# Patient Record
Sex: MALE | Race: BLACK OR AFRICAN AMERICAN | Employment: UNEMPLOYED | URBAN - METROPOLITAN AREA
[De-identification: names, ages, dates, MRNs, and addresses within clinical notes are randomized per-mention and may not be internally consistent; named-entity substitution may affect disease eponyms.]

---

## 2019-01-01 ENCOUNTER — APPOINTMENT (OUTPATIENT)
Dept: GENERAL RADIOLOGY | Age: 84
DRG: 004 | End: 2019-01-01
Payer: MEDICARE

## 2019-01-01 ENCOUNTER — APPOINTMENT (OUTPATIENT)
Dept: INTERVENTIONAL RADIOLOGY/VASCULAR | Age: 84
DRG: 004 | End: 2019-01-01
Payer: MEDICARE

## 2019-01-01 ENCOUNTER — HOSPITAL ENCOUNTER (EMERGENCY)
Age: 84
Discharge: HOME | End: 2019-07-21
Attending: EMERGENCY MEDICINE
Payer: MEDICARE

## 2019-01-01 ENCOUNTER — ANESTHESIA EVENT (OUTPATIENT)
Dept: ENDOSCOPY | Age: 84
DRG: 004 | End: 2019-01-01
Payer: MEDICARE

## 2019-01-01 ENCOUNTER — ANESTHESIA EVENT (OUTPATIENT)
Dept: OPERATING ROOM | Age: 84
DRG: 004 | End: 2019-01-01
Payer: MEDICARE

## 2019-01-01 ENCOUNTER — APPOINTMENT (OUTPATIENT)
Dept: CT IMAGING | Age: 84
DRG: 004 | End: 2019-01-01
Payer: MEDICARE

## 2019-01-01 ENCOUNTER — ANESTHESIA EVENT (OUTPATIENT)
Dept: MEDSURG UNIT | Age: 84
DRG: 004 | End: 2019-01-01
Payer: MEDICARE

## 2019-01-01 ENCOUNTER — APPOINTMENT (OUTPATIENT)
Dept: GENERAL RADIOLOGY | Age: 84
End: 2019-01-01
Payer: MEDICARE

## 2019-01-01 ENCOUNTER — ANESTHESIA (OUTPATIENT)
Dept: ENDOSCOPY | Age: 84
DRG: 004 | End: 2019-01-01
Payer: MEDICARE

## 2019-01-01 ENCOUNTER — HOSPITAL ENCOUNTER (INPATIENT)
Age: 84
LOS: 11 days | Discharge: ANOTHER HOSPITAL | DRG: 194 | End: 2019-03-23
Attending: PHYSICAL MEDICINE & REHABILITATION | Admitting: PHYSICAL MEDICINE & REHABILITATION
Payer: MEDICARE

## 2019-01-01 ENCOUNTER — ANESTHESIA (OUTPATIENT)
Dept: ICU | Age: 84
DRG: 004 | End: 2019-01-01
Payer: MEDICARE

## 2019-01-01 ENCOUNTER — ANESTHESIA (OUTPATIENT)
Dept: MEDSURG UNIT | Age: 84
DRG: 004 | End: 2019-01-01
Payer: MEDICARE

## 2019-01-01 ENCOUNTER — HOSPITAL ENCOUNTER (INPATIENT)
Age: 84
LOS: 19 days | Discharge: NURSING, REHAB OR LONG-TERM CARE | DRG: 004 | End: 2019-04-16
Attending: EMERGENCY MEDICINE | Admitting: INTERNAL MEDICINE
Payer: MEDICARE

## 2019-01-01 ENCOUNTER — APPOINTMENT (OUTPATIENT)
Dept: MRI IMAGING | Age: 84
DRG: 004 | End: 2019-01-01
Payer: MEDICARE

## 2019-01-01 ENCOUNTER — APPOINTMENT (OUTPATIENT)
Dept: GENERAL RADIOLOGY | Age: 84
DRG: 194 | End: 2019-01-01
Payer: MEDICARE

## 2019-01-01 ENCOUNTER — ANESTHESIA (OUTPATIENT)
Dept: OPERATING ROOM | Age: 84
DRG: 004 | End: 2019-01-01
Payer: MEDICARE

## 2019-01-01 ENCOUNTER — APPOINTMENT (OUTPATIENT)
Dept: CT IMAGING | Age: 84
DRG: 194 | End: 2019-01-01
Payer: MEDICARE

## 2019-01-01 ENCOUNTER — ANESTHESIA EVENT (OUTPATIENT)
Dept: ICU | Age: 84
DRG: 004 | End: 2019-01-01
Payer: MEDICARE

## 2019-01-01 ENCOUNTER — HOSPITAL ENCOUNTER (INPATIENT)
Age: 84
LOS: 3 days | Discharge: NURSING, REHAB OR LONG-TERM CARE | DRG: 194 | End: 2019-03-12
Attending: EMERGENCY MEDICINE | Admitting: HOSPITALIST
Payer: MEDICARE

## 2019-01-01 VITALS
TEMPERATURE: 97.7 F | HEART RATE: 99 BPM | SYSTOLIC BLOOD PRESSURE: 126 MMHG | RESPIRATION RATE: 16 BRPM | BODY MASS INDEX: 22.13 KG/M2 | DIASTOLIC BLOOD PRESSURE: 59 MMHG | OXYGEN SATURATION: 90 % | WEIGHT: 154.6 LBS | HEIGHT: 70 IN

## 2019-01-01 VITALS
WEIGHT: 150 LBS | DIASTOLIC BLOOD PRESSURE: 74 MMHG | OXYGEN SATURATION: 98 % | RESPIRATION RATE: 16 BRPM | TEMPERATURE: 98.1 F | BODY MASS INDEX: 22.22 KG/M2 | HEART RATE: 81 BPM | SYSTOLIC BLOOD PRESSURE: 123 MMHG | HEIGHT: 69 IN

## 2019-01-01 VITALS
WEIGHT: 154.8 LBS | TEMPERATURE: 97.8 F | HEIGHT: 70 IN | OXYGEN SATURATION: 95 % | DIASTOLIC BLOOD PRESSURE: 78 MMHG | SYSTOLIC BLOOD PRESSURE: 132 MMHG | HEART RATE: 97 BPM | BODY MASS INDEX: 22.16 KG/M2 | RESPIRATION RATE: 27 BRPM

## 2019-01-01 VITALS
WEIGHT: 160.05 LBS | SYSTOLIC BLOOD PRESSURE: 154 MMHG | RESPIRATION RATE: 22 BRPM | OXYGEN SATURATION: 100 % | HEIGHT: 70 IN | HEART RATE: 98 BPM | BODY MASS INDEX: 22.91 KG/M2 | DIASTOLIC BLOOD PRESSURE: 82 MMHG | TEMPERATURE: 99.1 F

## 2019-01-01 VITALS
SYSTOLIC BLOOD PRESSURE: 108 MMHG | RESPIRATION RATE: 12 BRPM | DIASTOLIC BLOOD PRESSURE: 69 MMHG | TEMPERATURE: 93.5 F | OXYGEN SATURATION: 100 %

## 2019-01-01 DIAGNOSIS — R77.8 ELEVATED TROPONIN: ICD-10-CM

## 2019-01-01 DIAGNOSIS — K94.23 MALFUNCTION OF GASTROSTOMY TUBE (HCC): Primary | ICD-10-CM

## 2019-01-01 DIAGNOSIS — R53.1 GENERAL WEAKNESS: ICD-10-CM

## 2019-01-01 DIAGNOSIS — R79.89 ELEVATED SERUM CREATININE: ICD-10-CM

## 2019-01-01 DIAGNOSIS — R94.31 ACUTE ELECTROCARDIOGRAM CHANGES: ICD-10-CM

## 2019-01-01 DIAGNOSIS — J11.1 INFLUENZA: ICD-10-CM

## 2019-01-01 DIAGNOSIS — R05.9 COUGH: ICD-10-CM

## 2019-01-01 DIAGNOSIS — I50.9 CONGESTIVE HEART FAILURE, UNSPECIFIED HF CHRONICITY, UNSPECIFIED HEART FAILURE TYPE (HCC): ICD-10-CM

## 2019-01-01 DIAGNOSIS — A41.9 SEPTICEMIA (HCC): Primary | ICD-10-CM

## 2019-01-01 DIAGNOSIS — R79.89 ELEVATED BRAIN NATRIURETIC PEPTIDE (BNP) LEVEL: ICD-10-CM

## 2019-01-01 DIAGNOSIS — J18.9 PNEUMONIA DUE TO ORGANISM: ICD-10-CM

## 2019-01-01 DIAGNOSIS — R77.8 TROPONIN LEVEL ELEVATED: ICD-10-CM

## 2019-01-01 DIAGNOSIS — R53.83 FATIGUE, UNSPECIFIED TYPE: Primary | ICD-10-CM

## 2019-01-01 DIAGNOSIS — R79.89 SERUM CREATININE RAISED: ICD-10-CM

## 2019-01-01 LAB
ABO/RH: NORMAL
ABO/RH: NORMAL
ACETYLCHOLINE BINDING ANTIBODY: 0 NMOL/L (ref 0–0.4)
ACETYLCHOLINE BINDING ANTIBODY: ABNORMAL NMOL/L (ref 0–0.4)
ACETYLCHOLINE BLOCKING AB: 0 % (ref 0–26)
ACETYLCHOLINE BLOCKING AB: ABNORMAL % (ref 0–26)
ADENOVIRUS DETECTION BY PCR: NOT DETECTED
ALBUMIN SERPL-MCNC: 2.5 GM/DL (ref 3.4–5)
ALBUMIN SERPL-MCNC: 2.9 GM/DL (ref 3.4–5)
ALBUMIN SERPL-MCNC: 3 GM/DL (ref 3.4–5)
ALBUMIN SERPL-MCNC: 3 GM/DL (ref 3.4–5)
ALBUMIN SERPL-MCNC: 3.6 GM/DL (ref 3.4–5)
ALP BLD-CCNC: 102 IU/L (ref 40–128)
ALP BLD-CCNC: 76 IU/L (ref 40–128)
ALP BLD-CCNC: 81 IU/L (ref 40–128)
ALP BLD-CCNC: 81 IU/L (ref 40–129)
ALP BLD-CCNC: 86 IU/L (ref 40–129)
ALT SERPL-CCNC: 14 U/L (ref 10–40)
ALT SERPL-CCNC: 5 U/L (ref 10–40)
ALT SERPL-CCNC: 7 U/L (ref 10–40)
ALT SERPL-CCNC: 7 U/L (ref 10–40)
ALT SERPL-CCNC: <5 U/L (ref 10–40)
AMMONIA: 35 UMOL/L (ref 16–60)
ANION GAP SERPL CALCULATED.3IONS-SCNC: 11 MMOL/L (ref 4–16)
ANION GAP SERPL CALCULATED.3IONS-SCNC: 11 MMOL/L (ref 4–16)
ANION GAP SERPL CALCULATED.3IONS-SCNC: 14 MMOL/L (ref 4–16)
ANION GAP SERPL CALCULATED.3IONS-SCNC: 3 MMOL/L (ref 4–16)
ANION GAP SERPL CALCULATED.3IONS-SCNC: 4 MMOL/L (ref 4–16)
ANION GAP SERPL CALCULATED.3IONS-SCNC: 5 MMOL/L (ref 4–16)
ANION GAP SERPL CALCULATED.3IONS-SCNC: 6 MMOL/L (ref 4–16)
ANION GAP SERPL CALCULATED.3IONS-SCNC: 7 MMOL/L (ref 4–16)
ANION GAP SERPL CALCULATED.3IONS-SCNC: 8 MMOL/L (ref 4–16)
ANION GAP SERPL CALCULATED.3IONS-SCNC: 9 MMOL/L (ref 4–16)
ANION GAP SERPL CALCULATED.3IONS-SCNC: 9 MMOL/L (ref 4–16)
ANTIBODY SCREEN: NEGATIVE
ANTIBODY SCREEN: NEGATIVE
APTT: 27.4 SECONDS (ref 21.2–33)
APTT: 30.8 SECONDS (ref 21.2–33)
APTT: 32.8 SECONDS (ref 21.2–33)
APTT: 33 SECONDS (ref 21.2–33)
APTT: 34 SECONDS (ref 21.2–33)
APTT: 34.2 SECONDS (ref 21.2–33)
APTT: 35.5 SECONDS (ref 21.2–33)
APTT: 35.8 SECONDS (ref 21.2–33)
APTT: 39.8 SECONDS (ref 21.2–33)
APTT: 41.5 SECONDS (ref 21.2–33)
APTT: 41.6 SECONDS (ref 21.2–33)
APTT: 61.1 SECONDS (ref 21.2–33)
APTT: 63.8 SECONDS (ref 21.2–33)
APTT: 73.2 SECONDS (ref 21.2–33)
APTT: >240 SECONDS (ref 21.2–33)
AST SERPL-CCNC: 13 IU/L (ref 15–37)
AST SERPL-CCNC: 18 IU/L (ref 15–37)
AST SERPL-CCNC: 18 IU/L (ref 15–37)
AST SERPL-CCNC: 19 IU/L (ref 15–37)
AST SERPL-CCNC: 35 IU/L (ref 15–37)
BACTERIA: ABNORMAL /HPF
BACTERIA: NEGATIVE /HPF
BACTERIA: NEGATIVE /HPF
BASE EXCESS MIXED: 6.1 (ref 0–1.2)
BASE EXCESS MIXED: ABNORMAL (ref 0–1.2)
BASOPHILS ABSOLUTE: 0 K/CU MM
BASOPHILS RELATIVE PERCENT: 0.1 % (ref 0–1)
BASOPHILS RELATIVE PERCENT: 0.3 % (ref 0–1)
BASOPHILS RELATIVE PERCENT: 0.5 % (ref 0–1)
BILIRUB SERPL-MCNC: 0.4 MG/DL (ref 0–1)
BILIRUB SERPL-MCNC: 0.4 MG/DL (ref 0–1)
BILIRUB SERPL-MCNC: 0.5 MG/DL (ref 0–1)
BILIRUB SERPL-MCNC: 0.5 MG/DL (ref 0–1)
BILIRUB SERPL-MCNC: 0.9 MG/DL (ref 0–1)
BILIRUBIN DIRECT: 0.2 MG/DL (ref 0–0.3)
BILIRUBIN URINE: NEGATIVE MG/DL
BILIRUBIN, INDIRECT: 0.2 MG/DL (ref 0–0.7)
BLOOD, URINE: ABNORMAL
BLOOD, URINE: ABNORMAL
BLOOD, URINE: NEGATIVE
BORDETELLA PERTUSSIS PCR: NOT DETECTED
BUN BLDV-MCNC: 13 MG/DL (ref 6–23)
BUN BLDV-MCNC: 15 MG/DL (ref 6–23)
BUN BLDV-MCNC: 16 MG/DL (ref 6–23)
BUN BLDV-MCNC: 16 MG/DL (ref 6–23)
BUN BLDV-MCNC: 17 MG/DL (ref 6–23)
BUN BLDV-MCNC: 18 MG/DL (ref 6–23)
BUN BLDV-MCNC: 19 MG/DL (ref 6–23)
BUN BLDV-MCNC: 19 MG/DL (ref 6–23)
BUN BLDV-MCNC: 20 MG/DL (ref 6–23)
BUN BLDV-MCNC: 20 MG/DL (ref 6–23)
BUN BLDV-MCNC: 21 MG/DL (ref 6–23)
BUN BLDV-MCNC: 21 MG/DL (ref 6–23)
BUN BLDV-MCNC: 22 MG/DL (ref 6–23)
BUN BLDV-MCNC: 23 MG/DL (ref 6–23)
BUN BLDV-MCNC: 24 MG/DL (ref 6–23)
BUN BLDV-MCNC: 29 MG/DL (ref 6–23)
BUN BLDV-MCNC: 31 MG/DL (ref 6–23)
BUN BLDV-MCNC: 34 MG/DL (ref 6–23)
BUN BLDV-MCNC: 35 MG/DL (ref 6–23)
BUN BLDV-MCNC: 9 MG/DL (ref 6–23)
CALCIUM IONIZED: 4.52 MG/DL (ref 4.48–5.28)
CALCIUM SERPL-MCNC: 7.3 MG/DL (ref 8.3–10.6)
CALCIUM SERPL-MCNC: 7.5 MG/DL (ref 8.3–10.6)
CALCIUM SERPL-MCNC: 7.6 MG/DL (ref 8.3–10.6)
CALCIUM SERPL-MCNC: 7.6 MG/DL (ref 8.3–10.6)
CALCIUM SERPL-MCNC: 7.7 MG/DL (ref 8.3–10.6)
CALCIUM SERPL-MCNC: 7.8 MG/DL (ref 8.3–10.6)
CALCIUM SERPL-MCNC: 7.9 MG/DL (ref 8.3–10.6)
CALCIUM SERPL-MCNC: 8 MG/DL (ref 8.3–10.6)
CALCIUM SERPL-MCNC: 8.1 MG/DL (ref 8.3–10.6)
CALCIUM SERPL-MCNC: 8.2 MG/DL (ref 8.3–10.6)
CALCIUM SERPL-MCNC: 8.2 MG/DL (ref 8.3–10.6)
CALCIUM SERPL-MCNC: 8.3 MG/DL (ref 8.3–10.6)
CALCIUM SERPL-MCNC: 8.4 MG/DL (ref 8.3–10.6)
CALCIUM SERPL-MCNC: 8.4 MG/DL (ref 8.3–10.6)
CALCIUM SERPL-MCNC: 8.5 MG/DL (ref 8.3–10.6)
CALCIUM SERPL-MCNC: 8.5 MG/DL (ref 8.3–10.6)
CALCIUM SERPL-MCNC: 8.6 MG/DL (ref 8.3–10.6)
CALCIUM SERPL-MCNC: 8.9 MG/DL (ref 8.3–10.6)
CALCIUM SERPL-MCNC: 8.9 MG/DL (ref 8.3–10.6)
CARBON MONOXIDE, BLOOD: 1.7 % (ref 0–5)
CARBON MONOXIDE, BLOOD: 1.9 % (ref 0–5)
CARBON MONOXIDE, BLOOD: 2 % (ref 0–5)
CARBON MONOXIDE, BLOOD: 2.1 % (ref 0–5)
CARBON MONOXIDE, BLOOD: 2.2 % (ref 0–5)
CARBON MONOXIDE, BLOOD: 2.2 % (ref 0–5)
CARBON MONOXIDE, BLOOD: 2.3 % (ref 0–5)
CARBON MONOXIDE, BLOOD: 2.4 % (ref 0–5)
CARBON MONOXIDE, BLOOD: 2.5 % (ref 0–5)
CARBON MONOXIDE, BLOOD: 2.6 % (ref 0–5)
CARBON MONOXIDE, BLOOD: 2.7 % (ref 0–5)
CARBON MONOXIDE, BLOOD: 2.8 % (ref 0–5)
CARBON MONOXIDE, BLOOD: 3.5 % (ref 0–5)
CARBON MONOXIDE, BLOOD: 4 % (ref 0–5)
CARBON MONOXIDE, BLOOD: 4.1 % (ref 0–5)
CHLAMYDOPHILA PNEUMONIA PCR: NOT DETECTED
CHLORIDE BLD-SCNC: 100 MMOL/L (ref 99–110)
CHLORIDE BLD-SCNC: 101 MMOL/L (ref 99–110)
CHLORIDE BLD-SCNC: 103 MMOL/L (ref 99–110)
CHLORIDE BLD-SCNC: 104 MMOL/L (ref 99–110)
CHLORIDE BLD-SCNC: 105 MMOL/L (ref 99–110)
CHLORIDE BLD-SCNC: 107 MMOL/L (ref 99–110)
CHLORIDE BLD-SCNC: 108 MMOL/L (ref 99–110)
CHLORIDE BLD-SCNC: 109 MMOL/L (ref 99–110)
CHLORIDE BLD-SCNC: 94 MMOL/L (ref 99–110)
CHLORIDE BLD-SCNC: 95 MMOL/L (ref 99–110)
CHLORIDE BLD-SCNC: 98 MMOL/L (ref 99–110)
CHLORIDE BLD-SCNC: 98 MMOL/L (ref 99–110)
CHLORIDE BLD-SCNC: 99 MMOL/L (ref 99–110)
CHOLESTEROL: 113 MG/DL
CLARITY: ABNORMAL
CLARITY: CLEAR
CLARITY: CLEAR
CO2 CONTENT: 26.7 MMOL/L (ref 19–24)
CO2 CONTENT: 31.3 MMOL/L (ref 19–24)
CO2 CONTENT: 33.9 MMOL/L (ref 19–24)
CO2 CONTENT: 34 MMOL/L (ref 19–24)
CO2 CONTENT: 34.2 MMOL/L (ref 19–24)
CO2 CONTENT: 35.6 MMOL/L (ref 19–24)
CO2 CONTENT: 35.7 MMOL/L (ref 19–24)
CO2 CONTENT: 36.8 MMOL/L (ref 19–24)
CO2 CONTENT: 37.3 MMOL/L (ref 19–24)
CO2 CONTENT: 37.4 MMOL/L (ref 19–24)
CO2 CONTENT: 37.6 MMOL/L (ref 19–24)
CO2 CONTENT: 38.1 MMOL/L (ref 19–24)
CO2 CONTENT: 39.4 MMOL/L (ref 19–24)
CO2 CONTENT: 40.2 MMOL/L (ref 19–24)
CO2 CONTENT: 40.3 MMOL/L (ref 19–24)
CO2 CONTENT: 41 MMOL/L (ref 19–24)
CO2 CONTENT: 41.7 MMOL/L (ref 19–24)
CO2 CONTENT: 43.1 MMOL/L (ref 19–24)
CO2 CONTENT: 43.6 MMOL/L (ref 19–24)
CO2 CONTENT: 43.6 MMOL/L (ref 19–24)
CO2 CONTENT: 44.5 MMOL/L (ref 19–24)
CO2: 29 MMOL/L (ref 21–32)
CO2: 29 MMOL/L (ref 21–32)
CO2: 30 MMOL/L (ref 21–32)
CO2: 31 MMOL/L (ref 21–32)
CO2: 31 MMOL/L (ref 21–32)
CO2: 32 MMOL/L (ref 21–32)
CO2: 33 MMOL/L (ref 21–32)
CO2: 33 MMOL/L (ref 21–32)
CO2: 34 MMOL/L (ref 21–32)
CO2: 35 MMOL/L (ref 21–32)
CO2: 36 MMOL/L (ref 21–32)
CO2: 37 MMOL/L (ref 21–32)
CO2: 38 MMOL/L (ref 21–32)
CO2: 39 MMOL/L (ref 21–32)
CO2: 41 MMOL/L (ref 21–32)
COLOR: ABNORMAL
COLOR: YELLOW
COLOR: YELLOW
COMMENT: ABNORMAL
COMMENT: NORMAL
COMPONENT: NORMAL
CORONAVIRUS 229E PCR: NOT DETECTED
CORONAVIRUS HKU1 PCR: NOT DETECTED
CORONAVIRUS NL63 PCR: NOT DETECTED
CORONAVIRUS OC43 PCR: NOT DETECTED
CREAT SERPL-MCNC: 0.8 MG/DL (ref 0.9–1.3)
CREAT SERPL-MCNC: 0.8 MG/DL (ref 0.9–1.3)
CREAT SERPL-MCNC: 0.9 MG/DL (ref 0.9–1.3)
CREAT SERPL-MCNC: 1 MG/DL (ref 0.9–1.3)
CREAT SERPL-MCNC: 1.1 MG/DL (ref 0.9–1.3)
CREAT SERPL-MCNC: 1.2 MG/DL (ref 0.9–1.3)
CREAT SERPL-MCNC: 1.3 MG/DL (ref 0.9–1.3)
CREAT SERPL-MCNC: 1.4 MG/DL (ref 0.9–1.3)
CROSSMATCH RESULT: NORMAL
CULTURE: ABNORMAL
CULTURE: NORMAL
DIFFERENTIAL TYPE: ABNORMAL
DOSE AMOUNT: NORMAL
DOSE TIME: NORMAL
EKG ATRIAL RATE: 116 BPM
EKG ATRIAL RATE: 91 BPM
EKG ATRIAL RATE: 94 BPM
EKG DIAGNOSIS: NORMAL
EKG P AXIS: 78 DEGREES
EKG P AXIS: 82 DEGREES
EKG P AXIS: 82 DEGREES
EKG P-R INTERVAL: 154 MS
EKG P-R INTERVAL: 160 MS
EKG P-R INTERVAL: 200 MS
EKG Q-T INTERVAL: 304 MS
EKG Q-T INTERVAL: 354 MS
EKG Q-T INTERVAL: 372 MS
EKG QRS DURATION: 82 MS
EKG QRS DURATION: 84 MS
EKG QRS DURATION: 86 MS
EKG QTC CALCULATION (BAZETT): 422 MS
EKG QTC CALCULATION (BAZETT): 435 MS
EKG QTC CALCULATION (BAZETT): 465 MS
EKG R AXIS: 18 DEGREES
EKG R AXIS: 21 DEGREES
EKG R AXIS: 38 DEGREES
EKG T AXIS: -11 DEGREES
EKG T AXIS: -3 DEGREES
EKG T AXIS: 82 DEGREES
EKG VENTRICULAR RATE: 116 BPM
EKG VENTRICULAR RATE: 91 BPM
EKG VENTRICULAR RATE: 94 BPM
EOSINOPHILS ABSOLUTE: 0 K/CU MM
EOSINOPHILS RELATIVE PERCENT: 0 % (ref 0–3)
EOSINOPHILS RELATIVE PERCENT: 0 % (ref 0–3)
EOSINOPHILS RELATIVE PERCENT: 0.1 % (ref 0–3)
GFR AFRICAN AMERICAN: 58 ML/MIN/1.73M2
GFR AFRICAN AMERICAN: >60 ML/MIN/1.73M2
GFR NON-AFRICAN AMERICAN: 48 ML/MIN/1.73M2
GFR NON-AFRICAN AMERICAN: 52 ML/MIN/1.73M2
GFR NON-AFRICAN AMERICAN: 58 ML/MIN/1.73M2
GFR NON-AFRICAN AMERICAN: >60 ML/MIN/1.73M2
GLUCOSE BLD-MCNC: 102 MG/DL (ref 70–99)
GLUCOSE BLD-MCNC: 103 MG/DL (ref 70–99)
GLUCOSE BLD-MCNC: 104 MG/DL (ref 70–99)
GLUCOSE BLD-MCNC: 108 MG/DL (ref 70–99)
GLUCOSE BLD-MCNC: 110 MG/DL (ref 70–99)
GLUCOSE BLD-MCNC: 111 MG/DL (ref 70–99)
GLUCOSE BLD-MCNC: 112 MG/DL (ref 70–99)
GLUCOSE BLD-MCNC: 112 MG/DL (ref 70–99)
GLUCOSE BLD-MCNC: 115 MG/DL (ref 70–99)
GLUCOSE BLD-MCNC: 116 MG/DL (ref 70–99)
GLUCOSE BLD-MCNC: 116 MG/DL (ref 70–99)
GLUCOSE BLD-MCNC: 117 MG/DL (ref 70–99)
GLUCOSE BLD-MCNC: 118 MG/DL (ref 70–99)
GLUCOSE BLD-MCNC: 121 MG/DL (ref 70–99)
GLUCOSE BLD-MCNC: 122 MG/DL (ref 70–99)
GLUCOSE BLD-MCNC: 122 MG/DL (ref 70–99)
GLUCOSE BLD-MCNC: 123 MG/DL (ref 70–99)
GLUCOSE BLD-MCNC: 124 MG/DL (ref 70–99)
GLUCOSE BLD-MCNC: 124 MG/DL (ref 70–99)
GLUCOSE BLD-MCNC: 125 MG/DL (ref 70–99)
GLUCOSE BLD-MCNC: 126 MG/DL (ref 70–99)
GLUCOSE BLD-MCNC: 127 MG/DL (ref 70–99)
GLUCOSE BLD-MCNC: 128 MG/DL (ref 70–99)
GLUCOSE BLD-MCNC: 130 MG/DL (ref 70–99)
GLUCOSE BLD-MCNC: 132 MG/DL (ref 70–99)
GLUCOSE BLD-MCNC: 133 MG/DL (ref 70–99)
GLUCOSE BLD-MCNC: 134 MG/DL (ref 70–99)
GLUCOSE BLD-MCNC: 136 MG/DL (ref 70–99)
GLUCOSE BLD-MCNC: 137 MG/DL (ref 70–99)
GLUCOSE BLD-MCNC: 138 MG/DL (ref 70–99)
GLUCOSE BLD-MCNC: 138 MG/DL (ref 70–99)
GLUCOSE BLD-MCNC: 139 MG/DL (ref 70–99)
GLUCOSE BLD-MCNC: 139 MG/DL (ref 70–99)
GLUCOSE BLD-MCNC: 140 MG/DL (ref 70–99)
GLUCOSE BLD-MCNC: 141 MG/DL (ref 70–99)
GLUCOSE BLD-MCNC: 142 MG/DL (ref 70–99)
GLUCOSE BLD-MCNC: 142 MG/DL (ref 70–99)
GLUCOSE BLD-MCNC: 144 MG/DL (ref 70–99)
GLUCOSE BLD-MCNC: 146 MG/DL (ref 70–99)
GLUCOSE BLD-MCNC: 146 MG/DL (ref 70–99)
GLUCOSE BLD-MCNC: 147 MG/DL (ref 70–99)
GLUCOSE BLD-MCNC: 148 MG/DL (ref 70–99)
GLUCOSE BLD-MCNC: 149 MG/DL (ref 70–99)
GLUCOSE BLD-MCNC: 150 MG/DL (ref 70–99)
GLUCOSE BLD-MCNC: 151 MG/DL (ref 70–99)
GLUCOSE BLD-MCNC: 151 MG/DL (ref 70–99)
GLUCOSE BLD-MCNC: 152 MG/DL (ref 70–99)
GLUCOSE BLD-MCNC: 153 MG/DL (ref 70–99)
GLUCOSE BLD-MCNC: 153 MG/DL (ref 70–99)
GLUCOSE BLD-MCNC: 154 MG/DL (ref 70–99)
GLUCOSE BLD-MCNC: 154 MG/DL (ref 70–99)
GLUCOSE BLD-MCNC: 155 MG/DL (ref 70–99)
GLUCOSE BLD-MCNC: 156 MG/DL (ref 70–99)
GLUCOSE BLD-MCNC: 157 MG/DL (ref 70–99)
GLUCOSE BLD-MCNC: 157 MG/DL (ref 70–99)
GLUCOSE BLD-MCNC: 158 MG/DL (ref 70–99)
GLUCOSE BLD-MCNC: 158 MG/DL (ref 70–99)
GLUCOSE BLD-MCNC: 159 MG/DL (ref 70–99)
GLUCOSE BLD-MCNC: 161 MG/DL (ref 70–99)
GLUCOSE BLD-MCNC: 162 MG/DL (ref 70–99)
GLUCOSE BLD-MCNC: 163 MG/DL
GLUCOSE BLD-MCNC: 163 MG/DL (ref 70–99)
GLUCOSE BLD-MCNC: 165 MG/DL (ref 70–99)
GLUCOSE BLD-MCNC: 165 MG/DL (ref 70–99)
GLUCOSE BLD-MCNC: 168 MG/DL (ref 70–99)
GLUCOSE BLD-MCNC: 168 MG/DL (ref 70–99)
GLUCOSE BLD-MCNC: 169 MG/DL (ref 70–99)
GLUCOSE BLD-MCNC: 170 MG/DL (ref 70–99)
GLUCOSE BLD-MCNC: 171 MG/DL (ref 70–99)
GLUCOSE BLD-MCNC: 171 MG/DL (ref 70–99)
GLUCOSE BLD-MCNC: 172 MG/DL (ref 70–99)
GLUCOSE BLD-MCNC: 172 MG/DL (ref 70–99)
GLUCOSE BLD-MCNC: 173 MG/DL (ref 70–99)
GLUCOSE BLD-MCNC: 174 MG/DL (ref 70–99)
GLUCOSE BLD-MCNC: 174 MG/DL (ref 70–99)
GLUCOSE BLD-MCNC: 175 MG/DL (ref 70–99)
GLUCOSE BLD-MCNC: 175 MG/DL (ref 70–99)
GLUCOSE BLD-MCNC: 177 MG/DL (ref 70–99)
GLUCOSE BLD-MCNC: 178 MG/DL (ref 70–99)
GLUCOSE BLD-MCNC: 179 MG/DL (ref 70–99)
GLUCOSE BLD-MCNC: 179 MG/DL (ref 70–99)
GLUCOSE BLD-MCNC: 183 MG/DL (ref 70–99)
GLUCOSE BLD-MCNC: 183 MG/DL (ref 70–99)
GLUCOSE BLD-MCNC: 184 MG/DL (ref 70–99)
GLUCOSE BLD-MCNC: 185 MG/DL (ref 70–99)
GLUCOSE BLD-MCNC: 188 MG/DL (ref 70–99)
GLUCOSE BLD-MCNC: 188 MG/DL (ref 70–99)
GLUCOSE BLD-MCNC: 190 MG/DL (ref 70–99)
GLUCOSE BLD-MCNC: 191 MG/DL (ref 70–99)
GLUCOSE BLD-MCNC: 191 MG/DL (ref 70–99)
GLUCOSE BLD-MCNC: 192 MG/DL (ref 70–99)
GLUCOSE BLD-MCNC: 193 MG/DL (ref 70–99)
GLUCOSE BLD-MCNC: 194 MG/DL (ref 70–99)
GLUCOSE BLD-MCNC: 196 MG/DL (ref 70–99)
GLUCOSE BLD-MCNC: 200 MG/DL (ref 70–99)
GLUCOSE BLD-MCNC: 201 MG/DL (ref 70–99)
GLUCOSE BLD-MCNC: 202 MG/DL (ref 70–99)
GLUCOSE BLD-MCNC: 203 MG/DL (ref 70–99)
GLUCOSE BLD-MCNC: 204 MG/DL (ref 70–99)
GLUCOSE BLD-MCNC: 204 MG/DL (ref 70–99)
GLUCOSE BLD-MCNC: 205 MG/DL (ref 70–99)
GLUCOSE BLD-MCNC: 205 MG/DL (ref 70–99)
GLUCOSE BLD-MCNC: 207 MG/DL (ref 70–99)
GLUCOSE BLD-MCNC: 207 MG/DL (ref 70–99)
GLUCOSE BLD-MCNC: 208 MG/DL (ref 70–99)
GLUCOSE BLD-MCNC: 211 MG/DL (ref 70–99)
GLUCOSE BLD-MCNC: 212 MG/DL (ref 70–99)
GLUCOSE BLD-MCNC: 214 MG/DL (ref 70–99)
GLUCOSE BLD-MCNC: 216 MG/DL (ref 70–99)
GLUCOSE BLD-MCNC: 219 MG/DL (ref 70–99)
GLUCOSE BLD-MCNC: 222 MG/DL (ref 70–99)
GLUCOSE BLD-MCNC: 223 MG/DL (ref 70–99)
GLUCOSE BLD-MCNC: 224 MG/DL
GLUCOSE BLD-MCNC: 224 MG/DL (ref 70–99)
GLUCOSE BLD-MCNC: 224 MG/DL (ref 70–99)
GLUCOSE BLD-MCNC: 226 MG/DL (ref 70–99)
GLUCOSE BLD-MCNC: 227 MG/DL (ref 70–99)
GLUCOSE BLD-MCNC: 229 MG/DL (ref 70–99)
GLUCOSE BLD-MCNC: 232 MG/DL (ref 70–99)
GLUCOSE BLD-MCNC: 239 MG/DL (ref 70–99)
GLUCOSE BLD-MCNC: 244 MG/DL (ref 70–99)
GLUCOSE BLD-MCNC: 250 MG/DL (ref 70–99)
GLUCOSE BLD-MCNC: 267 MG/DL (ref 70–99)
GLUCOSE BLD-MCNC: 301 MG/DL (ref 70–99)
GLUCOSE BLD-MCNC: 365 MG/DL (ref 70–99)
GLUCOSE BLD-MCNC: 69 MG/DL (ref 70–99)
GLUCOSE BLD-MCNC: 79 MG/DL (ref 70–99)
GLUCOSE BLD-MCNC: 81 MG/DL (ref 70–99)
GLUCOSE BLD-MCNC: 81 MG/DL (ref 70–99)
GLUCOSE BLD-MCNC: 82 MG/DL (ref 70–99)
GLUCOSE BLD-MCNC: 84 MG/DL (ref 70–99)
GLUCOSE BLD-MCNC: 85 MG/DL (ref 70–99)
GLUCOSE BLD-MCNC: 89 MG/DL (ref 70–99)
GLUCOSE BLD-MCNC: 90 MG/DL (ref 70–99)
GLUCOSE BLD-MCNC: 91 MG/DL (ref 70–99)
GLUCOSE BLD-MCNC: 96 MG/DL (ref 70–99)
GLUCOSE BLD-MCNC: 97 MG/DL (ref 70–99)
GLUCOSE BLD-MCNC: 99 MG/DL (ref 70–99)
GLUCOSE, URINE: 50 MG/DL
GLUCOSE, URINE: NEGATIVE MG/DL
GLUCOSE, URINE: NEGATIVE MG/DL
GRAM SMEAR: ABNORMAL
GRANULAR CASTS: 1 /LPF
GRANULAR CASTS: 4 /LPF
HCO3 ARTERIAL: 26 MMOL/L (ref 18–23)
HCO3 ARTERIAL: 29.9 MMOL/L (ref 18–23)
HCO3 ARTERIAL: 32.4 MMOL/L (ref 18–23)
HCO3 ARTERIAL: 32.5 MMOL/L (ref 18–23)
HCO3 ARTERIAL: 32.8 MMOL/L (ref 18–23)
HCO3 ARTERIAL: 34.2 MMOL/L (ref 18–23)
HCO3 ARTERIAL: 34.2 MMOL/L (ref 18–23)
HCO3 ARTERIAL: 34.3 MMOL/L (ref 18–23)
HCO3 ARTERIAL: 34.7 MMOL/L (ref 18–23)
HCO3 ARTERIAL: 35.9 MMOL/L (ref 18–23)
HCO3 ARTERIAL: 36.4 MMOL/L (ref 18–23)
HCO3 ARTERIAL: 36.6 MMOL/L (ref 18–23)
HCO3 ARTERIAL: 37.8 MMOL/L (ref 18–23)
HCO3 ARTERIAL: 38.6 MMOL/L (ref 18–23)
HCO3 ARTERIAL: 38.7 MMOL/L (ref 18–23)
HCO3 ARTERIAL: 39.2 MMOL/L (ref 18–23)
HCO3 ARTERIAL: 40 MMOL/L (ref 18–23)
HCO3 ARTERIAL: 41.3 MMOL/L (ref 18–23)
HCO3 ARTERIAL: 41.5 MMOL/L (ref 18–23)
HCO3 ARTERIAL: 41.9 MMOL/L (ref 18–23)
HCO3 ARTERIAL: 42.1 MMOL/L (ref 18–23)
HCO3 VENOUS: 31.2 MMOL/L (ref 19–25)
HCO3 VENOUS: 43.2 MMOL/L (ref 19–25)
HCT VFR BLD CALC: 21.9 % (ref 42–52)
HCT VFR BLD CALC: 23.2 % (ref 42–52)
HCT VFR BLD CALC: 23.9 % (ref 42–52)
HCT VFR BLD CALC: 25 % (ref 42–52)
HCT VFR BLD CALC: 25.2 % (ref 42–52)
HCT VFR BLD CALC: 25.4 % (ref 42–52)
HCT VFR BLD CALC: 25.8 % (ref 42–52)
HCT VFR BLD CALC: 25.8 % (ref 42–52)
HCT VFR BLD CALC: 25.9 % (ref 42–52)
HCT VFR BLD CALC: 26.1 % (ref 42–52)
HCT VFR BLD CALC: 26.8 % (ref 42–52)
HCT VFR BLD CALC: 26.9 % (ref 42–52)
HCT VFR BLD CALC: 27.1 % (ref 42–52)
HCT VFR BLD CALC: 28.1 % (ref 42–52)
HCT VFR BLD CALC: 28.5 % (ref 42–52)
HCT VFR BLD CALC: 28.6 % (ref 42–52)
HCT VFR BLD CALC: 28.8 % (ref 42–52)
HCT VFR BLD CALC: 29.1 % (ref 42–52)
HCT VFR BLD CALC: 29.9 % (ref 42–52)
HCT VFR BLD CALC: 30 % (ref 42–52)
HCT VFR BLD CALC: 35.3 % (ref 42–52)
HCT VFR BLD CALC: 36 % (ref 42–52)
HCT VFR BLD CALC: 36.1 % (ref 42–52)
HCT VFR BLD CALC: 36.4 % (ref 42–52)
HCT VFR BLD CALC: 37.9 % (ref 42–52)
HCT VFR BLD CALC: 38.1 % (ref 42–52)
HCT VFR BLD CALC: 39.3 % (ref 42–52)
HCT VFR BLD CALC: 39.3 % (ref 42–52)
HCT VFR BLD CALC: 41.7 % (ref 42–52)
HDLC SERPL-MCNC: 44 MG/DL
HEMOGLOBIN: 10.3 GM/DL (ref 13.5–18)
HEMOGLOBIN: 10.4 GM/DL (ref 13.5–18)
HEMOGLOBIN: 10.6 GM/DL (ref 13.5–18)
HEMOGLOBIN: 10.8 GM/DL (ref 13.5–18)
HEMOGLOBIN: 11 GM/DL (ref 13.5–18)
HEMOGLOBIN: 11.1 GM/DL (ref 13.5–18)
HEMOGLOBIN: 11.4 GM/DL (ref 13.5–18)
HEMOGLOBIN: 11.5 GM/DL (ref 13.5–18)
HEMOGLOBIN: 12.8 GM/DL (ref 13.5–18)
HEMOGLOBIN: 7 GM/DL (ref 13.5–18)
HEMOGLOBIN: 7.4 GM/DL (ref 13.5–18)
HEMOGLOBIN: 7.5 GM/DL (ref 13.5–18)
HEMOGLOBIN: 7.5 GM/DL (ref 13.5–18)
HEMOGLOBIN: 7.6 GM/DL (ref 13.5–18)
HEMOGLOBIN: 7.6 GM/DL (ref 13.5–18)
HEMOGLOBIN: 7.7 GM/DL (ref 13.5–18)
HEMOGLOBIN: 7.8 GM/DL (ref 13.5–18)
HEMOGLOBIN: 7.8 GM/DL (ref 13.5–18)
HEMOGLOBIN: 8.1 GM/DL (ref 13.5–18)
HEMOGLOBIN: 8.1 GM/DL (ref 13.5–18)
HEMOGLOBIN: 8.3 GM/DL (ref 13.5–18)
HEMOGLOBIN: 8.4 GM/DL (ref 13.5–18)
HEMOGLOBIN: 8.7 GM/DL (ref 13.5–18)
HEMOGLOBIN: 8.7 GM/DL (ref 13.5–18)
HEMOGLOBIN: 8.8 GM/DL (ref 13.5–18)
HEMOGLOBIN: ABNORMAL GM/DL (ref 13.5–18)
HEMOGLOBIN: ABNORMAL GM/DL (ref 13.5–18)
HUMAN METAPNEUMOVIRUS PCR: NOT DETECTED
HYALINE CASTS: 10 /LPF
IMMATURE NEUTROPHIL %: 0.4 % (ref 0–0.43)
IMMATURE NEUTROPHIL %: 0.7 % (ref 0–0.43)
IMMATURE NEUTROPHIL %: 2.3 % (ref 0–0.43)
INFLUENZA A BY PCR: ABNORMAL
INFLUENZA A H1 (2009) PCR: NOT DETECTED
INFLUENZA A H1 PANDEMIC PCR: NOT DETECTED
INFLUENZA A H3 PCR: ABNORMAL
INFLUENZA A H3 PCR: ABNORMAL
INFLUENZA B BY PCR: NOT DETECTED
INR BLD: 1.04 INDEX
INR BLD: 1.09 INDEX
INR BLD: 1.13 INDEX
IONIZED CA: 1.13 MMOL/L (ref 1.12–1.32)
KETONES, URINE: ABNORMAL MG/DL
KETONES, URINE: NEGATIVE MG/DL
KETONES, URINE: NEGATIVE MG/DL
LACTATE: 1.1 MMOL/L (ref 0.4–2)
LACTATE: 1.1 MMOL/L (ref 0.4–2)
LACTATE: 2 MMOL/L (ref 0.4–2)
LDL CHOLESTEROL DIRECT: 48 MG/DL
LEUKOCYTE ESTERASE, URINE: ABNORMAL
LEUKOCYTE ESTERASE, URINE: NEGATIVE
LEUKOCYTE ESTERASE, URINE: NEGATIVE
LIPASE: 27 IU/L (ref 13–60)
LV EF: 63 %
LVEF MODALITY: NORMAL
LYMPHOCYTES ABSOLUTE: 0.3 K/CU MM
LYMPHOCYTES ABSOLUTE: 0.6 K/CU MM
LYMPHOCYTES ABSOLUTE: 1 K/CU MM
LYMPHOCYTES RELATIVE PERCENT: 12 % (ref 24–44)
LYMPHOCYTES RELATIVE PERCENT: 4.9 % (ref 24–44)
LYMPHOCYTES RELATIVE PERCENT: 5.4 % (ref 24–44)
Lab: ABNORMAL
Lab: ABNORMAL
Lab: NORMAL
MAGNESIUM: 2.1 MG/DL (ref 1.8–2.4)
MAGNESIUM: 2.2 MG/DL (ref 1.8–2.4)
MAGNESIUM: 2.3 MG/DL (ref 1.8–2.4)
MCH RBC QN AUTO: 28.4 PG (ref 27–31)
MCH RBC QN AUTO: 28.4 PG (ref 27–31)
MCH RBC QN AUTO: 28.5 PG (ref 27–31)
MCH RBC QN AUTO: 28.6 PG (ref 27–31)
MCH RBC QN AUTO: 28.7 PG (ref 27–31)
MCH RBC QN AUTO: 28.9 PG (ref 27–31)
MCH RBC QN AUTO: 29.1 PG (ref 27–31)
MCH RBC QN AUTO: 29.1 PG (ref 27–31)
MCH RBC QN AUTO: 29.2 PG (ref 27–31)
MCH RBC QN AUTO: 29.2 PG (ref 27–31)
MCH RBC QN AUTO: 29.3 PG (ref 27–31)
MCHC RBC AUTO-ENTMCNC: 28 % (ref 32–36)
MCHC RBC AUTO-ENTMCNC: 28.3 % (ref 32–36)
MCHC RBC AUTO-ENTMCNC: 28.9 % (ref 32–36)
MCHC RBC AUTO-ENTMCNC: 29 % (ref 32–36)
MCHC RBC AUTO-ENTMCNC: 29.1 % (ref 32–36)
MCHC RBC AUTO-ENTMCNC: 29.2 % (ref 32–36)
MCHC RBC AUTO-ENTMCNC: 29.2 % (ref 32–36)
MCHC RBC AUTO-ENTMCNC: 29.3 % (ref 32–36)
MCHC RBC AUTO-ENTMCNC: 29.4 % (ref 32–36)
MCHC RBC AUTO-ENTMCNC: 29.4 % (ref 32–36)
MCHC RBC AUTO-ENTMCNC: 29.6 % (ref 32–36)
MCHC RBC AUTO-ENTMCNC: 29.8 % (ref 32–36)
MCHC RBC AUTO-ENTMCNC: 29.9 % (ref 32–36)
MCHC RBC AUTO-ENTMCNC: 30.2 % (ref 32–36)
MCHC RBC AUTO-ENTMCNC: 30.6 % (ref 32–36)
MCHC RBC AUTO-ENTMCNC: 30.6 % (ref 32–36)
MCHC RBC AUTO-ENTMCNC: 30.7 % (ref 32–36)
MCV RBC AUTO: 100.7 FL (ref 78–100)
MCV RBC AUTO: 100.8 FL (ref 78–100)
MCV RBC AUTO: 101.4 FL (ref 78–100)
MCV RBC AUTO: 104.8 FL (ref 78–100)
MCV RBC AUTO: 94.4 FL (ref 78–100)
MCV RBC AUTO: 94.7 FL (ref 78–100)
MCV RBC AUTO: 94.8 FL (ref 78–100)
MCV RBC AUTO: 94.8 FL (ref 78–100)
MCV RBC AUTO: 95.1 FL (ref 78–100)
MCV RBC AUTO: 95.1 FL (ref 78–100)
MCV RBC AUTO: 95.7 FL (ref 78–100)
MCV RBC AUTO: 96.9 FL (ref 78–100)
MCV RBC AUTO: 97 FL (ref 78–100)
MCV RBC AUTO: 97.2 FL (ref 78–100)
MCV RBC AUTO: 97.3 FL (ref 78–100)
MCV RBC AUTO: 97.4 FL (ref 78–100)
MCV RBC AUTO: 97.6 FL (ref 78–100)
MCV RBC AUTO: 97.7 FL (ref 78–100)
MCV RBC AUTO: 97.9 FL (ref 78–100)
MCV RBC AUTO: 98.2 FL (ref 78–100)
MCV RBC AUTO: 98.5 FL (ref 78–100)
MCV RBC AUTO: 98.7 FL (ref 78–100)
MCV RBC AUTO: 98.9 FL (ref 78–100)
MCV RBC AUTO: 98.9 FL (ref 78–100)
METHEMOGLOBIN ARTERIAL: 0.4 %
METHEMOGLOBIN ARTERIAL: 0.8 %
METHEMOGLOBIN ARTERIAL: 0.8 %
METHEMOGLOBIN ARTERIAL: 0.9 %
METHEMOGLOBIN ARTERIAL: 1.1 %
METHEMOGLOBIN ARTERIAL: 1.1 %
METHEMOGLOBIN ARTERIAL: 1.2 %
METHEMOGLOBIN ARTERIAL: 1.3 %
METHEMOGLOBIN ARTERIAL: 1.4 %
METHEMOGLOBIN ARTERIAL: 1.5 %
METHEMOGLOBIN ARTERIAL: 1.5 %
METHEMOGLOBIN ARTERIAL: 1.6 %
METHEMOGLOBIN ARTERIAL: 1.7 %
MONOCYTES ABSOLUTE: 0.4 K/CU MM
MONOCYTES ABSOLUTE: 0.7 K/CU MM
MONOCYTES ABSOLUTE: 0.9 K/CU MM
MONOCYTES RELATIVE PERCENT: 6.2 % (ref 0–4)
MONOCYTES RELATIVE PERCENT: 8.1 % (ref 0–4)
MONOCYTES RELATIVE PERCENT: 8.7 % (ref 0–4)
MUCUS: ABNORMAL HPF
MYCOPLASMA PNEUMONIAE PCR: NOT DETECTED
NITRITE URINE, QUANTITATIVE: NEGATIVE
NUCLEATED RBC %: 0 %
NUCLEATED RBC %: 0 %
NUCLEATED RBC %: 0.5 %
O2 SAT, VEN: 86.9 % (ref 50–70)
O2 SAT, VEN: 93.6 % (ref 50–70)
O2 SATURATION: 74.4 % (ref 96–97)
O2 SATURATION: 83.2 % (ref 96–97)
O2 SATURATION: 84.3 % (ref 96–97)
O2 SATURATION: 84.6 % (ref 96–97)
O2 SATURATION: 87.9 % (ref 96–97)
O2 SATURATION: 90.5 % (ref 96–97)
O2 SATURATION: 94.7 % (ref 96–97)
O2 SATURATION: 94.8 % (ref 96–97)
O2 SATURATION: 95 % (ref 96–97)
O2 SATURATION: 95.4 % (ref 96–97)
O2 SATURATION: 95.8 % (ref 96–97)
O2 SATURATION: 96 % (ref 96–97)
O2 SATURATION: 96 % (ref 96–97)
O2 SATURATION: 96.1 % (ref 96–97)
O2 SATURATION: 96.2 % (ref 96–97)
O2 SATURATION: 96.2 % (ref 96–97)
O2 SATURATION: 96.3 % (ref 96–97)
O2 SATURATION: 96.4 % (ref 96–97)
O2 SATURATION: 96.5 % (ref 96–97)
PARAINFLUENZA 1 PCR: NOT DETECTED
PARAINFLUENZA 2 PCR: NOT DETECTED
PARAINFLUENZA 3 PCR: NOT DETECTED
PARAINFLUENZA 4 PCR: NOT DETECTED
PCO2 ARTERIAL: 110 MMHG (ref 32–45)
PCO2 ARTERIAL: 22 MMHG (ref 32–45)
PCO2 ARTERIAL: 33 MMHG (ref 32–45)
PCO2 ARTERIAL: 44 MMHG (ref 32–45)
PCO2 ARTERIAL: 45 MMHG (ref 32–45)
PCO2 ARTERIAL: 46 MMHG (ref 32–45)
PCO2 ARTERIAL: 46 MMHG (ref 32–45)
PCO2 ARTERIAL: 47 MMHG (ref 32–45)
PCO2 ARTERIAL: 48 MMHG (ref 32–45)
PCO2 ARTERIAL: 49 MMHG (ref 32–45)
PCO2 ARTERIAL: 50 MMHG (ref 32–45)
PCO2 ARTERIAL: 52 MMHG (ref 32–45)
PCO2 ARTERIAL: 52 MMHG (ref 32–45)
PCO2 ARTERIAL: 53 MMHG (ref 32–45)
PCO2 ARTERIAL: 55 MMHG (ref 32–45)
PCO2 ARTERIAL: 55 MMHG (ref 32–45)
PCO2 ARTERIAL: 58 MMHG (ref 32–45)
PCO2 ARTERIAL: 59 MMHG (ref 32–45)
PCO2 ARTERIAL: 67 MMHG (ref 32–45)
PCO2 ARTERIAL: 69 MMHG (ref 32–45)
PCO2 ARTERIAL: 78 MMHG (ref 32–45)
PCO2, VEN: 46 MMHG (ref 38–52)
PCO2, VEN: 73 MMHG (ref 38–52)
PDW BLD-RTO: 12.8 % (ref 11.7–14.9)
PDW BLD-RTO: 12.8 % (ref 11.7–14.9)
PDW BLD-RTO: 13 % (ref 11.7–14.9)
PDW BLD-RTO: 13.1 % (ref 11.7–14.9)
PDW BLD-RTO: 13.5 % (ref 11.7–14.9)
PDW BLD-RTO: 14.6 % (ref 11.7–14.9)
PDW BLD-RTO: 15 % (ref 11.7–14.9)
PDW BLD-RTO: 15 % (ref 11.7–14.9)
PDW BLD-RTO: 15.2 % (ref 11.7–14.9)
PDW BLD-RTO: 15.4 % (ref 11.7–14.9)
PDW BLD-RTO: 15.6 % (ref 11.7–14.9)
PDW BLD-RTO: 15.9 % (ref 11.7–14.9)
PDW BLD-RTO: 16 % (ref 11.7–14.9)
PDW BLD-RTO: 16 % (ref 11.7–14.9)
PDW BLD-RTO: 16.1 % (ref 11.7–14.9)
PDW BLD-RTO: 16.4 % (ref 11.7–14.9)
PDW BLD-RTO: 16.6 % (ref 11.7–14.9)
PDW BLD-RTO: 16.7 % (ref 11.7–14.9)
PDW BLD-RTO: 16.8 % (ref 11.7–14.9)
PDW BLD-RTO: 17.1 % (ref 11.7–14.9)
PDW BLD-RTO: 17.5 % (ref 11.7–14.9)
PH BLOOD: 7.1 (ref 7.34–7.45)
PH BLOOD: 7.31 (ref 7.34–7.45)
PH BLOOD: 7.34 (ref 7.34–7.45)
PH BLOOD: 7.4 (ref 7.34–7.45)
PH BLOOD: 7.42 (ref 7.34–7.45)
PH BLOOD: 7.43 (ref 7.34–7.45)
PH BLOOD: 7.43 (ref 7.34–7.45)
PH BLOOD: 7.44 (ref 7.34–7.45)
PH BLOOD: 7.46 (ref 7.34–7.45)
PH BLOOD: 7.47 (ref 7.34–7.45)
PH BLOOD: 7.48 (ref 7.34–7.45)
PH BLOOD: 7.48 (ref 7.34–7.45)
PH BLOOD: 7.49 (ref 7.34–7.45)
PH BLOOD: 7.49 (ref 7.34–7.45)
PH BLOOD: 7.5 (ref 7.34–7.45)
PH BLOOD: 7.65 (ref 7.34–7.45)
PH BLOOD: 7.68 (ref 7.34–7.45)
PH VENOUS: 7.38 (ref 7.32–7.42)
PH VENOUS: 7.44 (ref 7.32–7.42)
PH, URINE: 5 (ref 5–8)
PLATELET # BLD: 141 K/CU MM (ref 140–440)
PLATELET # BLD: 148 K/CU MM (ref 140–440)
PLATELET # BLD: 154 K/CU MM (ref 140–440)
PLATELET # BLD: 168 K/CU MM (ref 140–440)
PLATELET # BLD: 168 K/CU MM (ref 140–440)
PLATELET # BLD: 170 K/CU MM (ref 140–440)
PLATELET # BLD: 170 K/CU MM (ref 140–440)
PLATELET # BLD: 171 K/CU MM (ref 140–440)
PLATELET # BLD: 176 K/CU MM (ref 140–440)
PLATELET # BLD: 178 K/CU MM (ref 140–440)
PLATELET # BLD: 183 K/CU MM (ref 140–440)
PLATELET # BLD: 192 K/CU MM (ref 140–440)
PLATELET # BLD: 199 K/CU MM (ref 140–440)
PLATELET # BLD: 200 K/CU MM (ref 140–440)
PLATELET # BLD: 206 K/CU MM (ref 140–440)
PLATELET # BLD: 219 K/CU MM (ref 140–440)
PLATELET # BLD: 221 K/CU MM (ref 140–440)
PLATELET # BLD: 236 K/CU MM (ref 140–440)
PLATELET # BLD: 253 K/CU MM (ref 140–440)
PLATELET # BLD: 270 K/CU MM (ref 140–440)
PLATELET # BLD: 276 K/CU MM (ref 140–440)
PLATELET # BLD: 286 K/CU MM (ref 140–440)
PLATELET # BLD: 311 K/CU MM (ref 140–440)
PLATELET # BLD: 313 K/CU MM (ref 140–440)
PLATELET # BLD: 356 K/CU MM (ref 140–440)
PLATELET # BLD: ABNORMAL K/CU MM (ref 140–440)
PMV BLD AUTO: 10 FL (ref 7.5–11.1)
PMV BLD AUTO: 10.1 FL (ref 7.5–11.1)
PMV BLD AUTO: 10.1 FL (ref 7.5–11.1)
PMV BLD AUTO: 10.2 FL (ref 7.5–11.1)
PMV BLD AUTO: 10.5 FL (ref 7.5–11.1)
PMV BLD AUTO: 10.6 FL (ref 7.5–11.1)
PMV BLD AUTO: 10.7 FL (ref 7.5–11.1)
PMV BLD AUTO: 10.8 FL (ref 7.5–11.1)
PMV BLD AUTO: 10.9 FL (ref 7.5–11.1)
PMV BLD AUTO: 11.1 FL (ref 7.5–11.1)
PMV BLD AUTO: 11.5 FL (ref 7.5–11.1)
PMV BLD AUTO: 12 FL (ref 7.5–11.1)
PMV BLD AUTO: 12 FL (ref 7.5–11.1)
PO2 ARTERIAL: 101 MMHG (ref 75–100)
PO2 ARTERIAL: 102 MMHG (ref 75–100)
PO2 ARTERIAL: 108 MMHG (ref 75–100)
PO2 ARTERIAL: 111 MMHG (ref 75–100)
PO2 ARTERIAL: 119 MMHG (ref 75–100)
PO2 ARTERIAL: 142 MMHG (ref 75–100)
PO2 ARTERIAL: 150 MMHG (ref 75–100)
PO2 ARTERIAL: 186 MMHG (ref 75–100)
PO2 ARTERIAL: 36 MMHG (ref 75–100)
PO2 ARTERIAL: 48 MMHG (ref 75–100)
PO2 ARTERIAL: 48 MMHG (ref 75–100)
PO2 ARTERIAL: 52 MMHG (ref 75–100)
PO2 ARTERIAL: 56 MMHG (ref 75–100)
PO2 ARTERIAL: 57 MMHG (ref 75–100)
PO2 ARTERIAL: 68 MMHG (ref 75–100)
PO2 ARTERIAL: 79 MMHG (ref 75–100)
PO2 ARTERIAL: 88 MMHG (ref 75–100)
PO2 ARTERIAL: 93 MMHG (ref 75–100)
PO2 ARTERIAL: 98 MMHG (ref 75–100)
PO2 ARTERIAL: 99 MMHG (ref 75–100)
PO2 ARTERIAL: 99 MMHG (ref 75–100)
PO2, VEN: 250 MMHG (ref 28–48)
PO2, VEN: 53 MMHG (ref 28–48)
POTASSIUM SERPL-SCNC: 3.1 MMOL/L (ref 3.5–5.1)
POTASSIUM SERPL-SCNC: 3.1 MMOL/L (ref 3.5–5.1)
POTASSIUM SERPL-SCNC: 3.2 MMOL/L (ref 3.5–5.1)
POTASSIUM SERPL-SCNC: 3.3 MMOL/L (ref 3.5–5.1)
POTASSIUM SERPL-SCNC: 3.4 MMOL/L (ref 3.5–5.1)
POTASSIUM SERPL-SCNC: 3.5 MMOL/L (ref 3.5–5.1)
POTASSIUM SERPL-SCNC: 3.5 MMOL/L (ref 3.5–5.1)
POTASSIUM SERPL-SCNC: 3.6 MMOL/L (ref 3.5–5.1)
POTASSIUM SERPL-SCNC: 3.7 MMOL/L (ref 3.5–5.1)
POTASSIUM SERPL-SCNC: 3.8 MMOL/L (ref 3.5–5.1)
POTASSIUM SERPL-SCNC: 3.9 MMOL/L (ref 3.5–5.1)
POTASSIUM SERPL-SCNC: 4 MMOL/L (ref 3.5–5.1)
POTASSIUM SERPL-SCNC: 4 MMOL/L (ref 3.5–5.1)
POTASSIUM SERPL-SCNC: 4.2 MMOL/L (ref 3.5–5.1)
POTASSIUM SERPL-SCNC: 4.6 MMOL/L (ref 3.5–5.1)
POTASSIUM SERPL-SCNC: 4.9 MMOL/L (ref 3.5–5.1)
POTASSIUM SERPL-SCNC: 5 MMOL/L (ref 3.5–5.1)
POTASSIUM SERPL-SCNC: ABNORMAL MMOL/L (ref 3.5–5.1)
PRO-BNP: 1095 PG/ML
PRO-BNP: ABNORMAL PG/ML
PROTEIN UA: 100 MG/DL
PROTEIN UA: 100 MG/DL
PROTEIN UA: 30 MG/DL
PROTHROMBIN TIME: 12.1 SECONDS (ref 9.12–12.5)
PROTHROMBIN TIME: 12.6 SECONDS (ref 9.12–12.5)
PROTHROMBIN TIME: 12.9 SECONDS (ref 9.12–12.5)
RBC # BLD: 2.23 M/CU MM (ref 4.6–6.2)
RBC # BLD: 2.37 M/CU MM (ref 4.6–6.2)
RBC # BLD: 2.46 M/CU MM (ref 4.6–6.2)
RBC # BLD: 2.56 M/CU MM (ref 4.6–6.2)
RBC # BLD: 2.57 M/CU MM (ref 4.6–6.2)
RBC # BLD: 2.62 M/CU MM (ref 4.6–6.2)
RBC # BLD: 2.65 M/CU MM (ref 4.6–6.2)
RBC # BLD: 2.66 M/CU MM (ref 4.6–6.2)
RBC # BLD: 2.72 M/CU MM (ref 4.6–6.2)
RBC # BLD: 2.83 M/CU MM (ref 4.6–6.2)
RBC # BLD: 2.85 M/CU MM (ref 4.6–6.2)
RBC # BLD: 2.96 M/CU MM (ref 4.6–6.2)
RBC # BLD: 2.98 M/CU MM (ref 4.6–6.2)
RBC # BLD: 3.04 M/CU MM (ref 4.6–6.2)
RBC # BLD: 3.07 M/CU MM (ref 4.6–6.2)
RBC # BLD: 3.59 M/CU MM (ref 4.6–6.2)
RBC # BLD: 3.64 M/CU MM (ref 4.6–6.2)
RBC # BLD: 3.7 M/CU MM (ref 4.6–6.2)
RBC # BLD: 3.74 M/CU MM (ref 4.6–6.2)
RBC # BLD: 3.75 M/CU MM (ref 4.6–6.2)
RBC # BLD: 3.91 M/CU MM (ref 4.6–6.2)
RBC # BLD: 3.98 M/CU MM (ref 4.6–6.2)
RBC # BLD: 4.02 M/CU MM (ref 4.6–6.2)
RBC # BLD: 4.4 M/CU MM (ref 4.6–6.2)
RBC URINE: 2 /HPF (ref 0–3)
RBC URINE: 6176 /HPF (ref 0–3)
RBC URINE: ABNORMAL /HPF (ref 0–3)
REASON FOR REJECTION: NORMAL
REASON FOR REJECTION: NORMAL
REJECTED TEST: NORMAL
REPORT STATUS: NORMAL
RHINOVIRUS ENTEROVIRUS PCR: NOT DETECTED
RSV PCR: NOT DETECTED
SEGMENTED NEUTROPHILS ABSOLUTE COUNT: 6 K/CU MM
SEGMENTED NEUTROPHILS ABSOLUTE COUNT: 6.3 K/CU MM
SEGMENTED NEUTROPHILS ABSOLUTE COUNT: 9.1 K/CU MM
SEGMENTED NEUTROPHILS RELATIVE PERCENT: 77 % (ref 36–66)
SEGMENTED NEUTROPHILS RELATIVE PERCENT: 84.9 % (ref 36–66)
SEGMENTED NEUTROPHILS RELATIVE PERCENT: 88.4 % (ref 36–66)
SODIUM BLD-SCNC: 137 MMOL/L (ref 135–145)
SODIUM BLD-SCNC: 137 MMOL/L (ref 135–145)
SODIUM BLD-SCNC: 138 MMOL/L (ref 135–145)
SODIUM BLD-SCNC: 139 MMOL/L (ref 135–145)
SODIUM BLD-SCNC: 139 MMOL/L (ref 135–145)
SODIUM BLD-SCNC: 141 MMOL/L (ref 135–145)
SODIUM BLD-SCNC: 142 MMOL/L (ref 135–145)
SODIUM BLD-SCNC: 143 MMOL/L (ref 135–145)
SODIUM BLD-SCNC: 144 MMOL/L (ref 135–145)
SODIUM BLD-SCNC: 146 MMOL/L (ref 135–145)
SODIUM BLD-SCNC: 147 MMOL/L (ref 135–145)
SODIUM BLD-SCNC: 148 MMOL/L (ref 135–145)
SPECIFIC GRAVITY UA: 1.02 (ref 1–1.03)
SPECIFIC GRAVITY UA: 1.03 (ref 1–1.03)
SPECIFIC GRAVITY UA: 1.03 (ref 1–1.03)
SPECIMEN: ABNORMAL
SPECIMEN: ABNORMAL
SPECIMEN: NORMAL
SQUAMOUS EPITHELIAL: <1 /HPF
STATUS: NORMAL
STRIATED MUSCLE AB TITER: NORMAL
STRIATED MUSCLE AB TITER: NORMAL
STRIATED MUSCLE AB, IGG SCREEN: ABNORMAL
STRIATED MUSCLE AB, IGG SCREEN: DETECTED
TITIN ANTIBODY: 0.24 IV (ref 0–0.45)
TITIN ANTIBODY: ABNORMAL IV (ref 0–0.45)
TOTAL CK: 49 IU/L (ref 38–174)
TOTAL IMMATURE NEUTOROPHIL: 0.03 K/CU MM
TOTAL IMMATURE NEUTOROPHIL: 0.07 K/CU MM
TOTAL IMMATURE NEUTOROPHIL: 0.19 K/CU MM
TOTAL NUCLEATED RBC: 0 K/CU MM
TOTAL PROTEIN: 5.1 GM/DL (ref 6.4–8.2)
TOTAL PROTEIN: 6.1 GM/DL (ref 6.4–8.2)
TOTAL PROTEIN: 6.1 GM/DL (ref 6.4–8.2)
TOTAL PROTEIN: 6.7 GM/DL (ref 6.4–8.2)
TOTAL PROTEIN: 7.7 GM/DL (ref 6.4–8.2)
TRANSFUSION STATUS: NORMAL
TRICHOMONAS: ABNORMAL /HPF
TRIGL SERPL-MCNC: 96 MG/DL
TROPONIN T: 0.06 NG/ML
TROPONIN T: 0.07 NG/ML
TROPONIN T: 0.13 NG/ML
TROPONIN T: 0.14 NG/ML
TROPONIN T: 0.14 NG/ML
TROPONIN T: 0.2 NG/ML
TSH HIGH SENSITIVITY: 0.79 UIU/ML (ref 0.27–4.2)
UNIT DIVISION: 0
UNIT NUMBER: NORMAL
UROBILINOGEN, URINE: NORMAL MG/DL (ref 0.2–1)
VANCOMYCIN TROUGH: 15 UG/ML (ref 10–20)
WBC # BLD: 10.1 K/CU MM (ref 4–10.5)
WBC # BLD: 10.5 K/CU MM (ref 4–10.5)
WBC # BLD: 10.7 K/CU MM (ref 4–10.5)
WBC # BLD: 11.4 K/CU MM (ref 4–10.5)
WBC # BLD: 5.5 K/CU MM (ref 4–10.5)
WBC # BLD: 6.4 K/CU MM (ref 4–10.5)
WBC # BLD: 6.8 K/CU MM (ref 4–10.5)
WBC # BLD: 6.9 K/CU MM (ref 4–10.5)
WBC # BLD: 7 K/CU MM (ref 4–10.5)
WBC # BLD: 7.2 K/CU MM (ref 4–10.5)
WBC # BLD: 7.3 K/CU MM (ref 4–10.5)
WBC # BLD: 7.3 K/CU MM (ref 4–10.5)
WBC # BLD: 7.4 K/CU MM (ref 4–10.5)
WBC # BLD: 7.5 K/CU MM (ref 4–10.5)
WBC # BLD: 7.7 K/CU MM (ref 4–10.5)
WBC # BLD: 8.2 K/CU MM (ref 4–10.5)
WBC # BLD: 8.5 K/CU MM (ref 4–10.5)
WBC # BLD: 8.7 K/CU MM (ref 4–10.5)
WBC # BLD: 8.7 K/CU MM (ref 4–10.5)
WBC # BLD: 8.9 K/CU MM (ref 4–10.5)
WBC # BLD: 9.4 K/CU MM (ref 4–10.5)
WBC # BLD: 9.4 K/CU MM (ref 4–10.5)
WBC # BLD: 9.9 K/CU MM (ref 4–10.5)
WBC # BLD: 9.9 K/CU MM (ref 4–10.5)
WBC UA: <1 /HPF (ref 0–2)
WBC UA: <1 /HPF (ref 0–2)
WBC UA: ABNORMAL /HPF (ref 0–2)

## 2019-01-01 PROCEDURE — 2580000003 HC RX 258: Performed by: INTERNAL MEDICINE

## 2019-01-01 PROCEDURE — 99283 EMERGENCY DEPT VISIT LOW MDM: CPT | Performed by: SURGERY

## 2019-01-01 PROCEDURE — 83735 ASSAY OF MAGNESIUM: CPT

## 2019-01-01 PROCEDURE — 80048 BASIC METABOLIC PNL TOTAL CA: CPT

## 2019-01-01 PROCEDURE — 6360000002 HC RX W HCPCS: Performed by: INTERNAL MEDICINE

## 2019-01-01 PROCEDURE — 94640 AIRWAY INHALATION TREATMENT: CPT

## 2019-01-01 PROCEDURE — 2500000003 HC RX 250 WO HCPCS: Performed by: INTERNAL MEDICINE

## 2019-01-01 PROCEDURE — 6370000000 HC RX 637 (ALT 250 FOR IP): Performed by: INTERNAL MEDICINE

## 2019-01-01 PROCEDURE — 92526 ORAL FUNCTION THERAPY: CPT

## 2019-01-01 PROCEDURE — 82140 ASSAY OF AMMONIA: CPT

## 2019-01-01 PROCEDURE — 94761 N-INVAS EAR/PLS OXIMETRY MLT: CPT

## 2019-01-01 PROCEDURE — 32555 ASPIRATE PLEURA W/ IMAGING: CPT

## 2019-01-01 PROCEDURE — 6370000000 HC RX 637 (ALT 250 FOR IP): Performed by: SURGERY

## 2019-01-01 PROCEDURE — 87070 CULTURE OTHR SPECIMN AEROBIC: CPT

## 2019-01-01 PROCEDURE — 6360000002 HC RX W HCPCS: Performed by: HOSPITALIST

## 2019-01-01 PROCEDURE — 2580000003 HC RX 258: Performed by: NURSE PRACTITIONER

## 2019-01-01 PROCEDURE — 84443 ASSAY THYROID STIM HORMONE: CPT

## 2019-01-01 PROCEDURE — 36592 COLLECT BLOOD FROM PICC: CPT

## 2019-01-01 PROCEDURE — 94002 VENT MGMT INPAT INIT DAY: CPT

## 2019-01-01 PROCEDURE — 82803 BLOOD GASES ANY COMBINATION: CPT

## 2019-01-01 PROCEDURE — 2000000000 HC ICU R&B

## 2019-01-01 PROCEDURE — 89220 SPUTUM SPECIMEN COLLECTION: CPT

## 2019-01-01 PROCEDURE — 6370000000 HC RX 637 (ALT 250 FOR IP): Performed by: NURSE PRACTITIONER

## 2019-01-01 PROCEDURE — 2500000003 HC RX 250 WO HCPCS: Performed by: HOSPITALIST

## 2019-01-01 PROCEDURE — 97530 THERAPEUTIC ACTIVITIES: CPT

## 2019-01-01 PROCEDURE — 94750 HC PULMONARY COMPLIANCE STUDY: CPT

## 2019-01-01 PROCEDURE — 6370000000 HC RX 637 (ALT 250 FOR IP): Performed by: PHYSICAL MEDICINE & REHABILITATION

## 2019-01-01 PROCEDURE — 3700000001 HC ADD 15 MINUTES (ANESTHESIA): Performed by: SURGERY

## 2019-01-01 PROCEDURE — 36569 INSJ PICC 5 YR+ W/O IMAGING: CPT

## 2019-01-01 PROCEDURE — 86900 BLOOD TYPING SEROLOGIC ABO: CPT

## 2019-01-01 PROCEDURE — 36600 WITHDRAWAL OF ARTERIAL BLOOD: CPT

## 2019-01-01 PROCEDURE — 87581 M.PNEUMON DNA AMP PROBE: CPT

## 2019-01-01 PROCEDURE — 2700000000 HC OXYGEN THERAPY PER DAY

## 2019-01-01 PROCEDURE — 85730 THROMBOPLASTIN TIME PARTIAL: CPT

## 2019-01-01 PROCEDURE — 96366 THER/PROPH/DIAG IV INF ADDON: CPT

## 2019-01-01 PROCEDURE — 97150 GROUP THERAPEUTIC PROCEDURES: CPT

## 2019-01-01 PROCEDURE — 81001 URINALYSIS AUTO W/SCOPE: CPT

## 2019-01-01 PROCEDURE — 1280000000 HC REHAB R&B

## 2019-01-01 PROCEDURE — C9113 INJ PANTOPRAZOLE SODIUM, VIA: HCPCS | Performed by: INTERNAL MEDICINE

## 2019-01-01 PROCEDURE — 84484 ASSAY OF TROPONIN QUANT: CPT

## 2019-01-01 PROCEDURE — 31720 CLEARANCE OF AIRWAYS: CPT

## 2019-01-01 PROCEDURE — 2580000003 HC RX 258: Performed by: SURGERY

## 2019-01-01 PROCEDURE — 85027 COMPLETE CBC AUTOMATED: CPT

## 2019-01-01 PROCEDURE — 99233 SBSQ HOSP IP/OBS HIGH 50: CPT | Performed by: INTERNAL MEDICINE

## 2019-01-01 PROCEDURE — 2500000003 HC RX 250 WO HCPCS: Performed by: NURSE ANESTHETIST, CERTIFIED REGISTERED

## 2019-01-01 PROCEDURE — 85025 COMPLETE CBC W/AUTO DIFF WBC: CPT

## 2019-01-01 PROCEDURE — 0B113F4 BYPASS TRACHEA TO CUTANEOUS WITH TRACHEOSTOMY DEVICE, PERCUTANEOUS APPROACH: ICD-10-PCS | Performed by: SURGERY

## 2019-01-01 PROCEDURE — 3609012800 HC EGD DIAGNOSTIC ONLY: Performed by: INTERNAL MEDICINE

## 2019-01-01 PROCEDURE — 3619154100 HC RT BRONCHOSCOPY

## 2019-01-01 PROCEDURE — 82962 GLUCOSE BLOOD TEST: CPT

## 2019-01-01 PROCEDURE — 6370000000 HC RX 637 (ALT 250 FOR IP): Performed by: HOSPITALIST

## 2019-01-01 PROCEDURE — 97166 OT EVAL MOD COMPLEX 45 MIN: CPT

## 2019-01-01 PROCEDURE — 86850 RBC ANTIBODY SCREEN: CPT

## 2019-01-01 PROCEDURE — 87081 CULTURE SCREEN ONLY: CPT

## 2019-01-01 PROCEDURE — 97116 GAIT TRAINING THERAPY: CPT

## 2019-01-01 PROCEDURE — 36415 COLL VENOUS BLD VENIPUNCTURE: CPT

## 2019-01-01 PROCEDURE — 6360000002 HC RX W HCPCS: Performed by: NURSE ANESTHETIST, CERTIFIED REGISTERED

## 2019-01-01 PROCEDURE — 94003 VENT MGMT INPAT SUBQ DAY: CPT

## 2019-01-01 PROCEDURE — 71045 X-RAY EXAM CHEST 1 VIEW: CPT

## 2019-01-01 PROCEDURE — 85610 PROTHROMBIN TIME: CPT

## 2019-01-01 PROCEDURE — 97535 SELF CARE MNGMENT TRAINING: CPT

## 2019-01-01 PROCEDURE — A9579 GAD-BASE MR CONTRAST NOS,1ML: HCPCS | Performed by: PSYCHIATRY & NEUROLOGY

## 2019-01-01 PROCEDURE — 99239 HOSP IP/OBS DSCHRG MGMT >30: CPT | Performed by: PHYSICAL MEDICINE & REHABILITATION

## 2019-01-01 PROCEDURE — 70498 CT ANGIOGRAPHY NECK: CPT

## 2019-01-01 PROCEDURE — 02HV33Z INSERTION OF INFUSION DEVICE INTO SUPERIOR VENA CAVA, PERCUTANEOUS APPROACH: ICD-10-PCS | Performed by: ANESTHESIOLOGY

## 2019-01-01 PROCEDURE — 2580000003 HC RX 258: Performed by: HOSPITALIST

## 2019-01-01 PROCEDURE — 96368 THER/DIAG CONCURRENT INF: CPT

## 2019-01-01 PROCEDURE — 86256 FLUORESCENT ANTIBODY TITER: CPT

## 2019-01-01 PROCEDURE — 83880 ASSAY OF NATRIURETIC PEPTIDE: CPT

## 2019-01-01 PROCEDURE — 2580000003 HC RX 258: Performed by: PHYSICIAN ASSISTANT

## 2019-01-01 PROCEDURE — 85049 AUTOMATED PLATELET COUNT: CPT

## 2019-01-01 PROCEDURE — 99223 1ST HOSP IP/OBS HIGH 75: CPT | Performed by: PHYSICAL MEDICINE & REHABILITATION

## 2019-01-01 PROCEDURE — 71275 CT ANGIOGRAPHY CHEST: CPT

## 2019-01-01 PROCEDURE — 0DH63UZ INSERTION OF FEEDING DEVICE INTO STOMACH, PERCUTANEOUS APPROACH: ICD-10-PCS | Performed by: SURGERY

## 2019-01-01 PROCEDURE — 85018 HEMOGLOBIN: CPT

## 2019-01-01 PROCEDURE — 99291 CRITICAL CARE FIRST HOUR: CPT | Performed by: INTERNAL MEDICINE

## 2019-01-01 PROCEDURE — 99285 EMERGENCY DEPT VISIT HI MDM: CPT

## 2019-01-01 PROCEDURE — 36430 TRANSFUSION BLD/BLD COMPNT: CPT

## 2019-01-01 PROCEDURE — 2140000000 HC CCU INTERMEDIATE R&B

## 2019-01-01 PROCEDURE — 83519 RIA NONANTIBODY: CPT

## 2019-01-01 PROCEDURE — 70553 MRI BRAIN STEM W/O & W/DYE: CPT

## 2019-01-01 PROCEDURE — 36620 INSERTION CATHETER ARTERY: CPT | Performed by: NURSE ANESTHETIST, CERTIFIED REGISTERED

## 2019-01-01 PROCEDURE — 94760 N-INVAS EAR/PLS OXIMETRY 1: CPT

## 2019-01-01 PROCEDURE — 80202 ASSAY OF VANCOMYCIN: CPT

## 2019-01-01 PROCEDURE — 97127 HC SP THER IVNTJ W/FOCUS COG FUNCJ: CPT

## 2019-01-01 PROCEDURE — 0BH18EZ INSERTION OF ENDOTRACHEAL AIRWAY INTO TRACHEA, VIA NATURAL OR ARTIFICIAL OPENING ENDOSCOPIC: ICD-10-PCS | Performed by: ANESTHESIOLOGY

## 2019-01-01 PROCEDURE — 93010 ELECTROCARDIOGRAM REPORT: CPT | Performed by: INTERNAL MEDICINE

## 2019-01-01 PROCEDURE — 99232 SBSQ HOSP IP/OBS MODERATE 35: CPT | Performed by: INTERNAL MEDICINE

## 2019-01-01 PROCEDURE — 86901 BLOOD TYPING SEROLOGIC RH(D): CPT

## 2019-01-01 PROCEDURE — 99233 SBSQ HOSP IP/OBS HIGH 50: CPT | Performed by: NURSE PRACTITIONER

## 2019-01-01 PROCEDURE — 86255 FLUORESCENT ANTIBODY SCREEN: CPT

## 2019-01-01 PROCEDURE — 0DJ08ZZ INSPECTION OF UPPER INTESTINAL TRACT, VIA NATURAL OR ARTIFICIAL OPENING ENDOSCOPIC: ICD-10-PCS | Performed by: INTERNAL MEDICINE

## 2019-01-01 PROCEDURE — 6360000004 HC RX CONTRAST MEDICATION: Performed by: EMERGENCY MEDICINE

## 2019-01-01 PROCEDURE — 99232 SBSQ HOSP IP/OBS MODERATE 35: CPT | Performed by: PHYSICAL MEDICINE & REHABILITATION

## 2019-01-01 PROCEDURE — 97110 THERAPEUTIC EXERCISES: CPT

## 2019-01-01 PROCEDURE — 51702 INSERT TEMP BLADDER CATH: CPT

## 2019-01-01 PROCEDURE — 36556 INSERT NON-TUNNEL CV CATH: CPT

## 2019-01-01 PROCEDURE — 85014 HEMATOCRIT: CPT

## 2019-01-01 PROCEDURE — 83605 ASSAY OF LACTIC ACID: CPT

## 2019-01-01 PROCEDURE — 31622 DX BRONCHOSCOPE/WASH: CPT | Performed by: SURGERY

## 2019-01-01 PROCEDURE — C1769 GUIDE WIRE: HCPCS | Performed by: SURGERY

## 2019-01-01 PROCEDURE — 49465 FLUORO EXAM OF G/COLON TUBE: CPT

## 2019-01-01 PROCEDURE — 2500000003 HC RX 250 WO HCPCS

## 2019-01-01 PROCEDURE — 94660 CPAP INITIATION&MGMT: CPT

## 2019-01-01 PROCEDURE — 3600000003 HC SURGERY LEVEL 3 BASE: Performed by: SURGERY

## 2019-01-01 PROCEDURE — 82330 ASSAY OF CALCIUM: CPT

## 2019-01-01 PROCEDURE — 6370000000 HC RX 637 (ALT 250 FOR IP): Performed by: EMERGENCY MEDICINE

## 2019-01-01 PROCEDURE — 93306 TTE W/DOPPLER COMPLETE: CPT

## 2019-01-01 PROCEDURE — 36556 INSERT NON-TUNNEL CV CATH: CPT | Performed by: NURSE ANESTHETIST, CERTIFIED REGISTERED

## 2019-01-01 PROCEDURE — 87486 CHLMYD PNEUM DNA AMP PROBE: CPT

## 2019-01-01 PROCEDURE — 2580000003 HC RX 258: Performed by: EMERGENCY MEDICINE

## 2019-01-01 PROCEDURE — 5A1945Z RESPIRATORY VENTILATION, 24-96 CONSECUTIVE HOURS: ICD-10-PCS | Performed by: INTERNAL MEDICINE

## 2019-01-01 PROCEDURE — 82248 BILIRUBIN DIRECT: CPT

## 2019-01-01 PROCEDURE — 87205 SMEAR GRAM STAIN: CPT

## 2019-01-01 PROCEDURE — 3700000001 HC ADD 15 MINUTES (ANESTHESIA): Performed by: INTERNAL MEDICINE

## 2019-01-01 PROCEDURE — 6360000002 HC RX W HCPCS

## 2019-01-01 PROCEDURE — 70450 CT HEAD/BRAIN W/O DYE: CPT

## 2019-01-01 PROCEDURE — 70496 CT ANGIOGRAPHY HEAD: CPT

## 2019-01-01 PROCEDURE — 82805 BLOOD GASES W/O2 SATURATION: CPT

## 2019-01-01 PROCEDURE — 80053 COMPREHEN METABOLIC PANEL: CPT

## 2019-01-01 PROCEDURE — 4500000028 HC INTERMEDIATE PROCEDURE

## 2019-01-01 PROCEDURE — 87798 DETECT AGENT NOS DNA AMP: CPT

## 2019-01-01 PROCEDURE — 6360000002 HC RX W HCPCS: Performed by: SURGERY

## 2019-01-01 PROCEDURE — 71046 X-RAY EXAM CHEST 2 VIEWS: CPT

## 2019-01-01 PROCEDURE — 02HV33Z INSERTION OF INFUSION DEVICE INTO SUPERIOR VENA CAVA, PERCUTANEOUS APPROACH: ICD-10-PCS | Performed by: INTERNAL MEDICINE

## 2019-01-01 PROCEDURE — 84132 ASSAY OF SERUM POTASSIUM: CPT

## 2019-01-01 PROCEDURE — 96374 THER/PROPH/DIAG INJ IV PUSH: CPT

## 2019-01-01 PROCEDURE — 3700000000 HC ANESTHESIA ATTENDED CARE: Performed by: INTERNAL MEDICINE

## 2019-01-01 PROCEDURE — 5A1955Z RESPIRATORY VENTILATION, GREATER THAN 96 CONSECUTIVE HOURS: ICD-10-PCS | Performed by: INTERNAL MEDICINE

## 2019-01-01 PROCEDURE — 2709999900 HC NON-CHARGEABLE SUPPLY: Performed by: SURGERY

## 2019-01-01 PROCEDURE — P9016 RBC LEUKOCYTES REDUCED: HCPCS

## 2019-01-01 PROCEDURE — 31600 PLANNED TRACHEOSTOMY: CPT | Performed by: SURGERY

## 2019-01-01 PROCEDURE — 74018 RADEX ABDOMEN 1 VIEW: CPT

## 2019-01-01 PROCEDURE — 80061 LIPID PANEL: CPT

## 2019-01-01 PROCEDURE — 82565 ASSAY OF CREATININE: CPT

## 2019-01-01 PROCEDURE — C1894 INTRO/SHEATH, NON-LASER: HCPCS

## 2019-01-01 PROCEDURE — 6360000002 HC RX W HCPCS: Performed by: NURSE PRACTITIONER

## 2019-01-01 PROCEDURE — 99221 1ST HOSP IP/OBS SF/LOW 40: CPT | Performed by: INTERNAL MEDICINE

## 2019-01-01 PROCEDURE — 86922 COMPATIBILITY TEST ANTIGLOB: CPT

## 2019-01-01 PROCEDURE — 93005 ELECTROCARDIOGRAM TRACING: CPT | Performed by: EMERGENCY MEDICINE

## 2019-01-01 PROCEDURE — 7100000000 HC PACU RECOVERY - FIRST 15 MIN

## 2019-01-01 PROCEDURE — 2580000003 HC RX 258

## 2019-01-01 PROCEDURE — 2709999900 HC NON-CHARGEABLE SUPPLY

## 2019-01-01 PROCEDURE — 6360000004 HC RX CONTRAST MEDICATION

## 2019-01-01 PROCEDURE — 99024 POSTOP FOLLOW-UP VISIT: CPT | Performed by: SURGERY

## 2019-01-01 PROCEDURE — 6360000002 HC RX W HCPCS: Performed by: PHYSICIAN ASSISTANT

## 2019-01-01 PROCEDURE — 97162 PT EVAL MOD COMPLEX 30 MIN: CPT

## 2019-01-01 PROCEDURE — 92610 EVALUATE SWALLOWING FUNCTION: CPT

## 2019-01-01 PROCEDURE — 94664 DEMO&/EVAL PT USE INHALER: CPT

## 2019-01-01 PROCEDURE — 3700000000 HC ANESTHESIA ATTENDED CARE: Performed by: SURGERY

## 2019-01-01 PROCEDURE — 31500 INSERT EMERGENCY AIRWAY: CPT | Performed by: ANESTHESIOLOGY

## 2019-01-01 PROCEDURE — 0W9B3ZX DRAINAGE OF LEFT PLEURAL CAVITY, PERCUTANEOUS APPROACH, DIAGNOSTIC: ICD-10-PCS | Performed by: RADIOLOGY

## 2019-01-01 PROCEDURE — 31500 INSERT EMERGENCY AIRWAY: CPT | Performed by: NURSE ANESTHETIST, CERTIFIED REGISTERED

## 2019-01-01 PROCEDURE — 97112 NEUROMUSCULAR REEDUCATION: CPT

## 2019-01-01 PROCEDURE — C1729 CATH, DRAINAGE: HCPCS

## 2019-01-01 PROCEDURE — 76937 US GUIDE VASCULAR ACCESS: CPT

## 2019-01-01 PROCEDURE — 99223 1ST HOSP IP/OBS HIGH 75: CPT | Performed by: PSYCHIATRY & NEUROLOGY

## 2019-01-01 PROCEDURE — 87086 URINE CULTURE/COLONY COUNT: CPT

## 2019-01-01 PROCEDURE — 99223 1ST HOSP IP/OBS HIGH 75: CPT | Performed by: SURGERY

## 2019-01-01 PROCEDURE — 82550 ASSAY OF CK (CPK): CPT

## 2019-01-01 PROCEDURE — 0BH17EZ INSERTION OF ENDOTRACHEAL AIRWAY INTO TRACHEA, VIA NATURAL OR ARTIFICIAL OPENING: ICD-10-PCS | Performed by: HOSPITALIST

## 2019-01-01 PROCEDURE — 43762 RPLC GTUBE NO REVJ TRC: CPT | Performed by: SURGERY

## 2019-01-01 PROCEDURE — 43246 EGD PLACE GASTROSTOMY TUBE: CPT | Performed by: SURGERY

## 2019-01-01 PROCEDURE — C9113 INJ PANTOPRAZOLE SODIUM, VIA: HCPCS | Performed by: SURGERY

## 2019-01-01 PROCEDURE — 99284 EMERGENCY DEPT VISIT MOD MDM: CPT

## 2019-01-01 PROCEDURE — 7100000001 HC PACU RECOVERY - ADDTL 15 MIN

## 2019-01-01 PROCEDURE — 87040 BLOOD CULTURE FOR BACTERIA: CPT

## 2019-01-01 PROCEDURE — 6360000004 HC RX CONTRAST MEDICATION: Performed by: INTERNAL MEDICINE

## 2019-01-01 PROCEDURE — 99232 SBSQ HOSP IP/OBS MODERATE 35: CPT | Performed by: SURGERY

## 2019-01-01 PROCEDURE — 83721 ASSAY OF BLOOD LIPOPROTEIN: CPT

## 2019-01-01 PROCEDURE — 6360000004 HC RX CONTRAST MEDICATION: Performed by: PSYCHIATRY & NEUROLOGY

## 2019-01-01 PROCEDURE — 6360000002 HC RX W HCPCS: Performed by: EMERGENCY MEDICINE

## 2019-01-01 PROCEDURE — 97163 PT EVAL HIGH COMPLEX 45 MIN: CPT

## 2019-01-01 PROCEDURE — 31500 INSERT EMERGENCY AIRWAY: CPT

## 2019-01-01 PROCEDURE — 2709999900 HC NON-CHARGEABLE SUPPLY: Performed by: INTERNAL MEDICINE

## 2019-01-01 PROCEDURE — 71250 CT THORAX DX C-: CPT

## 2019-01-01 PROCEDURE — 92611 MOTION FLUOROSCOPY/SWALLOW: CPT

## 2019-01-01 PROCEDURE — 93005 ELECTROCARDIOGRAM TRACING: CPT | Performed by: INTERNAL MEDICINE

## 2019-01-01 PROCEDURE — 6370000000 HC RX 637 (ALT 250 FOR IP): Performed by: PHYSICIAN ASSISTANT

## 2019-01-01 PROCEDURE — 3600000013 HC SURGERY LEVEL 3 ADDTL 15MIN: Performed by: SURGERY

## 2019-01-01 PROCEDURE — 83690 ASSAY OF LIPASE: CPT

## 2019-01-01 PROCEDURE — C1751 CATH, INF, PER/CENT/MIDLINE: HCPCS

## 2019-01-01 PROCEDURE — 93005 ELECTROCARDIOGRAM TRACING: CPT | Performed by: PHYSICIAN ASSISTANT

## 2019-01-01 PROCEDURE — 0W993ZX DRAINAGE OF RIGHT PLEURAL CAVITY, PERCUTANEOUS APPROACH, DIAGNOSTIC: ICD-10-PCS | Performed by: RADIOLOGY

## 2019-01-01 PROCEDURE — 99233 SBSQ HOSP IP/OBS HIGH 50: CPT | Performed by: PHYSICAL MEDICINE & REHABILITATION

## 2019-01-01 PROCEDURE — 74230 X-RAY XM SWLNG FUNCJ C+: CPT

## 2019-01-01 PROCEDURE — 96365 THER/PROPH/DIAG IV INF INIT: CPT

## 2019-01-01 RX ORDER — SODIUM CHLORIDE 9 MG/ML
INJECTION, SOLUTION INTRAVENOUS
Status: COMPLETED
Start: 2019-01-01 | End: 2019-01-01

## 2019-01-01 RX ORDER — OSELTAMIVIR PHOSPHATE 30 MG/1
30 CAPSULE ORAL 2 TIMES DAILY
Status: COMPLETED | OUTPATIENT
Start: 2019-01-01 | End: 2019-01-01

## 2019-01-01 RX ORDER — PROPOFOL 10 MG/ML
10 INJECTION, EMULSION INTRAVENOUS
Status: DISCONTINUED | OUTPATIENT
Start: 2019-01-01 | End: 2019-01-01

## 2019-01-01 RX ORDER — SIMVASTATIN 80 MG
40 TABLET ORAL NIGHTLY
COMMUNITY
End: 2020-01-01 | Stop reason: CLARIF

## 2019-01-01 RX ORDER — CEFAZOLIN SODIUM 1 G/50ML
1 INJECTION, SOLUTION INTRAVENOUS EVERY 8 HOURS
Status: DISCONTINUED | OUTPATIENT
Start: 2019-01-01 | End: 2019-01-01 | Stop reason: HOSPADM

## 2019-01-01 RX ORDER — DEXTROSE MONOHYDRATE 25 G/50ML
12.5 INJECTION, SOLUTION INTRAVENOUS PRN
Status: DISCONTINUED | OUTPATIENT
Start: 2019-01-01 | End: 2019-01-01 | Stop reason: HOSPADM

## 2019-01-01 RX ORDER — CHLORHEXIDINE GLUCONATE 0.12 MG/ML
15 RINSE ORAL 2 TIMES DAILY
Status: DISCONTINUED | OUTPATIENT
Start: 2019-01-01 | End: 2019-01-01 | Stop reason: HOSPADM

## 2019-01-01 RX ORDER — PROPOFOL 10 MG/ML
INJECTION, EMULSION INTRAVENOUS PRN
Status: DISCONTINUED | OUTPATIENT
Start: 2019-01-01 | End: 2019-01-01 | Stop reason: SDUPTHER

## 2019-01-01 RX ORDER — DOXYCYCLINE HYCLATE 50 MG/1
324 CAPSULE, GELATIN COATED ORAL
Status: ON HOLD | COMMUNITY
End: 2019-01-01 | Stop reason: HOSPADM

## 2019-01-01 RX ORDER — GABAPENTIN 400 MG/1
400 CAPSULE ORAL 2 TIMES DAILY
Status: DISCONTINUED | OUTPATIENT
Start: 2019-01-01 | End: 2019-01-01 | Stop reason: HOSPADM

## 2019-01-01 RX ORDER — OSELTAMIVIR PHOSPHATE 30 MG/1
30 CAPSULE ORAL 2 TIMES DAILY
Status: DISCONTINUED | OUTPATIENT
Start: 2019-01-01 | End: 2019-01-01 | Stop reason: HOSPADM

## 2019-01-01 RX ORDER — GUAIFENESIN/DEXTROMETHORPHAN 100-10MG/5
5 SYRUP ORAL EVERY 4 HOURS PRN
Status: DISCONTINUED | OUTPATIENT
Start: 2019-01-01 | End: 2019-01-01 | Stop reason: HOSPADM

## 2019-01-01 RX ORDER — FUROSEMIDE 10 MG/ML
20 INJECTION INTRAMUSCULAR; INTRAVENOUS ONCE
Status: COMPLETED | OUTPATIENT
Start: 2019-01-01 | End: 2019-01-01

## 2019-01-01 RX ORDER — SODIUM CHLORIDE 9 MG/ML
INJECTION, SOLUTION INTRAVENOUS CONTINUOUS
Status: DISCONTINUED | OUTPATIENT
Start: 2019-01-01 | End: 2019-01-01

## 2019-01-01 RX ORDER — VANCOMYCIN HYDROCHLORIDE 1 G/200ML
15 INJECTION, SOLUTION INTRAVENOUS EVERY 12 HOURS
Status: DISCONTINUED | OUTPATIENT
Start: 2019-01-01 | End: 2019-01-01

## 2019-01-01 RX ORDER — NICOTINE POLACRILEX 4 MG
15 LOZENGE BUCCAL PRN
Status: DISCONTINUED | OUTPATIENT
Start: 2019-01-01 | End: 2019-01-01 | Stop reason: HOSPADM

## 2019-01-01 RX ORDER — HEPARIN SODIUM 1000 [USP'U]/ML
60 INJECTION, SOLUTION INTRAVENOUS; SUBCUTANEOUS PRN
Status: DISCONTINUED | OUTPATIENT
Start: 2019-01-01 | End: 2019-01-01

## 2019-01-01 RX ORDER — ALBUTEROL SULFATE 2.5 MG/3ML
2.5 SOLUTION RESPIRATORY (INHALATION) EVERY 4 HOURS PRN
Status: DISCONTINUED | OUTPATIENT
Start: 2019-01-01 | End: 2019-01-01 | Stop reason: HOSPADM

## 2019-01-01 RX ORDER — SODIUM CHLORIDE 450 MG/100ML
INJECTION, SOLUTION INTRAVENOUS CONTINUOUS
Status: CANCELLED | OUTPATIENT
Start: 2019-01-01 | End: 2019-01-01

## 2019-01-01 RX ORDER — ONDANSETRON 2 MG/ML
4 INJECTION INTRAMUSCULAR; INTRAVENOUS EVERY 6 HOURS PRN
Status: DISCONTINUED | OUTPATIENT
Start: 2019-01-01 | End: 2019-01-01 | Stop reason: HOSPADM

## 2019-01-01 RX ORDER — DIPHENHYDRAMINE HCL 25 MG
25 TABLET ORAL
Status: ACTIVE | OUTPATIENT
Start: 2019-01-01 | End: 2019-01-01

## 2019-01-01 RX ORDER — POLYETHYLENE GLYCOL 3350 17 G/17G
17 POWDER, FOR SOLUTION ORAL DAILY PRN
Status: DISCONTINUED | OUTPATIENT
Start: 2019-01-01 | End: 2019-01-01 | Stop reason: HOSPADM

## 2019-01-01 RX ORDER — FUROSEMIDE 10 MG/ML
INJECTION INTRAMUSCULAR; INTRAVENOUS
Status: DISPENSED
Start: 2019-01-01 | End: 2019-01-01

## 2019-01-01 RX ORDER — SIMVASTATIN 40 MG
40 TABLET ORAL NIGHTLY
Status: DISCONTINUED | OUTPATIENT
Start: 2019-01-01 | End: 2019-01-01 | Stop reason: HOSPADM

## 2019-01-01 RX ORDER — SODIUM POLYSTYRENE SULFONATE 15 G/60ML
15 SUSPENSION ORAL; RECTAL ONCE
Status: COMPLETED | OUTPATIENT
Start: 2019-01-01 | End: 2019-01-01

## 2019-01-01 RX ORDER — DEXTROSE MONOHYDRATE 50 MG/ML
100 INJECTION, SOLUTION INTRAVENOUS PRN
Status: DISCONTINUED | OUTPATIENT
Start: 2019-01-01 | End: 2019-01-01 | Stop reason: HOSPADM

## 2019-01-01 RX ORDER — ONDANSETRON 2 MG/ML
4 INJECTION INTRAMUSCULAR; INTRAVENOUS EVERY 6 HOURS PRN
Status: DISCONTINUED | OUTPATIENT
Start: 2019-01-01 | End: 2019-01-01

## 2019-01-01 RX ORDER — MIDAZOLAM HYDROCHLORIDE 1 MG/ML
INJECTION INTRAMUSCULAR; INTRAVENOUS PRN
Status: DISCONTINUED | OUTPATIENT
Start: 2019-01-01 | End: 2019-01-01 | Stop reason: HOSPADM

## 2019-01-01 RX ORDER — INSULIN GLARGINE 100 [IU]/ML
5 INJECTION, SOLUTION SUBCUTANEOUS NIGHTLY
Status: DISCONTINUED | OUTPATIENT
Start: 2019-01-01 | End: 2019-01-01 | Stop reason: HOSPADM

## 2019-01-01 RX ORDER — METRONIDAZOLE 250 MG/1
500 TABLET ORAL EVERY 8 HOURS SCHEDULED
Status: COMPLETED | OUTPATIENT
Start: 2019-01-01 | End: 2019-01-01

## 2019-01-01 RX ORDER — GABAPENTIN 400 MG/1
400 CAPSULE ORAL 2 TIMES DAILY
COMMUNITY

## 2019-01-01 RX ORDER — ACETAMINOPHEN 325 MG/1
650 TABLET ORAL EVERY 4 HOURS PRN
Status: DISCONTINUED | OUTPATIENT
Start: 2019-01-01 | End: 2019-01-01

## 2019-01-01 RX ORDER — ALBUTEROL SULFATE 2.5 MG/3ML
2.5 SOLUTION RESPIRATORY (INHALATION) EVERY 4 HOURS PRN
Status: CANCELLED | OUTPATIENT
Start: 2019-01-01

## 2019-01-01 RX ORDER — IPRATROPIUM BROMIDE AND ALBUTEROL SULFATE 2.5; .5 MG/3ML; MG/3ML
3 SOLUTION RESPIRATORY (INHALATION)
Qty: 360 ML | Refills: 0 | Status: SHIPPED | OUTPATIENT
Start: 2019-01-01

## 2019-01-01 RX ORDER — LATANOPROST 50 UG/ML
1 SOLUTION/ DROPS OPHTHALMIC NIGHTLY
Status: CANCELLED | OUTPATIENT
Start: 2019-01-01

## 2019-01-01 RX ORDER — POTASSIUM CHLORIDE 29.8 MG/ML
20 INJECTION INTRAVENOUS
Status: COMPLETED | OUTPATIENT
Start: 2019-01-01 | End: 2019-01-01

## 2019-01-01 RX ORDER — PROPOFOL 10 MG/ML
10 INJECTION, EMULSION INTRAVENOUS CONTINUOUS
Status: DISCONTINUED | OUTPATIENT
Start: 2019-01-01 | End: 2019-01-01 | Stop reason: HOSPADM

## 2019-01-01 RX ORDER — SODIUM CHLORIDE 0.9 % (FLUSH) 0.9 %
10 SYRINGE (ML) INJECTION PRN
Status: DISCONTINUED | OUTPATIENT
Start: 2019-01-01 | End: 2019-01-01

## 2019-01-01 RX ORDER — CHLORHEXIDINE GLUCONATE 0.12 MG/ML
15 RINSE ORAL 2 TIMES DAILY
Qty: 420 ML | Refills: 0 | Status: SHIPPED | OUTPATIENT
Start: 2019-01-01 | End: 2019-01-01

## 2019-01-01 RX ORDER — MIDODRINE HYDROCHLORIDE 10 MG/1
10 TABLET ORAL
Qty: 90 TABLET | Refills: 3 | Status: SHIPPED | OUTPATIENT
Start: 2019-01-01 | End: 2020-01-01 | Stop reason: ALTCHOICE

## 2019-01-01 RX ORDER — OSELTAMIVIR PHOSPHATE 30 MG/1
30 CAPSULE ORAL ONCE
Status: COMPLETED | OUTPATIENT
Start: 2019-01-01 | End: 2019-01-01

## 2019-01-01 RX ORDER — POTASSIUM CHLORIDE 7.45 MG/ML
10 INJECTION INTRAVENOUS PRN
Status: CANCELLED | OUTPATIENT
Start: 2019-01-01

## 2019-01-01 RX ORDER — SODIUM CHLORIDE 0.9 % (FLUSH) 0.9 %
10 SYRINGE (ML) INJECTION 2 TIMES DAILY
Status: DISCONTINUED | OUTPATIENT
Start: 2019-01-01 | End: 2019-01-01

## 2019-01-01 RX ORDER — MIDAZOLAM HYDROCHLORIDE 1 MG/ML
5 INJECTION INTRAMUSCULAR; INTRAVENOUS ONCE
Status: COMPLETED | OUTPATIENT
Start: 2019-01-01 | End: 2019-01-01

## 2019-01-01 RX ORDER — GABAPENTIN 400 MG/1
400 CAPSULE ORAL 2 TIMES DAILY
Status: CANCELLED | OUTPATIENT
Start: 2019-01-01

## 2019-01-01 RX ORDER — ROCURONIUM BROMIDE 10 MG/ML
INJECTION, SOLUTION INTRAVENOUS PRN
Status: DISCONTINUED | OUTPATIENT
Start: 2019-01-01 | End: 2019-01-01 | Stop reason: SDUPTHER

## 2019-01-01 RX ORDER — ACETAMINOPHEN 325 MG/1
650 TABLET ORAL EVERY 4 HOURS PRN
Status: DISCONTINUED | OUTPATIENT
Start: 2019-01-01 | End: 2019-01-01 | Stop reason: HOSPADM

## 2019-01-01 RX ORDER — POTASSIUM CHLORIDE 1.5 G/1.77G
40 POWDER, FOR SOLUTION ORAL PRN
Status: DISCONTINUED | OUTPATIENT
Start: 2019-01-01 | End: 2019-01-01 | Stop reason: HOSPADM

## 2019-01-01 RX ORDER — LISINOPRIL 40 MG/1
40 TABLET ORAL DAILY
Status: ON HOLD | COMMUNITY
End: 2019-01-01 | Stop reason: HOSPADM

## 2019-01-01 RX ORDER — OMEPRAZOLE 20 MG/1
20 CAPSULE, DELAYED RELEASE ORAL
COMMUNITY
End: 2020-01-01 | Stop reason: ALTCHOICE

## 2019-01-01 RX ORDER — FENTANYL CITRATE 50 UG/ML
INJECTION, SOLUTION INTRAMUSCULAR; INTRAVENOUS PRN
Status: DISCONTINUED | OUTPATIENT
Start: 2019-01-01 | End: 2019-01-01 | Stop reason: SDUPTHER

## 2019-01-01 RX ORDER — LIDOCAINE HYDROCHLORIDE 10 MG/ML
5 INJECTION, SOLUTION EPIDURAL; INFILTRATION; INTRACAUDAL; PERINEURAL ONCE
Status: COMPLETED | OUTPATIENT
Start: 2019-01-01 | End: 2019-01-01

## 2019-01-01 RX ORDER — IPRATROPIUM BROMIDE AND ALBUTEROL SULFATE 2.5; .5 MG/3ML; MG/3ML
1 SOLUTION RESPIRATORY (INHALATION)
Status: DISCONTINUED | OUTPATIENT
Start: 2019-01-01 | End: 2019-01-01 | Stop reason: HOSPADM

## 2019-01-01 RX ORDER — 0.9 % SODIUM CHLORIDE 0.9 %
250 INTRAVENOUS SOLUTION INTRAVENOUS ONCE
Status: COMPLETED | OUTPATIENT
Start: 2019-01-01 | End: 2019-01-01

## 2019-01-01 RX ORDER — PROPOFOL 10 MG/ML
INJECTION, EMULSION INTRAVENOUS
Status: DISPENSED
Start: 2019-01-01 | End: 2019-01-01

## 2019-01-01 RX ORDER — SODIUM CHLORIDE 0.9 % (FLUSH) 0.9 %
10 SYRINGE (ML) INJECTION PRN
Status: DISCONTINUED | OUTPATIENT
Start: 2019-01-01 | End: 2019-01-01 | Stop reason: HOSPADM

## 2019-01-01 RX ORDER — DIAZEPAM 5 MG/1
5 TABLET ORAL
Status: ACTIVE | OUTPATIENT
Start: 2019-01-01 | End: 2019-01-01

## 2019-01-01 RX ORDER — 0.9 % SODIUM CHLORIDE 0.9 %
1000 INTRAVENOUS SOLUTION INTRAVENOUS ONCE
Status: COMPLETED | OUTPATIENT
Start: 2019-01-01 | End: 2019-01-01

## 2019-01-01 RX ORDER — ONDANSETRON 2 MG/ML
INJECTION INTRAMUSCULAR; INTRAVENOUS PRN
Status: DISCONTINUED | OUTPATIENT
Start: 2019-01-01 | End: 2019-01-01 | Stop reason: SDUPTHER

## 2019-01-01 RX ORDER — CHLORHEXIDINE GLUCONATE 0.12 MG/ML
15 RINSE ORAL 2 TIMES DAILY
Status: DISCONTINUED | OUTPATIENT
Start: 2019-01-01 | End: 2019-01-01

## 2019-01-01 RX ORDER — GUAIFENESIN/DEXTROMETHORPHAN 100-10MG/5
5 SYRUP ORAL EVERY 4 HOURS PRN
Status: CANCELLED | OUTPATIENT
Start: 2019-01-01

## 2019-01-01 RX ORDER — POTASSIUM CHLORIDE 29.8 MG/ML
40 INJECTION INTRAVENOUS ONCE
Status: DISCONTINUED | OUTPATIENT
Start: 2019-01-01 | End: 2019-01-01 | Stop reason: ALTCHOICE

## 2019-01-01 RX ORDER — MIDODRINE HYDROCHLORIDE 5 MG/1
5 TABLET ORAL 3 TIMES DAILY
COMMUNITY
End: 2019-01-01 | Stop reason: CLARIF

## 2019-01-01 RX ORDER — DEXTROSE MONOHYDRATE 50 MG/ML
INJECTION, SOLUTION INTRAVENOUS CONTINUOUS
Status: DISCONTINUED | OUTPATIENT
Start: 2019-01-01 | End: 2019-01-01

## 2019-01-01 RX ORDER — PANTOPRAZOLE SODIUM 40 MG/10ML
40 INJECTION, POWDER, LYOPHILIZED, FOR SOLUTION INTRAVENOUS DAILY
Qty: 30 EACH | Refills: 0 | Status: SHIPPED | OUTPATIENT
Start: 2019-01-01 | End: 2020-01-01 | Stop reason: ALTCHOICE

## 2019-01-01 RX ORDER — SODIUM CHLORIDE FOR INHALATION 0.9 %
3 VIAL, NEBULIZER (ML) INHALATION EVERY 4 HOURS PRN
Status: DISCONTINUED | OUTPATIENT
Start: 2019-01-01 | End: 2019-01-01 | Stop reason: HOSPADM

## 2019-01-01 RX ORDER — POTASSIUM CHLORIDE 750 MG/1
10 TABLET, FILM COATED, EXTENDED RELEASE ORAL
Status: DISCONTINUED | OUTPATIENT
Start: 2019-01-01 | End: 2019-01-01

## 2019-01-01 RX ORDER — MIDODRINE HYDROCHLORIDE 5 MG/1
10 TABLET ORAL
Status: DISCONTINUED | OUTPATIENT
Start: 2019-01-01 | End: 2019-01-01 | Stop reason: HOSPADM

## 2019-01-01 RX ORDER — DEXAMETHASONE SODIUM PHOSPHATE 4 MG/ML
INJECTION, SOLUTION INTRA-ARTICULAR; INTRALESIONAL; INTRAMUSCULAR; INTRAVENOUS; SOFT TISSUE PRN
Status: DISCONTINUED | OUTPATIENT
Start: 2019-01-01 | End: 2019-01-01 | Stop reason: SDUPTHER

## 2019-01-01 RX ORDER — OSELTAMIVIR PHOSPHATE 30 MG/1
30 CAPSULE ORAL 2 TIMES DAILY
Status: CANCELLED | OUTPATIENT
Start: 2019-01-01 | End: 2019-01-01

## 2019-01-01 RX ORDER — ACETAMINOPHEN 325 MG/1
650 TABLET ORAL EVERY 4 HOURS PRN
Status: CANCELLED | OUTPATIENT
Start: 2019-01-01

## 2019-01-01 RX ORDER — CLOPIDOGREL BISULFATE 75 MG/1
75 TABLET ORAL DAILY
COMMUNITY
End: 2019-01-01 | Stop reason: CLARIF

## 2019-01-01 RX ORDER — CLOPIDOGREL BISULFATE 75 MG/1
75 TABLET ORAL DAILY
Status: DISCONTINUED | OUTPATIENT
Start: 2019-01-01 | End: 2019-01-01 | Stop reason: HOSPADM

## 2019-01-01 RX ORDER — ASPIRIN 81 MG/1
81 TABLET, CHEWABLE ORAL DAILY
Qty: 30 TABLET | Refills: 3 | Status: SHIPPED | OUTPATIENT
Start: 2019-01-01

## 2019-01-01 RX ORDER — 0.9 % SODIUM CHLORIDE 0.9 %
10 VIAL (ML) INJECTION DAILY
Status: DISCONTINUED | OUTPATIENT
Start: 2019-01-01 | End: 2019-01-01 | Stop reason: HOSPADM

## 2019-01-01 RX ORDER — HEPARIN SODIUM 1000 [USP'U]/ML
30 INJECTION, SOLUTION INTRAVENOUS; SUBCUTANEOUS PRN
Status: DISCONTINUED | OUTPATIENT
Start: 2019-01-01 | End: 2019-01-01

## 2019-01-01 RX ORDER — POTASSIUM CHLORIDE 20 MEQ/1
40 TABLET, EXTENDED RELEASE ORAL PRN
Status: CANCELLED | OUTPATIENT
Start: 2019-01-01

## 2019-01-01 RX ORDER — CALCIUM CARBONATE 200(500)MG
500 TABLET,CHEWABLE ORAL 3 TIMES DAILY PRN
Status: DISCONTINUED | OUTPATIENT
Start: 2019-01-01 | End: 2019-01-01 | Stop reason: HOSPADM

## 2019-01-01 RX ORDER — METHYLPREDNISOLONE SODIUM SUCCINATE 125 MG/2ML
40 INJECTION, POWDER, LYOPHILIZED, FOR SOLUTION INTRAMUSCULAR; INTRAVENOUS ONCE
Status: COMPLETED | OUTPATIENT
Start: 2019-01-01 | End: 2019-01-01

## 2019-01-01 RX ORDER — HEPARIN SODIUM 1000 [USP'U]/ML
60 INJECTION, SOLUTION INTRAVENOUS; SUBCUTANEOUS ONCE
Status: COMPLETED | OUTPATIENT
Start: 2019-01-01 | End: 2019-01-01

## 2019-01-01 RX ORDER — HYDROCHLOROTHIAZIDE 12.5 MG/1
12.5 CAPSULE, GELATIN COATED ORAL DAILY
Status: ON HOLD | COMMUNITY
End: 2019-01-01 | Stop reason: HOSPADM

## 2019-01-01 RX ORDER — PANTOPRAZOLE SODIUM 40 MG/10ML
40 INJECTION, POWDER, LYOPHILIZED, FOR SOLUTION INTRAVENOUS DAILY
Status: DISCONTINUED | OUTPATIENT
Start: 2019-01-01 | End: 2019-01-01 | Stop reason: HOSPADM

## 2019-01-01 RX ORDER — PANTOPRAZOLE SODIUM 40 MG/1
40 TABLET, DELAYED RELEASE ORAL NIGHTLY
Status: DISCONTINUED | OUTPATIENT
Start: 2019-01-01 | End: 2019-01-01 | Stop reason: HOSPADM

## 2019-01-01 RX ORDER — SODIUM CHLORIDE 0.9 % (FLUSH) 0.9 %
10 SYRINGE (ML) INJECTION PRN
Status: CANCELLED | OUTPATIENT
Start: 2019-01-01

## 2019-01-01 RX ORDER — ASPIRIN 81 MG/1
81 TABLET, CHEWABLE ORAL DAILY
Status: DISCONTINUED | OUTPATIENT
Start: 2019-01-01 | End: 2019-01-01 | Stop reason: HOSPADM

## 2019-01-01 RX ORDER — SIMVASTATIN 40 MG
40 TABLET ORAL NIGHTLY
Status: CANCELLED | OUTPATIENT
Start: 2019-01-01

## 2019-01-01 RX ORDER — LATANOPROST 50 UG/ML
1 SOLUTION/ DROPS OPHTHALMIC NIGHTLY
Status: DISCONTINUED | OUTPATIENT
Start: 2019-01-01 | End: 2019-01-01 | Stop reason: HOSPADM

## 2019-01-01 RX ORDER — POTASSIUM CHLORIDE 7.45 MG/ML
10 INJECTION INTRAVENOUS PRN
Status: DISCONTINUED | OUTPATIENT
Start: 2019-01-01 | End: 2019-01-01 | Stop reason: HOSPADM

## 2019-01-01 RX ORDER — DEXTROSE AND SODIUM CHLORIDE 5; .45 G/100ML; G/100ML
INJECTION, SOLUTION INTRAVENOUS CONTINUOUS
Status: DISCONTINUED | OUTPATIENT
Start: 2019-01-01 | End: 2019-01-01

## 2019-01-01 RX ORDER — DEXTROSE MONOHYDRATE 25 G/50ML
12.5 INJECTION, SOLUTION INTRAVENOUS PRN
Status: CANCELLED | OUTPATIENT
Start: 2019-01-01

## 2019-01-01 RX ORDER — LEVOFLOXACIN 500 MG/1
500 TABLET, FILM COATED ORAL DAILY
Status: COMPLETED | OUTPATIENT
Start: 2019-01-01 | End: 2019-01-01

## 2019-01-01 RX ORDER — SODIUM CHLORIDE 0.9 % (FLUSH) 0.9 %
10 SYRINGE (ML) INJECTION EVERY 12 HOURS SCHEDULED
Status: CANCELLED | OUTPATIENT
Start: 2019-01-01

## 2019-01-01 RX ORDER — SODIUM CHLORIDE 0.9 % (FLUSH) 0.9 %
10 SYRINGE (ML) INJECTION EVERY 12 HOURS SCHEDULED
Status: DISCONTINUED | OUTPATIENT
Start: 2019-01-01 | End: 2019-01-01 | Stop reason: HOSPADM

## 2019-01-01 RX ORDER — DEXTROSE MONOHYDRATE 25 G/50ML
25 INJECTION, SOLUTION INTRAVENOUS ONCE
Status: COMPLETED | OUTPATIENT
Start: 2019-01-01 | End: 2019-01-01

## 2019-01-01 RX ORDER — SODIUM CHLORIDE 0.9 % (FLUSH) 0.9 %
10 SYRINGE (ML) INJECTION EVERY 12 HOURS SCHEDULED
Status: DISCONTINUED | OUTPATIENT
Start: 2019-01-01 | End: 2019-01-01

## 2019-01-01 RX ORDER — CLOPIDOGREL BISULFATE 75 MG/1
75 TABLET ORAL DAILY
Qty: 30 TABLET | Refills: 3 | Status: SHIPPED | OUTPATIENT
Start: 2019-01-01 | End: 2020-01-01 | Stop reason: ALTCHOICE

## 2019-01-01 RX ORDER — NICOTINE POLACRILEX 4 MG
15 LOZENGE BUCCAL PRN
Status: CANCELLED | OUTPATIENT
Start: 2019-01-01

## 2019-01-01 RX ORDER — POTASSIUM CHLORIDE 1.5 G/1.77G
40 POWDER, FOR SOLUTION ORAL PRN
Status: CANCELLED | OUTPATIENT
Start: 2019-01-01

## 2019-01-01 RX ORDER — PANTOPRAZOLE SODIUM 40 MG/1
40 TABLET, DELAYED RELEASE ORAL
Status: DISCONTINUED | OUTPATIENT
Start: 2019-01-01 | End: 2019-01-01

## 2019-01-01 RX ORDER — IPRATROPIUM BROMIDE AND ALBUTEROL SULFATE 2.5; .5 MG/3ML; MG/3ML
1 SOLUTION RESPIRATORY (INHALATION) ONCE
Status: COMPLETED | OUTPATIENT
Start: 2019-01-01 | End: 2019-01-01

## 2019-01-01 RX ORDER — LATANOPROST 50 UG/ML
1 SOLUTION/ DROPS OPHTHALMIC NIGHTLY
COMMUNITY

## 2019-01-01 RX ORDER — SODIUM CHLORIDE 9 MG/ML
INJECTION, SOLUTION INTRAVENOUS CONTINUOUS
Status: DISCONTINUED | OUTPATIENT
Start: 2019-01-01 | End: 2019-01-01 | Stop reason: HOSPADM

## 2019-01-01 RX ORDER — SUCCINYLCHOLINE/SOD CL,ISO/PF 100 MG/5ML
SYRINGE (ML) INTRAVENOUS PRN
Status: DISCONTINUED | OUTPATIENT
Start: 2019-01-01 | End: 2019-01-01 | Stop reason: HOSPADM

## 2019-01-01 RX ORDER — PANTOPRAZOLE SODIUM 40 MG/1
40 TABLET, DELAYED RELEASE ORAL NIGHTLY
Status: CANCELLED | OUTPATIENT
Start: 2019-01-01

## 2019-01-01 RX ORDER — FUROSEMIDE 10 MG/ML
40 INJECTION INTRAMUSCULAR; INTRAVENOUS ONCE
Status: COMPLETED | OUTPATIENT
Start: 2019-01-01 | End: 2019-01-01

## 2019-01-01 RX ORDER — ASPIRIN 81 MG/1
324 TABLET, CHEWABLE ORAL ONCE
Status: COMPLETED | OUTPATIENT
Start: 2019-01-01 | End: 2019-01-01

## 2019-01-01 RX ORDER — DEXTROSE MONOHYDRATE 50 MG/ML
100 INJECTION, SOLUTION INTRAVENOUS PRN
Status: CANCELLED | OUTPATIENT
Start: 2019-01-01

## 2019-01-01 RX ORDER — CALCIUM CHLORIDE 100 MG/ML
1 INJECTION INTRAVENOUS; INTRAVENTRICULAR ONCE
Status: DISCONTINUED | OUTPATIENT
Start: 2019-01-01 | End: 2019-01-01

## 2019-01-01 RX ORDER — LORAZEPAM 2 MG/ML
1 INJECTION INTRAMUSCULAR ONCE
Status: COMPLETED | OUTPATIENT
Start: 2019-01-01 | End: 2019-01-01

## 2019-01-01 RX ORDER — METRONIDAZOLE 250 MG/1
500 TABLET ORAL EVERY 8 HOURS SCHEDULED
Status: CANCELLED | OUTPATIENT
Start: 2019-01-01 | End: 2019-01-01

## 2019-01-01 RX ORDER — ONDANSETRON 2 MG/ML
4 INJECTION INTRAMUSCULAR; INTRAVENOUS EVERY 6 HOURS PRN
Status: CANCELLED | OUTPATIENT
Start: 2019-01-01

## 2019-01-01 RX ORDER — PANTOPRAZOLE SODIUM 40 MG/10ML
40 INJECTION, POWDER, LYOPHILIZED, FOR SOLUTION INTRAVENOUS 2 TIMES DAILY
Status: DISCONTINUED | OUTPATIENT
Start: 2019-01-01 | End: 2019-01-01 | Stop reason: SDUPTHER

## 2019-01-01 RX ORDER — ONDANSETRON 4 MG/1
4 TABLET, ORALLY DISINTEGRATING ORAL EVERY 8 HOURS PRN
Status: DISCONTINUED | OUTPATIENT
Start: 2019-01-01 | End: 2019-01-01 | Stop reason: HOSPADM

## 2019-01-01 RX ORDER — 0.9 % SODIUM CHLORIDE 0.9 %
500 INTRAVENOUS SOLUTION INTRAVENOUS ONCE
Status: COMPLETED | OUTPATIENT
Start: 2019-01-01 | End: 2019-01-01

## 2019-01-01 RX ORDER — HEPARIN SODIUM 10000 [USP'U]/100ML
12 INJECTION, SOLUTION INTRAVENOUS CONTINUOUS
Status: DISCONTINUED | OUTPATIENT
Start: 2019-01-01 | End: 2019-01-01

## 2019-01-01 RX ORDER — INSULIN GLARGINE 100 [IU]/ML
5 INJECTION, SOLUTION SUBCUTANEOUS NIGHTLY
Qty: 1 VIAL | Refills: 3 | Status: SHIPPED | OUTPATIENT
Start: 2019-01-01 | End: 2020-01-01 | Stop reason: ALTCHOICE

## 2019-01-01 RX ORDER — POTASSIUM CHLORIDE 20 MEQ/1
40 TABLET, EXTENDED RELEASE ORAL PRN
Status: DISCONTINUED | OUTPATIENT
Start: 2019-01-01 | End: 2019-01-01 | Stop reason: HOSPADM

## 2019-01-01 RX ORDER — LEVOFLOXACIN 500 MG/1
500 TABLET, FILM COATED ORAL DAILY
Status: CANCELLED | OUTPATIENT
Start: 2019-01-01 | End: 2019-01-01

## 2019-01-01 RX ADMIN — LATANOPROST 1 DROP: 50 SOLUTION OPHTHALMIC at 23:07

## 2019-01-01 RX ADMIN — GABAPENTIN 400 MG: 400 CAPSULE ORAL at 16:51

## 2019-01-01 RX ADMIN — CEFEPIME HYDROCHLORIDE 2 G: 2 INJECTION, POWDER, FOR SOLUTION INTRAVENOUS at 11:36

## 2019-01-01 RX ADMIN — GABAPENTIN 400 MG: 400 CAPSULE ORAL at 17:32

## 2019-01-01 RX ADMIN — LEVOFLOXACIN 500 MG: 500 TABLET, FILM COATED ORAL at 09:53

## 2019-01-01 RX ADMIN — SODIUM CHLORIDE, PRESERVATIVE FREE 10 ML: 5 INJECTION INTRAVENOUS at 09:56

## 2019-01-01 RX ADMIN — IPRATROPIUM BROMIDE AND ALBUTEROL SULFATE 1 AMPULE: .5; 3 SOLUTION RESPIRATORY (INHALATION) at 07:18

## 2019-01-01 RX ADMIN — IPRATROPIUM BROMIDE AND ALBUTEROL SULFATE 1 AMPULE: .5; 3 SOLUTION RESPIRATORY (INHALATION) at 11:08

## 2019-01-01 RX ADMIN — IPRATROPIUM BROMIDE AND ALBUTEROL SULFATE 1 AMPULE: .5; 3 SOLUTION RESPIRATORY (INHALATION) at 08:27

## 2019-01-01 RX ADMIN — IPRATROPIUM BROMIDE AND ALBUTEROL SULFATE 1 AMPULE: .5; 3 SOLUTION RESPIRATORY (INHALATION) at 11:39

## 2019-01-01 RX ADMIN — INSULIN LISPRO 1 UNITS: 100 INJECTION, SOLUTION INTRAVENOUS; SUBCUTANEOUS at 17:03

## 2019-01-01 RX ADMIN — SODIUM CHLORIDE, PRESERVATIVE FREE 10 ML: 5 INJECTION INTRAVENOUS at 21:45

## 2019-01-01 RX ADMIN — POTASSIUM CHLORIDE 10 MEQ: 7.46 INJECTION, SOLUTION INTRAVENOUS at 13:21

## 2019-01-01 RX ADMIN — SODIUM POLYSTYRENE SULFONATE 15 G: 15 SUSPENSION ORAL; RECTAL at 06:07

## 2019-01-01 RX ADMIN — ONDANSETRON 4 MG: 4 TABLET, ORALLY DISINTEGRATING ORAL at 09:45

## 2019-01-01 RX ADMIN — INSULIN LISPRO 4 UNITS: 100 INJECTION, SOLUTION INTRAVENOUS; SUBCUTANEOUS at 16:52

## 2019-01-01 RX ADMIN — SODIUM CHLORIDE, PRESERVATIVE FREE 10 ML: 5 INJECTION INTRAVENOUS at 08:42

## 2019-01-01 RX ADMIN — PANTOPRAZOLE SODIUM 40 MG: 40 INJECTION, POWDER, FOR SOLUTION INTRAVENOUS at 09:13

## 2019-01-01 RX ADMIN — CHLORHEXIDINE GLUCONATE 0.12% ORAL RINSE 15 ML: 1.2 LIQUID ORAL at 21:45

## 2019-01-01 RX ADMIN — INSULIN LISPRO 2 UNITS: 100 INJECTION, SOLUTION INTRAVENOUS; SUBCUTANEOUS at 17:19

## 2019-01-01 RX ADMIN — CHLORHEXIDINE GLUCONATE 0.12% ORAL RINSE 15 ML: 1.2 LIQUID ORAL at 21:41

## 2019-01-01 RX ADMIN — LATANOPROST 1 DROP: 50 SOLUTION OPHTHALMIC at 21:38

## 2019-01-01 RX ADMIN — PANTOPRAZOLE SODIUM 40 MG: 40 TABLET, DELAYED RELEASE ORAL at 21:26

## 2019-01-01 RX ADMIN — DEXTROSE MONOHYDRATE: 50 INJECTION, SOLUTION INTRAVENOUS at 01:15

## 2019-01-01 RX ADMIN — PROPOFOL 25 MCG/KG/MIN: 10 INJECTION, EMULSION INTRAVENOUS at 10:53

## 2019-01-01 RX ADMIN — IPRATROPIUM BROMIDE AND ALBUTEROL SULFATE 1 AMPULE: .5; 3 SOLUTION RESPIRATORY (INHALATION) at 07:28

## 2019-01-01 RX ADMIN — SODIUM CHLORIDE: 9 INJECTION, SOLUTION INTRAVENOUS at 04:11

## 2019-01-01 RX ADMIN — IPRATROPIUM BROMIDE AND ALBUTEROL SULFATE 1 AMPULE: .5; 3 SOLUTION RESPIRATORY (INHALATION) at 19:40

## 2019-01-01 RX ADMIN — GABAPENTIN 400 MG: 400 CAPSULE ORAL at 09:06

## 2019-01-01 RX ADMIN — LATANOPROST 1 DROP: 50 SOLUTION OPHTHALMIC at 21:34

## 2019-01-01 RX ADMIN — ENOXAPARIN SODIUM 40 MG: 100 INJECTION SUBCUTANEOUS at 09:41

## 2019-01-01 RX ADMIN — SODIUM CHLORIDE: 9 INJECTION, SOLUTION INTRAVENOUS at 21:45

## 2019-01-01 RX ADMIN — PANTOPRAZOLE SODIUM 40 MG: 40 TABLET, DELAYED RELEASE ORAL at 06:06

## 2019-01-01 RX ADMIN — SIMVASTATIN 40 MG: 40 TABLET, FILM COATED ORAL at 20:58

## 2019-01-01 RX ADMIN — METRONIDAZOLE 500 MG: 250 TABLET ORAL at 05:17

## 2019-01-01 RX ADMIN — IPRATROPIUM BROMIDE AND ALBUTEROL SULFATE 1 AMPULE: .5; 3 SOLUTION RESPIRATORY (INHALATION) at 07:25

## 2019-01-01 RX ADMIN — GUAIFENESIN AND DEXTROMETHORPHAN 5 ML: 100; 10 SYRUP ORAL at 11:30

## 2019-01-01 RX ADMIN — OSELTAMIVIR PHOSPHATE 30 MG: 30 CAPSULE ORAL at 08:40

## 2019-01-01 RX ADMIN — SODIUM CHLORIDE, PRESERVATIVE FREE 10 ML: 5 INJECTION INTRAVENOUS at 22:34

## 2019-01-01 RX ADMIN — MIDODRINE HYDROCHLORIDE 10 MG: 5 TABLET ORAL at 12:55

## 2019-01-01 RX ADMIN — ENOXAPARIN SODIUM 40 MG: 100 INJECTION SUBCUTANEOUS at 09:22

## 2019-01-01 RX ADMIN — METRONIDAZOLE 500 MG: 250 TABLET ORAL at 21:27

## 2019-01-01 RX ADMIN — CHLORHEXIDINE GLUCONATE 0.12% ORAL RINSE 15 ML: 1.2 LIQUID ORAL at 08:45

## 2019-01-01 RX ADMIN — GABAPENTIN 400 MG: 400 CAPSULE ORAL at 19:11

## 2019-01-01 RX ADMIN — Medication: at 09:00

## 2019-01-01 RX ADMIN — IPRATROPIUM BROMIDE AND ALBUTEROL SULFATE 1 AMPULE: .5; 3 SOLUTION RESPIRATORY (INHALATION) at 15:09

## 2019-01-01 RX ADMIN — POTASSIUM CHLORIDE: 750 TABLET, FILM COATED, EXTENDED RELEASE ORAL at 14:13

## 2019-01-01 RX ADMIN — CEFEPIME HYDROCHLORIDE 2 G: 2 INJECTION, POWDER, FOR SOLUTION INTRAVENOUS at 09:00

## 2019-01-01 RX ADMIN — INSULIN GLARGINE 5 UNITS: 100 INJECTION, SOLUTION SUBCUTANEOUS at 20:38

## 2019-01-01 RX ADMIN — PHENYLEPHRINE HYDROCHLORIDE 50 MCG/MIN: 10 INJECTION INTRAVENOUS at 11:40

## 2019-01-01 RX ADMIN — GABAPENTIN 400 MG: 400 CAPSULE ORAL at 09:26

## 2019-01-01 RX ADMIN — GABAPENTIN 400 MG: 400 CAPSULE ORAL at 17:24

## 2019-01-01 RX ADMIN — METRONIDAZOLE 500 MG: 250 TABLET ORAL at 20:24

## 2019-01-01 RX ADMIN — SODIUM CHLORIDE: 9 INJECTION, SOLUTION INTRAVENOUS at 08:12

## 2019-01-01 RX ADMIN — FAMOTIDINE 20 MG: 10 INJECTION, SOLUTION INTRAVENOUS at 08:51

## 2019-01-01 RX ADMIN — IPRATROPIUM BROMIDE AND ALBUTEROL SULFATE 1 AMPULE: .5; 3 SOLUTION RESPIRATORY (INHALATION) at 21:04

## 2019-01-01 RX ADMIN — POTASSIUM CHLORIDE 10 MEQ: 7.46 INJECTION, SOLUTION INTRAVENOUS at 09:50

## 2019-01-01 RX ADMIN — IPRATROPIUM BROMIDE AND ALBUTEROL SULFATE 1 AMPULE: .5; 3 SOLUTION RESPIRATORY (INHALATION) at 22:08

## 2019-01-01 RX ADMIN — INSULIN LISPRO 1 UNITS: 100 INJECTION, SOLUTION INTRAVENOUS; SUBCUTANEOUS at 13:08

## 2019-01-01 RX ADMIN — MIDODRINE HYDROCHLORIDE 10 MG: 5 TABLET ORAL at 08:17

## 2019-01-01 RX ADMIN — LIDOCAINE HYDROCHLORIDE 5 ML: 10 INJECTION, SOLUTION EPIDURAL; INFILTRATION; INTRACAUDAL; PERINEURAL at 10:00

## 2019-01-01 RX ADMIN — SODIUM CHLORIDE 1000 ML: 9 INJECTION, SOLUTION INTRAVENOUS at 14:10

## 2019-01-01 RX ADMIN — LORAZEPAM 1 MG: 2 INJECTION, SOLUTION INTRAMUSCULAR; INTRAVENOUS at 04:46

## 2019-01-01 RX ADMIN — INSULIN LISPRO 1 UNITS: 100 INJECTION, SOLUTION INTRAVENOUS; SUBCUTANEOUS at 18:46

## 2019-01-01 RX ADMIN — METFORMIN HYDROCHLORIDE 500 MG: 500 TABLET ORAL at 17:51

## 2019-01-01 RX ADMIN — PROPOFOL 25 MCG/KG/MIN: 10 INJECTION, EMULSION INTRAVENOUS at 20:29

## 2019-01-01 RX ADMIN — PHENYLEPHRINE HYDROCHLORIDE 50 MCG/MIN: 10 INJECTION INTRAVENOUS at 17:06

## 2019-01-01 RX ADMIN — INSULIN LISPRO 1 UNITS: 100 INJECTION, SOLUTION INTRAVENOUS; SUBCUTANEOUS at 21:34

## 2019-01-01 RX ADMIN — INSULIN LISPRO 1 UNITS: 100 INJECTION, SOLUTION INTRAVENOUS; SUBCUTANEOUS at 17:25

## 2019-01-01 RX ADMIN — SODIUM CHLORIDE, PRESERVATIVE FREE 10 ML: 5 INJECTION INTRAVENOUS at 21:37

## 2019-01-01 RX ADMIN — SODIUM CHLORIDE: 9 INJECTION, SOLUTION INTRAVENOUS at 14:29

## 2019-01-01 RX ADMIN — GABAPENTIN 400 MG: 400 CAPSULE ORAL at 19:10

## 2019-01-01 RX ADMIN — Medication 10 ML: at 09:08

## 2019-01-01 RX ADMIN — CLOPIDOGREL BISULFATE 75 MG: 75 TABLET ORAL at 08:17

## 2019-01-01 RX ADMIN — Medication 200 MG: at 03:45

## 2019-01-01 RX ADMIN — MIDODRINE HYDROCHLORIDE 10 MG: 5 TABLET ORAL at 11:53

## 2019-01-01 RX ADMIN — SODIUM CHLORIDE 1000 ML: 9 INJECTION, SOLUTION INTRAVENOUS at 22:01

## 2019-01-01 RX ADMIN — TAZOBACTAM SODIUM AND PIPERACILLIN SODIUM 3.38 G: 375; 3 INJECTION, SOLUTION INTRAVENOUS at 01:00

## 2019-01-01 RX ADMIN — OSELTAMIVIR PHOSPHATE 30 MG: 30 CAPSULE ORAL at 10:05

## 2019-01-01 RX ADMIN — IPRATROPIUM BROMIDE AND ALBUTEROL SULFATE 1 AMPULE: .5; 3 SOLUTION RESPIRATORY (INHALATION) at 20:01

## 2019-01-01 RX ADMIN — IPRATROPIUM BROMIDE AND ALBUTEROL SULFATE 1 AMPULE: .5; 3 SOLUTION RESPIRATORY (INHALATION) at 15:40

## 2019-01-01 RX ADMIN — Medication 25 MCG/HR: at 07:20

## 2019-01-01 RX ADMIN — TAZOBACTAM SODIUM AND PIPERACILLIN SODIUM 3.38 G: 375; 3 INJECTION, SOLUTION INTRAVENOUS at 01:09

## 2019-01-01 RX ADMIN — SODIUM CHLORIDE, PRESERVATIVE FREE 10 ML: 5 INJECTION INTRAVENOUS at 21:46

## 2019-01-01 RX ADMIN — SODIUM CHLORIDE, PRESERVATIVE FREE 10 ML: 5 INJECTION INTRAVENOUS at 21:27

## 2019-01-01 RX ADMIN — SODIUM CHLORIDE, PRESERVATIVE FREE 10 ML: 5 INJECTION INTRAVENOUS at 22:33

## 2019-01-01 RX ADMIN — GABAPENTIN 400 MG: 400 CAPSULE ORAL at 18:01

## 2019-01-01 RX ADMIN — ACETAMINOPHEN 650 MG: 325 TABLET ORAL at 17:15

## 2019-01-01 RX ADMIN — SODIUM CHLORIDE, PRESERVATIVE FREE 10 ML: 5 INJECTION INTRAVENOUS at 20:32

## 2019-01-01 RX ADMIN — PHENYLEPHRINE HYDROCHLORIDE 50 MCG/MIN: 10 INJECTION INTRAVENOUS at 03:00

## 2019-01-01 RX ADMIN — INSULIN LISPRO 1 UNITS: 100 INJECTION, SOLUTION INTRAVENOUS; SUBCUTANEOUS at 17:19

## 2019-01-01 RX ADMIN — CHLORHEXIDINE GLUCONATE 0.12% ORAL RINSE 15 ML: 1.2 LIQUID ORAL at 23:47

## 2019-01-01 RX ADMIN — METHYLPREDNISOLONE SODIUM SUCCINATE 40 MG: 125 INJECTION, POWDER, LYOPHILIZED, FOR SOLUTION INTRAMUSCULAR; INTRAVENOUS at 14:10

## 2019-01-01 RX ADMIN — INSULIN LISPRO 2 UNITS: 100 INJECTION, SOLUTION INTRAVENOUS; SUBCUTANEOUS at 17:40

## 2019-01-01 RX ADMIN — PROPOFOL 20 MCG/KG/MIN: 10 INJECTION, EMULSION INTRAVENOUS at 13:19

## 2019-01-01 RX ADMIN — DEXTROSE MONOHYDRATE: 50 INJECTION, SOLUTION INTRAVENOUS at 15:20

## 2019-01-01 RX ADMIN — Medication 10 ML: at 12:12

## 2019-01-01 RX ADMIN — DEXTROSE MONOHYDRATE: 50 INJECTION, SOLUTION INTRAVENOUS at 15:00

## 2019-01-01 RX ADMIN — CEFEPIME HYDROCHLORIDE 2 G: 2 INJECTION, POWDER, FOR SOLUTION INTRAVENOUS at 08:21

## 2019-01-01 RX ADMIN — DEXTROSE MONOHYDRATE 25 G: 25 INJECTION, SOLUTION INTRAVENOUS at 05:34

## 2019-01-01 RX ADMIN — ROCURONIUM BROMIDE 20 MG: 50 INJECTION, SOLUTION INTRAVENOUS at 08:05

## 2019-01-01 RX ADMIN — SIMVASTATIN 40 MG: 40 TABLET, FILM COATED ORAL at 21:55

## 2019-01-01 RX ADMIN — PANTOPRAZOLE SODIUM 40 MG: 40 TABLET, DELAYED RELEASE ORAL at 23:09

## 2019-01-01 RX ADMIN — SODIUM CHLORIDE, PRESERVATIVE FREE 10 ML: 5 INJECTION INTRAVENOUS at 09:02

## 2019-01-01 RX ADMIN — TAZOBACTAM SODIUM AND PIPERACILLIN SODIUM 3.38 G: 375; 3 INJECTION, SOLUTION INTRAVENOUS at 01:55

## 2019-01-01 RX ADMIN — CHLORHEXIDINE GLUCONATE 0.12% ORAL RINSE 15 ML: 1.2 LIQUID ORAL at 09:41

## 2019-01-01 RX ADMIN — GABAPENTIN 400 MG: 400 CAPSULE ORAL at 17:03

## 2019-01-01 RX ADMIN — CHLORHEXIDINE GLUCONATE 0.12% ORAL RINSE 15 ML: 1.2 LIQUID ORAL at 09:49

## 2019-01-01 RX ADMIN — INSULIN GLARGINE 5 UNITS: 100 INJECTION, SOLUTION SUBCUTANEOUS at 23:28

## 2019-01-01 RX ADMIN — IPRATROPIUM BROMIDE AND ALBUTEROL SULFATE 1 AMPULE: .5; 3 SOLUTION RESPIRATORY (INHALATION) at 07:35

## 2019-01-01 RX ADMIN — PANTOPRAZOLE SODIUM 40 MG: 40 INJECTION, POWDER, FOR SOLUTION INTRAVENOUS at 08:59

## 2019-01-01 RX ADMIN — TAZOBACTAM SODIUM AND PIPERACILLIN SODIUM 3.38 G: 375; 3 INJECTION, SOLUTION INTRAVENOUS at 09:40

## 2019-01-01 RX ADMIN — CLOPIDOGREL BISULFATE 75 MG: 75 TABLET ORAL at 13:29

## 2019-01-01 RX ADMIN — TAZOBACTAM SODIUM AND PIPERACILLIN SODIUM 3.38 G: 375; 3 INJECTION, SOLUTION INTRAVENOUS at 05:08

## 2019-01-01 RX ADMIN — CHLORHEXIDINE GLUCONATE 0.12% ORAL RINSE 15 ML: 1.2 LIQUID ORAL at 20:30

## 2019-01-01 RX ADMIN — IPRATROPIUM BROMIDE AND ALBUTEROL SULFATE 1 AMPULE: .5; 3 SOLUTION RESPIRATORY (INHALATION) at 11:45

## 2019-01-01 RX ADMIN — PROPOFOL 10 MCG/KG/MIN: 10 INJECTION, EMULSION INTRAVENOUS at 12:56

## 2019-01-01 RX ADMIN — SIMVASTATIN 40 MG: 40 TABLET, FILM COATED ORAL at 20:24

## 2019-01-01 RX ADMIN — INSULIN LISPRO 2 UNITS: 100 INJECTION, SOLUTION INTRAVENOUS; SUBCUTANEOUS at 12:46

## 2019-01-01 RX ADMIN — PANTOPRAZOLE SODIUM 40 MG: 40 INJECTION, POWDER, FOR SOLUTION INTRAVENOUS at 09:21

## 2019-01-01 RX ADMIN — ACETAMINOPHEN 650 MG: 325 TABLET ORAL at 20:17

## 2019-01-01 RX ADMIN — Medication: at 20:20

## 2019-01-01 RX ADMIN — IPRATROPIUM BROMIDE AND ALBUTEROL SULFATE 1 AMPULE: .5; 3 SOLUTION RESPIRATORY (INHALATION) at 11:04

## 2019-01-01 RX ADMIN — POTASSIUM CHLORIDE 40 MEQ: 20 TABLET, EXTENDED RELEASE ORAL at 12:16

## 2019-01-01 RX ADMIN — ENOXAPARIN SODIUM 40 MG: 40 INJECTION SUBCUTANEOUS at 09:53

## 2019-01-01 RX ADMIN — SIMVASTATIN 40 MG: 40 TABLET, FILM COATED ORAL at 20:30

## 2019-01-01 RX ADMIN — INSULIN GLARGINE 5 UNITS: 100 INJECTION, SOLUTION SUBCUTANEOUS at 20:16

## 2019-01-01 RX ADMIN — PHENYLEPHRINE HYDROCHLORIDE 85 MCG/MIN: 10 INJECTION INTRAVENOUS at 11:51

## 2019-01-01 RX ADMIN — ALBUTEROL SULFATE 2.5 MG: 2.5 SOLUTION RESPIRATORY (INHALATION) at 21:35

## 2019-01-01 RX ADMIN — METFORMIN HYDROCHLORIDE 500 MG: 500 TABLET ORAL at 17:19

## 2019-01-01 RX ADMIN — SODIUM CHLORIDE, PRESERVATIVE FREE 10 ML: 5 INJECTION INTRAVENOUS at 08:52

## 2019-01-01 RX ADMIN — POTASSIUM CHLORIDE 10 MEQ: 750 TABLET, FILM COATED, EXTENDED RELEASE ORAL at 12:32

## 2019-01-01 RX ADMIN — CHLORHEXIDINE GLUCONATE 0.12% ORAL RINSE 15 ML: 1.2 LIQUID ORAL at 08:51

## 2019-01-01 RX ADMIN — IPRATROPIUM BROMIDE AND ALBUTEROL SULFATE 1 AMPULE: .5; 3 SOLUTION RESPIRATORY (INHALATION) at 11:01

## 2019-01-01 RX ADMIN — MIDODRINE HYDROCHLORIDE 10 MG: 5 TABLET ORAL at 11:32

## 2019-01-01 RX ADMIN — CHLORHEXIDINE GLUCONATE 0.12% ORAL RINSE 15 ML: 1.2 LIQUID ORAL at 21:32

## 2019-01-01 RX ADMIN — CHLORHEXIDINE GLUCONATE 0.12% ORAL RINSE 15 ML: 1.2 LIQUID ORAL at 20:37

## 2019-01-01 RX ADMIN — SODIUM CHLORIDE, PRESERVATIVE FREE 10 ML: 5 INJECTION INTRAVENOUS at 08:18

## 2019-01-01 RX ADMIN — SIMVASTATIN 40 MG: 40 TABLET, FILM COATED ORAL at 23:08

## 2019-01-01 RX ADMIN — PROPOFOL 20 MCG/KG/MIN: 10 INJECTION, EMULSION INTRAVENOUS at 00:00

## 2019-01-01 RX ADMIN — AZITHROMYCIN MONOHYDRATE 500 MG: 500 INJECTION, POWDER, LYOPHILIZED, FOR SOLUTION INTRAVENOUS at 23:26

## 2019-01-01 RX ADMIN — Medication 10 ML: at 22:58

## 2019-01-01 RX ADMIN — SIMVASTATIN 40 MG: 40 TABLET, FILM COATED ORAL at 21:47

## 2019-01-01 RX ADMIN — LATANOPROST 1 DROP: 50 SOLUTION OPHTHALMIC at 21:41

## 2019-01-01 RX ADMIN — ASPIRIN 81 MG 81 MG: 81 TABLET ORAL at 09:41

## 2019-01-01 RX ADMIN — PANTOPRAZOLE SODIUM 40 MG: 40 TABLET, DELAYED RELEASE ORAL at 21:39

## 2019-01-01 RX ADMIN — PROPOFOL 30 MCG/KG/MIN: 10 INJECTION, EMULSION INTRAVENOUS at 20:18

## 2019-01-01 RX ADMIN — SIMVASTATIN 40 MG: 40 TABLET, FILM COATED ORAL at 21:06

## 2019-01-01 RX ADMIN — SODIUM CHLORIDE, PRESERVATIVE FREE 10 ML: 5 INJECTION INTRAVENOUS at 20:18

## 2019-01-01 RX ADMIN — ACETAMINOPHEN 650 MG: 325 TABLET ORAL at 21:32

## 2019-01-01 RX ADMIN — GABAPENTIN 400 MG: 400 CAPSULE ORAL at 08:18

## 2019-01-01 RX ADMIN — MIDODRINE HYDROCHLORIDE 10 MG: 5 TABLET ORAL at 08:15

## 2019-01-01 RX ADMIN — IPRATROPIUM BROMIDE AND ALBUTEROL SULFATE 1 AMPULE: .5; 3 SOLUTION RESPIRATORY (INHALATION) at 11:00

## 2019-01-01 RX ADMIN — CLOPIDOGREL BISULFATE 75 MG: 75 TABLET ORAL at 09:13

## 2019-01-01 RX ADMIN — DEXTROSE MONOHYDRATE: 50 INJECTION, SOLUTION INTRAVENOUS at 13:10

## 2019-01-01 RX ADMIN — FENTANYL CITRATE 50 MCG: 50 INJECTION INTRAMUSCULAR; INTRAVENOUS at 07:47

## 2019-01-01 RX ADMIN — MIDODRINE HYDROCHLORIDE 10 MG: 5 TABLET ORAL at 18:51

## 2019-01-01 RX ADMIN — POTASSIUM CHLORIDE 10 MEQ: 750 TABLET, FILM COATED, EXTENDED RELEASE ORAL at 15:02

## 2019-01-01 RX ADMIN — CEFTRIAXONE 1 G: 1 INJECTION, POWDER, FOR SOLUTION INTRAMUSCULAR; INTRAVENOUS at 23:18

## 2019-01-01 RX ADMIN — METRONIDAZOLE 500 MG: 250 TABLET ORAL at 22:56

## 2019-01-01 RX ADMIN — INSULIN LISPRO 2 UNITS: 100 INJECTION, SOLUTION INTRAVENOUS; SUBCUTANEOUS at 17:50

## 2019-01-01 RX ADMIN — IPRATROPIUM BROMIDE AND ALBUTEROL SULFATE 1 AMPULE: .5; 3 SOLUTION RESPIRATORY (INHALATION) at 11:20

## 2019-01-01 RX ADMIN — SODIUM CHLORIDE, PRESERVATIVE FREE 10 ML: 5 INJECTION INTRAVENOUS at 09:22

## 2019-01-01 RX ADMIN — IPRATROPIUM BROMIDE AND ALBUTEROL SULFATE 1 AMPULE: .5; 3 SOLUTION RESPIRATORY (INHALATION) at 15:34

## 2019-01-01 RX ADMIN — IPRATROPIUM BROMIDE AND ALBUTEROL SULFATE 1 AMPULE: .5; 3 SOLUTION RESPIRATORY (INHALATION) at 10:55

## 2019-01-01 RX ADMIN — PROPOFOL 20 MCG/KG/MIN: 10 INJECTION, EMULSION INTRAVENOUS at 03:01

## 2019-01-01 RX ADMIN — SODIUM CHLORIDE, PRESERVATIVE FREE 10 ML: 5 INJECTION INTRAVENOUS at 21:09

## 2019-01-01 RX ADMIN — PANTOPRAZOLE SODIUM 40 MG: 40 INJECTION, POWDER, FOR SOLUTION INTRAVENOUS at 23:47

## 2019-01-01 RX ADMIN — PHENYLEPHRINE HYDROCHLORIDE 100 MCG/MIN: 10 INJECTION INTRAVENOUS at 04:00

## 2019-01-01 RX ADMIN — CALCIUM CHLORIDE 1 G: 100 INJECTION, SOLUTION INTRAVENOUS at 05:33

## 2019-01-01 RX ADMIN — INSULIN LISPRO 2 UNITS: 100 INJECTION, SOLUTION INTRAVENOUS; SUBCUTANEOUS at 22:35

## 2019-01-01 RX ADMIN — SODIUM CHLORIDE, PRESERVATIVE FREE 10 ML: 5 INJECTION INTRAVENOUS at 08:19

## 2019-01-01 RX ADMIN — IOPAMIDOL 80 ML: 755 INJECTION, SOLUTION INTRAVENOUS at 23:59

## 2019-01-01 RX ADMIN — Medication 25 MCG/HR: at 01:56

## 2019-01-01 RX ADMIN — GUAIFENESIN AND DEXTROMETHORPHAN 5 ML: 100; 10 SYRUP ORAL at 13:06

## 2019-01-01 RX ADMIN — SODIUM CHLORIDE, PRESERVATIVE FREE 10 ML: 5 INJECTION INTRAVENOUS at 21:08

## 2019-01-01 RX ADMIN — IPRATROPIUM BROMIDE AND ALBUTEROL SULFATE 1 AMPULE: .5; 3 SOLUTION RESPIRATORY (INHALATION) at 19:30

## 2019-01-01 RX ADMIN — METFORMIN HYDROCHLORIDE 500 MG: 500 TABLET ORAL at 09:06

## 2019-01-01 RX ADMIN — SODIUM CHLORIDE, PRESERVATIVE FREE 10 ML: 5 INJECTION INTRAVENOUS at 08:51

## 2019-01-01 RX ADMIN — SIMVASTATIN 40 MG: 40 TABLET, FILM COATED ORAL at 20:38

## 2019-01-01 RX ADMIN — SODIUM CHLORIDE, PRESERVATIVE FREE 10 ML: 5 INJECTION INTRAVENOUS at 23:48

## 2019-01-01 RX ADMIN — GABAPENTIN 400 MG: 400 CAPSULE ORAL at 08:40

## 2019-01-01 RX ADMIN — ONDANSETRON 4 MG: 2 INJECTION INTRAMUSCULAR; INTRAVENOUS at 08:15

## 2019-01-01 RX ADMIN — IPRATROPIUM BROMIDE AND ALBUTEROL SULFATE 1 AMPULE: .5; 3 SOLUTION RESPIRATORY (INHALATION) at 20:40

## 2019-01-01 RX ADMIN — SODIUM CHLORIDE, PRESERVATIVE FREE 10 ML: 5 INJECTION INTRAVENOUS at 08:30

## 2019-01-01 RX ADMIN — PANTOPRAZOLE SODIUM 40 MG: 40 INJECTION, POWDER, FOR SOLUTION INTRAVENOUS at 20:30

## 2019-01-01 RX ADMIN — METFORMIN HYDROCHLORIDE 500 MG: 500 TABLET ORAL at 16:53

## 2019-01-01 RX ADMIN — INSULIN LISPRO 2 UNITS: 100 INJECTION, SOLUTION INTRAVENOUS; SUBCUTANEOUS at 16:51

## 2019-01-01 RX ADMIN — SIMVASTATIN 40 MG: 40 TABLET, FILM COATED ORAL at 20:25

## 2019-01-01 RX ADMIN — SODIUM CHLORIDE: 9 INJECTION, SOLUTION INTRAVENOUS at 08:16

## 2019-01-01 RX ADMIN — METFORMIN HYDROCHLORIDE 500 MG: 500 TABLET ORAL at 18:40

## 2019-01-01 RX ADMIN — TAZOBACTAM SODIUM AND PIPERACILLIN SODIUM 3.38 G: 375; 3 INJECTION, SOLUTION INTRAVENOUS at 08:29

## 2019-01-01 RX ADMIN — LATANOPROST 1 DROP: 50 SOLUTION OPHTHALMIC at 20:45

## 2019-01-01 RX ADMIN — GUAIFENESIN AND DEXTROMETHORPHAN 5 ML: 100; 10 SYRUP ORAL at 09:07

## 2019-01-01 RX ADMIN — INSULIN LISPRO 2 UNITS: 100 INJECTION, SOLUTION INTRAVENOUS; SUBCUTANEOUS at 09:02

## 2019-01-01 RX ADMIN — IPRATROPIUM BROMIDE AND ALBUTEROL SULFATE 1 AMPULE: .5; 3 SOLUTION RESPIRATORY (INHALATION) at 21:19

## 2019-01-01 RX ADMIN — INSULIN LISPRO 4 UNITS: 100 INJECTION, SOLUTION INTRAVENOUS; SUBCUTANEOUS at 12:16

## 2019-01-01 RX ADMIN — NOREPINEPHRINE BITARTRATE 2 MCG/MIN: 1 INJECTION INTRAVENOUS at 15:29

## 2019-01-01 RX ADMIN — LATANOPROST 1 DROP: 50 SOLUTION OPHTHALMIC at 21:37

## 2019-01-01 RX ADMIN — METFORMIN HYDROCHLORIDE 500 MG: 500 TABLET ORAL at 07:59

## 2019-01-01 RX ADMIN — PANTOPRAZOLE SODIUM 40 MG: 40 INJECTION, POWDER, FOR SOLUTION INTRAVENOUS at 20:38

## 2019-01-01 RX ADMIN — GUAIFENESIN AND DEXTROMETHORPHAN 5 ML: 100; 10 SYRUP ORAL at 00:16

## 2019-01-01 RX ADMIN — INSULIN LISPRO 4 UNITS: 100 INJECTION, SOLUTION INTRAVENOUS; SUBCUTANEOUS at 09:54

## 2019-01-01 RX ADMIN — CEFTRIAXONE SODIUM 1 G: 1 INJECTION, POWDER, FOR SOLUTION INTRAMUSCULAR; INTRAVENOUS at 23:37

## 2019-01-01 RX ADMIN — GUAIFENESIN AND DEXTROMETHORPHAN 5 ML: 100; 10 SYRUP ORAL at 09:52

## 2019-01-01 RX ADMIN — IPRATROPIUM BROMIDE AND ALBUTEROL SULFATE 1 AMPULE: .5; 3 SOLUTION RESPIRATORY (INHALATION) at 12:02

## 2019-01-01 RX ADMIN — HEPARIN SODIUM AND DEXTROSE 14 UNITS/KG/HR: 10000; 5 INJECTION INTRAVENOUS at 02:10

## 2019-01-01 RX ADMIN — CEFTRIAXONE 1 G: 1 INJECTION, POWDER, FOR SOLUTION INTRAMUSCULAR; INTRAVENOUS at 23:33

## 2019-01-01 RX ADMIN — POTASSIUM CHLORIDE 40 MEQ: 20 TABLET, EXTENDED RELEASE ORAL at 16:50

## 2019-01-01 RX ADMIN — SODIUM CHLORIDE: 9 INJECTION, SOLUTION INTRAVENOUS at 05:16

## 2019-01-01 RX ADMIN — PROPOFOL 25 MCG/KG/MIN: 10 INJECTION, EMULSION INTRAVENOUS at 04:55

## 2019-01-01 RX ADMIN — IPRATROPIUM BROMIDE AND ALBUTEROL SULFATE 1 AMPULE: .5; 3 SOLUTION RESPIRATORY (INHALATION) at 03:01

## 2019-01-01 RX ADMIN — INSULIN LISPRO 2 UNITS: 100 INJECTION, SOLUTION INTRAVENOUS; SUBCUTANEOUS at 11:56

## 2019-01-01 RX ADMIN — SIMVASTATIN 40 MG: 40 TABLET, FILM COATED ORAL at 20:41

## 2019-01-01 RX ADMIN — FUROSEMIDE 20 MG: 10 INJECTION, SOLUTION INTRAVENOUS at 15:17

## 2019-01-01 RX ADMIN — ASPIRIN 81 MG 81 MG: 81 TABLET ORAL at 08:34

## 2019-01-01 RX ADMIN — SODIUM CHLORIDE, PRESERVATIVE FREE 10 ML: 5 INJECTION INTRAVENOUS at 09:49

## 2019-01-01 RX ADMIN — PHENYLEPHRINE HYDROCHLORIDE 100 MCG: 10 INJECTION INTRAVENOUS at 07:53

## 2019-01-01 RX ADMIN — INSULIN GLARGINE 5 UNITS: 100 INJECTION, SOLUTION SUBCUTANEOUS at 22:14

## 2019-01-01 RX ADMIN — ACETAMINOPHEN 650 MG: 325 TABLET ORAL at 21:18

## 2019-01-01 RX ADMIN — ONDANSETRON 4 MG: 2 INJECTION INTRAMUSCULAR; INTRAVENOUS at 19:50

## 2019-01-01 RX ADMIN — IPRATROPIUM BROMIDE AND ALBUTEROL SULFATE 1 AMPULE: .5; 3 SOLUTION RESPIRATORY (INHALATION) at 07:47

## 2019-01-01 RX ADMIN — TAZOBACTAM SODIUM AND PIPERACILLIN SODIUM 3.38 G: 375; 3 INJECTION, SOLUTION INTRAVENOUS at 01:15

## 2019-01-01 RX ADMIN — DEXMEDETOMIDINE HYDROCHLORIDE 0.2 MCG/KG/HR: 100 INJECTION, SOLUTION, CONCENTRATE INTRAVENOUS at 05:00

## 2019-01-01 RX ADMIN — MIDODRINE HYDROCHLORIDE 10 MG: 5 TABLET ORAL at 11:04

## 2019-01-01 RX ADMIN — SIMVASTATIN 40 MG: 40 TABLET, FILM COATED ORAL at 21:34

## 2019-01-01 RX ADMIN — PANTOPRAZOLE SODIUM 40 MG: 40 INJECTION, POWDER, FOR SOLUTION INTRAVENOUS at 08:18

## 2019-01-01 RX ADMIN — IPRATROPIUM BROMIDE AND ALBUTEROL SULFATE 1 AMPULE: .5; 3 SOLUTION RESPIRATORY (INHALATION) at 11:29

## 2019-01-01 RX ADMIN — SODIUM CHLORIDE, PRESERVATIVE FREE 10 ML: 5 INJECTION INTRAVENOUS at 10:10

## 2019-01-01 RX ADMIN — PANTOPRAZOLE SODIUM 40 MG: 40 INJECTION, POWDER, FOR SOLUTION INTRAVENOUS at 08:16

## 2019-01-01 RX ADMIN — CHLORHEXIDINE GLUCONATE 0.12% ORAL RINSE 15 ML: 1.2 LIQUID ORAL at 20:20

## 2019-01-01 RX ADMIN — METRONIDAZOLE 500 MG: 250 TABLET ORAL at 06:29

## 2019-01-01 RX ADMIN — TAZOBACTAM SODIUM AND PIPERACILLIN SODIUM 3.38 G: 375; 3 INJECTION, SOLUTION INTRAVENOUS at 10:29

## 2019-01-01 RX ADMIN — PHENYLEPHRINE HYDROCHLORIDE 100 MCG: 10 INJECTION INTRAVENOUS at 08:37

## 2019-01-01 RX ADMIN — SIMVASTATIN 40 MG: 40 TABLET, FILM COATED ORAL at 21:27

## 2019-01-01 RX ADMIN — SODIUM CHLORIDE, PRESERVATIVE FREE 10 ML: 5 INJECTION INTRAVENOUS at 13:02

## 2019-01-01 RX ADMIN — INSULIN LISPRO 4 UNITS: 100 INJECTION, SOLUTION INTRAVENOUS; SUBCUTANEOUS at 09:09

## 2019-01-01 RX ADMIN — AZITHROMYCIN MONOHYDRATE 500 MG: 500 INJECTION, POWDER, LYOPHILIZED, FOR SOLUTION INTRAVENOUS at 00:39

## 2019-01-01 RX ADMIN — METRONIDAZOLE 500 MG: 250 TABLET ORAL at 20:22

## 2019-01-01 RX ADMIN — LATANOPROST 1 DROP: 50 SOLUTION OPHTHALMIC at 00:21

## 2019-01-01 RX ADMIN — LATANOPROST 1 DROP: 50 SOLUTION OPHTHALMIC at 23:56

## 2019-01-01 RX ADMIN — SIMVASTATIN 40 MG: 40 TABLET, FILM COATED ORAL at 21:32

## 2019-01-01 RX ADMIN — VANCOMYCIN HYDROCHLORIDE 1250 MG: 5 INJECTION, POWDER, LYOPHILIZED, FOR SOLUTION INTRAVENOUS at 12:56

## 2019-01-01 RX ADMIN — IPRATROPIUM BROMIDE AND ALBUTEROL SULFATE 1 AMPULE: .5; 3 SOLUTION RESPIRATORY (INHALATION) at 07:15

## 2019-01-01 RX ADMIN — MIDODRINE HYDROCHLORIDE 10 MG: 5 TABLET ORAL at 17:14

## 2019-01-01 RX ADMIN — SODIUM CHLORIDE: 9 INJECTION, SOLUTION INTRAVENOUS at 07:45

## 2019-01-01 RX ADMIN — MIDODRINE HYDROCHLORIDE 10 MG: 5 TABLET ORAL at 16:58

## 2019-01-01 RX ADMIN — PANTOPRAZOLE SODIUM 40 MG: 40 INJECTION, POWDER, FOR SOLUTION INTRAVENOUS at 12:48

## 2019-01-01 RX ADMIN — PANTOPRAZOLE SODIUM 40 MG: 40 TABLET, DELAYED RELEASE ORAL at 20:25

## 2019-01-01 RX ADMIN — IPRATROPIUM BROMIDE AND ALBUTEROL SULFATE 1 AMPULE: .5; 3 SOLUTION RESPIRATORY (INHALATION) at 14:40

## 2019-01-01 RX ADMIN — GABAPENTIN 400 MG: 400 CAPSULE ORAL at 17:50

## 2019-01-01 RX ADMIN — GABAPENTIN 400 MG: 400 CAPSULE ORAL at 17:19

## 2019-01-01 RX ADMIN — MIDODRINE HYDROCHLORIDE 10 MG: 5 TABLET ORAL at 19:11

## 2019-01-01 RX ADMIN — IPRATROPIUM BROMIDE AND ALBUTEROL SULFATE 1 AMPULE: .5; 3 SOLUTION RESPIRATORY (INHALATION) at 11:27

## 2019-01-01 RX ADMIN — SODIUM CHLORIDE, PRESERVATIVE FREE 10 ML: 5 INJECTION INTRAVENOUS at 21:43

## 2019-01-01 RX ADMIN — ASPIRIN 81 MG 81 MG: 81 TABLET ORAL at 13:29

## 2019-01-01 RX ADMIN — OSELTAMIVIR PHOSPHATE 30 MG: 30 CAPSULE ORAL at 08:04

## 2019-01-01 RX ADMIN — LATANOPROST 1 DROP: 50 SOLUTION OPHTHALMIC at 21:12

## 2019-01-01 RX ADMIN — GABAPENTIN 400 MG: 400 CAPSULE ORAL at 07:52

## 2019-01-01 RX ADMIN — SODIUM CHLORIDE, PRESERVATIVE FREE 10 ML: 5 INJECTION INTRAVENOUS at 21:26

## 2019-01-01 RX ADMIN — TAZOBACTAM SODIUM AND PIPERACILLIN SODIUM 3.38 G: 375; 3 INJECTION, SOLUTION INTRAVENOUS at 16:09

## 2019-01-01 RX ADMIN — PROPOFOL 15 MCG/KG/MIN: 10 INJECTION, EMULSION INTRAVENOUS at 09:21

## 2019-01-01 RX ADMIN — IPRATROPIUM BROMIDE AND ALBUTEROL SULFATE 1 AMPULE: .5; 3 SOLUTION RESPIRATORY (INHALATION) at 15:30

## 2019-01-01 RX ADMIN — IPRATROPIUM BROMIDE AND ALBUTEROL SULFATE 1 AMPULE: .5; 3 SOLUTION RESPIRATORY (INHALATION) at 07:44

## 2019-01-01 RX ADMIN — SODIUM CHLORIDE, PRESERVATIVE FREE 10 ML: 5 INJECTION INTRAVENOUS at 20:39

## 2019-01-01 RX ADMIN — INSULIN LISPRO 2 UNITS: 100 INJECTION, SOLUTION INTRAVENOUS; SUBCUTANEOUS at 12:26

## 2019-01-01 RX ADMIN — TAZOBACTAM SODIUM AND PIPERACILLIN SODIUM 3.38 G: 375; 3 INJECTION, SOLUTION INTRAVENOUS at 21:40

## 2019-01-01 RX ADMIN — PROPOFOL 40 MG: 10 INJECTION, EMULSION INTRAVENOUS at 08:11

## 2019-01-01 RX ADMIN — IPRATROPIUM BROMIDE AND ALBUTEROL SULFATE 1 AMPULE: .5; 3 SOLUTION RESPIRATORY (INHALATION) at 07:10

## 2019-01-01 RX ADMIN — ASPIRIN 81 MG 81 MG: 81 TABLET ORAL at 08:17

## 2019-01-01 RX ADMIN — SIMVASTATIN 40 MG: 40 TABLET, FILM COATED ORAL at 22:56

## 2019-01-01 RX ADMIN — ENOXAPARIN SODIUM 40 MG: 40 INJECTION SUBCUTANEOUS at 08:18

## 2019-01-01 RX ADMIN — INSULIN HUMAN 10 UNITS: 100 INJECTION, SOLUTION PARENTERAL at 05:31

## 2019-01-01 RX ADMIN — INSULIN LISPRO 1 UNITS: 100 INJECTION, SOLUTION INTRAVENOUS; SUBCUTANEOUS at 20:30

## 2019-01-01 RX ADMIN — SODIUM CHLORIDE, PRESERVATIVE FREE 10 ML: 5 INJECTION INTRAVENOUS at 21:06

## 2019-01-01 RX ADMIN — SODIUM CHLORIDE, PRESERVATIVE FREE 10 ML: 5 INJECTION INTRAVENOUS at 09:00

## 2019-01-01 RX ADMIN — INSULIN GLARGINE 5 UNITS: 100 INJECTION, SOLUTION SUBCUTANEOUS at 20:47

## 2019-01-01 RX ADMIN — LATANOPROST 1 DROP: 50 SOLUTION OPHTHALMIC at 21:02

## 2019-01-01 RX ADMIN — METFORMIN HYDROCHLORIDE 500 MG: 500 TABLET ORAL at 09:08

## 2019-01-01 RX ADMIN — INSULIN LISPRO 1 UNITS: 100 INJECTION, SOLUTION INTRAVENOUS; SUBCUTANEOUS at 12:34

## 2019-01-01 RX ADMIN — MIDODRINE HYDROCHLORIDE 10 MG: 5 TABLET ORAL at 15:13

## 2019-01-01 RX ADMIN — IPRATROPIUM BROMIDE AND ALBUTEROL SULFATE 1 AMPULE: .5; 3 SOLUTION RESPIRATORY (INHALATION) at 15:23

## 2019-01-01 RX ADMIN — CEFAZOLIN SODIUM 1 G: 1 INJECTION, SOLUTION INTRAVENOUS at 08:02

## 2019-01-01 RX ADMIN — SODIUM CHLORIDE, PRESERVATIVE FREE 10 ML: 5 INJECTION INTRAVENOUS at 21:10

## 2019-01-01 RX ADMIN — INSULIN LISPRO 1 UNITS: 100 INJECTION, SOLUTION INTRAVENOUS; SUBCUTANEOUS at 17:02

## 2019-01-01 RX ADMIN — INSULIN LISPRO 1 UNITS: 100 INJECTION, SOLUTION INTRAVENOUS; SUBCUTANEOUS at 17:51

## 2019-01-01 RX ADMIN — SODIUM CHLORIDE: 9 INJECTION, SOLUTION INTRAVENOUS at 16:51

## 2019-01-01 RX ADMIN — SODIUM CHLORIDE, PRESERVATIVE FREE 10 ML: 5 INJECTION INTRAVENOUS at 09:43

## 2019-01-01 RX ADMIN — METFORMIN HYDROCHLORIDE 500 MG: 500 TABLET ORAL at 13:21

## 2019-01-01 RX ADMIN — VANCOMYCIN HYDROCHLORIDE 1250 MG: 5 INJECTION, POWDER, LYOPHILIZED, FOR SOLUTION INTRAVENOUS at 09:30

## 2019-01-01 RX ADMIN — Medication: at 20:30

## 2019-01-01 RX ADMIN — MIDODRINE HYDROCHLORIDE 10 MG: 5 TABLET ORAL at 17:18

## 2019-01-01 RX ADMIN — OSELTAMIVIR PHOSPHATE 30 MG: 30 CAPSULE ORAL at 22:15

## 2019-01-01 RX ADMIN — GABAPENTIN 400 MG: 400 CAPSULE ORAL at 09:00

## 2019-01-01 RX ADMIN — ASPIRIN 81 MG 81 MG: 81 TABLET ORAL at 08:45

## 2019-01-01 RX ADMIN — SODIUM CHLORIDE, PRESERVATIVE FREE 10 ML: 5 INJECTION INTRAVENOUS at 09:44

## 2019-01-01 RX ADMIN — HEPARIN SODIUM AND DEXTROSE 12 UNITS/KG/HR: 10000; 5 INJECTION INTRAVENOUS at 18:48

## 2019-01-01 RX ADMIN — ENOXAPARIN SODIUM 40 MG: 100 INJECTION SUBCUTANEOUS at 08:18

## 2019-01-01 RX ADMIN — ASPIRIN 81 MG 81 MG: 81 TABLET ORAL at 09:01

## 2019-01-01 RX ADMIN — PROPOFOL 10 MG: 10 INJECTION, EMULSION INTRAVENOUS at 12:54

## 2019-01-01 RX ADMIN — GABAPENTIN 400 MG: 400 CAPSULE ORAL at 09:53

## 2019-01-01 RX ADMIN — OSELTAMIVIR PHOSPHATE 30 MG: 30 CAPSULE ORAL at 22:56

## 2019-01-01 RX ADMIN — PROPOFOL 30 MCG/KG/MIN: 10 INJECTION, EMULSION INTRAVENOUS at 01:24

## 2019-01-01 RX ADMIN — CEFEPIME HYDROCHLORIDE 2 G: 2 INJECTION, POWDER, FOR SOLUTION INTRAVENOUS at 20:20

## 2019-01-01 RX ADMIN — TAZOBACTAM SODIUM AND PIPERACILLIN SODIUM 3.38 G: 375; 3 INJECTION, SOLUTION INTRAVENOUS at 19:18

## 2019-01-01 RX ADMIN — TAZOBACTAM SODIUM AND PIPERACILLIN SODIUM 3.38 G: 375; 3 INJECTION, SOLUTION INTRAVENOUS at 17:05

## 2019-01-01 RX ADMIN — ENOXAPARIN SODIUM 40 MG: 100 INJECTION SUBCUTANEOUS at 08:34

## 2019-01-01 RX ADMIN — INSULIN LISPRO 1 UNITS: 100 INJECTION, SOLUTION INTRAVENOUS; SUBCUTANEOUS at 23:07

## 2019-01-01 RX ADMIN — CHLORHEXIDINE GLUCONATE 0.12% ORAL RINSE 15 ML: 1.2 LIQUID ORAL at 21:30

## 2019-01-01 RX ADMIN — ONDANSETRON 4 MG: 2 INJECTION INTRAMUSCULAR; INTRAVENOUS at 19:30

## 2019-01-01 RX ADMIN — CHLORHEXIDINE GLUCONATE 0.12% ORAL RINSE 15 ML: 1.2 LIQUID ORAL at 21:37

## 2019-01-01 RX ADMIN — GABAPENTIN 400 MG: 400 CAPSULE ORAL at 08:17

## 2019-01-01 RX ADMIN — INSULIN LISPRO 2 UNITS: 100 INJECTION, SOLUTION INTRAVENOUS; SUBCUTANEOUS at 22:49

## 2019-01-01 RX ADMIN — PHENYLEPHRINE HYDROCHLORIDE 48 MCG/KG/MIN: 10 INJECTION INTRAVENOUS at 22:43

## 2019-01-01 RX ADMIN — IPRATROPIUM BROMIDE AND ALBUTEROL SULFATE 1 AMPULE: .5; 3 SOLUTION RESPIRATORY (INHALATION) at 18:45

## 2019-01-01 RX ADMIN — IPRATROPIUM BROMIDE AND ALBUTEROL SULFATE 1 AMPULE: .5; 3 SOLUTION RESPIRATORY (INHALATION) at 00:38

## 2019-01-01 RX ADMIN — ENOXAPARIN SODIUM 40 MG: 40 INJECTION SUBCUTANEOUS at 09:07

## 2019-01-01 RX ADMIN — GABAPENTIN 400 MG: 400 CAPSULE ORAL at 18:39

## 2019-01-01 RX ADMIN — INSULIN GLARGINE 5 UNITS: 100 INJECTION, SOLUTION SUBCUTANEOUS at 22:49

## 2019-01-01 RX ADMIN — SIMVASTATIN 40 MG: 40 TABLET, FILM COATED ORAL at 20:20

## 2019-01-01 RX ADMIN — ACETAMINOPHEN 650 MG: 325 TABLET ORAL at 09:09

## 2019-01-01 RX ADMIN — TAZOBACTAM SODIUM AND PIPERACILLIN SODIUM 3.38 G: 375; 3 INJECTION, SOLUTION INTRAVENOUS at 09:00

## 2019-01-01 RX ADMIN — METRONIDAZOLE 500 MG: 250 TABLET ORAL at 05:19

## 2019-01-01 RX ADMIN — IPRATROPIUM BROMIDE AND ALBUTEROL SULFATE 1 AMPULE: .5; 3 SOLUTION RESPIRATORY (INHALATION) at 07:32

## 2019-01-01 RX ADMIN — GABAPENTIN 400 MG: 400 CAPSULE ORAL at 08:50

## 2019-01-01 RX ADMIN — CEFEPIME HYDROCHLORIDE 2 G: 2 INJECTION, POWDER, FOR SOLUTION INTRAVENOUS at 09:57

## 2019-01-01 RX ADMIN — PROPOFOL 20 MCG/KG/MIN: 10 INJECTION, EMULSION INTRAVENOUS at 04:29

## 2019-01-01 RX ADMIN — CEFEPIME HYDROCHLORIDE 2 G: 2 INJECTION, POWDER, FOR SOLUTION INTRAVENOUS at 08:51

## 2019-01-01 RX ADMIN — TAZOBACTAM SODIUM AND PIPERACILLIN SODIUM 3.38 G: 375; 3 INJECTION, SOLUTION INTRAVENOUS at 17:10

## 2019-01-01 RX ADMIN — ACETAMINOPHEN 650 MG: 325 TABLET ORAL at 05:18

## 2019-01-01 RX ADMIN — IPRATROPIUM BROMIDE AND ALBUTEROL SULFATE 1 AMPULE: .5; 3 SOLUTION RESPIRATORY (INHALATION) at 07:00

## 2019-01-01 RX ADMIN — PANTOPRAZOLE SODIUM 40 MG: 40 TABLET, DELAYED RELEASE ORAL at 20:24

## 2019-01-01 RX ADMIN — LATANOPROST 1 DROP: 50 SOLUTION OPHTHALMIC at 21:13

## 2019-01-01 RX ADMIN — INSULIN LISPRO 4 UNITS: 100 INJECTION, SOLUTION INTRAVENOUS; SUBCUTANEOUS at 17:24

## 2019-01-01 RX ADMIN — GABAPENTIN 400 MG: 400 CAPSULE ORAL at 17:29

## 2019-01-01 RX ADMIN — LATANOPROST 1 DROP: 50 SOLUTION OPHTHALMIC at 20:22

## 2019-01-01 RX ADMIN — LATANOPROST 1 DROP: 50 SOLUTION OPHTHALMIC at 21:52

## 2019-01-01 RX ADMIN — PANTOPRAZOLE SODIUM 40 MG: 40 TABLET, DELAYED RELEASE ORAL at 20:22

## 2019-01-01 RX ADMIN — ENOXAPARIN SODIUM 40 MG: 100 INJECTION SUBCUTANEOUS at 08:45

## 2019-01-01 RX ADMIN — TAZOBACTAM SODIUM AND PIPERACILLIN SODIUM 3.38 G: 375; 3 INJECTION, SOLUTION INTRAVENOUS at 08:59

## 2019-01-01 RX ADMIN — PANTOPRAZOLE SODIUM 40 MG: 40 INJECTION, POWDER, FOR SOLUTION INTRAVENOUS at 09:00

## 2019-01-01 RX ADMIN — METFORMIN HYDROCHLORIDE 500 MG: 500 TABLET ORAL at 08:18

## 2019-01-01 RX ADMIN — ASPIRIN 81 MG 81 MG: 81 TABLET ORAL at 09:26

## 2019-01-01 RX ADMIN — SODIUM CHLORIDE, PRESERVATIVE FREE 10 ML: 5 INJECTION INTRAVENOUS at 20:36

## 2019-01-01 RX ADMIN — POTASSIUM CHLORIDE 20 MEQ: 400 INJECTION, SOLUTION INTRAVENOUS at 14:26

## 2019-01-01 RX ADMIN — CHLORHEXIDINE GLUCONATE 0.12% ORAL RINSE 15 ML: 1.2 LIQUID ORAL at 20:41

## 2019-01-01 RX ADMIN — ENOXAPARIN SODIUM 40 MG: 40 INJECTION SUBCUTANEOUS at 08:00

## 2019-01-01 RX ADMIN — INSULIN LISPRO 1 UNITS: 100 INJECTION, SOLUTION INTRAVENOUS; SUBCUTANEOUS at 09:08

## 2019-01-01 RX ADMIN — PANTOPRAZOLE SODIUM 40 MG: 40 TABLET, DELAYED RELEASE ORAL at 22:56

## 2019-01-01 RX ADMIN — GABAPENTIN 400 MG: 400 CAPSULE ORAL at 09:41

## 2019-01-01 RX ADMIN — PROPOFOL 40 MG: 10 INJECTION, EMULSION INTRAVENOUS at 08:03

## 2019-01-01 RX ADMIN — SODIUM CHLORIDE, PRESERVATIVE FREE 10 ML: 5 INJECTION INTRAVENOUS at 08:46

## 2019-01-01 RX ADMIN — METRONIDAZOLE 500 MG: 250 TABLET ORAL at 12:50

## 2019-01-01 RX ADMIN — PANTOPRAZOLE SODIUM 40 MG: 40 INJECTION, POWDER, FOR SOLUTION INTRAVENOUS at 20:41

## 2019-01-01 RX ADMIN — PANTOPRAZOLE SODIUM 40 MG: 40 INJECTION, POWDER, FOR SOLUTION INTRAVENOUS at 08:35

## 2019-01-01 RX ADMIN — ENOXAPARIN SODIUM 40 MG: 100 INJECTION SUBCUTANEOUS at 09:00

## 2019-01-01 RX ADMIN — LATANOPROST 1 DROP: 50 SOLUTION OPHTHALMIC at 20:41

## 2019-01-01 RX ADMIN — SODIUM CHLORIDE, PRESERVATIVE FREE 10 ML: 5 INJECTION INTRAVENOUS at 20:20

## 2019-01-01 RX ADMIN — LATANOPROST 1 DROP: 50 SOLUTION OPHTHALMIC at 21:07

## 2019-01-01 RX ADMIN — SODIUM CHLORIDE: 9 INJECTION, SOLUTION INTRAVENOUS at 15:17

## 2019-01-01 RX ADMIN — GUAIFENESIN AND DEXTROMETHORPHAN 5 ML: 100; 10 SYRUP ORAL at 07:52

## 2019-01-01 RX ADMIN — INSULIN LISPRO 1 UNITS: 100 INJECTION, SOLUTION INTRAVENOUS; SUBCUTANEOUS at 04:32

## 2019-01-01 RX ADMIN — GUAIFENESIN AND DEXTROMETHORPHAN 5 ML: 100; 10 SYRUP ORAL at 21:27

## 2019-01-01 RX ADMIN — SODIUM CHLORIDE, PRESERVATIVE FREE 10 ML: 5 INJECTION INTRAVENOUS at 21:44

## 2019-01-01 RX ADMIN — SODIUM CHLORIDE, PRESERVATIVE FREE 10 ML: 5 INJECTION INTRAVENOUS at 21:32

## 2019-01-01 RX ADMIN — INSULIN LISPRO 4 UNITS: 100 INJECTION, SOLUTION INTRAVENOUS; SUBCUTANEOUS at 18:24

## 2019-01-01 RX ADMIN — ENOXAPARIN SODIUM 40 MG: 100 INJECTION SUBCUTANEOUS at 08:16

## 2019-01-01 RX ADMIN — GABAPENTIN 400 MG: 400 CAPSULE ORAL at 18:41

## 2019-01-01 RX ADMIN — SODIUM CHLORIDE, PRESERVATIVE FREE 10 ML: 5 INJECTION INTRAVENOUS at 21:47

## 2019-01-01 RX ADMIN — PHENYLEPHRINE HYDROCHLORIDE 200 MCG: 10 INJECTION INTRAVENOUS at 08:31

## 2019-01-01 RX ADMIN — TAZOBACTAM SODIUM AND PIPERACILLIN SODIUM 3.38 G: 375; 3 INJECTION, SOLUTION INTRAVENOUS at 20:50

## 2019-01-01 RX ADMIN — PANTOPRAZOLE SODIUM 40 MG: 40 TABLET, DELAYED RELEASE ORAL at 22:58

## 2019-01-01 RX ADMIN — PANTOPRAZOLE SODIUM 40 MG: 40 TABLET, DELAYED RELEASE ORAL at 21:12

## 2019-01-01 RX ADMIN — LATANOPROST 1 DROP: 50 SOLUTION OPHTHALMIC at 21:28

## 2019-01-01 RX ADMIN — PANTOPRAZOLE SODIUM 40 MG: 40 INJECTION, POWDER, FOR SOLUTION INTRAVENOUS at 09:55

## 2019-01-01 RX ADMIN — SIMVASTATIN 40 MG: 40 TABLET, FILM COATED ORAL at 23:47

## 2019-01-01 RX ADMIN — CLOPIDOGREL BISULFATE 75 MG: 75 TABLET ORAL at 08:59

## 2019-01-01 RX ADMIN — HEPARIN SODIUM 4190 UNITS: 1000 INJECTION, SOLUTION INTRAVENOUS; SUBCUTANEOUS at 18:47

## 2019-01-01 RX ADMIN — GABAPENTIN 400 MG: 400 CAPSULE ORAL at 21:51

## 2019-01-01 RX ADMIN — SODIUM CHLORIDE: 9 INJECTION, SOLUTION INTRAVENOUS at 18:41

## 2019-01-01 RX ADMIN — ENOXAPARIN SODIUM 40 MG: 100 INJECTION SUBCUTANEOUS at 09:13

## 2019-01-01 RX ADMIN — MIDAZOLAM HYDROCHLORIDE 6 MG: 1 INJECTION, SOLUTION INTRAMUSCULAR; INTRAVENOUS at 03:45

## 2019-01-01 RX ADMIN — GABAPENTIN 400 MG: 400 CAPSULE ORAL at 18:51

## 2019-01-01 RX ADMIN — LATANOPROST 1 DROP: 50 SOLUTION OPHTHALMIC at 22:35

## 2019-01-01 RX ADMIN — CEFEPIME HYDROCHLORIDE 2 G: 2 INJECTION, POWDER, FOR SOLUTION INTRAVENOUS at 20:38

## 2019-01-01 RX ADMIN — SODIUM CHLORIDE, PRESERVATIVE FREE 10 ML: 5 INJECTION INTRAVENOUS at 09:12

## 2019-01-01 RX ADMIN — IPRATROPIUM BROMIDE AND ALBUTEROL SULFATE 1 AMPULE: .5; 3 SOLUTION RESPIRATORY (INHALATION) at 11:40

## 2019-01-01 RX ADMIN — Medication: at 20:41

## 2019-01-01 RX ADMIN — PROPOFOL 25 MCG/KG/MIN: 10 INJECTION, EMULSION INTRAVENOUS at 15:12

## 2019-01-01 RX ADMIN — FUROSEMIDE 40 MG: 10 INJECTION, SOLUTION INTRAMUSCULAR; INTRAVENOUS at 11:49

## 2019-01-01 RX ADMIN — SODIUM CHLORIDE: 9 INJECTION, SOLUTION INTRAVENOUS at 07:09

## 2019-01-01 RX ADMIN — METRONIDAZOLE 500 MG: 500 INJECTION, SOLUTION INTRAVENOUS at 07:52

## 2019-01-01 RX ADMIN — LEVOFLOXACIN 500 MG: 500 TABLET, FILM COATED ORAL at 08:18

## 2019-01-01 RX ADMIN — LIDOCAINE HYDROCHLORIDE 100 MG: 20 INJECTION, SOLUTION INTRAVENOUS at 07:47

## 2019-01-01 RX ADMIN — TAZOBACTAM SODIUM AND PIPERACILLIN SODIUM 3.38 G: 375; 3 INJECTION, SOLUTION INTRAVENOUS at 20:37

## 2019-01-01 RX ADMIN — SODIUM CHLORIDE: 9 INJECTION, SOLUTION INTRAVENOUS at 18:26

## 2019-01-01 RX ADMIN — PANTOPRAZOLE SODIUM 40 MG: 40 INJECTION, POWDER, FOR SOLUTION INTRAVENOUS at 09:26

## 2019-01-01 RX ADMIN — IPRATROPIUM BROMIDE AND ALBUTEROL SULFATE 1 AMPULE: .5; 3 SOLUTION RESPIRATORY (INHALATION) at 20:44

## 2019-01-01 RX ADMIN — CHLORHEXIDINE GLUCONATE 0.12% ORAL RINSE 15 ML: 1.2 LIQUID ORAL at 09:01

## 2019-01-01 RX ADMIN — Medication 10 ML: at 20:25

## 2019-01-01 RX ADMIN — SODIUM CHLORIDE, PRESERVATIVE FREE 10 ML: 5 INJECTION INTRAVENOUS at 09:01

## 2019-01-01 RX ADMIN — METRONIDAZOLE 500 MG: 250 TABLET ORAL at 14:13

## 2019-01-01 RX ADMIN — IPRATROPIUM BROMIDE AND ALBUTEROL SULFATE 1 AMPULE: .5; 3 SOLUTION RESPIRATORY (INHALATION) at 15:10

## 2019-01-01 RX ADMIN — SODIUM CHLORIDE, PRESERVATIVE FREE 10 ML: 5 INJECTION INTRAVENOUS at 08:35

## 2019-01-01 RX ADMIN — PANTOPRAZOLE SODIUM 40 MG: 40 INJECTION, POWDER, FOR SOLUTION INTRAVENOUS at 08:17

## 2019-01-01 RX ADMIN — IPRATROPIUM BROMIDE AND ALBUTEROL SULFATE 1 AMPULE: .5; 3 SOLUTION RESPIRATORY (INHALATION) at 15:46

## 2019-01-01 RX ADMIN — POTASSIUM CHLORIDE 10 MEQ: 7.46 INJECTION, SOLUTION INTRAVENOUS at 09:49

## 2019-01-01 RX ADMIN — SODIUM CHLORIDE, PRESERVATIVE FREE 10 ML: 5 INJECTION INTRAVENOUS at 08:48

## 2019-01-01 RX ADMIN — PANTOPRAZOLE SODIUM 40 MG: 40 INJECTION, POWDER, FOR SOLUTION INTRAVENOUS at 21:37

## 2019-01-01 RX ADMIN — SODIUM CHLORIDE, PRESERVATIVE FREE 10 ML: 5 INJECTION INTRAVENOUS at 20:44

## 2019-01-01 RX ADMIN — SODIUM CHLORIDE, PRESERVATIVE FREE 10 ML: 5 INJECTION INTRAVENOUS at 10:09

## 2019-01-01 RX ADMIN — INSULIN GLARGINE 5 UNITS: 100 INJECTION, SOLUTION SUBCUTANEOUS at 21:35

## 2019-01-01 RX ADMIN — IPRATROPIUM BROMIDE AND ALBUTEROL SULFATE 1 AMPULE: .5; 3 SOLUTION RESPIRATORY (INHALATION) at 20:30

## 2019-01-01 RX ADMIN — INSULIN LISPRO 2 UNITS: 100 INJECTION, SOLUTION INTRAVENOUS; SUBCUTANEOUS at 08:38

## 2019-01-01 RX ADMIN — PROPOFOL 20 MCG/KG/MIN: 10 INJECTION, EMULSION INTRAVENOUS at 07:30

## 2019-01-01 RX ADMIN — CHLORHEXIDINE GLUCONATE 0.12% ORAL RINSE 15 ML: 1.2 LIQUID ORAL at 09:00

## 2019-01-01 RX ADMIN — INSULIN LISPRO 1 UNITS: 100 INJECTION, SOLUTION INTRAVENOUS; SUBCUTANEOUS at 12:51

## 2019-01-01 RX ADMIN — INSULIN LISPRO 1 UNITS: 100 INJECTION, SOLUTION INTRAVENOUS; SUBCUTANEOUS at 20:16

## 2019-01-01 RX ADMIN — PANTOPRAZOLE SODIUM 40 MG: 40 INJECTION, POWDER, FOR SOLUTION INTRAVENOUS at 08:29

## 2019-01-01 RX ADMIN — GUAIFENESIN AND DEXTROMETHORPHAN 5 ML: 100; 10 SYRUP ORAL at 22:55

## 2019-01-01 RX ADMIN — SODIUM CHLORIDE, PRESERVATIVE FREE 10 ML: 5 INJECTION INTRAVENOUS at 09:26

## 2019-01-01 RX ADMIN — CALCIUM CARBONATE 500 MG: 500 TABLET, CHEWABLE ORAL at 09:04

## 2019-01-01 RX ADMIN — INSULIN GLARGINE 5 UNITS: 100 INJECTION, SOLUTION SUBCUTANEOUS at 20:25

## 2019-01-01 RX ADMIN — INSULIN LISPRO 2 UNITS: 100 INJECTION, SOLUTION INTRAVENOUS; SUBCUTANEOUS at 12:37

## 2019-01-01 RX ADMIN — INSULIN LISPRO 1 UNITS: 100 INJECTION, SOLUTION INTRAVENOUS; SUBCUTANEOUS at 08:05

## 2019-01-01 RX ADMIN — METRONIDAZOLE 500 MG: 250 TABLET ORAL at 06:11

## 2019-01-01 RX ADMIN — INSULIN GLARGINE 5 UNITS: 100 INJECTION, SOLUTION SUBCUTANEOUS at 20:30

## 2019-01-01 RX ADMIN — ACETAMINOPHEN 650 MG: 325 TABLET ORAL at 21:51

## 2019-01-01 RX ADMIN — GUAIFENESIN AND DEXTROMETHORPHAN 5 ML: 100; 10 SYRUP ORAL at 17:57

## 2019-01-01 RX ADMIN — ENOXAPARIN SODIUM 40 MG: 40 INJECTION SUBCUTANEOUS at 08:40

## 2019-01-01 RX ADMIN — ENOXAPARIN SODIUM 40 MG: 40 INJECTION SUBCUTANEOUS at 07:59

## 2019-01-01 RX ADMIN — GABAPENTIN 400 MG: 400 CAPSULE ORAL at 09:07

## 2019-01-01 RX ADMIN — SODIUM CHLORIDE, PRESERVATIVE FREE 10 ML: 5 INJECTION INTRAVENOUS at 09:58

## 2019-01-01 RX ADMIN — Medication: at 23:47

## 2019-01-01 RX ADMIN — PANTOPRAZOLE SODIUM 40 MG: 40 INJECTION, POWDER, FOR SOLUTION INTRAVENOUS at 08:45

## 2019-01-01 RX ADMIN — INSULIN LISPRO 2 UNITS: 100 INJECTION, SOLUTION INTRAVENOUS; SUBCUTANEOUS at 17:00

## 2019-01-01 RX ADMIN — TAZOBACTAM SODIUM AND PIPERACILLIN SODIUM 3.38 G: 375; 3 INJECTION, SOLUTION INTRAVENOUS at 17:32

## 2019-01-01 RX ADMIN — DEXMEDETOMIDINE HYDROCHLORIDE 0.2 MCG/KG/HR: 100 INJECTION, SOLUTION, CONCENTRATE INTRAVENOUS at 00:04

## 2019-01-01 RX ADMIN — GABAPENTIN 400 MG: 400 CAPSULE ORAL at 08:46

## 2019-01-01 RX ADMIN — GABAPENTIN 400 MG: 400 CAPSULE ORAL at 08:59

## 2019-01-01 RX ADMIN — SIMVASTATIN 40 MG: 40 TABLET, FILM COATED ORAL at 21:12

## 2019-01-01 RX ADMIN — GABAPENTIN 400 MG: 400 CAPSULE ORAL at 09:21

## 2019-01-01 RX ADMIN — ACETAMINOPHEN 650 MG: 325 TABLET ORAL at 07:59

## 2019-01-01 RX ADMIN — GABAPENTIN 400 MG: 400 CAPSULE ORAL at 17:44

## 2019-01-01 RX ADMIN — PROPOFOL 20 MCG/KG/MIN: 10 INJECTION, EMULSION INTRAVENOUS at 10:45

## 2019-01-01 RX ADMIN — ASPIRIN 81 MG 81 MG: 81 TABLET ORAL at 08:15

## 2019-01-01 RX ADMIN — SIMVASTATIN 40 MG: 40 TABLET, FILM COATED ORAL at 21:45

## 2019-01-01 RX ADMIN — OSELTAMIVIR PHOSPHATE 30 MG: 30 CAPSULE ORAL at 23:39

## 2019-01-01 RX ADMIN — INSULIN LISPRO 2 UNITS: 100 INJECTION, SOLUTION INTRAVENOUS; SUBCUTANEOUS at 21:13

## 2019-01-01 RX ADMIN — IPRATROPIUM BROMIDE AND ALBUTEROL SULFATE 1 AMPULE: .5; 3 SOLUTION RESPIRATORY (INHALATION) at 07:07

## 2019-01-01 RX ADMIN — Medication: at 08:29

## 2019-01-01 RX ADMIN — LATANOPROST 1 DROP: 50 SOLUTION OPHTHALMIC at 21:39

## 2019-01-01 RX ADMIN — MIDODRINE HYDROCHLORIDE 10 MG: 5 TABLET ORAL at 08:34

## 2019-01-01 RX ADMIN — PROPOFOL 20 MCG/KG/MIN: 10 INJECTION, EMULSION INTRAVENOUS at 16:35

## 2019-01-01 RX ADMIN — LATANOPROST 1 DROP: 50 SOLUTION OPHTHALMIC at 20:25

## 2019-01-01 RX ADMIN — TAZOBACTAM SODIUM AND PIPERACILLIN SODIUM 3.38 G: 375; 3 INJECTION, SOLUTION INTRAVENOUS at 12:46

## 2019-01-01 RX ADMIN — METRONIDAZOLE 500 MG: 500 INJECTION, SOLUTION INTRAVENOUS at 08:40

## 2019-01-01 RX ADMIN — OSELTAMIVIR PHOSPHATE 30 MG: 30 CAPSULE ORAL at 03:50

## 2019-01-01 RX ADMIN — PHENYLEPHRINE HYDROCHLORIDE 85 MCG/MIN: 10 INJECTION INTRAVENOUS at 01:15

## 2019-01-01 RX ADMIN — LATANOPROST 1 DROP: 50 SOLUTION OPHTHALMIC at 21:30

## 2019-01-01 RX ADMIN — POTASSIUM CHLORIDE 20 MEQ: 400 INJECTION, SOLUTION INTRAVENOUS at 15:15

## 2019-01-01 RX ADMIN — CEFEPIME HYDROCHLORIDE 2 G: 2 INJECTION, POWDER, FOR SOLUTION INTRAVENOUS at 21:00

## 2019-01-01 RX ADMIN — GABAPENTIN 400 MG: 400 CAPSULE ORAL at 08:51

## 2019-01-01 RX ADMIN — MIDODRINE HYDROCHLORIDE 10 MG: 5 TABLET ORAL at 13:00

## 2019-01-01 RX ADMIN — METRONIDAZOLE 500 MG: 500 INJECTION, SOLUTION INTRAVENOUS at 23:26

## 2019-01-01 RX ADMIN — GADOTERIDOL 15 ML: 279.3 INJECTION, SOLUTION INTRAVENOUS at 18:39

## 2019-01-01 RX ADMIN — SODIUM CHLORIDE, PRESERVATIVE FREE 10 ML: 5 INJECTION INTRAVENOUS at 20:34

## 2019-01-01 RX ADMIN — FAMOTIDINE 20 MG: 10 INJECTION, SOLUTION INTRAVENOUS at 09:41

## 2019-01-01 RX ADMIN — INSULIN LISPRO 1 UNITS: 100 INJECTION, SOLUTION INTRAVENOUS; SUBCUTANEOUS at 20:34

## 2019-01-01 RX ADMIN — ENOXAPARIN SODIUM 40 MG: 40 INJECTION SUBCUTANEOUS at 08:04

## 2019-01-01 RX ADMIN — CHLORHEXIDINE GLUCONATE 0.12% ORAL RINSE 15 ML: 1.2 LIQUID ORAL at 21:06

## 2019-01-01 RX ADMIN — SIMVASTATIN 40 MG: 40 TABLET, FILM COATED ORAL at 21:37

## 2019-01-01 RX ADMIN — ACETAMINOPHEN 650 MG: 325 TABLET ORAL at 04:11

## 2019-01-01 RX ADMIN — INSULIN LISPRO 1 UNITS: 100 INJECTION, SOLUTION INTRAVENOUS; SUBCUTANEOUS at 21:07

## 2019-01-01 RX ADMIN — TAZOBACTAM SODIUM AND PIPERACILLIN SODIUM 3.38 G: 375; 3 INJECTION, SOLUTION INTRAVENOUS at 09:20

## 2019-01-01 RX ADMIN — IPRATROPIUM BROMIDE AND ALBUTEROL SULFATE 1 AMPULE: .5; 3 SOLUTION RESPIRATORY (INHALATION) at 11:07

## 2019-01-01 RX ADMIN — CHLORHEXIDINE GLUCONATE 0.12% ORAL RINSE 15 ML: 1.2 LIQUID ORAL at 09:44

## 2019-01-01 RX ADMIN — CHLORHEXIDINE GLUCONATE 0.12% ORAL RINSE 15 ML: 1.2 LIQUID ORAL at 08:17

## 2019-01-01 RX ADMIN — ASPIRIN 81 MG 81 MG: 81 TABLET ORAL at 08:59

## 2019-01-01 RX ADMIN — LATANOPROST 1 DROP: 50 SOLUTION OPHTHALMIC at 22:57

## 2019-01-01 RX ADMIN — PROPOFOL 10 MG: 10 INJECTION, EMULSION INTRAVENOUS at 12:50

## 2019-01-01 RX ADMIN — INSULIN LISPRO 1 UNITS: 100 INJECTION, SOLUTION INTRAVENOUS; SUBCUTANEOUS at 20:54

## 2019-01-01 RX ADMIN — MIDODRINE HYDROCHLORIDE 10 MG: 5 TABLET ORAL at 09:01

## 2019-01-01 RX ADMIN — SODIUM CHLORIDE, PRESERVATIVE FREE 10 ML: 5 INJECTION INTRAVENOUS at 23:26

## 2019-01-01 RX ADMIN — CHLORHEXIDINE GLUCONATE 0.12% ORAL RINSE 15 ML: 1.2 LIQUID ORAL at 22:32

## 2019-01-01 RX ADMIN — MIDODRINE HYDROCHLORIDE 10 MG: 5 TABLET ORAL at 17:29

## 2019-01-01 RX ADMIN — PROPOFOL 20 MCG/KG/MIN: 10 INJECTION, EMULSION INTRAVENOUS at 15:29

## 2019-01-01 RX ADMIN — GABAPENTIN 400 MG: 400 CAPSULE ORAL at 09:13

## 2019-01-01 RX ADMIN — SODIUM CHLORIDE: 9 INJECTION, SOLUTION INTRAVENOUS at 03:52

## 2019-01-01 RX ADMIN — LEVOFLOXACIN 500 MG: 500 TABLET, FILM COATED ORAL at 09:07

## 2019-01-01 RX ADMIN — METRONIDAZOLE 500 MG: 250 TABLET ORAL at 21:06

## 2019-01-01 RX ADMIN — GABAPENTIN 400 MG: 400 CAPSULE ORAL at 16:53

## 2019-01-01 RX ADMIN — PHENYLEPHRINE HYDROCHLORIDE 50 MCG/MIN: 10 INJECTION INTRAVENOUS at 05:16

## 2019-01-01 RX ADMIN — IPRATROPIUM BROMIDE AND ALBUTEROL SULFATE 1 AMPULE: .5; 3 SOLUTION RESPIRATORY (INHALATION) at 21:07

## 2019-01-01 RX ADMIN — IPRATROPIUM BROMIDE AND ALBUTEROL SULFATE 1 AMPULE: .5; 3 SOLUTION RESPIRATORY (INHALATION) at 15:14

## 2019-01-01 RX ADMIN — GUAIFENESIN AND DEXTROMETHORPHAN 5 ML: 100; 10 SYRUP ORAL at 13:08

## 2019-01-01 RX ADMIN — AZITHROMYCIN MONOHYDRATE 500 MG: 500 INJECTION, POWDER, LYOPHILIZED, FOR SOLUTION INTRAVENOUS at 23:30

## 2019-01-01 RX ADMIN — Medication: at 21:27

## 2019-01-01 RX ADMIN — ENOXAPARIN SODIUM 40 MG: 100 INJECTION SUBCUTANEOUS at 13:29

## 2019-01-01 RX ADMIN — CHLORHEXIDINE GLUCONATE 0.12% ORAL RINSE 15 ML: 1.2 LIQUID ORAL at 08:15

## 2019-01-01 RX ADMIN — FENTANYL CITRATE 50 MCG: 50 INJECTION INTRAMUSCULAR; INTRAVENOUS at 08:03

## 2019-01-01 RX ADMIN — CHLORHEXIDINE GLUCONATE 0.12% ORAL RINSE 15 ML: 1.2 LIQUID ORAL at 21:12

## 2019-01-01 RX ADMIN — SODIUM CHLORIDE, PRESERVATIVE FREE 10 ML: 5 INJECTION INTRAVENOUS at 22:27

## 2019-01-01 RX ADMIN — CHLORHEXIDINE GLUCONATE 0.12% ORAL RINSE 15 ML: 1.2 LIQUID ORAL at 20:17

## 2019-01-01 RX ADMIN — CHLORHEXIDINE GLUCONATE 0.12% ORAL RINSE 15 ML: 1.2 LIQUID ORAL at 08:59

## 2019-01-01 RX ADMIN — SODIUM CHLORIDE 250 ML: 9 INJECTION, SOLUTION INTRAVENOUS at 08:45

## 2019-01-01 RX ADMIN — SIMVASTATIN 40 MG: 40 TABLET, FILM COATED ORAL at 20:35

## 2019-01-01 RX ADMIN — PHENYLEPHRINE HYDROCHLORIDE 200 MCG: 10 INJECTION INTRAVENOUS at 08:28

## 2019-01-01 RX ADMIN — ASPIRIN 81 MG 81 MG: 81 TABLET ORAL at 08:51

## 2019-01-01 RX ADMIN — ASPIRIN 81 MG 81 MG: 81 TABLET ORAL at 08:18

## 2019-01-01 RX ADMIN — IPRATROPIUM BROMIDE AND ALBUTEROL SULFATE 1 AMPULE: .5; 3 SOLUTION RESPIRATORY (INHALATION) at 15:00

## 2019-01-01 RX ADMIN — GABAPENTIN 400 MG: 400 CAPSULE ORAL at 17:33

## 2019-01-01 RX ADMIN — PROPOFOL 15 MG: 10 INJECTION, EMULSION INTRAVENOUS at 12:52

## 2019-01-01 RX ADMIN — METRONIDAZOLE 500 MG: 250 TABLET ORAL at 13:43

## 2019-01-01 RX ADMIN — ASPIRIN 81 MG 81 MG: 81 TABLET ORAL at 09:48

## 2019-01-01 RX ADMIN — MIDODRINE HYDROCHLORIDE 10 MG: 5 TABLET ORAL at 09:48

## 2019-01-01 RX ADMIN — ENOXAPARIN SODIUM 40 MG: 40 INJECTION SUBCUTANEOUS at 09:10

## 2019-01-01 RX ADMIN — POTASSIUM CHLORIDE 10 MEQ: 7.46 INJECTION, SOLUTION INTRAVENOUS at 09:48

## 2019-01-01 RX ADMIN — METRONIDAZOLE 500 MG: 250 TABLET ORAL at 14:38

## 2019-01-01 RX ADMIN — ENOXAPARIN SODIUM 40 MG: 40 INJECTION SUBCUTANEOUS at 08:50

## 2019-01-01 RX ADMIN — INSULIN LISPRO 1 UNITS: 100 INJECTION, SOLUTION INTRAVENOUS; SUBCUTANEOUS at 21:05

## 2019-01-01 RX ADMIN — SIMVASTATIN 40 MG: 40 TABLET, FILM COATED ORAL at 22:15

## 2019-01-01 RX ADMIN — MIDODRINE HYDROCHLORIDE 10 MG: 5 TABLET ORAL at 11:49

## 2019-01-01 RX ADMIN — PROPOFOL 30 MCG/KG/MIN: 10 INJECTION, EMULSION INTRAVENOUS at 11:21

## 2019-01-01 RX ADMIN — INSULIN LISPRO 2 UNITS: 100 INJECTION, SOLUTION INTRAVENOUS; SUBCUTANEOUS at 11:49

## 2019-01-01 RX ADMIN — ALTEPLASE 1 MG: 2.2 INJECTION, POWDER, LYOPHILIZED, FOR SOLUTION INTRAVENOUS at 19:08

## 2019-01-01 RX ADMIN — ACETAMINOPHEN 650 MG: 325 TABLET ORAL at 02:04

## 2019-01-01 RX ADMIN — METFORMIN HYDROCHLORIDE 500 MG: 500 TABLET ORAL at 18:37

## 2019-01-01 RX ADMIN — SODIUM CHLORIDE: 9 INJECTION, SOLUTION INTRAVENOUS at 15:10

## 2019-01-01 RX ADMIN — SIMVASTATIN 40 MG: 40 TABLET, FILM COATED ORAL at 22:58

## 2019-01-01 RX ADMIN — CLOPIDOGREL BISULFATE 75 MG: 75 TABLET ORAL at 09:01

## 2019-01-01 RX ADMIN — GUAIFENESIN AND DEXTROMETHORPHAN 5 ML: 100; 10 SYRUP ORAL at 18:37

## 2019-01-01 RX ADMIN — SODIUM CHLORIDE 1 ML: 9 INJECTION, SOLUTION INTRAVENOUS at 22:26

## 2019-01-01 RX ADMIN — INSULIN LISPRO 2 UNITS: 100 INJECTION, SOLUTION INTRAVENOUS; SUBCUTANEOUS at 17:24

## 2019-01-01 RX ADMIN — POTASSIUM CHLORIDE 10 MEQ: 7.46 INJECTION, SOLUTION INTRAVENOUS at 14:39

## 2019-01-01 RX ADMIN — IPRATROPIUM BROMIDE AND ALBUTEROL SULFATE 1 AMPULE: .5; 3 SOLUTION RESPIRATORY (INHALATION) at 07:45

## 2019-01-01 RX ADMIN — IOPAMIDOL 100 ML: 755 INJECTION, SOLUTION INTRAVENOUS at 04:56

## 2019-01-01 RX ADMIN — CLOPIDOGREL BISULFATE 75 MG: 75 TABLET ORAL at 16:51

## 2019-01-01 RX ADMIN — GABAPENTIN 400 MG: 400 CAPSULE ORAL at 17:23

## 2019-01-01 RX ADMIN — PROPOFOL 20 MCG/KG/MIN: 10 INJECTION, EMULSION INTRAVENOUS at 07:08

## 2019-01-01 RX ADMIN — VANCOMYCIN HYDROCHLORIDE 1250 MG: 5 INJECTION, POWDER, LYOPHILIZED, FOR SOLUTION INTRAVENOUS at 11:52

## 2019-01-01 RX ADMIN — IPRATROPIUM BROMIDE AND ALBUTEROL SULFATE 1 AMPULE: .5; 3 SOLUTION RESPIRATORY (INHALATION) at 19:15

## 2019-01-01 RX ADMIN — PHENYLEPHRINE HYDROCHLORIDE 75 MCG/MIN: 10 INJECTION INTRAVENOUS at 13:11

## 2019-01-01 RX ADMIN — GABAPENTIN 400 MG: 400 CAPSULE ORAL at 17:10

## 2019-01-01 RX ADMIN — METRONIDAZOLE 500 MG: 250 TABLET ORAL at 20:26

## 2019-01-01 RX ADMIN — PROPOFOL 20 MCG/KG/MIN: 10 INJECTION, EMULSION INTRAVENOUS at 02:21

## 2019-01-01 RX ADMIN — DEXMEDETOMIDINE HYDROCHLORIDE 0.2 MCG/KG/HR: 100 INJECTION, SOLUTION, CONCENTRATE INTRAVENOUS at 05:19

## 2019-01-01 RX ADMIN — METFORMIN HYDROCHLORIDE 500 MG: 500 TABLET ORAL at 09:37

## 2019-01-01 RX ADMIN — ASPIRIN 81 MG 324 MG: 81 TABLET ORAL at 15:20

## 2019-01-01 RX ADMIN — MIDODRINE HYDROCHLORIDE 10 MG: 5 TABLET ORAL at 17:33

## 2019-01-01 RX ADMIN — OSELTAMIVIR PHOSPHATE 30 MG: 30 CAPSULE ORAL at 20:25

## 2019-01-01 RX ADMIN — SODIUM CHLORIDE, PRESERVATIVE FREE 10 ML: 5 INJECTION INTRAVENOUS at 09:45

## 2019-01-01 RX ADMIN — SODIUM CHLORIDE 500 ML: 9 INJECTION, SOLUTION INTRAVENOUS at 17:32

## 2019-01-01 RX ADMIN — TAZOBACTAM SODIUM AND PIPERACILLIN SODIUM 3.38 G: 375; 3 INJECTION, SOLUTION INTRAVENOUS at 01:25

## 2019-01-01 RX ADMIN — METRONIDAZOLE 500 MG: 250 TABLET ORAL at 06:01

## 2019-01-01 RX ADMIN — IPRATROPIUM BROMIDE AND ALBUTEROL SULFATE 1 AMPULE: .5; 3 SOLUTION RESPIRATORY (INHALATION) at 10:54

## 2019-01-01 RX ADMIN — INSULIN LISPRO 5 UNITS: 100 INJECTION, SOLUTION INTRAVENOUS; SUBCUTANEOUS at 21:53

## 2019-01-01 RX ADMIN — POTASSIUM CHLORIDE 10 MEQ: 7.46 INJECTION, SOLUTION INTRAVENOUS at 09:41

## 2019-01-01 RX ADMIN — DEXTROSE MONOHYDRATE: 50 INJECTION, SOLUTION INTRAVENOUS at 21:52

## 2019-01-01 RX ADMIN — GABAPENTIN 400 MG: 400 CAPSULE ORAL at 09:48

## 2019-01-01 RX ADMIN — PANTOPRAZOLE SODIUM 40 MG: 40 INJECTION, POWDER, FOR SOLUTION INTRAVENOUS at 21:27

## 2019-01-01 RX ADMIN — MIDODRINE HYDROCHLORIDE 10 MG: 5 TABLET ORAL at 13:15

## 2019-01-01 RX ADMIN — SODIUM CHLORIDE, PRESERVATIVE FREE 10 ML: 5 INJECTION INTRAVENOUS at 18:51

## 2019-01-01 RX ADMIN — INSULIN LISPRO 4 UNITS: 100 INJECTION, SOLUTION INTRAVENOUS; SUBCUTANEOUS at 17:14

## 2019-01-01 RX ADMIN — IPRATROPIUM BROMIDE AND ALBUTEROL SULFATE 1 AMPULE: .5; 3 SOLUTION RESPIRATORY (INHALATION) at 11:52

## 2019-01-01 RX ADMIN — ACETAMINOPHEN 650 MG: 325 TABLET ORAL at 08:15

## 2019-01-01 RX ADMIN — INSULIN LISPRO 2 UNITS: 100 INJECTION, SOLUTION INTRAVENOUS; SUBCUTANEOUS at 13:21

## 2019-01-01 RX ADMIN — DEXTROSE MONOHYDRATE: 50 INJECTION, SOLUTION INTRAVENOUS at 10:44

## 2019-01-01 RX ADMIN — OSELTAMIVIR PHOSPHATE 30 MG: 30 CAPSULE ORAL at 09:43

## 2019-01-01 RX ADMIN — GABAPENTIN 400 MG: 400 CAPSULE ORAL at 09:01

## 2019-01-01 RX ADMIN — SODIUM CHLORIDE, PRESERVATIVE FREE 10 ML: 5 INJECTION INTRAVENOUS at 23:07

## 2019-01-01 RX ADMIN — CEFTRIAXONE 1 G: 1 INJECTION, POWDER, FOR SOLUTION INTRAMUSCULAR; INTRAVENOUS at 00:39

## 2019-01-01 RX ADMIN — Medication: at 08:51

## 2019-01-01 RX ADMIN — INSULIN LISPRO 1 UNITS: 100 INJECTION, SOLUTION INTRAVENOUS; SUBCUTANEOUS at 21:41

## 2019-01-01 RX ADMIN — ENOXAPARIN SODIUM 40 MG: 100 INJECTION SUBCUTANEOUS at 08:59

## 2019-01-01 RX ADMIN — INSULIN LISPRO 4 UNITS: 100 INJECTION, SOLUTION INTRAVENOUS; SUBCUTANEOUS at 08:59

## 2019-01-01 RX ADMIN — INSULIN LISPRO 1 UNITS: 100 INJECTION, SOLUTION INTRAVENOUS; SUBCUTANEOUS at 20:47

## 2019-01-01 RX ADMIN — TAZOBACTAM SODIUM AND PIPERACILLIN SODIUM 3.38 G: 375; 3 INJECTION, SOLUTION INTRAVENOUS at 13:35

## 2019-01-01 RX ADMIN — SIMVASTATIN 40 MG: 40 TABLET, FILM COATED ORAL at 20:22

## 2019-01-01 RX ADMIN — POTASSIUM CHLORIDE 10 MEQ: 750 TABLET, FILM COATED, EXTENDED RELEASE ORAL at 14:07

## 2019-01-01 RX ADMIN — PANTOPRAZOLE SODIUM 40 MG: 40 TABLET, DELAYED RELEASE ORAL at 21:34

## 2019-01-01 RX ADMIN — INSULIN LISPRO 2 UNITS: 100 INJECTION, SOLUTION INTRAVENOUS; SUBCUTANEOUS at 12:17

## 2019-01-01 RX ADMIN — INSULIN GLARGINE 5 UNITS: 100 INJECTION, SOLUTION SUBCUTANEOUS at 21:09

## 2019-01-01 RX ADMIN — METRONIDAZOLE 500 MG: 250 TABLET ORAL at 15:05

## 2019-01-01 RX ADMIN — PANTOPRAZOLE SODIUM 40 MG: 40 TABLET, DELAYED RELEASE ORAL at 06:24

## 2019-01-01 RX ADMIN — INSULIN LISPRO 1 UNITS: 100 INJECTION, SOLUTION INTRAVENOUS; SUBCUTANEOUS at 23:01

## 2019-01-01 RX ADMIN — MIDODRINE HYDROCHLORIDE 10 MG: 5 TABLET ORAL at 17:40

## 2019-01-01 RX ADMIN — MIDODRINE HYDROCHLORIDE 10 MG: 5 TABLET ORAL at 12:18

## 2019-01-01 RX ADMIN — MIDAZOLAM HYDROCHLORIDE 5 MG: 1 INJECTION, SOLUTION INTRAMUSCULAR; INTRAVENOUS at 17:56

## 2019-01-01 RX ADMIN — IPRATROPIUM BROMIDE AND ALBUTEROL SULFATE 1 AMPULE: .5; 3 SOLUTION RESPIRATORY (INHALATION) at 16:23

## 2019-01-01 RX ADMIN — IPRATROPIUM BROMIDE AND ALBUTEROL SULFATE 1 AMPULE: .5; 3 SOLUTION RESPIRATORY (INHALATION) at 14:44

## 2019-01-01 RX ADMIN — METRONIDAZOLE 500 MG: 250 TABLET ORAL at 14:43

## 2019-01-01 RX ADMIN — SODIUM CHLORIDE: 9 INJECTION, SOLUTION INTRAVENOUS at 22:48

## 2019-01-01 RX ADMIN — SODIUM CHLORIDE, PRESERVATIVE FREE 10 ML: 5 INJECTION INTRAVENOUS at 21:41

## 2019-01-01 RX ADMIN — LEVOFLOXACIN 500 MG: 500 TABLET, FILM COATED ORAL at 07:59

## 2019-01-01 RX ADMIN — DEXTROSE MONOHYDRATE: 50 INJECTION, SOLUTION INTRAVENOUS at 14:28

## 2019-01-01 RX ADMIN — SIMVASTATIN 40 MG: 40 TABLET, FILM COATED ORAL at 20:17

## 2019-01-01 RX ADMIN — SODIUM CHLORIDE: 9 INJECTION, SOLUTION INTRAVENOUS at 19:38

## 2019-01-01 RX ADMIN — GUAIFENESIN AND DEXTROMETHORPHAN 5 ML: 100; 10 SYRUP ORAL at 20:59

## 2019-01-01 RX ADMIN — LATANOPROST 1 DROP: 50 SOLUTION OPHTHALMIC at 22:56

## 2019-01-01 RX ADMIN — GABAPENTIN 400 MG: 400 CAPSULE ORAL at 18:04

## 2019-01-01 RX ADMIN — INSULIN LISPRO 2 UNITS: 100 INJECTION, SOLUTION INTRAVENOUS; SUBCUTANEOUS at 08:42

## 2019-01-01 RX ADMIN — NOREPINEPHRINE BITARTRATE 2 MCG/MIN: 1 INJECTION, SOLUTION, CONCENTRATE INTRAVENOUS at 14:09

## 2019-01-01 RX ADMIN — ENOXAPARIN SODIUM 40 MG: 100 INJECTION SUBCUTANEOUS at 09:30

## 2019-01-01 RX ADMIN — ENOXAPARIN SODIUM 40 MG: 100 INJECTION SUBCUTANEOUS at 09:49

## 2019-01-01 RX ADMIN — METRONIDAZOLE 500 MG: 250 TABLET ORAL at 05:47

## 2019-01-01 RX ADMIN — SODIUM CHLORIDE: 9 INJECTION, SOLUTION INTRAVENOUS at 06:06

## 2019-01-01 RX ADMIN — IPRATROPIUM BROMIDE AND ALBUTEROL SULFATE 1 AMPULE: .5; 3 SOLUTION RESPIRATORY (INHALATION) at 07:52

## 2019-01-01 RX ADMIN — ENOXAPARIN SODIUM 40 MG: 40 INJECTION SUBCUTANEOUS at 09:43

## 2019-01-01 RX ADMIN — METFORMIN HYDROCHLORIDE 500 MG: 500 TABLET ORAL at 08:51

## 2019-01-01 RX ADMIN — SODIUM CHLORIDE, PRESERVATIVE FREE 10 ML: 5 INJECTION INTRAVENOUS at 00:23

## 2019-01-01 RX ADMIN — GABAPENTIN 400 MG: 400 CAPSULE ORAL at 10:06

## 2019-01-01 RX ADMIN — LEVOFLOXACIN 500 MG: 500 TABLET, FILM COATED ORAL at 09:09

## 2019-01-01 RX ADMIN — GABAPENTIN 400 MG: 400 CAPSULE ORAL at 16:50

## 2019-01-01 RX ADMIN — INSULIN LISPRO 1 UNITS: 100 INJECTION, SOLUTION INTRAVENOUS; SUBCUTANEOUS at 23:26

## 2019-01-01 RX ADMIN — MIDODRINE HYDROCHLORIDE 10 MG: 5 TABLET ORAL at 08:45

## 2019-01-01 RX ADMIN — PHENYLEPHRINE HYDROCHLORIDE 200 MCG: 10 INJECTION INTRAVENOUS at 07:59

## 2019-01-01 RX ADMIN — PROPOFOL 25 MCG/KG/MIN: 10 INJECTION, EMULSION INTRAVENOUS at 21:59

## 2019-01-01 RX ADMIN — SIMVASTATIN 40 MG: 40 TABLET, FILM COATED ORAL at 23:17

## 2019-01-01 RX ADMIN — IPRATROPIUM BROMIDE AND ALBUTEROL SULFATE 1 AMPULE: .5; 3 SOLUTION RESPIRATORY (INHALATION) at 19:21

## 2019-01-01 RX ADMIN — GABAPENTIN 400 MG: 400 CAPSULE ORAL at 17:01

## 2019-01-01 RX ADMIN — CHLORHEXIDINE GLUCONATE 0.12% ORAL RINSE 15 ML: 1.2 LIQUID ORAL at 08:34

## 2019-01-01 RX ADMIN — ASPIRIN 81 MG 81 MG: 81 TABLET ORAL at 09:13

## 2019-01-01 RX ADMIN — DEXTROSE AND SODIUM CHLORIDE: 5; 450 INJECTION, SOLUTION INTRAVENOUS at 05:30

## 2019-01-01 RX ADMIN — Medication 10 ML: at 00:00

## 2019-01-01 RX ADMIN — METRONIDAZOLE 500 MG: 500 INJECTION, SOLUTION INTRAVENOUS at 00:39

## 2019-01-01 RX ADMIN — METFORMIN HYDROCHLORIDE 500 MG: 500 TABLET ORAL at 10:34

## 2019-01-01 RX ADMIN — PROPOFOL 10 MCG/KG/MIN: 10 INJECTION, EMULSION INTRAVENOUS at 05:00

## 2019-01-01 RX ADMIN — METFORMIN HYDROCHLORIDE 500 MG: 500 TABLET ORAL at 17:24

## 2019-01-01 RX ADMIN — GABAPENTIN 400 MG: 400 CAPSULE ORAL at 19:18

## 2019-01-01 RX ADMIN — Medication 10 ML: at 21:28

## 2019-01-01 RX ADMIN — CHLORHEXIDINE GLUCONATE 0.12% ORAL RINSE 15 ML: 1.2 LIQUID ORAL at 09:26

## 2019-01-01 RX ADMIN — VANCOMYCIN HYDROCHLORIDE 1250 MG: 5 INJECTION, POWDER, LYOPHILIZED, FOR SOLUTION INTRAVENOUS at 10:30

## 2019-01-01 RX ADMIN — INSULIN LISPRO 2 UNITS: 100 INJECTION, SOLUTION INTRAVENOUS; SUBCUTANEOUS at 13:02

## 2019-01-01 RX ADMIN — CLOPIDOGREL BISULFATE 75 MG: 75 TABLET ORAL at 09:26

## 2019-01-01 RX ADMIN — INSULIN LISPRO 1 UNITS: 100 INJECTION, SOLUTION INTRAVENOUS; SUBCUTANEOUS at 09:49

## 2019-01-01 RX ADMIN — IPRATROPIUM BROMIDE AND ALBUTEROL SULFATE 1 AMPULE: .5; 3 SOLUTION RESPIRATORY (INHALATION) at 07:03

## 2019-01-01 RX ADMIN — CHLORHEXIDINE GLUCONATE 0.12% ORAL RINSE 15 ML: 1.2 LIQUID ORAL at 09:13

## 2019-01-01 RX ADMIN — SODIUM CHLORIDE, PRESERVATIVE FREE 10 ML: 5 INJECTION INTRAVENOUS at 20:21

## 2019-01-01 RX ADMIN — INSULIN GLARGINE 5 UNITS: 100 INJECTION, SOLUTION SUBCUTANEOUS at 21:12

## 2019-01-01 RX ADMIN — IPRATROPIUM BROMIDE AND ALBUTEROL SULFATE 1 AMPULE: .5; 3 SOLUTION RESPIRATORY (INHALATION) at 15:15

## 2019-01-01 RX ADMIN — CLOPIDOGREL BISULFATE 75 MG: 75 TABLET ORAL at 09:41

## 2019-01-01 RX ADMIN — SIMVASTATIN 40 MG: 40 TABLET, FILM COATED ORAL at 22:33

## 2019-01-01 RX ADMIN — PROPOFOL 50 MG: 10 INJECTION, EMULSION INTRAVENOUS at 07:47

## 2019-01-01 RX ADMIN — TAZOBACTAM SODIUM AND PIPERACILLIN SODIUM 3.38 G: 375; 3 INJECTION, SOLUTION INTRAVENOUS at 05:33

## 2019-01-01 RX ADMIN — SIMVASTATIN 40 MG: 40 TABLET, FILM COATED ORAL at 21:09

## 2019-01-01 RX ADMIN — METRONIDAZOLE 500 MG: 500 INJECTION, SOLUTION INTRAVENOUS at 16:28

## 2019-01-01 RX ADMIN — METFORMIN HYDROCHLORIDE 500 MG: 500 TABLET ORAL at 17:44

## 2019-01-01 RX ADMIN — SODIUM CHLORIDE, PRESERVATIVE FREE 10 ML: 5 INJECTION INTRAVENOUS at 01:25

## 2019-01-01 RX ADMIN — POTASSIUM CHLORIDE 10 MEQ: 7.46 INJECTION, SOLUTION INTRAVENOUS at 12:10

## 2019-01-01 RX ADMIN — SODIUM CHLORIDE, PRESERVATIVE FREE 10 ML: 5 INJECTION INTRAVENOUS at 21:16

## 2019-01-01 RX ADMIN — FUROSEMIDE 20 MG: 10 INJECTION, SOLUTION INTRAVENOUS at 21:10

## 2019-01-01 RX ADMIN — CHLORHEXIDINE GLUCONATE 0.12% ORAL RINSE 15 ML: 1.2 LIQUID ORAL at 21:09

## 2019-01-01 RX ADMIN — GABAPENTIN 400 MG: 400 CAPSULE ORAL at 18:37

## 2019-01-01 RX ADMIN — PANTOPRAZOLE SODIUM 40 MG: 40 INJECTION, POWDER, FOR SOLUTION INTRAVENOUS at 21:45

## 2019-01-01 RX ADMIN — IPRATROPIUM BROMIDE AND ALBUTEROL SULFATE 1 AMPULE: .5; 3 SOLUTION RESPIRATORY (INHALATION) at 14:48

## 2019-01-01 RX ADMIN — CLOPIDOGREL BISULFATE 75 MG: 75 TABLET ORAL at 08:46

## 2019-01-01 RX ADMIN — GABAPENTIN 400 MG: 400 CAPSULE ORAL at 09:43

## 2019-01-01 RX ADMIN — GABAPENTIN 400 MG: 400 CAPSULE ORAL at 08:04

## 2019-01-01 RX ADMIN — INSULIN LISPRO 1 UNITS: 100 INJECTION, SOLUTION INTRAVENOUS; SUBCUTANEOUS at 20:25

## 2019-01-01 RX ADMIN — INSULIN GLARGINE 5 UNITS: 100 INJECTION, SOLUTION SUBCUTANEOUS at 21:41

## 2019-01-01 RX ADMIN — CHLORHEXIDINE GLUCONATE 0.12% ORAL RINSE 15 ML: 1.2 LIQUID ORAL at 08:29

## 2019-01-01 RX ADMIN — PANTOPRAZOLE SODIUM 40 MG: 40 INJECTION, POWDER, FOR SOLUTION INTRAVENOUS at 09:44

## 2019-01-01 RX ADMIN — CEFEPIME HYDROCHLORIDE 2 G: 2 INJECTION, POWDER, FOR SOLUTION INTRAVENOUS at 21:47

## 2019-01-01 RX ADMIN — OSELTAMIVIR PHOSPHATE 30 MG: 30 CAPSULE ORAL at 07:52

## 2019-01-01 RX ADMIN — INSULIN LISPRO 1 UNITS: 100 INJECTION, SOLUTION INTRAVENOUS; SUBCUTANEOUS at 18:05

## 2019-01-01 RX ADMIN — DEXAMETHASONE SODIUM PHOSPHATE 8 MG: 4 INJECTION, SOLUTION INTRAMUSCULAR; INTRAVENOUS at 08:15

## 2019-01-01 RX ADMIN — INSULIN GLARGINE 5 UNITS: 100 INJECTION, SOLUTION SUBCUTANEOUS at 22:35

## 2019-01-01 RX ADMIN — IPRATROPIUM BROMIDE AND ALBUTEROL SULFATE 1 AMPULE: .5; 3 SOLUTION RESPIRATORY (INHALATION) at 17:02

## 2019-01-01 RX ADMIN — SODIUM CHLORIDE, PRESERVATIVE FREE 10 ML: 5 INJECTION INTRAVENOUS at 07:55

## 2019-01-01 RX ADMIN — SIMVASTATIN 40 MG: 40 TABLET, FILM COATED ORAL at 20:37

## 2019-01-01 RX ADMIN — Medication 10 ML: at 09:53

## 2019-01-01 RX ADMIN — IPRATROPIUM BROMIDE AND ALBUTEROL SULFATE 1 AMPULE: .5; 3 SOLUTION RESPIRATORY (INHALATION) at 14:17

## 2019-01-01 RX ADMIN — AZITHROMYCIN 500 MG: 500 INJECTION, POWDER, LYOPHILIZED, FOR SOLUTION INTRAVENOUS at 23:36

## 2019-01-01 RX ADMIN — SODIUM CHLORIDE 250 ML: 9 INJECTION, SOLUTION INTRAVENOUS at 08:48

## 2019-01-01 RX ADMIN — SODIUM CHLORIDE: 9 INJECTION, SOLUTION INTRAVENOUS at 00:41

## 2019-01-01 RX ADMIN — ALBUTEROL SULFATE 2.5 MG: 2.5 SOLUTION RESPIRATORY (INHALATION) at 16:59

## 2019-01-01 RX ADMIN — INSULIN LISPRO 1 UNITS: 100 INJECTION, SOLUTION INTRAVENOUS; SUBCUTANEOUS at 21:37

## 2019-01-01 RX ADMIN — PANTOPRAZOLE SODIUM 40 MG: 40 INJECTION, POWDER, FOR SOLUTION INTRAVENOUS at 21:41

## 2019-01-01 RX ADMIN — INSULIN LISPRO 1 UNITS: 100 INJECTION, SOLUTION INTRAVENOUS; SUBCUTANEOUS at 23:28

## 2019-01-01 RX ADMIN — GUAIFENESIN AND DEXTROMETHORPHAN 5 ML: 100; 10 SYRUP ORAL at 16:52

## 2019-01-01 RX ADMIN — TAZOBACTAM SODIUM AND PIPERACILLIN SODIUM 3.38 G: 375; 3 INJECTION, SOLUTION INTRAVENOUS at 13:44

## 2019-01-01 RX ADMIN — GABAPENTIN 400 MG: 400 CAPSULE ORAL at 08:00

## 2019-01-01 RX ADMIN — PHENYLEPHRINE HYDROCHLORIDE 50 MCG/MIN: 10 INJECTION INTRAVENOUS at 21:45

## 2019-01-01 RX ADMIN — IPRATROPIUM BROMIDE AND ALBUTEROL SULFATE 1 AMPULE: .5; 3 SOLUTION RESPIRATORY (INHALATION) at 12:12

## 2019-01-01 RX ADMIN — SODIUM CHLORIDE, PRESERVATIVE FREE 10 ML: 5 INJECTION INTRAVENOUS at 08:15

## 2019-01-01 RX ADMIN — SODIUM CHLORIDE, PRESERVATIVE FREE 10 ML: 5 INJECTION INTRAVENOUS at 08:21

## 2019-01-01 RX ADMIN — INSULIN LISPRO 1 UNITS: 100 INJECTION, SOLUTION INTRAVENOUS; SUBCUTANEOUS at 08:44

## 2019-01-01 RX ADMIN — IPRATROPIUM BROMIDE AND ALBUTEROL SULFATE 1 AMPULE: .5; 3 SOLUTION RESPIRATORY (INHALATION) at 07:08

## 2019-01-01 RX ADMIN — CHLORHEXIDINE GLUCONATE 0.12% ORAL RINSE 15 ML: 1.2 LIQUID ORAL at 08:18

## 2019-01-01 RX ADMIN — INSULIN LISPRO 2 UNITS: 100 INJECTION, SOLUTION INTRAVENOUS; SUBCUTANEOUS at 13:10

## 2019-01-01 RX ADMIN — GABAPENTIN 400 MG: 400 CAPSULE ORAL at 09:37

## 2019-01-01 RX ADMIN — TAZOBACTAM SODIUM AND PIPERACILLIN SODIUM 3.38 G: 375; 3 INJECTION, SOLUTION INTRAVENOUS at 04:42

## 2019-01-01 RX ADMIN — PROPOFOL 20 MCG/KG/MIN: 10 INJECTION, EMULSION INTRAVENOUS at 19:20

## 2019-01-01 RX ADMIN — METRONIDAZOLE 500 MG: 500 INJECTION, SOLUTION INTRAVENOUS at 16:50

## 2019-01-01 RX ADMIN — SIMVASTATIN 40 MG: 40 TABLET, FILM COATED ORAL at 21:40

## 2019-01-01 RX ADMIN — SODIUM CHLORIDE: 9 INJECTION, SOLUTION INTRAVENOUS at 12:02

## 2019-01-01 RX ADMIN — VANCOMYCIN HYDROCHLORIDE 1250 MG: 5 INJECTION, POWDER, LYOPHILIZED, FOR SOLUTION INTRAVENOUS at 10:25

## 2019-01-01 RX ADMIN — GABAPENTIN 400 MG: 400 CAPSULE ORAL at 18:47

## 2019-01-01 RX ADMIN — SODIUM CHLORIDE, PRESERVATIVE FREE 10 ML: 5 INJECTION INTRAVENOUS at 21:34

## 2019-01-01 RX ADMIN — INSULIN LISPRO 2 UNITS: 100 INJECTION, SOLUTION INTRAVENOUS; SUBCUTANEOUS at 17:03

## 2019-01-01 RX ADMIN — Medication: at 09:21

## 2019-01-01 RX ADMIN — METRONIDAZOLE 500 MG: 500 INJECTION, SOLUTION INTRAVENOUS at 17:01

## 2019-01-01 RX ADMIN — INSULIN LISPRO 2 UNITS: 100 INJECTION, SOLUTION INTRAVENOUS; SUBCUTANEOUS at 09:39

## 2019-01-01 RX ADMIN — MIDODRINE HYDROCHLORIDE 10 MG: 5 TABLET ORAL at 12:57

## 2019-01-01 RX ADMIN — GABAPENTIN 400 MG: 400 CAPSULE ORAL at 09:09

## 2019-01-01 RX ADMIN — METRONIDAZOLE 500 MG: 250 TABLET ORAL at 13:06

## 2019-01-01 RX ADMIN — SODIUM CHLORIDE: 9 INJECTION, SOLUTION INTRAVENOUS at 07:51

## 2019-01-01 RX ADMIN — POTASSIUM CHLORIDE 10 MEQ: 7.46 INJECTION, SOLUTION INTRAVENOUS at 16:32

## 2019-01-01 RX ADMIN — SODIUM CHLORIDE, PRESERVATIVE FREE 10 ML: 5 INJECTION INTRAVENOUS at 20:45

## 2019-01-01 RX ADMIN — CALCIUM CARBONATE 500 MG: 500 TABLET, CHEWABLE ORAL at 05:18

## 2019-01-01 RX ADMIN — PROPOFOL 20 MCG/KG/MIN: 10 INJECTION, EMULSION INTRAVENOUS at 22:32

## 2019-01-01 RX ADMIN — ENOXAPARIN SODIUM 40 MG: 40 INJECTION SUBCUTANEOUS at 10:06

## 2019-01-01 RX ADMIN — SODIUM CHLORIDE, PRESERVATIVE FREE 10 ML: 5 INJECTION INTRAVENOUS at 09:13

## 2019-01-01 RX ADMIN — LATANOPROST 1 DROP: 50 SOLUTION OPHTHALMIC at 22:59

## 2019-01-01 RX ADMIN — SIMVASTATIN 40 MG: 40 TABLET, FILM COATED ORAL at 21:39

## 2019-01-01 RX ADMIN — ENOXAPARIN SODIUM 40 MG: 40 INJECTION SUBCUTANEOUS at 09:08

## 2019-01-01 RX ADMIN — SODIUM CHLORIDE: 9 INJECTION, SOLUTION INTRAVENOUS at 01:58

## 2019-01-01 RX ADMIN — INSULIN LISPRO 2 UNITS: 100 INJECTION, SOLUTION INTRAVENOUS; SUBCUTANEOUS at 09:22

## 2019-01-01 RX ADMIN — IPRATROPIUM BROMIDE AND ALBUTEROL SULFATE 1 AMPULE: .5; 3 SOLUTION RESPIRATORY (INHALATION) at 04:02

## 2019-01-01 RX ADMIN — PHENYLEPHRINE HYDROCHLORIDE 85 MCG/MIN: 10 INJECTION INTRAVENOUS at 00:25

## 2019-01-01 RX ADMIN — LATANOPROST 1 DROP: 50 SOLUTION OPHTHALMIC at 21:27

## 2019-01-01 RX ADMIN — Medication 1 MG: at 03:53

## 2019-01-01 RX ADMIN — IPRATROPIUM BROMIDE AND ALBUTEROL SULFATE 1 AMPULE: .5; 3 SOLUTION RESPIRATORY (INHALATION) at 15:51

## 2019-01-01 RX ADMIN — SODIUM CHLORIDE, PRESERVATIVE FREE 10 ML: 5 INJECTION INTRAVENOUS at 21:13

## 2019-01-01 RX ADMIN — METRONIDAZOLE 500 MG: 250 TABLET ORAL at 05:07

## 2019-01-01 RX ADMIN — LEVOFLOXACIN 500 MG: 500 TABLET, FILM COATED ORAL at 10:06

## 2019-01-01 RX ADMIN — PROPOFOL 15 MCG/KG/MIN: 10 INJECTION, EMULSION INTRAVENOUS at 22:18

## 2019-01-01 RX ADMIN — OSELTAMIVIR PHOSPHATE 30 MG: 30 CAPSULE ORAL at 23:08

## 2019-01-01 RX ADMIN — INSULIN LISPRO 2 UNITS: 100 INJECTION, SOLUTION INTRAVENOUS; SUBCUTANEOUS at 12:53

## 2019-01-01 RX ADMIN — LATANOPROST 1 DROP: 50 SOLUTION OPHTHALMIC at 20:39

## 2019-01-01 RX ADMIN — INSULIN LISPRO 2 UNITS: 100 INJECTION, SOLUTION INTRAVENOUS; SUBCUTANEOUS at 11:58

## 2019-01-01 RX ADMIN — CHLORHEXIDINE GLUCONATE 0.12% ORAL RINSE 15 ML: 1.2 LIQUID ORAL at 13:00

## 2019-01-01 RX ADMIN — INSULIN LISPRO 1 UNITS: 100 INJECTION, SOLUTION INTRAVENOUS; SUBCUTANEOUS at 13:06

## 2019-01-01 RX ADMIN — PANTOPRAZOLE SODIUM 40 MG: 40 TABLET, DELAYED RELEASE ORAL at 20:59

## 2019-01-01 RX ADMIN — CHLORHEXIDINE GLUCONATE 0.12% ORAL RINSE 15 ML: 1.2 LIQUID ORAL at 09:21

## 2019-01-01 RX ADMIN — SODIUM CHLORIDE, PRESERVATIVE FREE 10 ML: 5 INJECTION INTRAVENOUS at 09:14

## 2019-01-01 RX ADMIN — ASPIRIN 81 MG 81 MG: 81 TABLET ORAL at 08:46

## 2019-01-01 RX ADMIN — GABAPENTIN 400 MG: 400 CAPSULE ORAL at 10:33

## 2019-01-01 RX ADMIN — INSULIN LISPRO 2 UNITS: 100 INJECTION, SOLUTION INTRAVENOUS; SUBCUTANEOUS at 13:04

## 2019-01-01 RX ADMIN — MIDODRINE HYDROCHLORIDE 10 MG: 5 TABLET ORAL at 16:51

## 2019-01-01 RX ADMIN — LATANOPROST 1 DROP: 50 SOLUTION OPHTHALMIC at 21:48

## 2019-01-01 RX ADMIN — LATANOPROST 1 DROP: 50 SOLUTION OPHTHALMIC at 23:37

## 2019-01-01 RX ADMIN — PANTOPRAZOLE SODIUM 40 MG: 40 TABLET, DELAYED RELEASE ORAL at 21:06

## 2019-01-01 RX ADMIN — GUAIFENESIN AND DEXTROMETHORPHAN 5 ML: 100; 10 SYRUP ORAL at 06:05

## 2019-01-01 RX ADMIN — PANTOPRAZOLE SODIUM 40 MG: 40 TABLET, DELAYED RELEASE ORAL at 23:17

## 2019-01-01 RX ADMIN — Medication 10 ML: at 22:57

## 2019-01-01 RX ADMIN — METRONIDAZOLE 500 MG: 250 TABLET ORAL at 22:58

## 2019-01-01 SDOH — HEALTH STABILITY: MENTAL HEALTH: HOW OFTEN DO YOU HAVE A DRINK CONTAINING ALCOHOL?: NEVER

## 2019-01-01 ASSESSMENT — PAIN DESCRIPTION - FREQUENCY
FREQUENCY: INTERMITTENT

## 2019-01-01 ASSESSMENT — PULMONARY FUNCTION TESTS
PIF_VALUE: 23
PIF_VALUE: 19
PIF_VALUE: 23
PIF_VALUE: 11
PIF_VALUE: 22
PIF_VALUE: 28
PIF_VALUE: 26
PIF_VALUE: 17
PIF_VALUE: 20
PIF_VALUE: 21
PIF_VALUE: 22
PIF_VALUE: 15
PIF_VALUE: 23
PIF_VALUE: 17
PIF_VALUE: 18
PIF_VALUE: 23
PIF_VALUE: 21
PIF_VALUE: 16
PIF_VALUE: 11
PIF_VALUE: 20
PIF_VALUE: 20
PIF_VALUE: 15
PIF_VALUE: 20
PIF_VALUE: 22
PEFR_L/MIN: 20
PIF_VALUE: 25
PIF_VALUE: 11
PIF_VALUE: 15
PIF_VALUE: 15
PIF_VALUE: 25
PIF_VALUE: 26
PIF_VALUE: 21
PIF_VALUE: 22
PIF_VALUE: 15
PIF_VALUE: 23
PIF_VALUE: 70
PIF_VALUE: 16
PIF_VALUE: 19
PIF_VALUE: 19
PIF_VALUE: 23
PIF_VALUE: 35
PIF_VALUE: 20
PIF_VALUE: 23
PIF_VALUE: 19
PIF_VALUE: 16
PIF_VALUE: 22
PIF_VALUE: 27
PIF_VALUE: 17
PIF_VALUE: 21
PIF_VALUE: 15
PIF_VALUE: 20
PIF_VALUE: 21
PIF_VALUE: 18
PIF_VALUE: 18
PIF_VALUE: 20
PIF_VALUE: 23
PIF_VALUE: 20
PIF_VALUE: 17
PIF_VALUE: 20
PIF_VALUE: 15
PIF_VALUE: 20
PIF_VALUE: 20
PIF_VALUE: 22
PIF_VALUE: 15
PIF_VALUE: 18
PIF_VALUE: 19
PIF_VALUE: 37
PIF_VALUE: 21
PIF_VALUE: 23
PIF_VALUE: 15
PIF_VALUE: 19
PIF_VALUE: 16
PIF_VALUE: 10
PIF_VALUE: 22
PIF_VALUE: 20
PIF_VALUE: 22
PIF_VALUE: 24
PIF_VALUE: 15
PIF_VALUE: 38
PIF_VALUE: 20
PIF_VALUE: 7
PIF_VALUE: 22
PIF_VALUE: 19
PIF_VALUE: 21
PIF_VALUE: 16
PIF_VALUE: 24
PIF_VALUE: 17
PIF_VALUE: 16
PIF_VALUE: 1
PIF_VALUE: 20
PIF_VALUE: 25
PIF_VALUE: 26
PIF_VALUE: 22
PIF_VALUE: 20
PIF_VALUE: 15
PIF_VALUE: 21
PIF_VALUE: 17
PIF_VALUE: 21
PIF_VALUE: 17
PIF_VALUE: 22
PIF_VALUE: 22
PIF_VALUE: 20
PIF_VALUE: 23
PIF_VALUE: 16
PIF_VALUE: 25
PIF_VALUE: 18
PIF_VALUE: 24
PIF_VALUE: 27
PIF_VALUE: 21
PIF_VALUE: 22
PIF_VALUE: 19
PIF_VALUE: 16
PIF_VALUE: 16
PIF_VALUE: 22
PIF_VALUE: 19
PIF_VALUE: 35
PIF_VALUE: 20
PIF_VALUE: 22
PIF_VALUE: 26
PIF_VALUE: 22
PIF_VALUE: 22
PIF_VALUE: 21
PIF_VALUE: 20
PIF_VALUE: 15
PIF_VALUE: 26
PIF_VALUE: 19
PIF_VALUE: 25
PIF_VALUE: 29
PEFR_L/MIN: 20
PIF_VALUE: 12
PIF_VALUE: 19
PIF_VALUE: 24
PIF_VALUE: 21
PIF_VALUE: 22
PIF_VALUE: 24
PIF_VALUE: 15
PIF_VALUE: 25
PIF_VALUE: 19
PIF_VALUE: 24
PIF_VALUE: 21
PIF_VALUE: 21
PIF_VALUE: 23
PIF_VALUE: 18
PIF_VALUE: 22
PIF_VALUE: 18
PIF_VALUE: 20
PIF_VALUE: 23
PIF_VALUE: 23
PIF_VALUE: 22
PIF_VALUE: 10
PIF_VALUE: 22
PIF_VALUE: 15
PIF_VALUE: 24
PIF_VALUE: 15
PIF_VALUE: 26
PIF_VALUE: 23
PIF_VALUE: 20
PIF_VALUE: 23
PIF_VALUE: 20
PIF_VALUE: 15
PIF_VALUE: 22
PIF_VALUE: 20
PIF_VALUE: 15
PIF_VALUE: 15
PIF_VALUE: 19
PIF_VALUE: 16
PIF_VALUE: 12
PIF_VALUE: 35
PIF_VALUE: 10
PIF_VALUE: 19
PIF_VALUE: 16
PIF_VALUE: 29
PIF_VALUE: 13
PIF_VALUE: 17
PIF_VALUE: 16
PIF_VALUE: 20
PIF_VALUE: 22
PIF_VALUE: 19
PIF_VALUE: 10
PIF_VALUE: 19
PIF_VALUE: 17
PIF_VALUE: 15
PIF_VALUE: 21
PIF_VALUE: 16
PIF_VALUE: 27
PIF_VALUE: 16
PIF_VALUE: 22
PIF_VALUE: 18
PIF_VALUE: 11
PIF_VALUE: 17
PIF_VALUE: 21
PIF_VALUE: 19
PIF_VALUE: 30
PIF_VALUE: 20
PIF_VALUE: 10
PIF_VALUE: 23
PIF_VALUE: 19
PIF_VALUE: 24
PIF_VALUE: 16
PIF_VALUE: 37
PIF_VALUE: 17
PIF_VALUE: 25
PIF_VALUE: 16
PIF_VALUE: 18
PIF_VALUE: 18
PIF_VALUE: 34
PIF_VALUE: 20
PIF_VALUE: 22
PIF_VALUE: 17
PIF_VALUE: 19
PIF_VALUE: 21
PIF_VALUE: 14
PIF_VALUE: 20
PIF_VALUE: 17
PIF_VALUE: 18
PIF_VALUE: 18
PIF_VALUE: 20
PIF_VALUE: 32
PIF_VALUE: 23
PIF_VALUE: 24
PIF_VALUE: 18
PIF_VALUE: 30
PIF_VALUE: 20
PIF_VALUE: 19
PIF_VALUE: 31
PIF_VALUE: 16
PIF_VALUE: 19
PIF_VALUE: 15
PIF_VALUE: 22
PIF_VALUE: 28
PIF_VALUE: 19
PIF_VALUE: 22
PIF_VALUE: 17
PIF_VALUE: 11
PIF_VALUE: 15
PIF_VALUE: 17
PIF_VALUE: 16
PIF_VALUE: 17
PIF_VALUE: 25
PIF_VALUE: 19
PIF_VALUE: 23
PIF_VALUE: 26
PIF_VALUE: 19
PIF_VALUE: 16
PIF_VALUE: 37
PIF_VALUE: 22
PIF_VALUE: 20
PIF_VALUE: 23
PIF_VALUE: 15
PIF_VALUE: 15
PIF_VALUE: 7
PIF_VALUE: 20
PIF_VALUE: 17
PIF_VALUE: 22
PIF_VALUE: 19
PIF_VALUE: 10
PIF_VALUE: 10
PIF_VALUE: 23
PIF_VALUE: 19
PIF_VALUE: 28
PIF_VALUE: 25
PIF_VALUE: 22
PIF_VALUE: 20
PIF_VALUE: 22
PIF_VALUE: 24
PIF_VALUE: 19
PIF_VALUE: 27
PIF_VALUE: 24
PIF_VALUE: 17
PIF_VALUE: 20
PIF_VALUE: 23
PIF_VALUE: 17
PIF_VALUE: 27
PIF_VALUE: 22
PIF_VALUE: 20
PIF_VALUE: 19
PIF_VALUE: 18
PIF_VALUE: 31
PIF_VALUE: 23
PIF_VALUE: 29
PIF_VALUE: 24
PIF_VALUE: 20
PIF_VALUE: 20
PIF_VALUE: 16
PIF_VALUE: 22
PIF_VALUE: 26
PIF_VALUE: 19
PIF_VALUE: 8
PIF_VALUE: 9
PIF_VALUE: 11
PIF_VALUE: 11
PIF_VALUE: 19
PIF_VALUE: 45
PIF_VALUE: 24
PIF_VALUE: 25
PIF_VALUE: 20
PIF_VALUE: 24
PIF_VALUE: 23
PIF_VALUE: 24
PIF_VALUE: 20
PIF_VALUE: 17
PIF_VALUE: 38
PIF_VALUE: 21
PIF_VALUE: 17
PIF_VALUE: 21
PIF_VALUE: 17
PIF_VALUE: 22
PIF_VALUE: 18
PIF_VALUE: 17
PIF_VALUE: 20
PIF_VALUE: 23
PIF_VALUE: 34
PIF_VALUE: 21
PIF_VALUE: 12
PIF_VALUE: 25
PIF_VALUE: 15
PIF_VALUE: 15
PIF_VALUE: 25
PIF_VALUE: 23
PIF_VALUE: 35
PIF_VALUE: 21
PIF_VALUE: 19
PIF_VALUE: 24
PIF_VALUE: 19
PIF_VALUE: 29
PIF_VALUE: 23
PIF_VALUE: 18
PIF_VALUE: 23
PIF_VALUE: 24
PIF_VALUE: 17
PIF_VALUE: 27
PIF_VALUE: 18
PIF_VALUE: 26
PIF_VALUE: 15
PIF_VALUE: 24
PIF_VALUE: 17
PIF_VALUE: 30
PIF_VALUE: 11
PIF_VALUE: 11
PIF_VALUE: 15
PIF_VALUE: 37
PIF_VALUE: 23
PIF_VALUE: 24
PIF_VALUE: 21
PIF_VALUE: 22
PIF_VALUE: 26
PIF_VALUE: 30
PIF_VALUE: 19
PIF_VALUE: 23
PIF_VALUE: 25
PIF_VALUE: 15
PIF_VALUE: 11
PIF_VALUE: 20
PIF_VALUE: 21
PIF_VALUE: 25
PIF_VALUE: 11
PIF_VALUE: 20
PIF_VALUE: 21
PIF_VALUE: 19
PIF_VALUE: 7
PIF_VALUE: 47
PIF_VALUE: 16
PIF_VALUE: 22
PIF_VALUE: 22
PIF_VALUE: 21
PIF_VALUE: 22
PIF_VALUE: 18
PIF_VALUE: 19
PIF_VALUE: 15
PIF_VALUE: 20
PIF_VALUE: 21
PIF_VALUE: 20
PIF_VALUE: 25
PIF_VALUE: 23
PIF_VALUE: 25
PIF_VALUE: 22
PIF_VALUE: 33
PIF_VALUE: 22
PIF_VALUE: 22
PIF_VALUE: 23
PIF_VALUE: 11
PIF_VALUE: 20
PIF_VALUE: 33
PIF_VALUE: 15
PIF_VALUE: 22
PIF_VALUE: 20
PIF_VALUE: 26
PIF_VALUE: 19
PIF_VALUE: 18
PIF_VALUE: 27
PIF_VALUE: 21
PIF_VALUE: 18
PIF_VALUE: 17
PIF_VALUE: 40
PIF_VALUE: 27
PIF_VALUE: 18
PIF_VALUE: 23
PIF_VALUE: 29
PIF_VALUE: 34
PIF_VALUE: 16
PIF_VALUE: 23
PIF_VALUE: 19
PIF_VALUE: 0
PIF_VALUE: 19
PIF_VALUE: 21
PIF_VALUE: 19
PIF_VALUE: 23
PIF_VALUE: 24
PIF_VALUE: 20
PIF_VALUE: 20
PIF_VALUE: 17
PIF_VALUE: 13
PIF_VALUE: 17
PIF_VALUE: 15
PIF_VALUE: 19
PIF_VALUE: 23
PIF_VALUE: 40
PIF_VALUE: 18
PIF_VALUE: 18
PIF_VALUE: 23
PIF_VALUE: 10
PIF_VALUE: 18
PIF_VALUE: 23
PIF_VALUE: 17
PIF_VALUE: 23
PIF_VALUE: 21
PIF_VALUE: 22
PIF_VALUE: 11
PIF_VALUE: 26
PIF_VALUE: 38
PIF_VALUE: 25
PIF_VALUE: 15
PIF_VALUE: 23
PIF_VALUE: 0
PIF_VALUE: 20
PIF_VALUE: 21
PIF_VALUE: 15
PIF_VALUE: 34
PIF_VALUE: 22
PIF_VALUE: 7
PIF_VALUE: 15
PIF_VALUE: 18
PIF_VALUE: 18
PIF_VALUE: 22
PIF_VALUE: 31
PIF_VALUE: 23
PIF_VALUE: 20
PIF_VALUE: 55
PIF_VALUE: 22
PIF_VALUE: 15
PIF_VALUE: 22
PIF_VALUE: 45
PIF_VALUE: 20
PIF_VALUE: 19
PIF_VALUE: 20
PIF_VALUE: 24
PIF_VALUE: 20
PIF_VALUE: 17
PIF_VALUE: 24
PIF_VALUE: 9
PIF_VALUE: 38
PIF_VALUE: 17
PIF_VALUE: 20
PIF_VALUE: 18
PIF_VALUE: 23
PIF_VALUE: 11
PIF_VALUE: 18
PIF_VALUE: 20
PIF_VALUE: 17
PIF_VALUE: 21
PIF_VALUE: 19
PIF_VALUE: 27
PIF_VALUE: 17
PIF_VALUE: 26
PIF_VALUE: 19
PIF_VALUE: 17
PIF_VALUE: 24
PIF_VALUE: 22
PIF_VALUE: 20
PIF_VALUE: 22
PIF_VALUE: 23
PIF_VALUE: 23
PIF_VALUE: 18
PIF_VALUE: 19
PIF_VALUE: 20
PIF_VALUE: 23
PIF_VALUE: 20
PIF_VALUE: 20
PIF_VALUE: 15
PIF_VALUE: 24
PIF_VALUE: 18
PIF_VALUE: 21
PIF_VALUE: 23
PIF_VALUE: 15
PIF_VALUE: 19
PIF_VALUE: 22
PIF_VALUE: 16
PIF_VALUE: 18
PIF_VALUE: 20
PIF_VALUE: 40
PIF_VALUE: 23
PIF_VALUE: 20
PIF_VALUE: 28
PIF_VALUE: 21
PIF_VALUE: 20
PIF_VALUE: 26
PIF_VALUE: 20
PIF_VALUE: 18
PIF_VALUE: 24
PIF_VALUE: 25
PIF_VALUE: 23
PIF_VALUE: 22
PIF_VALUE: 25
PIF_VALUE: 20
PIF_VALUE: 19
PIF_VALUE: 22
PIF_VALUE: 21
PIF_VALUE: 23
PIF_VALUE: 22
PIF_VALUE: 15
PIF_VALUE: 20
PIF_VALUE: 16
PIF_VALUE: 25
PIF_VALUE: 22
PIF_VALUE: 21
PIF_VALUE: 16
PIF_VALUE: 22
PIF_VALUE: 18
PIF_VALUE: 24
PIF_VALUE: 23
PIF_VALUE: 11
PIF_VALUE: 16
PIF_VALUE: 20
PIF_VALUE: 26
PIF_VALUE: 21
PIF_VALUE: 22
PIF_VALUE: 21
PIF_VALUE: 15
PIF_VALUE: 19
PIF_VALUE: 19
PIF_VALUE: 0
PIF_VALUE: 40
PIF_VALUE: 21
PIF_VALUE: 10
PIF_VALUE: 27
PIF_VALUE: 10
PIF_VALUE: 22
PIF_VALUE: 23
PIF_VALUE: 30
PIF_VALUE: 33
PIF_VALUE: 25
PIF_VALUE: 21
PIF_VALUE: 22
PIF_VALUE: 24

## 2019-01-01 ASSESSMENT — PAIN SCALES - GENERAL
PAINLEVEL_OUTOF10: 0
PAINLEVEL_OUTOF10: 3
PAINLEVEL_OUTOF10: 0
PAINLEVEL_OUTOF10: 3
PAINLEVEL_OUTOF10: 0
PAINLEVEL_OUTOF10: 2
PAINLEVEL_OUTOF10: 0
PAINLEVEL_OUTOF10: 5
PAINLEVEL_OUTOF10: 0
PAINLEVEL_OUTOF10: 1
PAINLEVEL_OUTOF10: 0
PAINLEVEL_OUTOF10: 3
PAINLEVEL_OUTOF10: 0
PAINLEVEL_OUTOF10: 2
PAINLEVEL_OUTOF10: 0
PAINLEVEL_OUTOF10: 5
PAINLEVEL_OUTOF10: 3
PAINLEVEL_OUTOF10: 0
PAINLEVEL_OUTOF10: 3
PAINLEVEL_OUTOF10: 0
PAINLEVEL_OUTOF10: 0

## 2019-01-01 ASSESSMENT — PAIN DESCRIPTION - LOCATION
LOCATION: OTHER (COMMENT)
LOCATION: NECK
LOCATION: ABDOMEN
LOCATION: NECK
LOCATION: NECK

## 2019-01-01 ASSESSMENT — ENCOUNTER SYMPTOMS
EYES NEGATIVE: 1
COLOR CHANGE: 0
EYE REDNESS: 0
SORE THROAT: 0
NAUSEA: 0
CHEST TIGHTNESS: 0
SHORTNESS OF BREATH: 0
ALLERGIC/IMMUNOLOGIC NEGATIVE: 1
CONSTIPATION: 0
DIARRHEA: 0
ABDOMINAL PAIN: 1
GASTROINTESTINAL NEGATIVE: 1
COUGH: 1
VOMITING: 0
EYE DISCHARGE: 0

## 2019-01-01 ASSESSMENT — PAIN DESCRIPTION - PAIN TYPE
TYPE: SURGICAL PAIN
TYPE: ACUTE PAIN

## 2019-01-01 ASSESSMENT — PAIN DESCRIPTION - DESCRIPTORS
DESCRIPTORS: SORE
DESCRIPTORS: ACHING
DESCRIPTORS: ACHING;SORE
DESCRIPTORS: SORE;DISCOMFORT
DESCRIPTORS: SORE
DESCRIPTORS: ACHING

## 2019-01-01 ASSESSMENT — PAIN DESCRIPTION - ORIENTATION
ORIENTATION: ANTERIOR

## 2019-01-01 ASSESSMENT — PAIN DESCRIPTION - ONSET
ONSET: ON-GOING
ONSET: GRADUAL
ONSET: ON-GOING

## 2019-01-01 ASSESSMENT — PAIN DESCRIPTION - PROGRESSION
CLINICAL_PROGRESSION: NOT CHANGED

## 2019-01-01 ASSESSMENT — PAIN - FUNCTIONAL ASSESSMENT
PAIN_FUNCTIONAL_ASSESSMENT: PREVENTS OR INTERFERES SOME ACTIVE ACTIVITIES AND ADLS

## 2019-03-09 PROBLEM — J09.X1 COMMUNITY ACQUIRED PNEUMONIA DUE TO INFLUENZA A VIRUS: Status: ACTIVE | Noted: 2019-01-01

## 2019-03-12 PROBLEM — J18.9 PNEUMONIA: Status: ACTIVE | Noted: 2019-01-01

## 2019-03-12 PROBLEM — I10 ESSENTIAL HYPERTENSION: Status: ACTIVE | Noted: 2019-01-01

## 2019-03-12 PROBLEM — N17.9 AKI (ACUTE KIDNEY INJURY) (HCC): Status: ACTIVE | Noted: 2019-01-01

## 2019-03-12 PROBLEM — E11.42 TYPE 2 DIABETES MELLITUS WITH DIABETIC POLYNEUROPATHY, WITHOUT LONG-TERM CURRENT USE OF INSULIN (HCC): Status: ACTIVE | Noted: 2019-01-01

## 2019-03-12 PROBLEM — R13.12 OROPHARYNGEAL DYSPHAGIA: Status: ACTIVE | Noted: 2019-01-01

## 2019-03-13 NOTE — PROGRESS NOTES
A Complete drug regimen review was completed for this patient this date. [x]  No clinically significant medication issue was identified   []  Yes, a clinically significant medication issue was identified     []  Adverse Drug Event:    []  Allergy:    []  Side Effect:    []  Ineffective Therapy:    []  Drug interaction:     []  Duplicate Therapy:    []  Untreated Indication:    []  Non-adherence:    []  Other:  Nursing contacted the physician:       Date:                Time:    Actions recommended by physician were completed:   Date:                 Time:  Action(s) Taken:             []  New Physician Order Received    []  Issue Noted by Physician;  However No Action Required    []  Other:

## 2019-03-13 NOTE — PLAN OF CARE
Problem: Falls - Risk of:  Goal: Will remain free from falls  Description  Will remain free from falls  Outcome: Ongoing

## 2019-03-13 NOTE — PROGRESS NOTES
Nutrition Assessment    Type and Reason for Visit: Initial(rehab admit )    Nutrition Recommendations: Continue diet consistency as per speech therapy    Will offer magic cup oral nutrition supplement at this time    Nutrition Assessment: Patient currently high nutrition risk with inadequate oral intake related to insufficient intake with dysphagia as evidenced by inconsistent meal intake. Patient does not like current diet-pureed with nectar thick liquids. Agreed to try supplement. Malnutrition Assessment:  · Malnutrition Status:  At risk for malnutrition  · Context: Acute illness or injury    Nutrition Risk Level: High    Nutrient Needs:  · Estimated Daily Total Kcal: 9706-8316 (25-30 calvin/kg)  · Estimated Daily Protein (g): 69-83 (1-1.2 g/kg)  · Estimated Daily Total Fluid (ml/day): 8151-0421 ( 1ml/calvin)    Nutrition Diagnosis:   · Problem: Inadequate oral intake  · Etiology: related to Difficulty swallowing, Insufficient energy/nutrient consumption     Signs and symptoms:  as evidenced by Diet history of poor intake    Objective Information:  · Nutrition-Focused Physical Findings: advanced age, mssing most teeth, difficulty hearing  · Wound Type: None  · Current Nutrition Therapies:  · Oral Diet Orders: Dysphagia 1 (Pureed), Nectar Thick   · Oral Diet intake: 1-25%, %  · Oral Nutrition Supplement (ONS) Orders: None  · ONS intake:    · Anthropometric Measures:  · Ht: 5' 10\" (177.8 cm)   · Current Body Wt: 152 lb 8.9 oz (69.2 kg)  · Admission Body Wt:    · Usual Body Wt: (140-150# usual range per patient )  · Ideal Body Wt: 166 lb (75.3 kg), % Ideal Body 91  · BMI Classification: BMI 18.5 - 24.9 Normal Weight    Nutrition Interventions:   Continue current diet, Start ONS  Continued Inpatient Monitoring, Education not appropriate at this time, Speech Therapy    Nutrition Evaluation:   · Evaluation: Goals set   · Goals: Patient will consume at least 70% of meals during stay     · Monitoring: Meal Intake, Supplement Intake, Chewing/Swallowing, Pertinent Labs, Weight, Diet Tolerance      Electronically signed by Rosmery Cortez RD, LD on 3/13/19 at 1:42 PM    Contact Number: [phone]

## 2019-03-13 NOTE — PROGRESS NOTES
Occupational Therapy                                                  [facility] ARU OCCUPATIONAL THERAPY EVALUATION    Chart Review:  Past Medical History:   Diagnosis Date    Diabetes mellitus ([address]     Hypertension      History reviewed. No pertinent surgical history.   Social History:  Social/Functional History  Lives With: Spouse(Maggie )  Type of Home: House  Home Layout: Two level, Bed/Bath upstairs(equipped with stair lift to access 2nd floor )  Home Access: Stairs to enter with rails  Entrance Stairs - Number of Steps: 3  Entrance Stairs - Rails: Right  Bathroom Shower/Tub: Tub/Shower unit  Bathroom Toilet: Handicap height  Bathroom Equipment: Grab bars around toilet, Shower chair  Bathroom Accessibility: Accessible  Home Equipment: Rolling walker, Cane(stair lift)  Receives Help From: Family  ADL Assistance: Independent  Homemaking Responsibilities: No(Pt reports light cleaning)  Ambulation Assistance: Independent(used walker at home, SPC in the community )  Transfer Assistance: Independent  Active : Yes  Mode of Transportation: Car  Additional Comments: sleeps in regular/flat bed, not on supplemental O2 at home, no falls in the past year; spouse organized pt's meds in pill box organizer due to hand limitations from arthritis, pt was able to manage meds Ind once organized by spouse; information verified by spouse due to pt's memory deficits     Restrictions:  Restrictions/Precautions  Restrictions/Precautions: Fall Risk, General Precautions, Isolation, Swallowing - Thickened Liquids(droplet precautions )                  Pain Level: 0       Objective:  Observation/Palpation  Posture: Fair    Orientation  Overall Orientation Status: Impaired(Pt refusing to answer orientation questions, however, unaware of current situation)        Vision  Vision: (Glasses)  Vision - Basic Assessment  Prior Vision: Wears glasses all the time  Visual Field Cut: No  Oculo Motor Control: WNL  Hearing  Hearing: Exceptions to WFL  Hearing Exceptions: Hard of hearing/hearing concerns    ROM:      LUE AROM (degrees)  LUE AROM : WFL           RUE AROM (degrees)  RUE AROM : WFL          Strength:    LUE Strength  L Hand Grasp: 3/5  L Hand Release: 3/5  RUE Strength  R Hand Grasp: 3/5  R Hand Release: 3/5    Quality of Movement: Tone RUE  RUE Tone: Normotonic  Tone LUE  LUE Tone: Normotonic  Coordination  Coordination and Movement description: Decreased speed, Gross motor impairments       Sensation:    Sensation  Overall Sensation Status: [facility]    ADL's:    ADL  Feeding: Stand by assistance  Grooming: Stand by assistance(seated in w/c)  UE Bathing: Stand by assistance  LE Bathing: Moderate assistance(Requires assist to wash/dry B/L feet and buttock)  UE Dressing: Stand by assistance(seated; pull-over shirt)  LE Dressing: Dependent/Total(Requires assist to doff/don B/L socks and depends. Did not don pants secondary to severe diarrhea)  Toileting: Moderate assistance(Assist required for clothing management)    Bed Mobility:    Bed mobility  Rolling to Left: Stand by assistance  Rolling to Right: Stand by assistance  Supine to Sit: Stand by assistance(per OT assessment; used bed features )  Sit to Supine: Stand by assistance  Scooting: Stand by assistance    Transfers:    Transfers  Sit to stand: Moderate assistance  Stand to sit: Moderate assistance  Toilet Transfers  Toilet - Technique: Stand pivot  Equipment Used: Grab bars  Toilet Transfer: Moderate assistance     Shower Transfers  Shower - Transfer Type: To and From  Shower - Transfer To: Shower seat without back  Shower - Technique: Stand pivot  Shower Transfers: Maximal assistance  Shower Transfers Comments: Mod(A) to shower, Max(A) from shower       Functional Mobility:    Balance  Standing Balance: Maximum assistance  Standing Balance  Sit to stand:  Moderate assistance  Stand to sit: Moderate assistance        Cognition:  Cognition  Overall Cognitive Status: Exceptions  Arousal/Alertness: Delayed responses to stimuli  Following Commands: Inconsistently follows commands  Attention Span: Attends with cues to redirect  Memory: Decreased recall of recent events, Decreased short term memory  Safety Judgement: Decreased awareness of need for assistance, Decreased awareness of need for safety  Problem Solving: Assistance required to identify errors made, Assistance required to correct errors made, Decreased awareness of errors  Insights: Decreased awareness of deficits  Initiation: Requires cues for some  Sequencing: Requires cues for some  Cognition Comment: Pt with poor sequencing during bathing tasks requires frequent VC to complete task with thoroughness    Perception:  Perception  Overall Perceptual Status: WFL    Assessment:     Performance deficits / Impairments: Decreased functional mobility , Decreased safe awareness, Decreased balance, Decreased coordination, Decreased ADL status, Decreased cognition, Decreased ROM, Decreased endurance, Decreased strength, Decreased sensation    The patient is a 80year old M admitted onto ARU after hospitalization for influenza A w/uncontrolled HTN. Prognosis: Fair  Decision Making: Medium Complexity  Clinical Presentation:  Dec. Strength, endurance and balance. History:  See \"Social/Functional\" sections for complete Occupational Profile. Pt's co-morbidities include HTN, DM, kidney disease stage II, dysphagia which all impact function and ability to progress through plan of care. Assistance/Modification:  Use of DME, providing verbal cues, providing physical assistance for mobility and ADL's, providing set-up of supplies during ADL's  Patient education:   ARU procotol, Role of O.T., O.T. plan of care, insight/safety awareness  []   Patient goal was established and reviewed in Rehabtracker with patient and/or family this date.   Treatment Initiated:  Patient education, ADL re-training, strength, balance, activity

## 2019-03-13 NOTE — PATIENT CARE CONFERENCE
ACUTE REHAB TEAM CONFERENCE SUMMARY   [address]    NAME: Tray Gutierrez  : 7/15/1933 ADMIT DATE: 3/12/2019    Rehab Admitting Dx:Pneumonia [J18.9]  Patient Comorbid Conditions: Active Hospital Problems    Diagnosis Date Noted    Essential hypertension [I10] 2019    Type 2 diabetes mellitus with diabetic polyneuropathy, without long-term current use of insulin (HCC) [E11.42] 2019    KERA (acute kidney injury) ([address] [N17.9] 2019    Oropharyngeal dysphagia [R13.12] 2019    Community acquired pneumonia due to influenza A virus [J09.X1] 2019     Date: 3/14/2019    CASE MANAGEMENT  Current issues/needs regarding patient and family discharge status:   Patient plans discharge 2-level home and spouse. Stair lift in home. BOBBY.    PHYSICAL THERAPY   Short term goals  Time Frame for Short term goals: 5 tx days:   Short term goal 1: Pt will complete sit<->stand transfers from elevated seat height with Min A. NOT MET (MOD A; CAR TX MOD A)  Short term goal 2: Pt will ambulate 150 ft using RW with CGA. NOT MET (96 FT, RW, MIN A)  Short term goal 3: Pt will ascend/descend 4 steps using bilat rails with CGA NOT MET   Short term goal 4: Pt will ascend/descend curb step and ambulate over uneven surfaces using RW with CGA. NOT MET   Long term goals  Time Frame for Long term goals : 12 tx days or until discharge:   Long term goal 1: Pt will complete bed mobility and sup<->sit Ind. NOT MET (SBA)  Long term goal 2: Pt will complete OOB transfers with RW with Sup. Long term goal 3: Pt will ambulate >/=150 ft using RW with Sup. Long term goal 4: Pt will ascend/descend 4 steps using 1 rail + SPC with Sup. Long term goal 5: Pt will ascend/descend curb step and ambulate over uneven surfaces using RW with Sup.       Ongoing impairments or deficits: balance, strength, activity tolerance, cognition  Areas where progress has been made:N/A  Specific barriers to progress: cognition, self-limiting behavior  Strategies that improve performance: elevated seat height, VCs  Equipment needed at discharge:has RW     FIMS:  Bed, Chair, Wheel Chair: 3 - Requires 25-49% assistance to transfer  Walk: 2 - Maximal Assistance Requires up to [address] requires assistance of one person to walk between  feet (Patient performs 25-49% of locomotion effort or goes between  feet)(Min A)  Distance Walked: 96 ft   Stairs: 0 - Activity Does not Occur ( 0 only for the admission assessment)              OCCUPATIONAL THERAPY   Short term goals  Time Frame for Short term goals: 7-10 days  Short term goal 1: Pt to complete eating with (I)  Short term goal 2: Pt to complete bathing with min(A)  Short term goal 3: Pt to complete LB dressing with Mod(A) using AE as needed  Short term goal 4: Pt to complete toileting with Min(A)  Short term goal 5: Pt to complete all functional transfers with min(A) :   Long term goals  Time Frame for Long term goals : 14-21 days  Long term goal 1: Pt to complete grooming with Mod(I) standing at toee-[id number]-[id number]  Long term goal 2: Pt to complete bathing with SBA-NMET-Mod(A)  Long term goal 3: Pt to complete UB dressing with set up-NMET-SBA  Long term goal 4: Pt to complete LB dressing with SBA using AE as needed-NMET-total(A)  Long term goal 5: Pt to complete toileting with SBA-NMET-Mod(A)  Long term goals 6: Pt to complete all functional transfers (bed, shower, toilet) with SBA-NMET-Mod(A)  Long term goal 7: Pt to participate in ongoing ther ex/act. with emphasis on >5 min static/dynamic standing tolerance :       Eatin - Feeds self with adaptive equipment/dentures and/or feeds self with modified diet and/or performs own tube feeding  Groomin - Requires setup/cues to do all tasks  Bathing: 3 - Able to bathe 5-7 areas  Dressing-Upper: 5 - Requires setup/supervision/cues and/or requires assist with presthesis/brace only  Dressing-Lower: 1 - Total assist with lower body dressing  Toileting: 3 - Able to perform 2 tasks  Toilet Transfer: 3 - Requires 25-49% assist getting off toilet  Shower Transfer: 2 - Maximal assistance, pt. expends 25%-49% effort    Other: (TBD; will continue to assess equipment needs pending progress)         Ongoing impairments or deficits: Dec. Strength, endurance, activity tolerance, balance, cognition  Areas where progress has been made: n/a  Specific barriers to progress: Cognition, compliance(?)  Strategies that improve performance: inc. Time to complete, encouragement  Equipment needed at discharge: TBD      COGNITIVE FUNCTION/SPEECH THERAPY (AS INDICATED)  LTG    Pt seen for dysphagia. Currently on a dysphagia 1 diet with nectar thick liquids. Pt is weak with reduced cough STR. Cognition impaired. Short-term Goals  Timeframe for Short-term Goals: LTG: Pt will safely consume least restrictive diet with no s/s aspiration  FIM Goal: Eatin  Goal 1: Pt will tolerate nectar liquids/puree without s/s aspiration. Goal 2: Pt will complete lingual and pharyngeal ROM/STR for safe diet advancement in the absence of aspiration. Goal 3: Pt will complete therapeutic PO trials with SLP for thins and soft solids.          Ongoing impairments or deficits: swallowing  Areas where progress has been made:  Specific barriers to progress: fatigue  Strategies that improve performance: repetition    Fall Risk: [x]  Yes  []  No    Current Medical Status:   [] Is continent of bowel and bladder     [x] Is incontinent of bowel and bladder at times    [x] Has had an adequate number of bowel movements   [] Urinates with no urinary retention >300ml in bladder   [] Targeting bladder protocol with martinez removal   [x] Maintaining O2 SATs at 90% on oxygen   [x] Has pain managed while on ARU         [x] Has had no skin breakdown while on ARU   [] Has improved skin integrity via wound measurements   [] Has no signs/symptoms of infection at the wound site  [x] Has been free from injury during hospitalization   [] Has experienced a fall during hospitalization  [x] Ongoing education with patient/family with understanding demonstrated for pneumonia/influenza  [] Receives IV Fluids  [x] Diabetic PTA and on oral medications at home    NURSING  FIMS: Bladder Level of Assistance: 7- Urinates in toilet Independently, does not take medication to manager bladder function  Bladder Frequency of Accidents: 7 - Urinates in toilet without need for device or medication, no bladder accidents  Bowel Level of Assistance: 1- requires 75%+ assistance for bowel management tasks, staff changes soiled linens, clothes, absorbant pads, helper provides enema or provides digital stimulation to patient, staff empties colostomy(required full assistance to change soiled diaper )  Bowel Frequency of Accidents: 6 - No accidents, uses device like bedpan, diaper, bedside commode colostomy, or requires medication to manage bowels(no accident on PT eval )    NUTRITION  Weight: 152 lb 12.5 oz (69.3 kg) / Body mass index is 21.92 kg/m². Current diet: DIET GENERAL; Dysphagia I Pureed; Nectar Thick  Dietary Nutrition Supplements: Frozen Oral Supplement  Intake: Meal intake varies %, dislikes current diet. Willing to try magic cup supplement. Describe changes in above areas:  Fatigues easily, dysphagia diet     Team goals for next treatment period/Intervention for current barriers:   1. Improve toileting with reduced assist  2. Improve swallowing for safe diet intake meeting nutritional needs.        Patient Strengths: Cooperative    Ongoing tx following discharge: [x]HHC PT/OT/ST   []OUTPATIENT     [] No Further Treatment     [] Family/Caregiver Training  []  Transitional Living Arrangement   [] Home Assessment (date  )     [] Family Conference   []  Therapeutic Pass       []  Other: (specify)    Estimated Discharge Date: 3/23/19    Estimated Discharge Destination: []home alone   []home alone with assist

## 2019-03-13 NOTE — PROGRESS NOTES
liquids by small sips and puree diet. Pt should be positioned fully upright. SLP will follow. Date of Eval: 3/13/2019  Evaluating Therapist: ESTEPHANIA Hernandez    Current Diet level:  Current Diet : Puree  Current Liquid Diet : Nectar      Primary Complaint  Patient Complaint: I can't chew things and I cough a lot. Pain:  Pain Assessment  Patient Currently in Pain: Denies  Pain Assessment: 0-10  Pain Level: 0    Reason for Referral  Dawit Allen was referred for a bedside swallow evaluation to assess the efficiency of his swallow function, identify signs and symptoms of aspiration and make recommendations regarding safe dietary consistencies, effective compensatory strategies, and safe eating environment. Impression  This 79 yo male was admitted with HTN, HLD, DM who presents wit cough, SOB, weakness, and fatigue that had progressively worsened. Pt dx with community acquired PNA secondary to Influenza A, Hypoxia, and KERA. Dysphagia Diagnosis: Moderate pharyngeal stage dysphagia; Moderate oral stage dysphagia  Dysphagia Outcome Severity Scale: Level 3: Moderate dysphagia- Total assisstance, supervision or strategies. Two or more diet consistencies restricted     Treatment Plan  Requires SLP Intervention: Yes  Duration/Frequency of Treatment: 5x/week x2 weeks min 30 minutes  D/C Recommendations: To be determined  Referral To: Dietician    Recommended Diet and Intervention  Diet Solids Recommendation: Dysphagia I Pureed  Liquid Consistency Recommendation: Nectar  Recommended Form of Meds: Whole with puree(1 at a time)  Therapeutic Interventions: Therapeutic PO trials with SLP; Diet tolerance monitoring;Patient/Family education;Shaker;Oral care;Hallie;Pharyngeal exercises; Laryngeal exercises; Tongue base strengthening    Compensatory Swallowing Strategies  Compensatory Swallowing Strategies: Small bites/sips;Eat/Feed slowly;Upright as possible for all oral intake    Treatment/Goals  Short-term Goals  Timeframe for Short-term Goals: LTG: Pt will safely consume least restrictive diet with no s/s aspiration  FIM Goal: Eatin  Goal 1: Pt will tolerate nectar liquids/puree without s/s aspiration. Goal 2: Pt will complete lingual and pharyngeal ROM/STR for safe diet advancement in the absence of aspiration. Goal 3: Pt will complete therapeutic PO trials with SLP for thins and soft solids. General  Chart Reviewed: Yes  Comments: Significant wt loss over the last few months  Behavior/Cognition: Alert; Cooperative; Requires cueing  Respiratory Status: Room air  Communication Observation: Functional  Follows Directions: Simple  Dentition: Some missing teeth  Patient Positioning: Upright in bed  Baseline Vocal Quality: Normal;Weak  Volitional Cough: Weak;Wet  Prior Dysphagia History: Pt reports having been seen at [place] for over a year due to swallowing problems. Wife confirmed prior MBS and thickening liquids at home. Pt explained livia exercises and effortful swallow. Consistencies Administered: Puree;Nectar - cup; Ice Chips;Mechanical soft           Vision/Hearing  Vision  Vision: Impaired  Vision Exceptions: Wears glasses at all times  Hearing  Hearing: Exceptions to [facility]  Hearing Exceptions: Hard of hearing/hearing concerns;Right hearing aid    Oral Motor Deficits  Oral/Motor  Oral Motor: Exceptions to [place]  Lingual Strength: Reduced  Lingual Coordination: Reduced    Oral Phase Dysfunction  Oral Phase  Oral Phase: Exceptions  Oral Phase Dysfunction  Impaired Mastication: (Pt reported he lets everything get soggy at home--least restrictive diet is likely dysphagia 2 textures for solids.)  Decreased Anterior to Posterior Transit: All  Suspected Premature Bolus Loss: All(Observed on MBS on 3/11/19)  Oral Phase  Oral Phase - Comment: Pt exhibits a moderately impaired oral dysphagia characterized by reduced lingual ROM/Coord, poor mastication due to difficulty with gums meeting for mastication due to few bottom teeth and no upper teeth, premature pharyngeal entry with observed pooling on MBS. Indicators of Pharyngeal Phase Dysfunction   Pharyngeal Phase  Pharyngeal Phase: Exceptions  Indicators of Pharyngeal Phase Dysfunction  Delayed Swallow: All  Decreased Laryngeal Elevation: All  Cough - Delayed: Ice chips  Pharyngeal Phase   Pharyngeal: Pt exhbits a moderate pharyngeal dysphagia characterized by reduced laryngeal elevation, delayed swallow, known penetration and aspiration per MBS, and pharyngeal residue. Prognosis  Prognosis  Prognosis for safe diet advancement: fair  Barriers to reach goals: cognitive deficits;fatigue  Barriers/Prognosis Comment: Prior dysphagia hx  Individuals consulted  Consulted and agree with results and recommendations: Patient; Family member(wife)    Education  Patient Education: results, recommendations--rehab expectations  Patient Education Response: Verbalizes understanding              Therapy Time  SLP Individual Minutes  Time In: 5836  Time Out: 1600  Minutes: 1550 [address], [address] [address]  3/13/2019 4:45 PM

## 2019-03-13 NOTE — H&P
[address] [place] [address], [address] [place] [address]                              HISTORY AND PHYSICAL    PATIENT NAME: Alisia Mcmahon                       :        07/15/1933  MED REC NO:   [id number]                          ROOM:       1010  ACCOUNT NO:   [de-identified]                           ADMIT DATE: 2019  PROVIDER:     Tolu Nelson MD    ADMITTING DIAGNOSIS:  Pneumonia. COMORBID DIAGNOSES IMPACTING REHABILITATION:  Influenza A with hypoxia,  uncontrolled hypertension, diabetes, acute renal failure with chronic  kidney disease stage II, and dysphagia. CHIEF COMPLAINT:  Poor appetite and choking with some meals. HISTORY OF PRESENT ILLNESS:  The patient is an 42-year-old right-hand  dominant male who struggled with three to four days of progressive  fatigue, cough, and fever before presenting to our ED on 2019. He  was diagnosed with pneumonia and was also found to have influenza A  infection. He was started on antibiotics and antiviral medications. He  required fluid resuscitation, nebulizers, and adjustment of blood  pressure medicines. He has struggled with choking and coughing with  meals and a swallowing evaluation has identified a severe oropharyngeal  dysphagia. He has been placed on a pureed diet with thickened liquids  and Speech Therapy is working on strengthening exercises. He has a poor appetite, positional dizziness, and fatigue with exertion. He still has a moist cough. He is having infrequent bowel movements and  controls his bladder. The remainder of his review of systems is negative. SOCIAL HISTORY:  The patient is  and he lives with his wife in a  one plus three-step entry two-story home with bed and bath on the second  level. There is a stair lift to access the bed and bath level. He  typically uses a handicapped height commode and a tub/shower for  bathing.   There are grab bars in bathroom. He uses a cane for  ambulation. He does not drive nor does he use tobacco nor alcohol. He  gets help from his family for medication supervision. He does his own  personal hygiene, but limited homemaking or meal prep. ALLERGIES : No known drug allergies. ADMITTING MEDICATIONS:  Lovenox 40 mg subcutaneous daily, Neurontin 400  mg b.i.d., Humalog sliding scale with each meal, Xalatan eye drops to  each eye nightly, Levaquin 500 mg p.o. daily for seven more days, Flagyl  500 mg t.i.d. for seven more days, Tamiflu to 30 mg b.i.d. for three  more doses, Protonix 40 mg nightly, Zocor 40 mg nightly. PAST MEDICAL HISTORY:  Diabetes, hypertension, chronic kidney disease  stage II, osteoarthritis of the knees, hyperlipidemia, and diabetic  neuropathy. FAMILY HISTORY:  Hypertension, emphysema, and arthritis. PAST SURGICAL HISTORY:  None. PHYSICAL EXAMINATION:  VITAL SIGNS:  The patient is afebrile with a blood pressure of 150/70,  pulse 95, respirations 16. He weighs 155 pounds. GENERAL:  The patient is seen recumbent in bed. He has oxygen per nasal  cannula. He is alert, but hard of hearing. He follows two-step  commands. He participates in simple conversation. He is distracted by  some shortness of breath and occasional cough. HEENT:  There are no signs of trauma to his head or neck. Visual fields  are full. Mucous membranes are dry. NECK:  Supple. There is no adenopathy. LUNGS:  There are rhonchi throughout his lung fields. No rales or  wheezes were noted. Effort of respiration is mild to moderate. HEART:  Sounds reveal a pansystolic murmur with a regular rate and  rhythm. ABDOMEN:  His thin abdomen is soft. Bowel sounds are present. There is  no rebound, guarding, or masses noted. BACK:  The skin overlying his spine is moist, but intact. He has mildly  exaggerated dorsal kyphosis and functional trunk rotation.     EXTREMITIES:  The patient cautiously moves prophylaxis will be emphasized. 2.  DVT prophylaxis:  Lovenox 40 mg subcutaneous daily. I must monitor  his hemoglobin and platelet count while on this medication. Weightbearing activities should become protective as well. 3.  Uncontrolled hypertension:  The patient has had hypotension with the  recent illness. He has been taken off his home antihypertensives. We  will encourage consistent oral intake and monitor vital signs at rest  and with activity. As his renal function stabilizes and blood pressure  rises, we are going to need to restart his diuretic or ACE inhibitor. 4.  Diabetes: The patient requires no calorie restrictions on his diet  because his intake has been so poor. We will monitor blood sugars  p.r.n. and react accordingly. He was on metformin at home before, but  his renal function will not allow us to use that at this time. 5.  Dysphagia: The patient is on a pureed diet with nectar-thick  liquids and Speech Therapy will continue to work on swallowing  exercises. General debility is the etiology at this point. If he does  not respond to interventions, we will look into this further. I personally performed a history and physical on this patient within 24  hours of admission to the rehab unit. I have reviewed the preadmission  screen and concur with its findings without change. A detailed plan of  care will be established by hospital day #4, and I attest he is  appropriate for inpatient rehabilitation at this time.         Bailey Fox MD    D: 03/12/2019 23:10:42       T: 03/12/2019 23:12:46     [email or web address]  Job#: [id number]     Doc#: [id number]

## 2019-03-13 NOTE — FLOWSHEET NOTE
Patient states that he left his hearing aid batteries behind when he transferred from  to [facility]. I called , I spoke to Patricia/Hans, neither one had any knowledge of his lost hearing aid batteries. I notified the patient that I was unable to track down his hearing aid batteries.

## 2019-03-13 NOTE — PLAN OF CARE
ARU Interdisciplinary Plan of Care (IPOC)  [facility]  [address]st [address] [facility], [address]  [phone]  Fax: [phone]        Michael Joyner    : 7/15/1933  Acct #: [de-identified]  MRN: [id number]   PHYSICIAN:  Joceline Marino MD  Primary Active Problems:   Active Hospital Problems    Diagnosis Date Noted    Essential hypertension [I10] 2019    Type 2 diabetes mellitus with diabetic polyneuropathy, without long-term current use of insulin ([facility] [address] [E11.42] 2019    KERA (acute kidney injury) ([facility] [address] [N17.9] 2019    Oropharyngeal dysphagia [R13.12] 2019    Community acquired pneumonia due to influenza A virus [J09.X1] 2019       Rehabilitation Diagnosis:     Pneumonia [J18.9]       ADMIT DATE:3/12/2019   CARE PLAN     NURSING:  Michael Joyner while on this unit will:      Bowel and Bladder   [] Be continent of bowel and bladder      [x] Have an adequate number of bowel movements   [x] Urinate with no urinary retention >300ml in bladder   [] Bladder Scan: (details)   [] Complete bladder protocol with martinez removal   [] Initiate Bladder Program to toilet every ___ hours   [] Initiate Bowel Program to toilet every ___hours   [] Bladder training    [] Bowel training  Pulmonary   [x] Maintain O2 SATs at _92__%  Pain Management   [x] Have pain managed while on ARU        [x] Be pain free by discharge    [x] Medication Management and Education  Maintenance of Skin Integrity/Wound Management   [x] Have no skin breakdown while on ARU   [] Have improved skin integrity via wound measurements   [] Have no signs/symptoms of infection via infection protection and monitoring at the          wound site  Fall Prevention   [x] Be free from injury during hospitalization via fall prevention measures     [x] Disease management and Education  Precautions   [] Weight Bearing Precautions   [x] Swallowing Precautions   [x] Monitoring of Risks of Complications   [x] DVT Prophylaxis    [x] Fluid/electrolyte/Nutrition Management    [x] Complete education with patient/family with understanding demonstrated for          in-room safety with transfers to bed, toilet, wheelchair, shower as well as                bathroom activities and hygiene. [x] Adjustment   [x] Other:   Nursing interventions may include bowel/bladder training, education for medical assistive devices, medication education, O2 saturation management, energy conservation, stress management techniques, fall prevention, alarms protocol, seating and positioning, skin/wound care, pressure relief instruction,dressing changes,  infection protection, DVT prophylaxis, and/or assistance with in room safety with transfers to bed, toilet, wheelchair, shower as well as bathroom activities and hygiene. Patient/caregiver education for:   [x] Disease/sustained injury/management      [x] Medication Use   [] Surgical intervention   [x] Safety/Precautions   [x] Body mechanics and or joint protection   [x] Health maintenance         PHYSICAL THERAPY:  Goals:                  Short term goals  Time Frame for Short term goals: 5 tx days:   Short term goal 1: Pt will complete sit<->stand transfers from elevated seat height with Min A. Short term goal 2: Pt will ambulate 150 ft using RW with CGA. Short term goal 3: Pt will ascend/descend 4 steps using bilat rails with CGA   Short term goal 4: Pt will ascend/descend curb step and ambulate over uneven surfaces using RW with CGA. Long term goals  Time Frame for Long term goals : 12 tx days or until discharge:   Long term goal 1: Pt will complete bed mobility and sup<->sit Ind. Long term goal 2: Pt will complete OOB transfers with RW with Sup. Long term goal 3: Pt will ambulate >/=150 ft using RW with Sup. Long term goal 4: Pt will ascend/descend 4 steps using 1 rail + SPC with Sup.    Long term goal 5: Pt will ascend/descend curb step and ambulate Strengthening, Endurance Training, ROM, Pain Management, Balance Training, Functional Mobility Training, Safety Education & Training, Positioning, Self-Care / ADL, Equipment Evaluation, Education, & procurement, Patient/Caregiver Education & Training         cognitive training, home management, energy conservation training, community reintegration, splint fabrication, patient/caregiver education and training, and concurrent and/or group therapy. SPEECH THERAPY: (If ordered)  Plan of Care and Goals:   LTG       FIM Goals: Comprehension: GOAL: Comprehension: 6  Expression: GOAL: Expression: 7  Social: GOAL: Social Interaction: 7  Problem Solving: GOAL: Problem Solvin  Memory: GOAL: Memory: 5                             Short-term Goals  Timeframe for Short-term Goals: LTG: Pt will safely consume least restrictive diet with no s/s aspiration  FIM Goal: Eatin  Goal 1: Pt will tolerate nectar liquids/puree without s/s aspiration. Goal 2: Pt will complete lingual and pharyngeal ROM/STR for safe diet advancement in the absence of aspiration. Goal 3: Pt will complete therapeutic PO trials with SLP for thins and soft solids. Treatments may include speech/language/communication therapy, cognitive training, group therapy, education, and/or dysphagia therapy based on the above goals. Co-treats where appropriate with PT or OT to facilitate patient goals in functional tasks. These goals were reviewed with this patient at the time of assessment and Fredi Cantor is in agreement. CASE MANAGEMENT:  Goals:   Assist patient/family with discharge planning, patient/family counseling, assistance in obtaining recommended equipment and other services, and coordination with insurance during ARU stay. Patient Goals:   Return to maximum level of independent function.       Nutrition goal:Patient will consume at least 70% of meals during stay        Current  FIMS and Goals: SELF-CARE  Eatin - Feeds self with adaptive equipment/dentures and/or feeds self with modified diet and/or performs own tube feeding  GOAL: Eatin(modified diet)  Groomin - Requires setup/cues to do all tasks  GOAL: Groomin  Bathing: 3 - Able to bathe 5-7 areas  GOAL: Bathin  Dressing-Upper: 5 - Requires setup/supervision/cues and/or requires assist with presthesis/brace only  GOAL: Dressing-Upper: 6  Dressing-Lower: 1 - Total assist with lower body dressing  GOAL: Dressing-Lower: 5  Toileting: 3 - Able to perform 2 tasks  GOAL: Toiletin  Bowel Level of Assistance: 1- requires 75%+ assistance for bowel management tasks, staff changes soiled linens, clothes, absorbant pads, helper provides enema or provides digital stimulation to patient, staff empties colostomy(required full assistance to change soiled diaper )  TRANSFERS  Bed, Chair, Wheel Chair: 3 - Requires 25-49% assistance to transfer  GOAL: Bed, Chair, Wheel Chair: 5  Toilet Transfer: 3 - Requires 25-49% assist getting off toilet  GOAL: Toilet: 5  Primary Mode: Shower  GOAL: Tub, Shower: 5  Shower Transfer: 2 - Maximal assistance, pt. expends 25%-49% effort  SPHINCTER CONTROL  Bladder Level of Assistance: 7- Urinates in toilet Independently, does not take medication to manager bladder function  Bladder Frequency of Accidents: 7 - Urinates in toilet without need for device or medication, no bladder accidents  Bowel Level of Assistance: 1- requires 75%+ assistance for bowel management tasks, staff changes soiled linens, clothes, absorbant pads, helper provides enema or provides digital stimulation to patient, staff empties colostomy(required full assistance to change soiled diaper )  Bowel Frequency of Accidents: 6 - No accidents, uses device like bedpan, diaper, bedside commode colostomy, or requires medication to manage bowels(no accident on PT eval )  LOCOMOTION  Primary Mode: Walk  GOAL: Walk/Wheel Chair: 5  Distance Walked: 96 ft Walk: 2 - Maximal Assistance Requires up to Maximal Assistance AND requires assistance of one person to walk between  feet (Patient performs 25-49% of locomotion effort or goes between  feet)(Min A)  Stairs: 0 - Activity Does not Occur ( 0 only for the admission assessment)  GOAL: Stairs: 2  COMMUNICATION  Comprehension: 5 - Patient understands basic needs (hungry/hot/pain)  GOAL: Comprehension: 6  Expression: 5 - Expresses basic ideas/needs only (hungry/hot/pain)  GOAL: Expression: 7  SOCIAL COGNITION  Social Interaction: 5 - Patient is appropriate with supervision/cues  GOAL: Social Interaction: 7  Problem Solvin - Patient solves simple/routine tasks 25%-49%  GOAL: Problem Solvin  Memory: 2 - Patient remembers 25%-49% of the time  GOAL: Memory: 5    Activities Prior to Admit:   Homemaking Responsibilities: No(Pt reports light cleaning)  Active : Yes  Mode of Transportation: Car               Intensity of Therapy  Darion Hernandez will be seen a minimum of 3 hours of therapy per day/a minimum of 5 out of 7 days per week. [] In this rare instance due to the nature of this patient's medical involvement, this patient will be seen 15 hours per week (900 minutes within a 7 day period). Treatments may include therapeutic exercises, gait training, neuromuscular re-ed, transfer training, community reintegration, bed mobility, w/c mobility and training, self care, home mgmt, cognitive training, energy conservation,dysphagia tx, speech/language/communication therapy, group therapy, and patient/family education. In addition, dietician/nutritionist may monitor calorie count as well as intake and collaboratively work with SLP on dietary upgrades. Neuropsychology/Psychology may evaluate and provide necessary support.   Group therapy as appropriate to facilitate improved endurance, STR, COORD, function, safety, transfers, awareness and insight into deficits, problem solving, memory, and social interaction and engagement. Medical issues being managed closely and that require 24 hour availability of a physician:   [] Swallowing Precautions                                     [] Weight bearing precautions   [] Wound Care                             [x] Infection Prevention   [x] DVT Prophylaxis/assessment              [x] Monitoring for complications    [x] Fall Precautions/Prevention                         [x] Fluid/Electrolyte/Nutrition Balance   [] Voice Protection                           [x] Medication Management   [x] Respiratory                   [x] Pain Mgmt   [x] Bowel/Bladder Fx    Medical Prognosis: [] Good  [x] Fair    [] Guarded   Total expected IRF days 14                                            Physician anticipated functional outcomes:  FWW and HHC OT/PT and supervision. Rehab Goals:   [] Return to premorbid function of_______________________________.    [] Independent   [] Mod I  [x] Supervision  [] CGA   [] Min A   [] Mod A  Level for ambulation []without assistive device  [x] with assistive device        [] Independent   [] Mod I  [x] Supervision [] CGA   [] Min A   [] Mod A  Level for transfers []without assistive device  [x] with assistive device         [] Independent   [] Mod I  [x] Supervision [] CGA   [] Min A   [] Mod A Level with ADL's []without assistive device   [x] with assistive device     ___________________     Level with cognitive skills requiring [] No assist [x] Supervision  [] Active Assist/Cues     [] Maximize level of mobility and ADL's to decrease burden on caregiver    IPOC brief synthesis of Preadmission Screen, Post-Admission Evaluation, and Therapy Evaluations:  Acute inpatient rehabilitation with occupational and  physical therapy with speech-language pathology, 180 minutes, five out  of every seven days. We will address basic and advancing mobility with  self-care instruction and adaptive equipment training. Caregiver  education will be offered. Expected length of stay prior to a  supervised level of function for discharge to home is two weeks.     ADDITIONAL RECOMMENDATIONS:  1. Pneumonia with influenza A and gait disturbance: The patient  requires daily occupational and physical therapy. We will emphasize  pulmonary hygiene, O2 supplementation as needed, and we will monitor  vital signs and O2 saturations at rest and with activity. We will  provide incentive spirometry, nebulizers, and ongoing antibiotic therapy  for the next week. He also has three more doses of Tamiflu to complete  the course. DVT prophylaxis will be emphasized.     2. DVT prophylaxis:  Lovenox 40 mg subcutaneous daily. I must monitor  his hemoglobin and platelet count while on this medication. Weightbearing activities should become protective as well.     3. Uncontrolled hypertension:  The patient has had hypotension with the  recent illness. He has been taken off his home antihypertensives. We  will encourage consistent oral intake and monitor vital signs at rest  and with activity. As his renal function stabilizes and blood pressure  rises, we are going to need to restart his diuretic or ACE inhibitor.     4.  Diabetes: The patient requires no calorie restrictions on his diet  because his intake has been so poor. We will monitor blood sugars  p.r.n. and react accordingly. He was on metformin at home before, but  his renal function will not allow us to use that at this time.     5. Dysphagia: The patient is on a pureed diet with nectar-thick  liquids and Speech Therapy will continue to work on swallowing  exercises. General debility is the etiology at this point. If he does  not respond to interventions, we will look into this further.         Anticipated discharge destination:    [] Home Independently   [x] Home with supervision    []SNF     [] Other       This plan has been reviewed with me in a language I understand.  I have had the opportunity to include my input with my therapy team.    ________________________________________________   ______________________  Patient/Significant Other      Date    I have reviewed this initial plan of care and agree with its contents:    Title   Name    Date    Time    Physician: Sourav Urbano 3/15/2019 9:42 AM    Case Mgmt:  Coby Patricia 3/21/19 1604    OT: Ever Fisher MS, OTR/L 3/13/19 16:11    PT: Becki Arvizu, RAMON     03/13/19   15:46    RN: Gabriella Chiu RN 3/12/2019    ST: Clint Bush MA, CCC-SLP  3/13/19 17:00    Dietician: FERNANDEZ Chan  03/13/2019  13:45

## 2019-03-13 NOTE — PROGRESS NOTES
Physical Therapy    [facility] ARU PHYSICAL THERAPY EVALUATION    Fall History: None in the past year    Social History:  Social/Functional History  Lives With: Spouse(Maggie )  Type of Home: House  Home Layout: Two level, Bed/Bath upstairs(equipped with stair lift to access 2nd floor )  Home Access: Stairs to enter with rails  Entrance Stairs - Number of Steps: 3  Entrance Stairs - Rails: Right  Bathroom Shower/Tub: Tub/Shower unit  Bathroom Toilet: Handicap height  Bathroom Equipment: Grab bars around toilet, Shower chair  Bathroom Accessibility: Accessible  Home Equipment: Rolling walker, Cane(stair lift)  Receives Help From: Family  ADL Assistance: Independent  Homemaking Responsibilities: No(Pt reports light cleaning)  Ambulation Assistance: Independent(used walker at home, SPC in the community )  Transfer Assistance: Independent  Active : Yes  Mode of Transportation: Car  Additional Comments: sleeps in regular/flat bed, not on supplemental O2 at home, no falls in the past year; spouse organized pt's meds in pill box organizer due to hand limitations from arthritis, pt was able to manage meds Ind once organized by spouse; information verified by spouse due to pt's memory deficits     Restrictions:  Restrictions/Precautions  Restrictions/Precautions: Fall Risk, General Precautions, Isolation, Swallowing - Thickened Liquids(droplet precautions )       Subjective: Pt awakened with tactile cues, required encouragement to participate in therapy. Pain Level: None reported        Objective:      Vision  Vision: (Glasses)  Hearing  Hearing: Exceptions to [facility]  Hearing Exceptions: Hard of hearing/hearing concerns    Bed Mobility:    Bed mobility  Rolling to Left: Stand by assistance  Rolling to Right: Stand by assistance  Supine to Sit: Stand by assistance(per OT assessment; used bed features )  Sit to Supine: Stand by assistance  Scooting: Stand by assistance    Transfers:    Transfers  Sit to Stand:  Moderate Assistance  Stand to sit: Moderate Assistance  Bed to Chair: Minimal assistance  Stand Pivot Transfers: Minimal Assistance  Car Transfer: Moderate Assistance(Min A to assist with controlled descent prior to getting into car; Mod A to get up from passenger seat due to BLE weakness )    Ambulation:    Ambulation?: Yes  Ambulation 1  Surface: level tile  Device: Rolling Walker  Other Apparatus: Wheelchair follow, O2(0.5L O2 )  Assistance: Minimal assistance  Quality of Gait: slow anthony, decreased step length and step height bilat  Distance: 10 ft + 50 ft with 2 turns + 96 ft (max)   Comments: pt's ambulation tolerance was limited by fatigue     Curb/Stairs/Uneven surface:  Stairs?: No(pt unable today due to increased fatigue (safety concerns) )    Balance:    Balance  Comments: static standing balance in unsupported stance required Min-Mod A due to retropulsion. Toileting activity completed with Mod A     Assessment:   The patient is an 80year old male admitted onto ARU after hospitalization for pneumonia. Pt was on 0.5L O2 on this date, did not appear in respiratory distress, however had intermittent episodes of coughing. He reports arthritis in his feet and hands, denied pain but reports increased stiffness. Pt presented with memory deficits and was cue-dependent for sequencing to complete sit<->stand transfers with use of RW. He required encouragement to participate in mobility tasks due to decreased understanding of ARU stay and appeared to have self-limiting behavior. As this tx session progressed, he had more realization of his weakness that he had some understanding of the value of PT. Pt presented with memory deficits that it is anticipated that he will require supervision from spouse on discharge.      Body structures, Functions, Activity limitations: Decreased functional mobility , Decreased strength, Decreased endurance, Decreased ADL status, Decreased sensation, Decreased cognition, Decreased balance, Training, Stair training, Safety Education & Training    PT Individual Minutes  Time In: 1325  Time Out: 7593  Minutes: 87        Timed Code Treatment Minutes: 72 Minutes    Number of Minutes/Billable Intervention  PT Evaluation 15   Gait Training 32   Therapeutic Exercise    Neuro Re-Ed    Therapeutic Activity 40   Wheelchair Propulsion    Group    Other:    TOTAL 87       Electronically signed by:    Aminah Rosenbaum PT,   3/13/2019, 3:33 PM

## 2019-03-13 NOTE — PROGRESS NOTES
[facility]  ACUTE REHAB UNIT  SPEECH/LANGUAGE PATHOLOGY      [x] Daily           [] Discharge    Patient:Tai Ortega      URT:[birth year]  EBONY:[id number]  Rehab Dx/Hx: Pneumonia [J18.9]   No Known Allergies  Precautions: Sit up for all meals and thereafter for 30 minutes, Eat with small bites (1/2 tsp; 1 tsp), Drink from a cup only with small sips, No straws,  when swallowing, Alternate solids with liquids and Take your medication with apple sauce; Restrictions/Precautions: Fall Risk, General Precautions, Isolation, Swallowing - Thickened Liquids(droplet precautions )          Home Situation/IADL:   Social/Functional History  Lives With: Spouse(Maggie )  Type of Home: House  Home Layout: Two level, Bed/Bath upstairs(equipped with stair lift to access 2nd floor )  Home Access: Stairs to enter with rails  Entrance Stairs - Number of Steps: 3  Entrance Stairs - Rails: Right  Bathroom Shower/Tub: Tub/Shower unit  Bathroom Toilet: Handicap height  Bathroom Equipment: Grab bars around toilet, Shower chair  Bathroom Accessibility: Accessible  Home Equipment: Rolling walker, Cane(stair lift)  Receives Help From: Family  ADL Assistance: Independent  Homemaking Responsibilities: No(Pt reports light cleaning)  Ambulation Assistance: Independent(used walker at home, SPC in the community )  Transfer Assistance: Independent  Active : Yes  Mode of Transportation: Car  Additional Comments: sleeps in regular/flat bed, not on supplemental O2 at home, no falls in the past year; spouse organized pt's meds in pill box organizer due to hand limitations from arthritis, pt was able to manage meds Ind once organized by spouse; information verified by spouse due to pt's memory deficits     POC: Pt will be seen 5x/week for a minimum of 30 minutes per day. Co-treats where appropriate with PT or OT to facilitate patient goals in functional tasks.     Date of Admit: 3/12/2019  Room #: 1010/1010-A     ST Dx:   [] Cognition/  Memory Deficits  [x]   Dysphagia  []  Dysarthria  []  Apraxia  []   Aphasia  []   Other   # billable minutes per area  15         Date: 3/13/2019  Day of ARU Week:  2       SLP Individual Minutes  Time In: 4529  Time Out: 1600  Minutes: 45  eval 30 +15 tx     Variance/Reason:  [] Refusal due to   [] Medical hold/reason  [] Illness   [] Off Unit for test/procedure  [] Extra time needed to complete task  [] Other (specify)    Activity completed: Completed education with pt and wife on MBS results following bedside eval.  Reviewed images and provided recommendations with rationale. Discussed POC and rehab expectations. Clarified with wife safe swallow protocol as she was asking about thickener to liquid ratio--they have pump thickener at home not powder. Alarm placed: [x]bed []chair   []other:   [] activated        Barriers to progress:   [x] Fatigue        [x] Cognitive Deficits   [] Memory Deficits   [] Reduced Attn   [] Self Limiting Behaviors    [] Reduced insight/awareness     [] Visual Deficits   [] Premorbid Conditions     [] Other      Education/Interventions used this date: See above    [x]   Progress was updated and reviewed in Rehabtracker with patient and/or family this  date. [] Attending Care Conference for pt this date. See Team Patient Care Conference Note for updates.     Interventions used this date:  [] Speech/Language Treatment    [] Instruction in HEP    Group   [x] Dysphagia Treatment   [] Cognitive Skill Juan    Other:         Assessment / Impression                                                          Treatment/Activity Tolerance:   [x] Tolerated Treatment well:     [] Patient limited by fatigue/pain:       [] Patient limited by medical complications:    [] Adverse Reaction to Tx:   [] Significant change in status:      Electronically Signed by  ESTEPHANIA Best, MA, CCC-SLP    3/13/2019  4:46 PM

## 2019-03-14 NOTE — PROGRESS NOTES
Nutrition Assessment    Type and Reason for Visit: Reassess(ongoing follow up )    Nutrition Recommendations: Continue diet consistency per SLP   Continue to offer frozen oral supplement (Magic Cup)  to provide 290 kcal and 9 grams of protein. Encourage intake at meals. Nutrition Assessment: Meal intake variable on pureed diet with nectar thick liquids. Family reports diet not quite the same at home, not pureed but has been on thickened liquids. Provided magic cup, willing to try supplement. Remains high nutrition risk. Malnutrition Assessment:  · Malnutrition Status: At risk for malnutrition  · Context: Acute illness or injury  · Findings of the 6 clinical characteristics of malnutrition (Minimum of 2 out of 6 clinical characteristics is required to make the diagnosis of moderate or severe Protein Calorie Malnutrition based on AND/ASPEN Guidelines):  1.  Energy Intake-Less than or equal to 75% of estimated energy requirement, Unable to assess      Nutrition Risk Level: High    Nutrient Needs:  · Estimated Daily Total Kcal: 8481-0097 (25-30 calvin/kg)  · Estimated Daily Protein (g): 69-83 (1-1.2 g/kg)  · Estimated Daily Total Fluid (ml/day): 3887-9679 ( 1ml/calvin)    Nutrition Diagnosis:   · Problem: Inadequate oral intake  · Etiology: related to Difficulty swallowing, Insufficient energy/nutrient consumption     Signs and symptoms:  as evidenced by Diet history of poor intake    Objective Information:  · Nutrition-Focused Physical Findings: advanced age, mssing most teeth, difficulty hearing  · Wound Type: None  · Current Nutrition Therapies:  · Oral Diet Orders: Dysphagia 1 (Pureed), Nectar Thick   · Oral Diet intake: 26-50%, 51-75%  · Oral Nutrition Supplement (ONS) Orders: Frozen Oral Supplement  · ONS intake:    · Anthropometric Measures:  · Ht: 5' 10\" (177.8 cm)   · Current Body Wt: 152 lb 8.9 oz (69.2 kg)  · Admission Body Wt:    · Usual Body Wt: (140-150# usual range per patient )  · Ideal Body Wt: 166 lb (75.3 kg), % Ideal Body 91  · BMI Classification: BMI 18.5 - 24.9 Normal Weight    Nutrition Interventions:   Continue current diet, Continue current ONS  Feeding Assistance/Environment Change, Continued Inpatient Monitoring    Nutrition Evaluation:   · Evaluation: Progressing toward goals   · Goals: Patient will consume at least 70% of meals during stay     · Monitoring: Supplement Intake, Meal Intake, Diet Tolerance, Weight, Pertinent Labs, Chewing/Swallowing      Electronically signed by Dagoberto Mruillo RD, LD on 3/14/19 at 1:24 PM    Contact Number: [phone]

## 2019-03-14 NOTE — PROGRESS NOTES
Patient was reviewed at today's care conference. Patient will d/c home with spouse 3/23. Patient has some [employer] benefits PTA. Patient has had significant recent weight loss. Mod A toilet, will need RW, 96' RW, total A LBD. Case mgt met with patient and spouse in room. Satisfied with 3/23 d/c home. Spouse reports [facility] PTA through [place] but agency unknown. Dr Brandt Jordan care team @ [place]. Has a PCP appt coming up, but uncertain of date. Spouse reports \"cases\" of diet supplement glytrol at home that patient sometimes drinks. Patient will try magic cups today. Spouse's health is reasonable and she can provide a min A level. No o2 PTA. Rehabtracker was presented to patient/family and a brochure, including contact information, was provided. Case mgt spoke with Erik Urban @ [place]. Patient has PCP appt 3/21. Case mgt advised patient will still be in hospital.  Spouse indicated that appts need to be changed, she'll change them herself. Patient has home based care, Cedric VILCHIS. Case mgt spoke with Thomas Lockhart, notified of 3/23 discharge and need for Pt/ot. Patient already has pt/ot/slp/RN, Luciana Thompson PTA.   Requested AVS @ d/c to [phone]

## 2019-03-14 NOTE — PROGRESS NOTES
Occupational Therapy   Physical Rehabilitation: OCCUPATIONAL THERAPY     [x] daily progress note       [] discharge       Patient Name:  Monik Richardson   :  7/15/1933 MRN: [id number]  Room:  [address] [place], [place] [address]A Date of Admission: 3/12/2019  Rehabilitation Diagnosis:   Pneumonia [J18.9]       Date  THURS 3/14/2019       Day of ARU Week:  3   Time IN/OUT 2698-3298 (edited by Jinny Noble MS, OTR/L to reflect accurate timestamp)   Individual Tx Minutes 45   Group Tx Minutes    Co-Treat Minutes    Concurrent Tx Minutes    TOTAL Tx Time Mins 45   Variance Time    Variance Time []   Refusal due to:     []   Medical hold/reason:    []   Illness   []   Off Unit for test/procedure  []   Extra time needed to complete task  []   Therapeutic need  []   Other (specify):   Restrictions Restrictions/Precautions  Restrictions/Precautions: Fall Risk, General Precautions, Isolation, Swallowing - Thickened Liquids(droplet precautions )      Communication with other providers: [x]   OK to see per nursing:     []   Spoke with team member regarding:      Subjective observations and cognitive status: cooperative   Pain level/location:    /10       Location:  No c/o   Discharge recommendations  Anticipated discharge date:  TBD  Destination: []home alone   []home alone w assist prn [] home w/ family    [] Continuous supervision       []SNF            [] Assisted living     [] Other:   Continued therapy: []HHC OT  []OUTPATIENT  OT   [] No Further OT  Equipment needs: TBD   (HIT 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 transition between stars)    Eating/Feeding:      na  Extremity Used:      []   R UE       []   L UE         Adaptive Equipment Used:        Grooming:   Washed face, declined oral,hair care      Bathing:  Chair side c encouragement c Min A to steady in stance, buttocks,feet      Dressing:      Upper Body Dressing:  S c pullover shirt  Lower Body Dressing: Min A, A for socks, steady in stance, A c hike    Toileting:  na    Toilet Transfers:  Na   Device Used:    [] Standard Toilet         []   Rhiannon Bird           []  Bedside Commode       []   Elevated Toilet          []   Other:        Tub/Shower Transfer:   Na  Device Used:    []   Shower Bench      []   Shower Chair      []   Tub Transfer Bench           []   Bathtub    []   Shower         []   Other:         Bed Mobility:           [x]   Pt received out of bed   Rolling R/L:    Scooting:    Supine --> Sit:    Sit --> Supine:      Transfers:    Sit--> Stand:  Min A  Stand --> Sit:   Min A  Stand-Pivot:     Other:    Assistive device required for transfer:         Functional Mobility:  NA  Assistance:    Device:   []   Leland Faster     []   Standard Walker []   Wheelchair        []   Giovanna Reason       []   4-Wheeled Janessa Oas         []   Cardiac Walker       []   Other:        Homemaking Tasks: Additional Therapeutic activities/exercises completed this date:     [x]   ADL Training   [x]   Balance/Postural training     []   Bed/Transfer Training   [x]   Endurance Training   []   Neuromuscular Re-ed   []   Nu-step:  Time:        Level:         #Steps:       []   Rebounder:    []  Seated     []  Standing        []   Supine Ther Ex (reps/sets):     []   Seated Ther Ex (reps/sets):     []   Standing Ther Ex (reps/sets):     []   Other:      Comments:      Patient/Caregiver Education and Training:   []   [employer] Equipment Use  []   Bed Mobility/Transfer Technique/Safety  [x]   Energy Conservation Tips  []   Family training  [x]   Postural Awareness  [x]   Safety During Functional Activities  [x]   Reinforced Patient's Precautions   []   Progress was updated and reviewed in Rehabtracker with patient and/or family this         date.      Treatment Plan for Next Session:  See poc  Assessment:        Treatment/Activity Tolerance:   [x] Tolerated treatment with no adverse effects    [] Patient limited by fatigue  [] Patient limited by pain   [] Patient limited by medical complications:    [] Adverse reaction to Tx:   [] Significant change in status    Safety:       []  bed alarm set    []  chair alarm set    []  Pt refused alarms                []  Telesitter activated      [x]  Gait belt used during tx session      []other:       Number of Minutes/Billable Intervention  Therapeutic Exercise    ADL Self-care 45   Neuro Re-Ed    Therapeutic Activity    Group    Other:    TOTAL 45       Social History  Social/Functional History  Lives With: Spouse(Maggie )  Type of Home: House  Home Layout: Two level, Bed/Bath upstairs(equipped with stair lift to access 2nd floor )  Home Access: Stairs to enter with rails  Entrance Stairs - Number of Steps: 3  Entrance Stairs - Rails: Right  Bathroom Shower/Tub: Tub/Shower unit  Bathroom Toilet: Handicap height  Bathroom Equipment: Grab bars around toilet, Shower chair  Bathroom Accessibility: Accessible  Home Equipment: Rolling walker, Cane(stair lift)  Receives Help From: Family  ADL Assistance: Independent  Homemaking Responsibilities: No(Pt reports light cleaning)  Ambulation Assistance: Independent(used walker at home, SPC in the community )  Transfer Assistance: Independent  Active : Yes  Mode of Transportation: Car  Additional Comments: sleeps in regular/flat bed, not on supplemental O2 at home, no falls in the past year; spouse organized pt's meds in pill box organizer due to hand limitations from arthritis, pt was able to manage meds Ind once organized by spouse; information verified by spouse due to pt's memory deficits     Objective                                                                                    Goals:  (Update in navigator)  Short term goals  Time Frame for Short term goals: 7-10 days  Short term goal 1: Pt to complete eating with (I)  Short term goal 2: Pt to complete bathing with min(A)  Short term goal 3: Pt to complete LB dressing with Mod(A) using AE as needed  Short term goal 4: Pt to complete toileting with Min(A)  Short term goal 5: Pt to complete all functional transfers with min(A):  Long term goals  Time Frame for Long term goals : 14-21 days  Long term goal 1: Pt to complete grooming with Mod(I) standing at sink  Long term goal 2: Pt to complete bathing with SBA  Long term goal 3: Pt to complete UB dressing with Mod(I)  Long term goal 4: Pt to complete LB dressing with SBA using AE as needed  Long term goal 5: Pt to complete toileting with SBA  Long term goals 6: Pt to complete all functional transfers (bed, shower, toilet) with SBA  Long term goal 7: Pt to participate in ongoing ther ex/act. with emphasis on >5 min static/dynamic standing tolerance :        Plan of Care                                                                              Times per week: 5 days per week for a minimum of 60 minutes/day plus group as appropriate for 60 minutes.   Treatment to include Plan  Times per day: Daily  Current Treatment Recommendations: Strengthening, Endurance Training, ROM, Pain Management, Balance Training, Functional Mobility Training, Safety Education & Training, Positioning, Self-Care / ADL, Equipment Evaluation, Education, & procurement, Patient/Caregiver Education & Training    Electronically signed by   Moustapha Raza. melida Eubanks  3/14/2019, 8:16 AM

## 2019-03-14 NOTE — PROGRESS NOTES
[facility]  ACUTE REHAB UNIT  SPEECH/LANGUAGE PATHOLOGY      [x] Daily           [] Discharge    Patient:Tai Huizar      QC:7/71/[id number]  O:[id number]  Rehab Dx/Hx: Pneumonia [J18.9]   No Known Allergies  Precautions: Sit up for all meals and thereafter for 30 minutes, Eat with small bites (1/2 tsp; 1 tsp), Drink from a cup only with small sips, No straws, Alternate solids with liquids and Take your medication with apple sauce; Restrictions/Precautions: Fall Risk, General Precautions, Isolation, Swallowing - Thickened Liquids(droplet precautions )          Home Situation/IADL:   Social/Functional History  Lives With: Spouse(Maggie )  Type of Home: House  Home Layout: Two level, Bed/Bath upstairs(equipped with stair lift to access 2nd floor )  Home Access: Stairs to enter with rails  Entrance Stairs - Number of Steps: 3  Entrance Stairs - Rails: Right  Bathroom Shower/Tub: Tub/Shower unit  Bathroom Toilet: Handicap height  Bathroom Equipment: Grab bars around toilet, Shower chair  Bathroom Accessibility: Accessible  Home Equipment: Rolling walker, Cane(stair lift)  Receives Help From: Family  ADL Assistance: Independent  Homemaking Responsibilities: No(Pt reports light cleaning)  Ambulation Assistance: Independent(used walker at home, SPC in the community )  Transfer Assistance: Independent  Active : Yes  Mode of Transportation: Car  Additional Comments: sleeps in regular/flat bed, not on supplemental O2 at home, no falls in the past year; spouse organized pt's meds in pill box organizer due to hand limitations from arthritis, pt was able to manage meds Ind once organized by spouse; information verified by spouse due to pt's memory deficits     POC: Pt will be seen 5x/week for a minimum of 30 minutes per day. Co-treats where appropriate with PT or OT to facilitate patient goals in functional tasks.     Date of Admit: 3/12/2019  Room #: 1010/1010-A     ST Dx:   [] Cognition/  Memory Deficits  []   Dysphagia  []  Dysarthria  []  Apraxia  []   Aphasia  []   Other   # billable minutes per area           Date: 3/14/2019  Day of ARU Week:  3       SLP Individual Minutes  Time In: 930  Time Out: 0  Minutes: 45 Group in:      Group Out:      Group total-  [place] in:    [place] out-   [place] total-      Variance/Reason:  [] Refusal due to   [] Medical hold/reason  [] Illness   [] Off Unit for test/procedure  [] Extra time needed to complete task  [] Other (specify)    Activity completed: Swallow exercises for airway protection and lingual STR    Pain: denies  Current Diet: DIET GENERAL; Dysphagia I Pureed; Nectar Thick  Dietary Nutrition Supplements: Frozen Oral Supplement  Subjective: Pt alert and sitting up in chair. Memory deficits evident along with [place]. Goals and POC:  LTG                                Short-term Goals  Timeframe for Short-term Goals: LTG: Pt will safely consume least restrictive diet with no s/s aspiration  FIM Goal: Eatin  Goal 1: Pt will tolerate nectar liquids/puree without s/s aspiration. Large gulping sip of NTL this date resulted in prolonged coughing and difficulty clearing. Pt cued for small sips. Responding well to this therapist following and completed tx. Goal 2: Pt will complete lingual and pharyngeal ROM/STR for safe diet advancement in the absence of aspiration. [place] Pressure Instrument was used to target lingual STR at 38/35 kPa goal pressure. 10Lingual Pulses were completed x 8sets. 5 sec Sustained holds were completed x 8 35 kPa. Improvement was evident by increased achievement of target goal during session greater than 50%. Airway protection exercises were completed this date. Sustained phonation was completed for 3 secs x 5 sets. Vocal quality was judged to be hoarse, breathy and weaker than yesterday. Pushing  techniques were able to be demonstrated by pt upon request x4. STR remains reduced.     Goal 3: Pt will complete therapeutic PO trials with SLP for thins and soft solids. Not ready. Alarm placed: []bed [x]chair   []other:   [] activated        Barriers to progress:   [x] Fatigue        [] Cognitive Deficits   [] Memory Deficits   [] Reduced Attn   [] Self Limiting Behaviors    [] Reduced insight/awareness     [] Visual Deficits   [] Premorbid Conditions     [] Other      Education/Interventions used this date: swallow exercises. [x]   Progress was updated and reviewed in Rehabtracker with patient and/or family this       date. [] Attending Care Conference for pt this date. See Team Patient Care Conference Note for updates.     Interventions used this date:  [] Speech/Language Treatment    [] Instruction in HEP    Group   [] Dysphagia Treatment   [] Cognitive Skill Juan    Other:         Assessment / Impression                                                          Treatment/Activity Tolerance:   [x] Tolerated Treatment well:     [] Patient limited by fatigue/pain:       [] Patient limited by medical complications:    [] Adverse Reaction to Tx:   [] Significant change in status:      Electronically Signed by  ESTEPHANIA Ortiz, MA, CCC-SLP    3/14/2019  3:43 PM

## 2019-03-14 NOTE — FLOWSHEET NOTE
[x] daily progress note       [] discharge       Patient Name:  Zari Haque   :  7/15/1933 MRN: [id number]  Room:  [address] [place], [place] [address] Date of Admission: 3/12/2019  Rehabilitation Diagnosis:   Pneumonia [J18.9]       Date 3/14/2019       Day of ARU Week:  3   Time IN/OUT [id number]  [id number]   Individual Tx Minutes 45   Group Tx Minutes 60   TOTAL Tx Time Mins 105   Restrictions Restrictions/Precautions  Restrictions/Precautions: Fall Risk, General Precautions, Isolation, Swallowing - Thickened Liquids(droplet precautions )      Communication with other providers: [x]   OK to see per nursing:     []   Spoke with team member regarding:      Subjective observations and cognitive status: Pt seen up in recliner at beginning of treatment. Agreeable to therapy. Pt had a lot of coughing today. Pt on 3 L of O2. Pain level/location:    0/10           Discharge recommendations  Anticipated discharge date:  TBD  Destination: []home alone   []home alone with assist PRN     [] home w/ family      [] Continuous supervision  []SNF    [] Assisted living     [] Other:   Continued therapy: []C PT  []OUTPATIENT  PT   [] No Further PT  Equipment needs: TBD       [x]   Pt received out of bed     Transfers:    Sit--> Stand:  Min A   Stand --> Sit:   CGA  Stand-Pivot:   Min A  Assistive device required for transfer:   RW  Pt required mod vcs for proper hand placement. Gait:    Distance:  79'   Assistance:  CGA  Device:  RW  Gait Quality:  reciprocal pattern     Stairs   # Completed:   12  Assistance:   CGA  Supportive Device:  2 rails   Height:   4\" and 6\"    Curb       Assistance:  CGA-min A (1 posterior LOB while ascending curb requiring min A to recover)  Supportive Device: RW  Height:  6\"  Mod vcs for proper technique.      Uneven Surfaces:       Assistance:  CGA  Device:   RW  Surfaces Completed:   [x]  Green mat with bean bags beneath      []  Throw rugs             []  Outdoor pavements      []  Grass          []  Loose gravel        []  Other:   vcs to stay closer to walker. Additional Therapeutic activities/exercises completed this date:     []   Nu-step:  Time:        Level:         #Steps:       []   Rebounder:    []  Seated     []  Standing        [x]   Balance training: pt stood with CGA for balance with RW during reaching for items. []   Postural training    []   Supine ther ex (reps/sets):     []   Seated ther ex (reps/sets):     []   Standing ther ex (reps/sets):     []   Picked up object from floor     Assist Level:                     []   Reacher used   []   Other:   []   Other:   []   Other:    Group:   Total time in group = 60 min    Exercise Group:Tai Burr participated in exercise and discussion on benefits and precautions during exercise. This provided the opportunity to have fun, build camaraderie, increase endurance, improve breathing techniques, balance, and strength. Jael Lee performed a variety of seated activities as able, with focus on technique and safety during exercise. Also focused on warm-up, warm-down, endurance monitoring, strengthening & strategies, function, safety, and general social & communication opportunities with other group members. Functional areas targeted:   [] Communication [x] Memory  [x] Coordination    [] Kitchen Safety [x] Problem Solving  [x] Strengthening     [x] Attention  [x] Endurance   [] Mobility    [x] Awareness/Insight [] Balance  [] Transfers  [x] Safety   [] Other (specify)  Participation:  [x] Actively participated         Education completed: benefits of exercise   Cognitive fx during this group: pt did well with following instructions and paying attention during group.    Family present: no           Patient/Caregiver Education and Training:   [x]   Transfer technique/safety  [x]   Gait technique/sequencing  [x]   Proper use of assistive device  [x]   Advanced mobility safety and technique  []   Reinforced patient's precautions/mobility while to manage meds Ind once organized by spouse; information verified by spouse due to pt's memory deficits     Objective                                                                                    Goals:  (Update in navigator)  Short term goals  Time Frame for Short term goals: 5 tx days:   Short term goal 1: Pt will complete sit<->stand transfers from elevated seat height with Min A. Short term goal 2: Pt will ambulate 150 ft using RW with CGA. Short term goal 3: Pt will ascend/descend 4 steps using bilat rails with CGA   Short term goal 4: Pt will ascend/descend curb step and ambulate over uneven surfaces using RW with CGA. :  Long term goals  Time Frame for Long term goals : 12 tx days or until discharge:   Long term goal 1: Pt will complete bed mobility and sup<->sit Ind. Long term goal 2: Pt will complete OOB transfers with RW with Sup. Long term goal 3: Pt will ambulate >/=150 ft using RW with Sup. Long term goal 4: Pt will ascend/descend 4 steps using 1 rail + SPC with Sup. Long term goal 5: Pt will ascend/descend curb step and ambulate over uneven surfaces using RW with Sup. :        Plan of Care                                                                              Times per week: 5 days per week for a minimum of 60 minutes/day plus group as appropriate for 60 minutes.   Treatment to include Current Treatment Recommendations: Strengthening, Transfer Training, Endurance Training, Cognitive Reorientation, Patient/Caregiver Education & Training, ROM, Equipment Evaluation, Education, & procurement, Balance Training, Gait Training, Home Exercise Program, Functional Mobility Training, Cognitive/Perceptual Training, Stair training, Safety Education & Training    Electronically signed by   Maylin Sarabia, [id number]  3/14/2019, 4:21 PM

## 2019-03-14 NOTE — PLAN OF CARE
Problem: Falls - Risk of:  Goal: Will remain free from falls  Description  Will remain free from falls  Outcome: Ongoing  Goal: Absence of physical injury  Description  Absence of physical injury  Outcome: Ongoing     Problem: Risk for Impaired Skin Integrity  Goal: Tissue integrity - skin and mucous membranes  Description  Structural intactness and normal physiological function of skin and  mucous membranes.   Outcome: Ongoing

## 2019-03-14 NOTE — PROGRESS NOTES
Records reviewed. Pt examined on the ARU this afternoon.     ADMITTING DIAGNOSIS:  Pneumonia. COMORBID DIAGNOSES IMPACTING REHABILITATION:  Influenza A with hypoxia,  uncontrolled hypertension, diabetes, acute renal failure with chronic  kidney disease stage II, and dysphagia. CHIEF COMPLAINT:  Short of breath with exertion. He has a poor appetite, significant positional dizziness, and fatigue with exertion. Occasional moist cough. He is having infrequent bowel movements and he controls his bladder. The remainder of his review of systems is negative. PHYSICAL EXAMINATION:  VITAL SIGNS:  Blood pressure (!) 146/68, pulse 97, temperature 98 °F (36.7 °C), temperature source Oral, resp. rate 16, height 5' 10\" (1.778 m), weight 152 lb 8 oz (69.2 kg), SpO2 95 %. GENERAL:  The patient is seen lying back in bed with oxygen per nasal cannula. Alert and oriented ×2. Participates in simple conversation. HEENT:   Visual fields are full. Mucous membranes are dry. NECK:  Supple. LUNGS:  There are rhonchi throughout his lung fields. No rales or wheezes were noted. Effort of respiration is mild to moderate. HEART:  Sounds reveal a pansystolic murmur with a regular rate and rhythm. ABDOMEN:  His thin abdomen is soft. Bowel sounds are present. There is  no rebound, guarding, or masses noted. EXTREMITIES:  Uses his hands to manipulate clothing and to call button.  strength is fair. Coordination is fair. Upper limb tone is normal. There is no swelling of the hands. In the lower limbs, he has 3+/5 strength and trace edema about the ankles. Both heels are clear and there are no signs of DVT. Sitting balance is fair-.  Standing balance is poor. VC's and Mod PA with transfers/dressing. LABORATORY TESTING:  Potassium is 3.3, BUN and creatinine are 9 and 1.0,  calcium 7.7. White blood count 6.4, hemoglobin 10.8, plate count 582,237.     IMPRESSION:  An 72-year-old male with pneumonia and influenza A with  hypoxia, uncontrolled hypertension, diabetes, acute renal failure,  superimposed upon chronic kidney disease stage II, and dysphagia. Limitations include his age, his difficulty hearing, and his poor endurance. Expected length of stay prior to a supervised level of function for discharge to home is two weeks. RECOMMENDATIONS:  1. Pneumonia with influenza A and gait disturbance: The patient  requires daily occupational and physical therapy. Emphasize pulmonary hygiene, O2 supplementation as needed, and monitoring his O2 saturations at rest and with activity. Continue incentive spirometry, nebulizers, and ongoing antibiotic therapy for the next week. He also has two more doses of Tamiflu. Fair- activity tolerance for therapies. 2.  DVT prophylaxis:  Lovenox 40 mg subcutaneous daily. Monitor his hemoglobin and platelet count while on this medication. Weightbearing activities should become protective as well. No signs or symptoms of active bleeding. Trying to get him walking more each day. 3.  Uncontrolled hypertension:  The patient has had hypotension with the  recent illness. Off his home antihypertensives. Encourage consistent oral intake and monitor vital signs at rest and with activity. May again be a candidate for diuretic if his renal function stabilizes. 4.  Diabetes: The patient requires no calorie restrictions on his diet  because his intake is so poor. Monitor blood sugars p.r.n. He was on metformin at home before, but his renal function will not allow us to use that at this time. 5.  Dysphagia: The patient is on a pureed diet with nectar-thick  liquids and Speech Therapy addresses swallowing. General debility is the etiology at this point.

## 2019-03-15 NOTE — PROGRESS NOTES
LOB  Assistance:    Device:   [x]   Rolling Walker     []   Standard Walker []   Wheelchair        []   [address] [facility]       []   Tawny Tee         []   [facility]       []   Other:        Homemaking Tasks: Additional Therapeutic activities/exercises completed this date:     []   ADL Training   [x]   Balance/Postural training  Standing bal act x 20 minutes @ counter occupied c act. Steady, no LOB    []   Bed/Transfer Training   [x]   Endurance Training   []   Neuromuscular Re-ed   []   Nu-step:  Time:        Level:         #Steps:       []   Rebounder:    []  Seated     []  Standing        []   Supine Ther Ex (reps/sets):     []   Seated Ther Ex (reps/sets):     [x]   Standing Ther Ex (reps/sets):  BUE exercises c #2 free wt x 20 reps x 2 sets,  Rest breaks PRN  Cog/fine motor act for problem solving, STM, observation, following direction    []   Other:      Comments:      Patient/Caregiver Education and Training:   []   [employer] Equipment Use  []   Bed Mobility/Transfer Technique/Safety  [x]   Energy Conservation Tips  []   Family training  []   Postural Awareness  [x]   Safety During Functional Activities  [x]   Reinforced Patient's Precautions   []   Progress was updated and reviewed in Rehabtracker with patient and/or family this         date.     Treatment Plan for Next Session:  See poc  Assessment:        Treatment/Activity Tolerance:   [x] Tolerated treatment with no adverse effects    [] Patient limited by fatigue  [] Patient limited by pain   [] Patient limited by medical complications:    [] Adverse reaction to Tx:   [] Significant change in status    Safety:       []  bed alarm set    []  chair alarm set    []  Pt refused alarms                []  Telesitter activated      [x]  Gait belt used during tx session      []other:       Number of Minutes/Billable Intervention  Therapeutic Exercise 15   ADL Self-care    Neuro Re-Ed    Therapeutic Activity 30   Group    Other:    TOTAL 45       Social History  Social/Functional History  Lives With: Spouse(Maggie )  Type of Home: House  Home Layout: Two level, Bed/Bath upstairs(equipped with stair lift to access 2nd floor )  Home Access: Stairs to enter with rails  Entrance Stairs - Number of Steps: 3  Entrance Stairs - Rails: Right  Bathroom Shower/Tub: Tub/Shower unit  Bathroom Toilet: Handicap height  Bathroom Equipment: Grab bars around toilet, Shower chair  Bathroom Accessibility: Accessible  Home Equipment: Rolling walker, Cane(stair lift)  Receives Help From: Family  ADL Assistance: Independent  Homemaking Responsibilities: No(Pt reports light cleaning)  Ambulation Assistance: Independent(used walker at home, SPC in the community )  Transfer Assistance: Independent  Active : Yes  Mode of Transportation: Car  Additional Comments: sleeps in regular/flat bed, not on supplemental O2 at home, no falls in the past year; spouse organized pt's meds in pill box organizer due to hand limitations from arthritis, pt was able to manage meds Ind once organized by spouse; information verified by spouse due to pt's memory deficits     Objective                                                                                    Goals:  (Update in navigator)  Short term goals  Time Frame for Short term goals: 7-10 days  Short term goal 1: Pt to complete eating with (I)  Short term goal 2: Pt to complete bathing with min(A)  Short term goal 3: Pt to complete LB dressing with Mod(A) using AE as needed  Short term goal 4: Pt to complete toileting with Min(A)  Short term goal 5: Pt to complete all functional transfers with min(A):  Long term goals  Time Frame for Long term goals : 14-21 days  Long term goal 1: Pt to complete grooming with Mod(I) standing at sink  Long term goal 2: Pt to complete bathing with SBA  Long term goal 3: Pt to complete UB dressing with Mod(I)  Long term goal 4: Pt to complete LB dressing with SBA using AE as needed  Long term goal 5: Pt to

## 2019-03-15 NOTE — PROGRESS NOTES
[facility]  ACUTE REHAB UNIT  SPEECH/LANGUAGE PATHOLOGY      [x] Daily           [] Discharge    Patient:Tai Joshi      MUP:[birth year]  JEWELL:[id number]  Rehab Dx/Hx: Pneumonia [J18.9]   No Known Allergies  Precautions: Sit up for all meals and thereafter for 30 minutes, Eat with small bites (1/2 tsp; 1 tsp), Drink from a cup only with small sips, No straws, Alternate solids with liquids and Take your medication with apple sauce; Restrictions/Precautions: Fall Risk, General Precautions, Isolation, Swallowing - Thickened Liquids(droplet precautions )          Home Situation/IADL:   Social/Functional History  Lives With: Spouse(Maggie )  Type of Home: House  Home Layout: Two level, Bed/Bath upstairs(equipped with stair lift to access 2nd floor )  Home Access: Stairs to enter with rails  Entrance Stairs - Number of Steps: 3  Entrance Stairs - Rails: Right  Bathroom Shower/Tub: Tub/Shower unit  Bathroom Toilet: Handicap height  Bathroom Equipment: Grab bars around toilet, Shower chair  Bathroom Accessibility: Accessible  Home Equipment: Rolling walker, Cane(stair lift)  Receives Help From: Family  ADL Assistance: Independent  Homemaking Responsibilities: No(Pt reports light cleaning)  Ambulation Assistance: Independent(used walker at home, SPC in the community )  Transfer Assistance: Independent  Active : Yes  Mode of Transportation: Car  Additional Comments: sleeps in regular/flat bed, not on supplemental O2 at home, no falls in the past year; spouse organized pt's meds in pill box organizer due to hand limitations from arthritis, pt was able to manage meds Ind once organized by spouse; information verified by spouse due to pt's memory deficits     POC: Pt will be seen 5x/week for a minimum of 30 minutes per day. Co-treats where appropriate with PT or OT to facilitate patient goals in functional tasks.     Date of Admit: 3/12/2019  Room #: 1010/1010-A     ST Dx:   [] Cognition/  Memory Deficits  []   Dysphagia  []  Dysarthria  []  Apraxia  []   Aphasia  []   Other   # billable minutes per area           Date: 3/15/2019  Day of ARU Week:  4       SLP Individual Minutes  Time In: 1000  Time Out: 3803  Minutes: 30 Group in:      Group Out:      Group total-  [place] in:    [place] out-   [place] total-      Variance/Reason:  [] Refusal due to   [] Medical hold/reason  [] Illness   [] Off Unit for test/procedure  [] Extra time needed to complete task  [] Other (specify)    Activity completed: Swallow exercises this date. Pain: denies  Current Diet: DIET GENERAL; Dysphagia I Pureed; Nectar Thick  Dietary Nutrition Supplements: Frozen Oral Supplement  Subjective: Pt remains confused and asking the same questions as last session. Increased cough this date--stronger but remains non-productive. Suction hooked up to assist as needed. Goals and POC:  LTG                                Short-term Goals  Timeframe for Short-term Goals: LTG: Pt will safely consume least restrictive diet with no s/s aspiration  FIM Goal: Eatin  Goal 1: Pt will tolerate nectar liquids/puree without s/s aspiration. Pt not wanting to eat pureed diet. Taking nectar with smaller sips this date. Meds whole with applesauce 1 at a time. Need to continue to monitor intake and will keep Dr Criss Sheriff apprised of concerns with lack of intake. Goal 2: Pt will complete lingual and pharyngeal ROM/STR for safe diet advancement in the absence of aspiration. [place] Pressure Instrument was used to target lingual STR at 38kPa goal pressure. 10Lingual Pulses were completed x 10sets. 10 sec Improvement was evident by increased achievement of target goal during session at 90% consistency--improved from yesterday. Airway protection exercises were completed this date. Adduction was completed  using an effortful grunt x 10 sets.   Pulling techniques were able to be demonstrated by pt upon request and with verbal cues with increased attempts at airway clearance x5. Incentive spirometer was completed x 8 sets with pt achieving 250 ml with significant effort. Chin tuck against resistance was completed to assist with pharyngeal STR. Pt sitting upright in chair with rolled towel held for 20 secs x 10 sets. Goal 3: Pt will complete therapeutic PO trials with SLP for thins and soft solids. Goal 4: Pt will complete cog eval by 3/19/19--added today due to ongoing cognitive deficts. Alarm placed: []bed [x]chair   []other:   []PREET activated        Barriers to progress:   [x] Fatigue        [x] Cognitive Deficits   [] Memory Deficits   [] Reduced Attn   [] Self Limiting Behaviors    [] Reduced insight/awareness     [] Visual Deficits   [] Premorbid Conditions     [] Other      Education/Interventions used this date: swallow exercises and safe swallow protocol--pt needs reinforcement    [x]   Progress was updated and reviewed in Rehabtracker with patient and/or family this   date. [] Attending Care Conference for pt this date. See Team Patient Care Conference Note for updates.     Interventions used this date:  [] Speech/Language Treatment    [] Instruction in HEP    Group   [x] Dysphagia Treatment   [] Cognitive Skill Juan    Other:         Assessment / Impression                                                          Treatment/Activity Tolerance:   [] Tolerated Treatment well:     [x] Patient limited by fatigue/pain:       [] Patient limited by medical complications:    [] Adverse Reaction to Tx:   [] Significant change in status:      Electronically Signed by  Jada Pinto, [id number] [facility]    3/15/2019  3:02 PM

## 2019-03-15 NOTE — PLAN OF CARE
Problem: Falls - Risk of:  Goal: Will remain free from falls  Outcome: Ongoing  Goal: Absence of physical injury  Outcome: Ongoing     Problem: Risk for Impaired Skin Integrity  Goal: Tissue integrity - skin and mucous membranes  Outcome: Ongoing

## 2019-03-15 NOTE — PROGRESS NOTES
Records reviewed. Pt examined on the ARU this afternoon.     ADMITTING DIAGNOSIS:  Pneumonia. COMORBID DIAGNOSES IMPACTING REHABILITATION:  Influenza A with hypoxia,  uncontrolled hypertension, diabetes, acute renal failure with chronic  kidney disease stage II, and dysphagia. CHIEF COMPLAINT:  Mild nausea this morning. Tired after therapies. Fair appetite, mild positional dizziness, and persistent fatigue with exertion. Less of a moist cough. More frequent bowel movements. No dysuria. The remainder of his review of systems is negative. PHYSICAL EXAMINATION:  VITAL SIGNS:  Blood pressure (!) 107/58, pulse 99, temperature 98 °F (36.7 °C), temperature source Oral, resp. rate 16, height 5' 10\" (1.778 m), weight 152 lb 12.5 oz (69.3 kg), SpO2 100 %. GENERAL:  The patient is seen  sitting up in a wheelchair. Oriented ×2. Follows one-step commands. Breathing becomes labored during conversation. HEENT:   Visual fields are full. Mucous membranes are moist.    NECK:  Supple. LUNGS:  There are rhonchi throughout his lungs  No rales or wheezes were noted. Effort of respiration is milder. HEART:  Sounds reveal a pansystolic murmur with a regular rate and rhythm. ABDOMEN:  His thin abdomen is soft. Bowel sounds are present. There is  no rebound, guarding, or masses noted. EXTREMITIES:  Upper limbs fatigue with activity but there is no new weakness.  strength is functional. No new edema or bruising. In the lower limbs, he has  weakness with hip flexion and knee extension affecting transfers. He has trace edema about the ankles. Both heels are clear and there are no signs of DVT. Sitting balance is fair. Standing balance is poor. VC's and Min-Mod PA with transfers. LABORATORY TESTING:  Potassium is 3.3, BUN and creatinine are 9 and 1.0,  calcium 7.7. White blood count 6.4, hemoglobin 10.8, plate count  112,791.     IMPRESSION:  An 79-year-old male with pneumonia and influenza A with  hypoxia, uncontrolled hypertension, diabetes, acute renal failure,  superimposed upon chronic kidney disease stage II, and dysphagia. Limitations include his age, his difficulty hearing, and his poor endurance. Expected length of stay prior to a supervised level of function for discharge to home i3/23/2019. RECOMMENDATIONS:  1. Pneumonia with influenza A and gait disturbance: The patient  requires daily occupational and physical therapy. Continues to require pulmonary hygiene and O2 supplementation, but his coughing is improving. Monitor vital signs and O2 saturations at rest and with activihe has completed his Tamiflu but is still taking antibiotics. DVT prophylaxis emphasized. fair activity tolerance for therapies. Increasing time out of bed and up in a chair. 2.  DVT prophylaxis:  Lovenox 40 mg subcutaneous daily. I must monitor  his hemoglobin and platelet count while on this medication. Weightbearing activities increase each day. No new bruising. 3.  Uncontrolled hypertension:   Recent hypotension required weaning off some medications. He may be a candidate for diuretics again once his renal function stabilizes. 4.  Diabetes: The patient requires no calorie restrictions on his diet  because his intake has been so poor. Monitor blood sugars p.r.n. I will restart his metformin at 500 mg twice a day tomorrow. 5.  Dysphagia: The patient is on a pureed diet with nectar-thick  liquids and Speech Therapy works on swallowing exercises. General debility is the etiology at this point. Counseling and Coordination of Care: In care conference today I met with the patient's OT, PT, RN and . We discussed the patient's problems, progress and prognosis. Disposition issues were clarified and plans were established for ongoing rehabilitation efforts beyond the ARU stay. I reviewed this information with the patient during a second distinct visit with the patient.  More than half of the total time of 30 minutes spent with the patient involved counseling and coordination of care.

## 2019-03-15 NOTE — PROGRESS NOTES
Physical Therapy  [x] daily progress note       [] discharge       Patient Name:  Dawit Allen   :  7/15/1933 MRN: [id number]  Room:  [address] [place] [address] Date of Admission: 3/12/2019    Rehabilitation Diagnosis:     Pneumonia [J18.9]    Date  3/15/2019   TIMES IN/OUT   1879-2016   Group Tx Minutes 60   TOTAL Tx time seen 60   Variance Time    Variance Reason    [] Refusal due to   [] Medical hold/reason  [] Illness   [] Off Unit for test/procedure  [] Extra time needed to complete task  [] Other (specify)   Restrictions/Precautions Restrictions/Precautions  Restrictions/Precautions: Fall Risk, General Precautions, Isolation, Swallowing - Thickened Liquids(droplet precautions )      Current Diet/Swallowing Issues DIET GENERAL; Dysphagia I Pureed; Nectar Thick  Dietary Nutrition Supplements: Frozen Oral Supplement   Communication with other providers: [x] Ok to see per nursing at morning huddle  [] Medical hold and reason  [] Spoke with (team    member) regarding   Subjective observations: Pt agreeable to group session. Pain level/location: No c/o pain   Alarm placed/where? Fall Risk  [] Pt taken to DR for lunch with nsg present   [] Pt taken back to room with chair/bed alarm in place   [x] Pt taken back to room by other staff member       Group Activity:  Barriers ([facility]) Group: Completed discussion and practice of barriers encountered in home and community settings including hazards, weather considerations, travel preparations, safe use of assistive device(s), and assistance needs. Focused on problem-solving a variety of situations related to being at home and in the community; performing physical tasks safely (device safety, maneuvering steps, curbs, tight spaces, obstacles). Focused on endurance monitoring & strategies prn, problem solving, functional mobility, and general social & communication opportunities with other group members.       Functional areas targeted:   [x] Communication [x] Memory  [x]

## 2019-03-16 NOTE — PROGRESS NOTES
Physical Rehabilitation: OCCUPATIONAL THERAPY     [x] daily progress note       [] discharge       Patient Name:  Johanna Avila   :  7/15/1933 MRN: [id number]  Room:  [address] [place] [address] Date of Admission: 3/12/2019  Rehabilitation Diagnosis:   Pneumonia [J18.9]       Date 3/16/2019       Day of ARU Week:  5   Time IN/OUT 4524-0221   Individual Tx Minutes 60   Group Tx Minutes    Co-Treat Minutes    Concurrent Tx Minutes    TOTAL Tx Time Mins 60   Variance Time    Variance Time []   Refusal due to:     []   Medical hold/reason:    []   Illness   []   Off Unit for test/procedure  []   Extra time needed to complete task  []   Therapeutic need  []   Other (specify):   Restrictions Restrictions/Precautions  Restrictions/Precautions: Fall Risk, General Precautions, Isolation, Swallowing - Thickened Liquids(droplet precautions )      Communication with other providers: [x]   OK to see per nursing:     []   Spoke with team member regarding:      Subjective observations and cognitive status: Pt alert orientated and agreeable to tx      Pain level/location:  0  /10       Location: none reported   Discharge recommendations  Anticipated discharge date:  TBD  Destination: []home alone   []home alone w assist prn [] home w/ family    [] Continuous supervision       []SNF            [] Assisted living     [] Other:   Continued therapy: []HHC OT  []OUTPATIENT  OT   [] No Further OT  Equipment needs: TBD         Grooming:   MOD A to use electric shaver sitting at sink. Bathing:   UB-MIN A while sitting on shower bench          LB-MIN A while sitting on shower bench then @CGA for pericare in stance pulling up on grab bar      Dressing:      Upper Body Dressing:  MIN A (sitting on shower bench)  Lower Body Dressing:  MOD A to pull over feet to ankle then standing using grab bar to pull up.       Toileting:   SUP       Toilet Transfers:   CGA-MIN A  Device Used:    [x]   Standard Toilet         []   Jose Cadet           []  Bedside Commode       []   Elevated Toilet          []   Other:        Tub/Shower Transfer:   MIN A   Device Used:    [x]   Shower Bench      []   Shower Chair      []   Tub Transfer Bench           []   Bathtub    [x]   Shower         []   Other:         Bed Mobility:           [x]   Pt received out of bed   Rolling R/L:    Scooting:    Supine --> Sit:    Sit --> Supine:      Transfers:    Sit--> Stand:  CGA  Stand --> Sit:   CGA-MIN A   Stand-Pivot:   MIN A  Other:    Assistive device required for transfer:   Gait belt c RW      Additional Therapeutic activities/exercises completed this date:     [x]   ADL Training   [x]   Balance/Postural training     []  Transfer Training   []   Endurance Training   []   Neuromuscular Re-ed   []   Nu-step:  Time:        Level:         #Steps:       []   Rebounder:    []  Seated     []  Standing        []   Supine Ther Ex (reps/sets):     []   Seated Ther Ex (reps/sets):     []   Standing Ther Ex (reps/sets):     []   Other:      Comments:   Pt required @MIN VCs for safety and impulsivity when performing functional transfers    Patient/Caregiver Education and Training:   []   Adaptive Equipment Use  [x]   Transfer Technique/Safety  [x]   Energy Conservation Tips  []   Family training  [x]   Postural Awareness  [x]   Safety During Functional Activities  []   Reinforced Patient's Precautions   []   Progress was updated and reviewed in Rehabtracker with patient and/or family this         date.      Treatment Plan for Next Session: continue in current POC     Assessment:        Treatment/Activity Tolerance:   [x] Tolerated treatment with no adverse effects    [] Patient limited by fatigue  [] Patient limited by pain   [] Patient limited by medical complications:    [] Adverse reaction to Tx:   [] Significant change in status    Safety:       []  bed alarm set    []  chair alarm set    []  Pt refused alarms                []  Telesitter activated      []  Gait belt used during tx session []other:       Number of Minutes/Billable Intervention  Therapeutic Exercise    ADL Self-care 60   Neuro Re-Ed    Therapeutic Activity    Group    Other:    TOTAL 60       Social History  Social/Functional History  Lives With: Spouse(Maggie )  Type of Home: House  Home Layout: Two level, Bed/Bath upstairs(equipped with stair lift to access 2nd floor )  Home Access: Stairs to enter with rails  Entrance Stairs - Number of Steps: 3  Entrance Stairs - Rails: Right  Bathroom Shower/Tub: Tub/Shower unit  Bathroom Toilet: Handicap height  Bathroom Equipment: Grab bars around toilet, Shower chair  Bathroom Accessibility: Accessible  Home Equipment: Rolling walker, Cane(stair lift)  Receives Help From: Family  ADL Assistance: Independent  Homemaking Responsibilities: No(Pt reports light cleaning)  Ambulation Assistance: Independent(used walker at home, SPC in the community )  Transfer Assistance: Independent  Active : Yes  Mode of Transportation: Car  Additional Comments: sleeps in regular/flat bed, not on supplemental O2 at home, no falls in the past year; spouse organized pt's meds in pill box organizer due to hand limitations from arthritis, pt was able to manage meds Ind once organized by spouse; information verified by spouse due to pt's memory deficits     Objective                                                                                    Goals:  (Update in navigator)  Short term goals  Time Frame for Short term goals: 7-10 days  Short term goal 1: Pt to complete eating with (I)  Short term goal 2: Pt to complete bathing with min(A)  Short term goal 3: Pt to complete LB dressing with Mod(A) using AE as needed  Short term goal 4: Pt to complete toileting with Min(A)  Short term goal 5: Pt to complete all functional transfers with min(A):  Long term goals  Time Frame for Long term goals : 14-21 days  Long term goal 1: Pt to complete grooming with Mod(I) standing at sink  Long term goal 2: Pt to complete bathing with SBA  Long term goal 3: Pt to complete UB dressing with Mod(I)  Long term goal 4: Pt to complete LB dressing with SBA using AE as needed  Long term goal 5: Pt to complete toileting with SBA  Long term goals 6: Pt to complete all functional transfers (bed, shower, toilet) with SBA  Long term goal 7: Pt to participate in ongoing ther ex/act. with emphasis on >5 min static/dynamic standing tolerance :        Plan of Care                                                                              Times per week: 5 days per week for a minimum of 60 minutes/day plus group as appropriate for 60 minutes.   Treatment to include Plan  Times per day: Daily  Current Treatment Recommendations: Strengthening, Endurance Training, ROM, Pain Management, Balance Training, Functional Mobility Training, Safety Education & Training, Positioning, Self-Care / ADL, Equipment Evaluation, Education, & procurement, Patient/Caregiver Education & Training    Electronically signed by   Kraig Jang #[id number]. 2016    3/16/2019, 8:27 AM

## 2019-03-16 NOTE — PROGRESS NOTES
Physical Therapy  [x] daily progress note       [] discharge       Patient Name:  Wiliam Post   :  7/15/1933 MRN: [id number]  Room:  [address] [place] [address] Date of Admission: 3/12/2019  Rehabilitation Diagnosis:   Pneumonia [J18.9]       Date 3/16/2019       Day of ARU Week:  5   Time IN/-935   Individual Tx Minutes 60   Group Tx Minutes 15   Co-Treat Minutes    Concurrent Tx Minutes    TOTAL Tx Time Mins 75   Variance Time    Variance Time []   Refusal due to:     []   Medical hold/reason:    []   Illness   []   Off Unit for test/procedure  []   Extra time needed to complete task  []   Therapeutic need  []   Other (specify):   Restrictions Restrictions/Precautions  Restrictions/Precautions: Fall Risk, General Precautions, Isolation, Swallowing - Thickened Liquids(droplet precautions )      Communication with other providers: [x]   OK to see per nursing:     []   Spoke with team member regarding:      Subjective observations and cognitive status: Pt states he feels fine today. Pain level/location: Pt reported B shoulder pain however; did not rate it. Discharge recommendations  Anticipated discharge date: 2019  Destination: []home alone   []home alone with assist PRN     [] home w/ family      [] Continuous supervision  []SNF    [] Assisted living     [] Other:   Continued therapy: []HHC PT  []OUTPATIENT  PT   [] No Further PT  Equipment needs: has RW     Bed Mobility:           []   Pt received out of bed   Rolling R/L:  SBA  Scooting:  CGA/MN  Supine --> Sit:  SBA  Sit --> Supine:  [id number]     Transfers:    Sit--> Stand:  MIN  Stand --> Sit:   SBA  Stand-Pivot:   CGA  Other:    Assistive device required for transfer:   RW    Gait:    Distance:  07+34+325 feet  Assistance:  CGA  Device:  RW  Gait Quality:  Pt displayed forward posture with flexed knees and hips.       Additional Therapeutic activities/exercises completed this date:     []   Nu-step:  Time:        Level:         #Steps:       [] Rebounder:    []  Seated     []  Standing        []   Balance training         [x]   Postural training    []   Supine ther ex (reps/sets):     []   Seated ther ex (reps/sets):     []   Standing ther ex (reps/sets):     []   Picked up object from floor     Assist Level:                     []   Reacher used   [x]   Other: Group Activity:  Exercise Group:Patient participated in exercise and discussion on benefits and precautions during exercise. This provided the opportunity to have fun, build camaraderie, increase endurance, improve breathing techniques, balance, and strength. Patient performed a variety of seated and standing activities as able, with focus on technique and safety during exercise. Also focused on warm-up, warm-down, endurance monitoring, strengthening & strategies, function, safety, and general social & communication opportunities with other group members. Functional areas targeted:   [x] Communication [x] Memory  [x] Coordination    [] Kitchen Safety [x] Problem Solving  [x] Strengthening     [x] Attention  [x] Endurance   [] Mobility    [x] Awareness/Insight [x] Balance  [] Transfers  [x] Safety   [] Other (specify)  Participation:  [x] Actively participated    [] Required  cues for   [] Other (specify)    Education completed: UE AROM strength and stretching. Cognitive fx during this group: Problem solve benefits and precaution of exercise. Family present: NO        Comments:  Upon arrival pt in bed without 02 in but under chin. Pt 02 was checked at 87%. Pt advised to wear 02 until told other wised. Pt nurse informed. Pt could  not complete group today because pt became ill and had to be taken back to his room.     Patient/Caregiver Education and Training:   []   Bed Mobility/Transfer technique/safety  [x]   Gait technique/sequencing  [x]   Proper use of assistive device  []   Advanced mobility safety and technique  []   Reinforced patient's precautions/mobility while maintaining pt's memory deficits     Objective                                                                                    Goals:  (Update in navigator)  Short term goals  Time Frame for Short term goals: 5 tx days:   Short term goal 1: Pt will complete sit<->stand transfers from elevated seat height with Min A. Short term goal 2: Pt will ambulate 150 ft using RW with CGA. Short term goal 3: Pt will ascend/descend 4 steps using bilat rails with CGA   Short term goal 4: Pt will ascend/descend curb step and ambulate over uneven surfaces using RW with CGA. :  Long term goals  Time Frame for Long term goals : 12 tx days or until discharge:   Long term goal 1: Pt will complete bed mobility and sup<->sit Ind. Long term goal 2: Pt will complete OOB transfers with RW with Sup. Long term goal 3: Pt will ambulate >/=150 ft using RW with Sup. Long term goal 4: Pt will ascend/descend 4 steps using 1 rail + SPC with Sup. Long term goal 5: Pt will ascend/descend curb step and ambulate over uneven surfaces using RW with Sup. :        Plan of Care                                                                              Times per week: 5 days per week for a minimum of 60 minutes/day plus group as appropriate for 60 minutes.   Treatment to include Current Treatment Recommendations: Strengthening, Transfer Training, Endurance Training, Cognitive Reorientation, Patient/Caregiver Education & Training, ROM, Equipment Evaluation, Education, & procurement, Balance Training, Gait Training, Home Exercise Program, Functional Mobility Training, Cognitive/Perceptual Training, Stair training, Safety Education & Training    Electronically signed by   Yolanda Haque,  3/16/2019, 8:56 AM

## 2019-03-18 NOTE — PROGRESS NOTES
Physical Therapy    [x] daily progress note       [] discharge       Patient Name:  Michael Joyner   :  7/15/1933 MRN: [id number]  Room:  [address] [place] [address] Date of Admission: 3/12/2019  Rehabilitation Diagnosis:   Pneumonia [J18.9]       Date 3/18/2019       Day of ARU Week:  7   Time IN/OUT 1305/1408   Individual Tx Minutes 61   Group Tx Minutes    Co-Treat Minutes    Concurrent Tx Minutes    TOTAL Tx Time Mins 63   Variance Time +3   Variance Time []   Refusal due to:     []   Medical hold/reason:    []   Illness   []   Off Unit for test/procedure  [x]   Extra time needed to complete task  []   Therapeutic need  []   Other (specify):   Restrictions Restrictions/Precautions  Restrictions/Precautions: Fall Risk, General Precautions, Isolation, Swallowing - Thickened Liquids(droplet precautions )      Communication with other providers: [x]   OK to see per nursing:     []   Spoke with team member regarding:      Subjective observations and cognitive status: Pt reports not sleeping well last night. Pt states that his nausea has improved.       Pain level/location: None reported    Discharge recommendations  Anticipated discharge date:  19  Destination: []home alone   []home alone with assist PRN     [x] home w/ family      [x] Continuous supervision  []SNF    [] Assisted living     [] Other:   Continued therapy: [x]HHC PT  []OUTPATIENT  PT   [] No Further PT  Equipment needs: has RW      Bed Mobility:           []   Pt received out of bed   Scooting:  SBA   Supine --> Sit:  Ind   Sit --> Supine:  SBA   All mobility tasks were completed without use of bed features    Transfers:    Sit--> Stand:  SBA (required 2-3 attempts to complete each trial)   Stand --> Sit:  SBA->CGA   Stand-Pivot:  CGA   Assistive device required for transfer: RW, elevated seat height     Gait:    Distance:  364 feet  Assistance:  SBA  Device:  RW   Gait Quality:  step-through but with impaired heel-toe pattern, slow anthony, stooped posture \  SpO2 in room air: 92% at rest, 84% after maximal exertion     Stairs   # Completed:   4 x 2 trials  Assistance:  CGA on ascent, Min A on descent  Supportive Device:  bilat rails->1 rail + SPC  Height:   6\"  Pattern and Quality: reciprocal on ascent, non-reciprocal on descent, slow with increased hesitation on descent but no instability noted     Uneven Surfaces:       Assistance:   CGA  Device:    RW  Surfaces Completed:   []  Green mat with bean bags beneath      [x]  Throw rugs             []  Outdoor pavements      []  Grass          []  Loose gravel        []  Other:       Patient/Caregiver Education and Training:   [x]   Bed Mobility/Transfer technique/safety  []   Gait technique/sequencing  []   Proper use of assistive device  [x]   Advanced mobility safety and technique  []   Reinforced patient's precautions/mobility while maintaining precautions  [x]   Postural awareness  []   Family training  [x]   Progress was updated and reviewed in Rehabtracker with patient and/or family this         date. Treatment Plan for Next Session: stair training with 1 rail +SPC, advanced gait training with RW     Assessment: Pt with desaturation following community distance ambulation, however his Spo2 in room air gradually increased during sitting break. Pt with poor quality of PLB despite verbal and visual cues provided. Pt also had increased impulsivity and was less receptive to cues when fatigued especially towards the end of this tx session.      Treatment/Activity Tolerance:   [] Tolerated treatment with no adverse effects    [x] Patient limited by fatigue  [] Patient limited by pain   [] Patient limited by medical complications:    [] Adverse reaction to Tx:   [] Significant change in status    Safety:       [x]  bed alarm set    []  chair alarm set    []  Pt refused alarms                []  Telesitter activated      [x]  Gait belt used during tx session      []other:         Number of Minutes/Billable Intervention  Gait Training 48   Therapeutic Exercise    Neuro Re-Ed    Therapeutic Activity 15   Wheelchair Propulsion    Group    Other:    TOTAL 63         Social History  Social/Functional History  Lives With: Spouse(Maggie )  Type of Home: House  Home Layout: Two level, Bed/Bath upstairs(equipped with stair lift to access 2nd floor )  Home Access: Stairs to enter with rails  Entrance Stairs - Number of Steps: 3  Entrance Stairs - Rails: Right  Bathroom Shower/Tub: Tub/Shower unit  Bathroom Toilet: Handicap height  Bathroom Equipment: Grab bars around toilet, Shower chair  Bathroom Accessibility: Accessible  Home Equipment: Rolling walker, Cane(stair lift)  Receives Help From: Family  ADL Assistance: Independent  Homemaking Responsibilities: No(Pt reports light cleaning)  Ambulation Assistance: Independent(used walker at home, SPC in the community )  Transfer Assistance: Independent  Active : Yes  Mode of Transportation: Car  Additional Comments: sleeps in regular/flat bed, not on supplemental O2 at home, no falls in the past year; spouse organized pt's meds in pill box organizer due to hand limitations from arthritis, pt was able to manage meds Ind once organized by spouse; information verified by spouse due to pt's memory deficits     Objective                                                                                    Goals:  (Update in navigator)  Short term goals  Time Frame for Short term goals: 5 tx days:   Short term goal 1: Pt will complete sit<->stand transfers from elevated seat height with Min A. Short term goal 2: Pt will ambulate 150 ft using RW with CGA.    Short term goal 3: Pt will ascend/descend 4 steps using bilat rails with CGA   Short term goal 4: Pt will ascend/descend curb step and ambulate over uneven surfaces using RW with CGA. :  Long term goals  Time Frame for Long term goals : 12 tx days or until discharge:   Long term goal 1: Pt will complete bed mobility and sup<->sit Ind.   Long term goal 2: Pt will complete OOB transfers with RW with Sup. Long term goal 3: Pt will ambulate >/=150 ft using RW with Sup. Long term goal 4: Pt will ascend/descend 4 steps using 1 rail + SPC with Sup. Long term goal 5: Pt will ascend/descend curb step and ambulate over uneven surfaces using RW with Sup. :        Plan of Care                                                                              Times per week: 5 days per week for a minimum of 60 minutes/day plus group as appropriate for 60 minutes.   Treatment to include Current Treatment Recommendations: Strengthening, Transfer Training, Endurance Training, Cognitive Reorientation, Patient/Caregiver Education & Training, ROM, Equipment Evaluation, Education, & procurement, Balance Training, Gait Training, Home Exercise Program, Functional Mobility Training, Cognitive/Perceptual Training, Stair training, Safety Education & Training    Electronically signed by   Aminah Rosenbaum PT   3/18/2019, 1:00 PM

## 2019-03-18 NOTE — PROGRESS NOTES
age, his difficulty hearing, and his poor endurance. Expected length of stay prior to a supervised level of function for discharge to home i3/23/2019. RECOMMENDATIONS:  1. Pneumonia with influenza A and gait disturbance: The patient  requires daily occupational and physical therapy. Continues to require pulmonary hygiene and O2 supplementation, but his coughing is improving. Monitor vital signs and O2 saturations at rest and with activihe has completed his Tamiflu but is still taking by mouth Levaquin and Flagyl. DVT prophylaxis emphasized. fair activity tolerance for therapies. Increasing time out of bed and up in a chair. 2.  DVT prophylaxis:  Lovenox 40 mg subcutaneous daily. I must monitor  his hemoglobin and platelet count while on this medication. Weightbearing activities increase each day. No new bruising. 3.  Uncontrolled hypertension:   Recent hypotension required weaning off some medications. He may be a candidate for diuretics again once his renal function stabilizes. 4.  Diabetes: The patient requires no calorie restrictions on his diet  because his intake has been so poor. Monitor blood sugars p.r.n. he received metformin this morning. Blood sugars will be monitored twice a day now. 5.  Dysphagia: The patient is on a pureed diet with nectar-thick  liquids and Speech Therapy works on swallowing exercises. General debility is the etiology at this point. No change to diet.

## 2019-03-18 NOTE — PROGRESS NOTES
[facility]  ACUTE REHAB UNIT  SPEECH/LANGUAGE PATHOLOGY      [x] Daily           [] Discharge    Patient:Tai Luke      KKX:[id number]  UYY:[id number]  Rehab Dx/Hx: Pneumonia [J18.9]   No Known Allergies  Precautions: Sit up for all meals and thereafter for 30 minutes, Eat with small bites (1/2 tsp; 1 tsp), No straws, Swallow hard (effortful swallow), Alternate solids with liquids and Take your medication with apple sauce; Restrictions/Precautions: Fall Risk, General Precautions, Isolation, Swallowing - Thickened Liquids(droplet precautions )          Home Situation/IADL:   Social/Functional History  Lives With: Spouse(Maggie )  Type of Home: House  Home Layout: Two level, Bed/Bath upstairs(equipped with stair lift to access 2nd floor )  Home Access: Stairs to enter with rails  Entrance Stairs - Number of Steps: 3  Entrance Stairs - Rails: Right  Bathroom Shower/Tub: Tub/Shower unit  Bathroom Toilet: Handicap height  Bathroom Equipment: Grab bars around toilet, Shower chair  Bathroom Accessibility: Accessible  Home Equipment: Rolling walker, Cane(stair lift)  Receives Help From: Family  ADL Assistance: Independent  Homemaking Responsibilities: No(Pt reports light cleaning)  Ambulation Assistance: Independent(used walker at home, SPC in the community )  Transfer Assistance: Independent  Active : Yes  Mode of Transportation: Car  Additional Comments: sleeps in regular/flat bed, not on supplemental O2 at home, no falls in the past year; spouse organized pt's meds in pill box organizer due to hand limitations from arthritis, pt was able to manage meds Ind once organized by spouse; information verified by spouse due to pt's memory deficits     POC: Pt will be seen 5x/week for a minimum of 30 minutes per day. Co-treats where appropriate with PT or OT to facilitate patient goals in functional tasks.     Date of Admit: 3/12/2019  Room #: 1010/1010-A     ST Dx:   [x]   Cognition/  Memory status:      Electronically Signed by  Jada Galvan, [address]    3/18/2019  12:21 PM

## 2019-03-18 NOTE — PROGRESS NOTES
Occupational Therapy  Physical Rehabilitation: OCCUPATIONAL THERAPY     [x] daily progress note       [] discharge       Patient Name:  Ankit Eddy   :  7/15/1933 MRN: [id number]  Room:  [address] [place] [address] Date of Admission: 3/12/2019  Rehabilitation Diagnosis:   Pneumonia [J18.9]       Date 3/18/2019       Day of ARU Week:  7   Time IN/OUT 8:36/9:32   Individual Tx Minutes 64   Group Tx Minutes    Co-Treat Minutes    Concurrent Tx Minutes    TOTAL Tx Time Mins 56   Variance Time -4   Variance Time []   Refusal due to:     []   Medical hold/reason:    [x]   Illness: Pt with upset stomach requests to go back to room where Pt proceeded to vomit. Therapist informed Zaida Beaulieu RN of situation. []   Off Unit for test/procedure  []   Extra time needed to complete task  []   Therapeutic need  []   Other (specify):   Restrictions Restrictions/Precautions: Fall Risk, General Precautions, Isolation, Swallowing - Thickened Liquids(droplet precautions )         Communication with other providers: [x]   OK to see per nursing:     []   Spoke with team member regarding:      Subjective observations and cognitive status: Upon entering room Pt sitting up fully clothed and bathed stating \"someone already gave me bath\" when instructed to complete ADL session.  At this time Pt declines ADL session stating already completed this AM.      Pain level/location:    0/10       Location: No complaints   Discharge recommendations  Anticipated discharge date:  3-23  Destination: []home alone   []home alone w assist prn   [x] home w/ family    [] Continuous supervision       []SNF    [] Assisted living     [] Other:   Continued therapy: [x]HHC OT  []OUTPATIENT  OT   [] No Further OT  Equipment needs: n/a         Bed Mobility:           [x]   Pt received out of bed         Functional Mobility:    Assistance:  n/a  Device:   []   [facility]     []   Standard Walker []   Wheelchair        []   Shazia Clark       []   Robert Delaney         []   Cardiac Moses Flores       []   Other:        Homemaking Tasks: Additional Therapeutic activities/exercises completed this date:     []   ADL Training   []   Balance/Postural training     []   Bed/Transfer Training   [x]   Endurance Training: Therapist engages Pt in seated arm ergometer exercise for ~10 min total b/w 20-25 RPM. Pt requires multiple and frequent rest breaks throughout to complete with thoroughness. []   Neuromuscular Re-ed   []   Nu-step:  Time:        Level:         #Steps:       []   Rebounder:    []  Seated     []  Standing        []   Supine Ther Ex (reps/sets):     [x]   Seated Ther Ex (reps/sets): Therapist engages Pt in seated protraction/retractions 3x10 reps using 2.5lb straight wt. For improved UB strength. Therapist engages Pt in seated Rickshaw exercises 2x10 reps @ 28.8lbs for improved functional transfers and in prep for ADL's.     []   Standing Ther Ex (reps/sets):     []   Other:      Comments:  Upon entering room Pt w/o O2 wear stating he \"does not need\" at given moment. Therapist engages Pt in endurance tasks in which Pt request O2 wear due to possibility of fatigue. Patient/Caregiver Education and Training:   []   [employer] Equipment Use  []   Bed Mobility/Transfer Technique/Safety  [x]   Energy Conservation Tips  []   Family training  [x]   Postural Awareness  [x]   Safety During Functional Activities  []   Reinforced Patient's Precautions   []   Progress was updated and reviewed in Rehabtracker with patient and/or family this         date. Treatment Plan for Next Session: ADL task completion      Assessment:  Pt displays very poor activity tolerance on this date as evidenced by requiring frequent rest breaks taking ~30 min total to complete 10 min on arm ergometer. Pt on 2L O2 throughout session w/o complaints or signs of dizziness/light headedness.        Treatment/Activity Tolerance:   [x] Tolerated treatment with no adverse effects    [x] Patient limited by fatigue  [] Patient limited by pain   [] Patient limited by medical complications:    [] Adverse reaction to Tx:   [] Significant change in status    Safety:       []  bed alarm set    [x]  chair alarm set    []  Pt refused alarms                []  Telesitter activated      [x]  Gait belt used during tx session      []other:       Number of Minutes/Billable Intervention  Therapeutic Exercise 56   ADL Self-care    Neuro Re-Ed    Therapeutic Activity    Group    Other:    TOTAL 56       Social History  Social/Functional History  Lives With: Spouse(Maggie )  Type of Home: House  Home Layout: Two level, Bed/Bath upstairs(equipped with stair lift to access 2nd floor )  Home Access: Stairs to enter with rails  Entrance Stairs - Number of Steps: 3  Entrance Stairs - Rails: Right  Bathroom Shower/Tub: Tub/Shower unit  Bathroom Toilet: Handicap height  Bathroom Equipment: Grab bars around toilet, Shower chair  Bathroom Accessibility: Accessible  Home Equipment: Rolling walker, Cane(stair lift)  Receives Help From: Family  ADL Assistance: Independent  Homemaking Responsibilities: No(Pt reports light cleaning)  Ambulation Assistance: Independent(used walker at home, SPC in the community )  Transfer Assistance: Independent  Active : Yes  Mode of Transportation: Car  Additional Comments: sleeps in regular/flat bed, not on supplemental O2 at home, no falls in the past year; spouse organized pt's meds in pill box organizer due to hand limitations from arthritis, pt was able to manage meds Ind once organized by spouse; information verified by spouse due to pt's memory deficits     Objective                                                                                    Goals:  (Update in navigator)  Short term goals  Time Frame for Short term goals: 7-10 days  Short term goal 1: Pt to complete eating with (I)  Short term goal 2: Pt to complete bathing with min(A)  Short term goal 3: Pt to complete LB dressing with Mod(A) using AE as needed  Short term goal 4: Pt to complete toileting with Min(A)  Short term goal 5: Pt to complete all functional transfers with min(A):  Long term goals  Time Frame for Long term goals : 14-21 days  Long term goal 1: Pt to complete grooming with Mod(I) standing at sink  Long term goal 2: Pt to complete bathing with SBA  Long term goal 3: Pt to complete UB dressing with Mod(I)  Long term goal 4: Pt to complete LB dressing with SBA using AE as needed  Long term goal 5: Pt to complete toileting with SBA  Long term goals 6: Pt to complete all functional transfers (bed, shower, toilet) with SBA  Long term goal 7: Pt to participate in ongoing ther ex/act. with emphasis on >5 min static/dynamic standing tolerance :        Plan of Care                                                                              Times per week: 5 days per week for a minimum of 60 minutes/day plus group as appropriate for 60 minutes.   Treatment to include Plan  Times per day: Daily  Current Treatment Recommendations: Strengthening, Endurance Training, ROM, Pain Management, Balance Training, Functional Mobility Training, Safety Education & Training, Positioning, Self-Care / ADL, Equipment Evaluation, Education, & procurement, Patient/Caregiver Education & Training    Electronically signed by   Elda Ron MS, OTR/L  3/18/2019, 8:52 AM

## 2019-03-18 NOTE — PROGRESS NOTES
Nutrition Assessment    Type and Reason for Visit: Reassess(ongoing follow up)    Nutrition Recommendations: Continue diet consistency per speech   Continue to offer frozen oral supplement (Magic Cup)  to provide 290 kcal and 9 grams of protein. Encourage consistent intake    Nutrition Assessment: Meal intake remains inconsistent. Small meals at times and reporting less intake now due to upset stomach. Remains on pureed diet with nectar thick liquids with frozen supplement offered. Noted patient may drink DM nutrition supplement at times at home. Remains high nutrition risk. Magic cups with similar calorie and protein content of DM supplement. Malnutrition Assessment:  · Malnutrition Status:  At risk for malnutrition  · Context: Acute illness or injury    Nutrition Risk Level: High    Nutrient Needs:  · Estimated Daily Total Kcal: 2564-0556 (25-30 calvin/kg)  · Estimated Daily Protein (g): 69-83 (1-1.2 g/kg)  · Estimated Daily Total Fluid (ml/day): 0863-8985 ( 1ml/calvin)    Nutrition Diagnosis:   · Problem: Inadequate oral intake  · Etiology: related to Difficulty swallowing, Insufficient energy/nutrient consumption     Signs and symptoms:  as evidenced by Diet history of poor intake    Objective Information:  · Nutrition-Focused Physical Findings: advanced age, mssing most teeth, difficulty hearing  · Wound Type: None  · Current Nutrition Therapies:  · Oral Diet Orders: Dysphagia 1 (Pureed), Nectar Thick   · Oral Diet intake: 26-50%  · Oral Nutrition Supplement (ONS) Orders: Frozen Oral Supplement  · ONS intake: 26-50%  · Anthropometric Measures:  · Ht: 5' 10\" (177.8 cm)   · Current Body Wt: 149 lb 11.1 oz (67.9 kg)  · Admission Body Wt: 154 lb 8.7 oz (70.1 kg)  · Usual Body Wt: (140-150# usual range per patient )  · Ideal Body Wt: 166 lb (75.3 kg), % Ideal Body 91  · BMI Classification: BMI 18.5 - 24.9 Normal Weight    Nutrition Interventions:   Continue current diet, Continue current ONS  Feeding

## 2019-03-18 NOTE — PROGRESS NOTES
Records reviewed. Pt examined on the ARU this AM.     ADMITTING DIAGNOSIS:  Pneumonia. COMORBID DIAGNOSES IMPACTING REHABILITATION:  Influenza A with hypoxia,  uncontrolled hypertension, diabetes, acute renal failure with chronic  kidney disease stage II, and dysphagia. CHIEF COMPLAINT:  Much less coughing. A bit more energetic today. Eating better. Resting better. Occasional diarrhea. No dysuria. No chills or rigors. The remainder of his review of systems is negative. PHYSICAL EXAMINATION:  VITAL SIGNS: Blood pressure 129/62, pulse 99, temperature 97.9 °F (36.6 °C), temperature source Oral, resp. rate 16, height 5' 10\" (1.778 m), weight 149 lb 11.2 oz (67.9 kg), SpO2 93 %. GENERAL:  The patient is seen sitting up in bed. Oriented ×2. Follows one-step commands. Breathing  easily. HEENT:   Visual fields are full. Mucous membranes are moist.    NECK:  Supple. LUNGS:  There are rhonchi throughout his lungs  No rales or wheezes were noted. Effort of respiration is  minimal.     HEART:  Sounds reveal a pansystolic murmur with a regular rate and rhythm. ABDOMEN:  His thin abdomen is soft. Bowel sounds are present. There is  no rebound, guarding, or masses noted. EXTREMITIES:  Some crepitus at the shoulder when he tries to raise his arms up dexterity is fair. In the lower limbs, he has  weakness with hip flexion and knee extension affecting transfers. He has trace edema about the ankles. Both heels are clear and there are no signs of DVT. Sitting balance is good. Standing balance is fair-. VC's and CGA-Min PA with transfers. LABORATORY TESTING:  Potassium is 3.4, BUN and creatinine are 17 and 1.1,  calcium 7.7. White blood count 10.1, hemoglobin 11.5, plate count  215,852.     IMPRESSION:  An 77-year-old male with pneumonia and influenza A with  hypoxia, uncontrolled hypertension, diabetes, acute renal failure,  superimposed upon chronic kidney disease stage II, and dysphagia. Limitations include his age, his difficulty hearing, and his poor endurance. Expected length of stay prior to a supervised level of function for discharge to home i3/23/2019. RECOMMENDATIONS:  1. Pneumonia with influenza A and gait disturbance: The patient  requires daily occupational and physical therapy. Continues to require pulmonary hygiene and O2 supplementation, but his coughing is improving. Monitor vital signs and O2 saturations at rest and with activihe has completed his Tamiflu but is still taking by mouth Levaquin and Flagyl. DVT prophylaxis emphasized. fair activity tolerance for therapies. Increasing time out of bed and up in a chair. Continue antibiotics. 2.  DVT prophylaxis:  Lovenox 40 mg subcutaneous daily. I must monitor  his hemoglobin and platelet count while on this medication. Weightbearing activities increase each day. No new bruising. Check labs in a.m.    3.  Uncontrolled hypertension:   Recent hypotension required weaning off some medications. He may be a candidate for diuretics again once his renal function stabilizes. 4.  Diabetes: The patient requires no calorie restrictions on his diet  because his intake has been so poor. Monitor blood sugars p.r.n. he received metformin this morning. Blood sugars are monitored twice a day now. 5.  Dysphagia: The patient is on a pureed diet with nectar-thick  liquids and Speech Therapy works on swallowing exercises. General debility is the etiology at this point. No change to diet.

## 2019-03-19 NOTE — PROGRESS NOTES
Accessibility: Accessible  Home Equipment: Rolling walker, Cane(stair lift)  Receives Help From: Family  ADL Assistance: Independent  Homemaking Responsibilities: No(Pt reports light cleaning)  Ambulation Assistance: Independent(used walker at home, SPC in the community )  Transfer Assistance: Independent  Active : Yes  Mode of Transportation: Car  Additional Comments: sleeps in regular/flat bed, not on supplemental O2 at home, no falls in the past year; spouse organized pt's meds in pill box organizer due to hand limitations from arthritis, pt was able to manage meds Ind once organized by spouse; information verified by spouse due to pt's memory deficits     Objective                                                                                    Goals:  (Update in navigator)  Short term goals  Time Frame for Short term goals: 5 tx days:   Short term goal 1: Pt will complete sit<->stand transfers from elevated seat height with Min A. Short term goal 2: Pt will ambulate 150 ft using RW with CGA. Short term goal 3: Pt will ascend/descend 4 steps using bilat rails with CGA   Short term goal 4: Pt will ascend/descend curb step and ambulate over uneven surfaces using RW with CGA. :  Long term goals  Time Frame for Long term goals : 12 tx days or until discharge:   Long term goal 1: Pt will complete bed mobility and sup<->sit Ind. Long term goal 2: Pt will complete OOB transfers with RW with Sup. Long term goal 3: Pt will ambulate >/=150 ft using RW with Sup. Long term goal 4: Pt will ascend/descend 4 steps using 1 rail + SPC with Sup. Long term goal 5: Pt will ascend/descend curb step and ambulate over uneven surfaces using RW with Sup. :        Plan of Care                                                                              Times per week: 5 days per week for a minimum of 60 minutes/day plus group as appropriate for 60 minutes.   Treatment to include Current Treatment Recommendations: Strengthening, Transfer Training, Endurance Training, Cognitive Reorientation, Patient/Caregiver Education & Training, [employer], Equipment Evaluation, Education, & procurement, Balance Training, Gait Training, Home Exercise Program, Functional Mobility Training, Cognitive/Perceptual Training, Stair training, Safety Education & Training    Electronically signed by   Cosmo Jackson, PT   3/19/2019, 11:35 AM

## 2019-03-19 NOTE — PROGRESS NOTES
Occupational Therapy  Physical Rehabilitation: OCCUPATIONAL THERAPY     [x] daily progress note       [] discharge       Patient Name:  Maren Cuenca   :  7/15/1933 MRN: [id number]  Room:  [address] [place] [address] Date of Admission: 3/12/2019  Rehabilitation Diagnosis:   Pneumonia [J18.9]       Date 3/19/2019       Day of ARU Week:  1   Time IN/OUT 13:00/14:00   Individual Tx Minutes 60   Group Tx Minutes    Co-Treat Minutes    Concurrent Tx Minutes    TOTAL Tx Time Mins 60   Variance Time    Variance Time []   Refusal due to:     []   Medical hold/reason:    []   Illness   []   Off Unit for test/procedure  []   Extra time needed to complete task  []   Therapeutic need  []   Other (specify):   Restrictions Restrictions/Precautions: Fall Risk, General Precautions, Isolation, Swallowing - Thickened Liquids(droplet precautions )         Communication with other providers: [x]   OK to see per nursing:     []   Spoke with team member regarding:      Subjective observations and cognitive status: Pt with slight irritation on this date upon entering room stating \"I figured since it was already this late I wasn't getting therapy\".      Pain level/location:    0/10       Location: No complaints of pain   Discharge recommendations  Anticipated discharge date:  3-23  Destination: []home alone   []home alone w assist prn   [x] home w/ family    [] Continuous supervision       []SNF    [] Assisted living     [] Other:   Continued therapy: [x]HHC OT  []OUTPATIENT  OT   [] No Further OT  Equipment needs: n/a       Bed Mobility:           [x]   Pt received out of bed       Transfers:    Sit--> Stand:  Min(A) for stability; min VC given for hand placement  Stand --> Sit:   Min(A) for stability; min VC given for hand placement  Stand-Pivot:   Min(A) for stability; min VC given for hand placement  Other:    Assistive device required for transfer:  w/c      Functional Mobility:    Assistance:  n/a  Device:   []   EchoStar     []   Standard activity. Entire session with focus on PLB and coordination of breathing into task in which Pt requires Max VC to coordinate w/100% accuracy.        Treatment/Activity Tolerance:   [x] Tolerated treatment with no adverse effects    [x] Patient limited by fatigue  [] Patient limited by pain   [] Patient limited by medical complications:    [] Adverse reaction to Tx:   [] Significant change in status    Safety:       []  bed alarm set    [x]  chair alarm set    []  Pt refused alarms                []  Telesitter activated      [x]  Gait belt used during tx session      []other:       Number of Minutes/Billable Intervention  Therapeutic Exercise 60   ADL Self-care    Neuro Re-Ed    Therapeutic Activity    Group    Other:    TOTAL 60       Social History  Social/Functional History  Lives With: Spouse(Maggie )  Type of Home: House  Home Layout: Two level, Bed/Bath upstairs(equipped with stair lift to access 2nd floor )  Home Access: Stairs to enter with rails  Entrance Stairs - Number of Steps: 3  Entrance Stairs - Rails: Right  Bathroom Shower/Tub: Tub/Shower unit  Bathroom Toilet: Handicap height  Bathroom Equipment: Grab bars around toilet, Shower chair  Bathroom Accessibility: Accessible  Home Equipment: Rolling walker, Cane(stair lift)  Receives Help From: Family  ADL Assistance: Independent  Homemaking Responsibilities: No(Pt reports light cleaning)  Ambulation Assistance: Independent(used walker at home, SPC in the community )  Transfer Assistance: Independent  Active : Yes  Mode of Transportation: Car  Additional Comments: sleeps in regular/flat bed, not on supplemental O2 at home, no falls in the past year; spouse organized pt's meds in pill box organizer due to hand limitations from arthritis, pt was able to manage meds Ind once organized by spouse; information verified by spouse due to pt's memory deficits     Objective

## 2019-03-19 NOTE — PROGRESS NOTES
[facility]  ACUTE REHAB UNIT  SPEECH/LANGUAGE PATHOLOGY      [x] Daily           [] Discharge    Patient:Tai Mason      [facility]:[birth year]  J:[id number]  Rehab Dx/Hx: Pneumonia [J18.9]   No Known Allergies  Precautions: Sit up for all meals and thereafter for 30 minutes, Eat with small bites (1/2 tsp; 1 tsp), Drink from a cup only with small sips, No straws, Alternate solids with liquids and Take your medication with apple sauce; Restrictions/Precautions: Fall Risk, General Precautions, Isolation, Swallowing - Thickened Liquids(droplet precautions )          Home Situation/IADL:   Social/Functional History  Lives With: Spouse(Maggie )  Type of Home: House  Home Layout: Two level, Bed/Bath upstairs(equipped with stair lift to access 2nd floor )  Home Access: Stairs to enter with rails  Entrance Stairs - Number of Steps: 3  Entrance Stairs - Rails: Right  Bathroom Shower/Tub: Tub/Shower unit  Bathroom Toilet: Handicap height  Bathroom Equipment: Grab bars around toilet, Shower chair  Bathroom Accessibility: Accessible  Home Equipment: Rolling walker, Cane(stair lift)  Receives Help From: Family  ADL Assistance: Independent  Homemaking Responsibilities: No(Pt reports light cleaning)  Ambulation Assistance: Independent(used walker at home, SPC in the community )  Transfer Assistance: Independent  Active : Yes  Mode of Transportation: Car  Additional Comments: sleeps in regular/flat bed, not on supplemental O2 at home, no falls in the past year; spouse organized pt's meds in pill box organizer due to hand limitations from arthritis, pt was able to manage meds Ind once organized by spouse; information verified by spouse due to pt's memory deficits     POC: Pt will be seen 5x/week for a minimum of 30 minutes per day. Co-treats where appropriate with PT or OT to facilitate patient goals in functional tasks.     Date of Admit: 3/12/2019  Room #: 1010/1010-A     ST Dx:   [] Cognition/  Memory Deficits  []   Dysphagia  []  Dysarthria  []  Apraxia  []   Aphasia  []   Other   # billable minutes per area           Date: 3/19/2019  Day of ARU Week:  1       SLP Individual Minutes  Time In: 1100  Time Out: 1620  Minutes: 75 Group in:      Group Out:      Group total-  [place] in:    [place] out-   [place] total-      Variance/Reason:  [] Refusal due to   [] Medical hold/reason  [] Illness   [] Off Unit for test/procedure  [] Extra time needed to complete task  [] Other (specify)    Activity completed: Swallow tx for exercises-IOPI, livia, CTAR, airway protection and PO trials for diet advancement. Pain: denies  Current Diet: Dietary Nutrition Supplements: Frozen Oral Supplement  DIET GENERAL; Dysphagia II Mechanically Altered; Nectar Thick  Subjective: Pt alert and sitting up in chair. Improved productive cough when pt leans forward      Goals and POC:  LTG                                Short-term Goals  Timeframe for Short-term Goals: LTG: Pt will safely consume least restrictive diet with no s/s aspiration  FIM Goal: Eatin  Goal 1: Pt will tolerate nectar liquids/puree without s/s aspiration. Meeting goal with limited intake. Goal 2: Pt will complete lingual and pharyngeal ROM/STR for safe diet advancement in the absence of aspiration. Chin tuck against resistance was completed to assist with pharyngeal STR. Pt sitting upright in chair with rolled towel held for 20 secs x 5 sets. Airway protection exercises were completed this date. Adduction was completed x3 using an effortful grunt x 15 sets. Incentive spirometer was completed x 20 sets with pt achieving 250-500 ml  [place] Pressure Instrument was used to target lingual STR at 40kPa goal pressure. 10Lingual Pulses were completed x 10sets. 10 sec Improvement was evident by increased achievement of target goal during session. Jacky Mech was completed  Pt achieved 5 swallows/ 5 trials.     Productive cough when pt cued to lean forward with clear mucus removed. Goal 3: Pt will complete therapeutic PO trials with SLP for thins and soft solids. Pt consumed canned pears, cottage cheese, and green beans with no distress. Mastication was good, bite size was small, and swallow was timely with repeated swallows x2 to clear. Diet advanced to dysphagia 2 with nectar thick liquids and ice chips between meals. Alarm placed: []bed [x]chair   []other:   [] activated        Barriers to progress:   [x] Fatigue        [] Cognitive Deficits   [] Memory Deficits   [] Reduced Attn   [] Self Limiting Behaviors    [] Reduced insight/awareness     [] Visual Deficits   [] Premorbid Conditions     [] Other      Education/Interventions used this date: safe swallow protocol. Pt directed this therapist mid meal to assist with cutting up food. [x]   Progress was updated and reviewed in Rehabtracker with patient and/or family this   date. [] Attending Care Conference for pt this date. See Team Patient Care Conference Note for updates.     Interventions used this date:  [] Speech/Language Treatment    [] Instruction in HEP    Group   [x] Dysphagia Treatment   [] Cognitive Skill Juan    Other:         Assessment / Impression                                                          Treatment/Activity Tolerance:   [x] Tolerated Treatment well:     [] Patient limited by fatigue/pain:       [] Patient limited by medical complications:    [] Adverse Reaction to Tx:   [] Significant change in status:      Electronically Signed by  Debbie Kay MA, CCC-SLP    3/19/2019  1:10 PM

## 2019-03-19 NOTE — PROGRESS NOTES
Records reviewed. Pt examined on the ARU this AM.     ADMITTING DIAGNOSIS:  Pneumonia. COMORBID DIAGNOSES IMPACTING REHABILITATION:  Influenza A with hypoxia,  uncontrolled hypertension, diabetes, acute renal failure with chronic  kidney disease stage II, and dysphagia. CHIEF COMPLAINT:  Asking about therapy plans for after discharge. Eating better. Resting better. Infrequent loose stool. No dysuria. No chills or rigors. The remainder of his review of systems is negative. PHYSICAL EXAMINATION:  VITAL SIGNS: Blood pressure 121/65, pulse 99, temperature 98.1 °F (36.7 °C), resp. rate 18, height 5' 10\" (1.778 m), weight 149 lb 11.2 oz (67.9 kg), SpO2 (!) 88 %. GENERAL:  The patient is seen sitting up in a wheelchair. Oriented ×3. In no distress. Follows one-step commands. Breathing  easily. HEENT:   Visual fields are full. Mucous membranes are moist.    NECK:  Supple. LUNGS:  There are rhonchi throughout his lungs  No rales or wheezes were noted. Effort of respiration is  minimal.     HEART:  Sounds reveal a pansystolic murmur with a regular rate and rhythm. ABDOMEN:  His thin abdomen is soft. Bowel sounds are present. There is  no rebound, guarding, or masses noted. EXTREMITIES:  Some crepitus at the shoulder when he tries to raise his arms up dexterity is fair. In the lower limbs, he has  weakness with hip flexion and knee extension affecting transfers. He has trace edema about the ankles. Both heels are clear and there are no signs of DVT. Sitting balance is good. Standing balance is fair. VC's and CGA with transfers. Improving overall. LABORATORY TESTING:  Potassium is 4.0, BUN and creatinine are 21 and 1.2,  calcium 7.7. White blood count 9.9, hemoglobin 11.1, platelet count  458,334.     IMPRESSION:  An 43-year-old male with pneumonia and influenza A with  hypoxia, uncontrolled hypertension, diabetes, acute renal failure,  superimposed upon chronic kidney disease stage

## 2019-03-20 NOTE — PROGRESS NOTES
Records reviewed. Pt examined on the ARU this AM.     ADMITTING DIAGNOSIS:  Pneumonia. COMORBID DIAGNOSES IMPACTING REHABILITATION:  Influenza A with hypoxia,  uncontrolled hypertension, diabetes, acute renal failure with chronic  kidney disease stage II, and dysphagia. CHIEF COMPLAINT:  Less coughing. Eating better. Resting better. Infrequent loose stool. No dysuria. No chills or rigors. The remainder of his review of systems is negative. PHYSICAL EXAMINATION:  VITAL SIGNS: Blood pressure 119/60, pulse 97, temperature 97.4 °F (36.3 °C), temperature source Oral, resp. rate 16, height 5' 10\" (1.778 m), weight 156 lb 4.8 oz (70.9 kg), SpO2 98 %. GENERAL:  The patient is seen sitting up in bed. Oriented ×3. In no distress. Follows one-step commands. Breathing easier. HEENT:   Visual fields are full. Mucous membranes are moist.    NECK:  Supple. LUNGS:  There are rhonchi throughout his lungs  No rales or wheezes were noted. Effort of respiration is  minimal.     HEART:  Sounds reveal a pansystolic murmur with a regular rate and rhythm. ABDOMEN:  His thin abdomen is soft. Bowel sounds are present. There is  no rebound, guarding, or masses noted. EXTREMITIES:  Some crepitus at the shoulder when he tries to raise his arms up dexterity is fair. In the lower limbs, he has  4/5 strength with hip flexion and knee extension. He has trace edema about the ankles. Both heels are clear and there are no signs of DVT. Sitting balance is good. Standing balance is fair. VC's and CGA with transfers. Improving overall. LABORATORY TESTING:  Potassium is 4.0, BUN and creatinine are 21 and 1.2,  calcium 7.7. White blood count 9.9, hemoglobin 11.1, platelet count  298,886. IMPRESSION:  An 75-year-old male with pneumonia and influenza A with  hypoxia, uncontrolled hypertension, diabetes, acute renal failure,  superimposed upon chronic kidney disease stage II, and dysphagia.     Limitations include his age, his difficulty hearing, and his poor endurance. Expected length of stay prior to a supervised level of function for discharge to home i3/23/2019. RECOMMENDATIONS:  1. Pneumonia with influenza A and gait disturbance: The patient  requires daily occupational and physical therapy. Continues to require pulmonary hygiene and O2 supplementation, but his coughing is improving. Monitor vital signs and O2 saturations at rest and with activity. DVT prophylaxis emphasized. Out of contact isolation. Tolerance or activity is improving. Off antibiotics now. CGT for going home this week. 2.  DVT prophylaxis:  Lovenox 40 mg subcutaneous daily. I must monitor  his hemoglobin and platelet count while on this medication. Weightbearing activities increase each day. No new bruising. Hemoglobin and platelet are holding relatively steady. Did more walking today. 3.  Uncontrolled hypertension:   Recent hypotension required weaning off some medications. He may be a candidate for diuretics again once his renal function stabilizes. This point his blood pressure stabilized. His weight is also stable. No medication changes today. 4.  Diabetes: The patient requires no calorie restrictions on his diet  because his intake has been so poor. Monitor blood sugars p.r.n. he received metformin this morning. Blood sugars are monitored twice a day now. Blood sugar less than 160.    5.  Dysphagia: The patient is on a pureed diet with nectar-thick  liquids and Speech Therapy works on swallowing exercises. General debility is the etiology at this point. No change to diet.

## 2019-03-20 NOTE — FLOWSHEET NOTE
[x] daily progress note       [] discharge       Patient Name:  Carl Bailey   :  7/15/1933 MRN: [id number]  Room:  [address] [place], [place] [address] Date of Admission: 3/12/2019  Rehabilitation Diagnosis:   Pneumonia [J18.9]       Date 3/20/2019       Day of ARU Week:  2   Time IN/OUT 8990-6849  1718-8415   Individual Tx Minutes 124   TOTAL Tx Time Mins 124   Restrictions Restrictions/Precautions  Restrictions/Precautions: Fall Risk, General Precautions, Isolation, Swallowing - Thickened Liquids(droplet precautions )      Communication with other providers: [x]   OK to see per nursing:     []   Spoke with team member regarding:      Subjective observations and cognitive status: Pt seen sitting up in recliner at beginning of treatment. Agreeable to therapy. Pt on 2 L of O2. PM: pt seen sitting up in recliner at beginning of treatment. Pt's BP was 114/53. Agreeable to therapy after max encouragement. Respiratory therapist Newell Osler) requested to place pt on room air O2. Pt's O2 sats dropped to 87% on room air O2 after activity. Pt was then placed back on 2 L of O2 per [facility] request. Respiratory therapist was then informed. Pt required max encouragement to participate in therapy throughout treatment. Pain level/location:   0 /10            Discharge recommendations  Anticipated discharge date:  19  Destination: []home alone   []home alone with assist PRN     [x] home w/ family      [x] Continuous supervision  []SNF    [] Assisted living     [] Other:   Continued therapy: [x]HHC PT  []OUTPATIENT  PT   [] No Further PT  Equipment needs: has RW       [x]   Pt received out of bed     Transfers:    Sit--> Stand:  Min A-mod A in AM; SBA-min A in PM  Stand --> Sit:   Min A  Stand-Pivot:   Min A   Assistive device required for transfer:  RW  vcs for proper hand placement.  Vcs for proper technique     Gait:    Distance:  AM: 384'; PM: 380'   Assistance:  SBA  Device:  RW  Gait Quality:  vcs to stay closer to walker; vcs to increase trunk extension posture. Stairs (PM)  # Completed:  4  Assistance:  CGA  Supportive Device:  2 rails   Height:  6\"    Curb    (PM)  Assistance:  CGA-min A   Supportive Device: RW  Height:  6\"   Pt required vcs for technique and safety. Uneven Surfaces: (PM)   Assistance:   CGA  Device:    RW  Surfaces Completed:   [x]  Green mat with bean bags beneath      []  Throw rugs             []  Outdoor pavements      []  Grass          []  Loose gravel        []  Other:     PM:  Pt amb over transitional surface SBA with RW. Pt amb over carpeted surface SBA with RW. Additional Therapeutic activities/exercises completed this date:     [x]   Nu-step: (AM) Time: 10 minutes       Level:  1     (for strengthening and endurance) with 2 rest breaks. []   Rebounder:    []  Seated     []  Standing        []   Balance training         []   Postural training    []   Supine ther ex (reps/sets):     []   Seated ther ex (reps/sets):     [x]   Standing ther ex (reps/sets): (AM) heel raises, marching, hip abduction, hip extension x 10 reps each for strengthening. []   Picked up object from floor     Assist Level:                     []   Reacher used   []   Other:   []   Other:   []   Other:    Patient/Caregiver Education and Training:   [x]   Transfer technique/safety  [x]   Gait technique/sequencing  [x]   Proper use of assistive device  [x]   Advanced mobility safety and technique  []   Reinforced patient's precautions/mobility while maintaining precautions  []   Postural awareness  []   Family training  [x]   Progress was updated in Rehabtracker this date.     Treatment Plan for Next Session: gait; transfers; exercises; advanced gait; stair training; balance training     Assessment:    Treatment/Activity Tolerance:   [] Tolerated treatment with no adverse effects    [x] Patient limited by fatigue  [] Patient limited by pain   [] Patient limited by medical complications:    [] Adverse reaction to Tx:   [] Significant change in status    Safety:       []  bed alarm set    [x]  chair alarm set    []  Pt refused alarms                []  Telesitter activated      [x]  Gait belt used during tx session      [x]other: pt left sitting up in recliner with call light at end of treatment. Number of Minutes/Billable Intervention  Gait Training 75   Therapeutic Exercise 30   Neuro Re-Ed    Therapeutic Activity 19   Wheelchair Propulsion    Group    Other:    TOTAL 124         Social History  Social/Functional History  Lives With: Spouse(Maggie )  Type of Home: House  Home Layout: Two level, Bed/Bath upstairs(equipped with stair lift to access 2nd floor )  Home Access: Stairs to enter with rails  Entrance Stairs - Number of Steps: 3  Entrance Stairs - Rails: Right  Bathroom Shower/Tub: Tub/Shower unit  Bathroom Toilet: Handicap height  Bathroom Equipment: Grab bars around toilet, Shower chair  Bathroom Accessibility: Accessible  Home Equipment: Rolling walker, Cane(stair lift)  Receives Help From: Family  ADL Assistance: Independent  Homemaking Responsibilities: No(Pt reports light cleaning)  Ambulation Assistance: Independent(used walker at home, SPC in the community )  Transfer Assistance: Independent  Active : Yes  Mode of Transportation: Car  Additional Comments: sleeps in regular/flat bed, not on supplemental O2 at home, no falls in the past year; spouse organized pt's meds in pill box organizer due to hand limitations from arthritis, pt was able to manage meds Ind once organized by spouse; information verified by spouse due to pt's memory deficits     Objective                                                                                    Goals:  (Update in navigator)  Short term goals  Time Frame for Short term goals: 5 tx days:   Short term goal 1: Pt will complete sit<->stand transfers from elevated seat height with Min A. Short term goal 2: Pt will ambulate 150 ft using RW with CGA.    Short term goal 3: Pt will ascend/descend 4 steps using bilat rails with CGA   Short term goal 4: Pt will ascend/descend curb step and ambulate over uneven surfaces using RW with CGA. :  Long term goals  Time Frame for Long term goals : 12 tx days or until discharge:   Long term goal 1: Pt will complete bed mobility and sup<->sit Ind. Long term goal 2: Pt will complete OOB transfers with RW with Sup. Long term goal 3: Pt will ambulate >/=150 ft using RW with Sup. Long term goal 4: Pt will ascend/descend 4 steps using 1 rail + SPC with Sup. Long term goal 5: Pt will ascend/descend curb step and ambulate over uneven surfaces using RW with Sup. :        Plan of Care                                                                              Times per week: 5 days per week for a minimum of 60 minutes/day plus group as appropriate for 60 minutes.   Treatment to include Current Treatment Recommendations: Strengthening, Transfer Training, Endurance Training, Cognitive Reorientation, Patient/Caregiver Education & Training, ROM, Equipment Evaluation, Education, & procurement, Balance Training, Gait Training, Home Exercise Program, Functional Mobility Training, Cognitive/Perceptual Training, Stair training, Safety Education & Training    Electronically signed by   Krystal Torres, [id number]  3/20/2019, 11:39 AM

## 2019-03-20 NOTE — PROGRESS NOTES
Slow,steady, no LOB  Assistance:    Device:   [x]   Rolling Walker     []   Standard Walker []   Wheelchair        []   Mundo Almanza       []   Santamaria Turkmen         []   Cardiac [place]       []   Other:        Homemaking Tasks: Additional Therapeutic activities/exercises completed this date:     [x]   ADL Training   [x]   Balance/Postural training   Standing bal act x 5 minutes x 2 @ RW,  Unsteady when coughing    []   Bed/Transfer Training   [x]   Endurance Training   []   Neuromuscular Re-ed   []   Nu-step:  Time:        Level:         #Steps:       []   Rebounder:    []  Seated     []  Standing        []   Supine Ther Ex (reps/sets):     []   Seated Ther Ex (reps/sets):     [x]   Standing Ther Ex (reps/sets): Bue exercises c #3 free wts x 10 reps x 4 sets,  Fatigue noted   []   Other:      Comments:      Patient/Caregiver Education and Training:   []   Adaptive Equipment Use  []   Bed Mobility/Transfer Technique/Safety  [x]   Energy Conservation Tips  []   Family training  [x]   Postural Awareness  [x]   Safety During Functional Activities  [x]   Reinforced Patient's Precautions   []   Progress was updated and reviewed in Rehabtracker with patient and/or family this         date.     Treatment Plan for Next Session  See poc  Assessment:        Treatment/Activity Tolerance:   [x] Tolerated treatment with no adverse effects    [] Patient limited by fatigue  [] Patient limited by pain   [] Patient limited by medical complications:    [] Adverse reaction to Tx:   [] Significant change in status    Safety:       []  bed alarm set    []  chair alarm set    []  Pt refused alarms                []  Telesitter activated      [x]  Gait belt used during tx session      []other:       Number of Minutes/Billable Intervention  Therapeutic Exercise 30   ADL Self-care    Neuro Re-Ed    Therapeutic Activity 15   Group    Other:    TOTAL 45       Social History  Social/Functional History  Lives With: Spouse(Maggie )  Type of Home: House  Home Layout: Two level, Bed/Bath upstairs(equipped with stair lift to access 2nd floor )  Home Access: Stairs to enter with rails  Entrance Stairs - Number of Steps: 3  Entrance Stairs - Rails: Right  Bathroom Shower/Tub: Tub/Shower unit  Bathroom Toilet: Handicap height  Bathroom Equipment: Grab bars around toilet, Shower chair  Bathroom Accessibility: Accessible  Home Equipment: Rolling walker, Cane(stair lift)  Receives Help From: Family  ADL Assistance: Independent  Homemaking Responsibilities: No(Pt reports light cleaning)  Ambulation Assistance: Independent(used walker at home, SPC in the community )  Transfer Assistance: Independent  Active : Yes  Mode of Transportation: Car  Additional Comments: sleeps in regular/flat bed, not on supplemental O2 at home, no falls in the past year; spouse organized pt's meds in pill box organizer due to hand limitations from arthritis, pt was able to manage meds Ind once organized by spouse; information verified by spouse due to pt's memory deficits     Objective                                                                                    Goals:  (Update in navigator)  Short term goals  Time Frame for Short term goals: 7-10 days  Short term goal 1: Pt to complete eating with (I)  Short term goal 2: Pt to complete bathing with min(A)  Short term goal 3: Pt to complete LB dressing with Mod(A) using AE as needed  Short term goal 4: Pt to complete toileting with Min(A)  Short term goal 5: Pt to complete all functional transfers with min(A):  Long term goals  Time Frame for Long term goals : 14-21 days  Long term goal 1: Pt to complete grooming with Mod(I) standing at sink  Long term goal 2: Pt to complete bathing with SBA  Long term goal 3: Pt to complete UB dressing with Mod(I)  Long term goal 4: Pt to complete LB dressing with SBA using AE as needed  Long term goal 5: Pt to complete toileting with SBA  Long term goals 6: Pt to complete all functional transfers (bed, shower, toilet) with SBA  Long term goal 7: Pt to participate in ongoing ther ex/act. with emphasis on >5 min static/dynamic standing tolerance :        Plan of Care                                                                              Times per week: 5 days per week for a minimum of 60 minutes/day plus group as appropriate for 60 minutes.   Treatment to include Plan  Times per day: Daily  Current Treatment Recommendations: Strengthening, Endurance Training, ROM, Pain Management, Balance Training, Functional Mobility Training, Safety Education & Training, Positioning, Self-Care / ADL, Equipment Evaluation, Education, & procurement, Patient/Caregiver Education & Training    Electronically signed by   Jeffery Nicholson,  3/20/2019, 1:01 PM

## 2019-03-20 NOTE — PROGRESS NOTES
[x]   Dysphagia  []  Dysarthria  []  Apraxia  []   Aphasia  []   Other   # billable minutes per area  37         Date: 3/20/2019  Day of ARU Week:  2       SLP Individual Minutes  Time In: 1083  Time Out: 4640  Minutes: 37     Variance/Reason:  [] Refusal due to   [] Medical hold/reason  [] Illness   [] Off Unit for test/procedure  [] Extra time needed to complete task  [] Other (specify)    Activity completed: Swallow tx    Pain: denies  Current Diet: Dietary Nutrition Supplements: Frozen Oral Supplement  DIET GENERAL; Dysphagia II Mechanically Altered; Nectar Thick  Subjective: Pt alert and motivated. Very responsive to exercises and wanting to get better. Goals and POC:  LTG                                Short-term Goals  Timeframe for Short-term Goals: LTG: Pt will safely consume least restrictive diet with no s/s aspiration  FIM Goal: Eatin  Goal 1: Pt will tolerate nectar liquids/dysphagia 2 without s/s aspiration. Upgraded 3/20/19  Goal 2: Pt will complete lingual and pharyngeal ROM/STR for safe diet advancement in the absence of aspiration. [place] Pressure Instrument was used to target lingual STR at 40kPa goal pressure. 10 Lingual Pulses were completed x 8sets. 10 sec   Improvement was evident by increased achievement of target goal during session. Shakers: HOB at 90 degrees. Modified with yellow theraband  10 flexion/extension for 10 sets. Airway protection exercises were completed this date. Adduction was completed x3 using an effortful grunt x 5 sets. Sustained phonation was completed for 4 secs x 5 sets. Laryngeal elevation was attempted via glides with limited range. Pt with persistent hoarse breathy voice. May be a candidate for VLS/FEES after discharge. Incentive spirometer was completed x 5 sets of 4 with pt achieving 250-500 ml  Goal 3: Pt will complete therapeutic PO trials with SLP for thins and soft solids. Just upgraded to Dysphagia 2 diet with nectar.   Goal 4: Pt will complete cog eval by 3/19/19--Goal met. No tx recommended for cognition                Alarm placed: []bed [x]chair   []other:   [] activated        Barriers to progress:   [x] Fatigue        [] Cognitive Deficits   [] Memory Deficits   [] Reduced Attn   [] Self Limiting Behaviors    [] Reduced insight/awareness     [] Visual Deficits   [] Premorbid Conditions     [] Other      Education/Interventions used this date: Reviewed exercises and rationale. Pt made observation that amt of ice chips and amt of cheated water results in cough vs no cough. Cough continues to be more productive. [x]   Progress was updated and reviewed in Rehabtracker with patient and/or family this  date. [] Attending Care Conference for pt this date. See Team Patient Care Conference Note for updates.     Interventions used this date:  [] Speech/Language Treatment    [] Instruction in HEP    Group   [x] Dysphagia Treatment   [] Cognitive Skill Juan    Other:         Assessment / Impression                                                          Treatment/Activity Tolerance:   [x] Tolerated Treatment well:     [] Patient limited by fatigue/pain:       [] Patient limited by medical complications:    [] Adverse Reaction to Tx:   [] Significant change in status:      Electronically Signed by  Alejo Michel MA, [id number] [facility]    3/20/2019  2:28 PM

## 2019-03-20 NOTE — PATIENT CARE CONFERENCE
ACUTE REHAB TEAM CONFERENCE SUMMARY   [facility]    NAME: Maki Long  : 7/15/1933 ADMIT DATE: 3/12/2019    Rehab Admitting Dx:Pneumonia [J18.9]  Patient Comorbid Conditions: Active Hospital Problems    Diagnosis Date Noted    Essential hypertension [I10] 2019    Type 2 diabetes mellitus with diabetic polyneuropathy, without long-term current use of insulin (HCC) [E11.42] 2019    KERA (acute kidney injury) ([facility] [address] [N17.9] 2019    Oropharyngeal dysphagia [R13.12] 2019    Community acquired pneumonia due to influenza A virus [J09.X1] 2019     Date: 3/21/2019    CASE MANAGEMENT  Current issues/needs regarding patient and family discharge status:   Patient plans d/c 2-level with spouse and son. Stair lift to 2nd floor. [facility]. [facility] aware [facility]. Patient no longer on o2. Patient reports he has no new DME needs. PHYSICAL THERAPY   Short term goals  Time Frame for Short term goals: 5 tx days:   Short term goal 1: Pt will complete sit<->stand transfers from elevated seat height with Min A.  - MET - Min A at [facility] term goal 2: Pt will ambulate 150 ft using RW with CGA.  - MET - SBA, RW, 384'  Short term goal 3: Pt will ascend/descend 4 steps using bilat rails with CGA  - MET - CGA, 4 steps   Short term goal 4: Pt will ascend/descend curb step and ambulate over uneven surfaces using RW with CGA.     - NMET - CGA to Min A   Long term goals  Time Frame for Long term goals : 12 tx days or until discharge:   Long term goal 1: Pt will complete bed mobility and sup<->sit Ind. Long term goal 2: Pt will complete OOB transfers with RW with Sup. Long term goal 3: Pt will ambulate >/=150 ft using RW with Sup. Long term goal 4: Pt will ascend/descend 4 steps using 1 rail + SPC with Sup. Long term goal 5: Pt will ascend/descend curb step and ambulate over uneven surfaces using RW with Sup.       Ongoing impairments or deficits: fatigue  Areas where progress has been made: ambulation, transfers  Specific barriers to progress: participation,   Strategies that improve performance:encouragement  Equipment needed at discharge: has RW     FIMS:  Bed, Chair, Wheel Chair: 3 - Requires 25-49% assistance to transfer  Walk: 5 - Supervision Requires standby supervision or cuing to walk at least 150 feet  Distance Walked: 380'  Stairs: 2- Maximal Assistance Performs 25-49% of the effort to go up and down 4 to 6 stairs and requires the assistance of one person only              OCCUPATIONAL THERAPY   Short term goals  Time Frame for Short term goals: 7-10 days  Short term goal 1: Pt to complete eating with (I)  Short term goal 2: Pt to complete bathing with min(A)  Short term goal 3: Pt to complete LB dressing with Mod(A) using AE as needed  Short term goal 4: Pt to complete toileting with Min(A)  Short term goal 5: Pt to complete all functional transfers with min(A) :   Long term goals  Time Frame for Long term goals : 14-21 days  Long term goal 1: Pt to complete grooming with Mod(I) standing at sink-PMET-set up  Long term goal 2: Pt to complete bathing with SBA-MET-SBA w/sponge bath at sink  Long term goal 3: Pt to complete UB dressing with Mod(I)-PMET-Set up  Long term goal 4: Pt to complete LB dressing with SBA using AE as needed-Met-SBA  Long term goal 5: Pt to complete toileting with SBA-MET-SBA  Long term goals 6: Pt to complete all functional transfers (bed, shower, toilet) with SBA-NMET-Min(A)  Long term goal 7: Pt to participate in ongoing ther ex/act. with emphasis on >5 min static/dynamic standing tolerance  :       Eatin - Feeds self with adaptive equipment/dentures and/or feeds self with modified diet and/or performs own tube feeding  Grooming: 3 - Able to perform 2-3 tasks (mod assist)  Bathin - Able to bathe 8-9 areas(needed asst w/lower legs/feet)  Dressing-Upper: 5 - Requires setup/supervision/cues and/or requires assist with presthesis/brace only  Dressing-Lower: 3 - Requires assist with 2-3 parts of dressing  Toileting: 3 - Able to perform 2 tasks(pt pulled pants/diaper down and performed hygiene)  Toilet Transfer: 3 - Requires 25-49% assist getting off toilet  Shower Transfer: 4 - Minimal contact assistance, pt. expends 75% or more effort    Other: (TBD; will continue to assess equipment needs pending progress)         Ongoing impairments or deficits: Dec. Strength, balance  Areas where progress has been made: ADL activities  Specific barriers to progress: Participation   Strategies that improve performance: Encouragement   Equipment needed at discharge: TBD; possible w/c      COGNITIVE FUNCTION/SPEECH THERAPY (AS INDICATED)  LTG      Short-term Goals  Timeframe for Short-term Goals: LTG: Pt will safely consume least restrictive diet with no s/s aspiration  FIM Goal: Eatin  Goal 1: Pt will tolerate nectar liquids/dysphagia 2 without s/s aspiration. Meeting goal.  Goal 2: Pt will complete lingual and pharyngeal ROM/STR for safe diet advancement in the absence of aspiration. Meeting goal with improvement as diet has been advanced to dysphagia 2 and ice chips. Goal 3: Pt will complete therapeutic PO trials with SLP for thins and soft solids. Partially met, continue  Goal 4: Pt will complete cog eval by 3/19/19--Goal met. No tx recommended for cognition         Ongoing impairments or deficits: Dysphagia--airway protection improved with productive cough  Areas where progress has been made:swallowing   Specific barriers to progress: fatigue  Strategies that improve performance:repetition of swallow exercises.     Fall Risk: [x]  Yes  []  No    Current Medical Status:   [x] Is continent of bowel    [x] Is incontinent of  Bladder at times    [x] Has had an adequate number of bowel movements   [] Urinates with no urinary retention >300ml in bladder   [] Targeting bladder protocol with martinez removal   [x]

## 2019-03-21 NOTE — PROGRESS NOTES
Records reviewed. Pt examined on the ARU this afternoon.      ADMITTING DIAGNOSIS:  Pneumonia. COMORBID DIAGNOSES IMPACTING REHABILITATION:  Influenza A with hypoxia,  uncontrolled hypertension, diabetes, acute renal failure with chronic  kidney disease stage II, and dysphagia. CHIEF COMPLAINT:  Is concerned about his choking, but he has poor insight into its etiology. Eating better. Resting better. Infrequent loose stool. No dysuria. No chills or rigors. The remainder of his review of systems is negative. PHYSICAL EXAMINATION:  VITAL SIGNS: Blood pressure (!) 121/59, pulse 99, temperature 99.2 °F (37.3 °C), resp. rate 16, height 5' 10\" (1.778 m), weight 152 lb 11.2 oz (69.3 kg), SpO2 94 %. GENERAL:  The patient is seen sitting up in a bedside chair. Oriented ×3. In no distress. Follows one-step commands. Breathing easier. Fair- reasoning. HEENT:   Visual fields are full. Mucous membranes are moist.    NECK:  Supple. LUNGS:  There are rhonchi throughout his lungs  No rales or wheezes were noted. Effort of respiration is  minimal.     HEART:  Sounds reveal a pansystolic murmur with a regular rate and rhythm. ABDOMEN:  His thin abdomen is soft. Bowel sounds are present. There is  no rebound, guarding, or masses noted. EXTREMITIES:  More spontaneous with his arm movements.  is functional.    In the lower limbs, he has  4/5 strength with hip flexion and knee extension. He has trace edema about the ankles. Both heels are clear and there are no signs of DVT. Sitting balance is good. Standing balance is fair. VC's and supervision-CGA with transfers. Improving overall. LABORATORY TESTING:  Potassium is 4.0, BUN and creatinine are 21 and 1.2,  calcium 7.7. White blood count 9.9, hemoglobin 11.1, platelet count  660,963.     IMPRESSION:  An 80-year-old male with pneumonia and influenza A with  hypoxia, uncontrolled hypertension, diabetes, acute renal failure,  superimposed upon chronic kidney disease stage II, and dysphagia. Limitations include his age, his difficulty hearing, and his poor endurance. Expected length of stay prior to a supervised level of function for discharge to home is 3/23/2019. RECOMMENDATIONS:  1. Pneumonia with influenza A and gait disturbance: The patient  requires daily occupational and physical therapy. Continues to require pulmonary hygiene and O2 supplementation, but his coughing is improving. Monitor vital signs and O2 saturations at rest and with activity. DVT prophylaxis emphasized. Out of contact isolation. Tolerance of activity is improving. Off antibiotics now. CGT arranged. He will need supervision with his food preparation in eating after discharge. His wife seems comfortable with this responsibility. 2.  DVT prophylaxis:  Lovenox 40 mg subcutaneous daily. I must monitor  his hemoglobin and platelet count while on this medication. Weightbearing activities increase each day. No new bruising. Hemoglobin and platelet are holding relatively steady. Did more walking again today. I expect will stop Lovenox at discharge. 3.  Uncontrolled hypertension:   Recent hypotension required weaning off some medications. He may be a candidate for diuretics again once his renal function stabilizes. This point his blood pressure stabilized. His weight is also stable. No medication changes today. 4.  Diabetes: The patient requires no calorie restrictions on his diet  because his intake has been so poor. Monitor blood sugars p.r.n. he received metformin this morning. Blood sugars are monitored twice a day now. Blood sugar less than 160.    5.  Dysphagia: The patient is on a pureed diet with nectar-thick  liquids and Speech Therapy works on swallowing exercises. General debility is the etiology at this point. No change to diet. Counseling and Coordination of Care:     In care conference today I met with the patient's OT, PT, RN and Social Worker. We discussed the patient's problems, progress and prognosis. Disposition issues were clarified and plans were established for ongoing rehabilitation efforts beyond the ARU stay. I reviewed this information with the patient during a second distinct visit with the patient. More than half of the total time of 33 minutes spent with the patient involved counseling and coordination of care.

## 2019-03-21 NOTE — PROGRESS NOTES
Occupational Therapy  Physical Rehabilitation: OCCUPATIONAL THERAPY     [x] daily progress note       [] discharge       Patient Name:  Khai Bonilla   :  7/15/1933 MRN: [id number]  Room:  [address] [place] [address] Date of Admission: 3/12/2019  Rehabilitation Diagnosis:   Pneumonia [J18.9]       Date 3/21/2019       Day of ARU Week:  3   Time IN/OUT 9:33/10:21   Individual Tx Minutes 48   Group Tx Minutes    Co-Treat Minutes    Concurrent Tx Minutes    TOTAL Tx Time Mins 48   Variance Time    Variance Time []   Refusal due to:     []   Medical hold/reason:    []   Illness   []   Off Unit for test/procedure  []   Extra time needed to complete task  []   Therapeutic need  []   Other (specify):   Restrictions Restrictions/Precautions  Restrictions/Precautions: Fall Risk, General Precautions, Isolation, Swallowing - Thickened Liquids(droplet precautions )      Communication with other providers: [x]   OK to see per nursing:     []   Spoke with team member regarding:      Subjective observations and cognitive status: \"What are we going to be doing\"     Pain level/location:    0/10       Location:    Discharge recommendations  Anticipated discharge date:  TBD  Destination: []home alone   []home alone w assist prn [x] home w/ family    [] Continuous supervision       []SNF            [] Assisted living     [] Other:   Continued therapy: []HHC OT  []OUTPATIENT  OT   [] No Further OT  Equipment needs: TBD   (HIT F2 to transition between stars)        Grooming:  Set up; seated at sink      Bathing:  SBA; slight unsteadiness when completing rachel care/buttock in standing. No LOB      Dressing:      Upper Body Dressing:  Set up; Pt requires assist to   Lower Body Dressing:  SBA; min VC given for appropriate LAWSON for optimal stability when  Donning pants/underpants      Toileting:  SBA; min VC given for hand placement and positioning. Pt able to remove hand from grab bar w/o any UE support to perform rachel care w/o LOB.         Toilet Transfers:  SBA  Device Used:    []   Standard Toilet         [x]   Grab Bars           []  Bedside Commode       []   Elevated Toilet          []   Other:        Tub/Shower Transfer:  n/a  Device Used:    []   Shower Bench      []   Shower Chair      []   Tub Transfer Bench           []   Bathtub    []   Shower         []   Other:         Bed Mobility:           []   Pt received out of bed   Rolling R/L:  SBA  Scooting:  SBA  Supine --> Sit:  SBA; min VC given for LE placement  Sit --> Supine:  n/a    Transfers:    Sit--> Stand:  SBA  Stand --> Sit:   SBA; Pt completes STS x5 to complete bathing and LB dressing  Stand-Pivot:   SBA; VC given for alignment  Other:    Assistive device required for transfer:   Grab bars; FWW      Functional Mobility:    Assistance:  n/a  Device:   []   Rolling Walker     []   Standard Walker []   Wheelchair        []   [place]       []   4-Wheeled [place]         []   Cardiac Dave Wesley       []   Other:        Homemaking Tasks:  n/a      Additional Therapeutic activities/exercises completed this date:     [x]   ADL Training   []   Balance/Postural training     []   Bed/Transfer Training   []   Endurance Training   []   Neuromuscular Re-ed   []   Nu-step:  Time:        Level:         #Steps:       []   Rebounder:    []  Seated     []  Standing        []   Supine Ther Ex (reps/sets):     []   Seated Ther Ex (reps/sets):     []   Standing Ther Ex (reps/sets):     []   Other:      Comments:      Patient/Caregiver Education and Training:   []   [employer] Equipment Use  [x]   Bed Mobility/Transfer Technique/Safety  []   Energy Conservation Tips  []   Family training  []   Postural Awareness  [x]   Safety During Functional Activities  [x]   Reinforced Patient's Precautions   [x]   Progress was updated and reviewed in Rehabtracker with patient and/or family this         date. Treatment Plan for Next Session: Static/dyanmc standing  Act.  , strenghthening    Assessment:  Pt is an 80 yr old M who is limited by dec. Activity tolerance and inc. Fatigue as evidenced by frequent rest breaks to complete all ADL tasks.        Treatment/Activity Tolerance:   [x] Tolerated treatment with no adverse effects    [x] Patient limited by fatigue  [] Patient limited by pain   [] Patient limited by medical complications:    [] Adverse reaction to Tx:   [] Significant change in status    Safety:       []  bed alarm set    [x]  chair alarm set    []  Pt refused alarms                []  Telesitter activated      [x]  Gait belt used during tx session      []other:       Number of Minutes/Billable Intervention  Therapeutic Exercise    ADL Self-care 48   Neuro Re-Ed    Therapeutic Activity    Group    Other:    TOTAL 48       Social History  Social/Functional History  Lives With: Spouse(Maggie )  Type of Home: House  Home Layout: Two level, Bed/Bath upstairs(equipped with stair lift to access 2nd floor )  Home Access: Stairs to enter with rails  Entrance Stairs - Number of Steps: 3  Entrance Stairs - Rails: Right  Bathroom Shower/Tub: Tub/Shower unit  Bathroom Toilet: Handicap height  Bathroom Equipment: Grab bars around toilet, Shower chair  Bathroom Accessibility: Accessible  Home Equipment: Rolling walker, Cane(stair lift)  Receives Help From: Family  ADL Assistance: Independent  Homemaking Responsibilities: No(Pt reports light cleaning)  Ambulation Assistance: Independent(used walker at home, SPC in the community )  Transfer Assistance: Independent  Active : Yes  Mode of Transportation: Car  Additional Comments: sleeps in regular/flat bed, not on supplemental O2 at home, no falls in the past year; spouse organized pt's meds in pill box organizer due to hand limitations from arthritis, pt was able to manage meds Ind once organized by spouse; information verified by spouse due to pt's memory deficits     Objective                                                                                    Goals:  (Update in navigator)  Short term goals  Time Frame for Short term goals: 7-10 days  Short term goal 1: Pt to complete eating with (I)  Short term goal 2: Pt to complete bathing with min(A)  Short term goal 3: Pt to complete LB dressing with Mod(A) using AE as needed  Short term goal 4: Pt to complete toileting with Min(A)  Short term goal 5: Pt to complete all functional transfers with min(A):  Long term goals  Time Frame for Long term goals : 14-21 days  Long term goal 1: Pt to complete grooming with Mod(I) standing at sink  Long term goal 2: Pt to complete bathing with SBA  Long term goal 3: Pt to complete UB dressing with Mod(I)  Long term goal 4: Pt to complete LB dressing with SBA using AE as needed  Long term goal 5: Pt to complete toileting with SBA  Long term goals 6: Pt to complete all functional transfers (bed, shower, toilet) with SBA  Long term goal 7: Pt to participate in ongoing ther ex/act. with emphasis on >5 min static/dynamic standing tolerance :        Plan of Care                                                                              Times per week: 5 days per week for a minimum of 60 minutes/day plus group as appropriate for 60 minutes.   Treatment to include Plan  Times per day: Daily  Current Treatment Recommendations: Strengthening, Endurance Training, ROM, Pain Management, Balance Training, Functional Mobility Training, Safety Education & Training, Positioning, Self-Care / ADL, Equipment Evaluation, Education, & procurement, Patient/Caregiver Education & Training    Electronically signed by   Payton Colbert MS, OTR/L  3/21/2019, 9:47 AM

## 2019-03-21 NOTE — PLAN OF CARE
Problem: Falls - Risk of:  Goal: Will remain free from falls  Outcome: Ongoing  Goal: Absence of physical injury  Outcome: Ongoing     Problem: Risk for Impaired Skin Integrity  Goal: Tissue integrity - skin and mucous membranes  Outcome: Ongoing     Problem: Infection:  Goal: Will remain free from infection  Outcome: Ongoing     Problem: Safety:  Goal: Free from accidental physical injury  Outcome: Ongoing  Goal: Free from intentional harm  Outcome: Ongoing     Problem: Daily Care:  Goal: Daily care needs are met  Outcome: Ongoing     Problem: Pain:  Goal: Patient's pain/discomfort is manageable  Outcome: Ongoing     Problem: Skin Integrity:  Goal: Skin integrity will stabilize  Outcome: Ongoing     Problem: Discharge Planning:  Goal: Patients continuum of care needs are met  Outcome: Ongoing

## 2019-03-21 NOTE — PROGRESS NOTES
Pt does not qualify for home oxygen while awake. Pt had cold fingers and did not get a good pleth with the finger probe. Used Nasal pulse oximetry probe and the pleth was good.   Will put in order for Overnight Oxymetry

## 2019-03-21 NOTE — PROGRESS NOTES
[facility]  ACUTE REHAB UNIT  SPEECH/LANGUAGE PATHOLOGY      [x] Daily           [] Discharge    Patient:Tai Miller      XXR:[id number]  SRD:[id number]  Rehab Dx/Hx: Pneumonia [J18.9]   No Known Allergies  Precautions: Sit up for all meals and thereafter for 30 minutes, Eat with small bites (1/2 tsp; 1 tsp), Drink from a cup only with small sips, No straws, Alternate solids with liquids, Rinse mouth with water after snacks and meals and Take your medication with apple sauce; Restrictions/Precautions: Fall Risk, General Precautions, Isolation, Swallowing - Thickened Liquids(droplet precautions )          Home Situation/IADL:   Social/Functional History  Lives With: Spouse(Maggie )  Type of Home: House  Home Layout: Two level, Bed/Bath upstairs(equipped with stair lift to access 2nd floor )  Home Access: Stairs to enter with rails  Entrance Stairs - Number of Steps: 3  Entrance Stairs - Rails: Right  Bathroom Shower/Tub: Tub/Shower unit  Bathroom Toilet: Handicap height  Bathroom Equipment: Grab bars around toilet, Shower chair  Bathroom Accessibility: Accessible  Home Equipment: Rolling walker, Cane(stair lift)  Receives Help From: Family  ADL Assistance: Independent  Homemaking Responsibilities: No(Pt reports light cleaning)  Ambulation Assistance: Independent(used walker at home, SPC in the community )  Transfer Assistance: Independent  Active : Yes  Mode of Transportation: Car  Additional Comments: sleeps in regular/flat bed, not on supplemental O2 at home, no falls in the past year; spouse organized pt's meds in pill box organizer due to hand limitations from arthritis, pt was able to manage meds Ind once organized by spouse; information verified by spouse due to pt's memory deficits     POC: Pt will be seen 5x/week for a minimum of 30 minutes per day. Co-treats where appropriate with PT or OT to facilitate patient goals in functional tasks.     Date of Admit: 3/12/2019  Room #: 1010/1010-A      Dx:   []   Cognition/  Memory Deficits  []   Dysphagia  []  Dysarthria  []  Apraxia  []   Aphasia  []   Other   # billable minutes per area           Date: 3/21/2019  Day of ARU Week:  3       SLP Individual Minutes  Time In: 3494  Time Out: 9366  Minutes: 50 Group in:      Group Out:      Group total-  [place] in:    [place] out-   [place] total-      Variance/Reason:  [] Refusal due to   [] Medical hold/reason  [] Illness   [] Off Unit for test/procedure  [] Extra time needed to complete task  [] Other (specify)    Activity completed: Swallow exercises and education    Pain: denies  Current Diet: Dietary Nutrition Supplements: Frozen Oral Supplement  DIET GENERAL; Dysphagia II Mechanically Altered; Nectar Thick  Subjective: alert and cooperative; wife present for end of session for education. Goals and POC:  LTG                                Short-term Goals  Timeframe for Short-term Goals: LTG: Pt will safely consume least restrictive diet with no s/s aspiration  FIM Goal: Eatin  Goal 1: Pt will tolerate nectar liquids/dysphagia 2 without s/s aspiration. Meeting goal with improved intake. Goal 2: Pt will complete lingual and pharyngeal ROM/STR for safe diet advancement in the absence of aspiration. Widget was designed and pt was instructed on use for lingual STR. Thickness was 5 with 10 pulses completed x 5 sets. Lingual presses and protrusion completed x5 sets  Shakers: HOB at 60 degrees. Sustained lifts in flexion x 20 for 6 sets  Quick lifts in flexion x 10 for 2 sets  Adduction exercises via effortful grunts x3 for 6 sets  Incentive spirometer achieved 250-500 ml x4 sets of 3. Goal 3: Pt will complete therapeutic PO trials with SLP for thins and soft solids. Thin liquids by cup sip (small cued sips) shows immediate and prolonged coughing. With weak hoarse breathy vocal quality suspect vocal cord involvement with ENT consult recommended.     Goal 4: Pt will complete cog guyal by 3/19/19--Goal met. No tx recommended for cognition                                     Alarm placed: []bed [x]chair   []other:   []PREET activated        Barriers to progress:   [x] Fatigue        [] Cognitive Deficits   [] Memory Deficits   [] Reduced Attn   [] Self Limiting Behaviors    [] Reduced insight/awareness     [] Visual Deficits   [] Premorbid Conditions     [] Other      Education/Interventions used this date: Extensive education has been completed on hydration monitoring, aspiration precautions, obtaining thickener, mixing beverages to proper texture, food lists with dysphagia diet suggestions for each mealtime and snacks, comprehensive exercise program, and safe swallow protocol. Following instruction and education, pt was provided with handouts and a bag filled with supplies to continue exercises at home along with exercise sheet that was reviewed and practiced this date. [x]   Progress was updated and reviewed in Rehabtracker with patient and/or family this  date. [x] Attending Care Conference for pt this date. See Team Patient Care Conference Note for updates.     Interventions used this date:  [] Speech/Language Treatment    [] Instruction in HEP    Group   [x] Dysphagia Treatment   [] Cognitive Skill Juan    Other:         Assessment / Impression                                                          Treatment/Activity Tolerance:   [x] Tolerated Treatment well:     [] Patient limited by fatigue/pain:       [] Patient limited by medical complications:    [] Adverse Reaction to Tx:   [] Significant change in status:      Electronically Signed by  Deep Werner MA, Lanis Kleine    3/21/2019  3:18 PM

## 2019-03-21 NOTE — FLOWSHEET NOTE
[x] daily progress note       [] discharge       Patient Name:  Darrian Reyes   :  7/15/1933 MRN: [id number]  Room:  [address] [place] [address] Date of Admission: 3/12/2019  Rehabilitation Diagnosis:   Pneumonia [J18.9]       Date 3/21/2019       Day of ARU Week:  3   Time IN/OUT 7163-6264  7895-7163   Individual Tx Minutes 65   Group Tx Minutes 60   TOTAL Tx Time Mins 125   Restrictions Restrictions/Precautions  Restrictions/Precautions: Fall Risk, General Precautions, Isolation, Swallowing - Thickened Liquids(droplet precautions )      Communication with other providers: [x]   OK to see per nursing:     []   Spoke with team member regarding:      Subjective observations and cognitive status: Pt seen up in recliner at beginning of treatment. Agreeable to therapy. Pt on 2 L of O2.     Pain level/location:    0/10           Discharge recommendations   Anticipated discharge date:  19  Destination: []home alone   []home alone with assist PRN     [x] home w/ family      [x] Continuous supervision  []SNF    [] Assisted living     [] Other:   Continued therapy: [x]HHC PT  []OUTPATIENT  PT   [] No Further PT  Equipment needs: has RW      [x]   Pt received out of bed     Transfers:    Sit--> Stand:  SBA  Stand --> Sit:   SBA  Stand-Pivot:  SBA  Assistive device required for transfer: RW    Gait:    Distance:  450'  Assistance:  SBA  Device: RW  Gait Quality:  Reciprocal pattern     Stairs   # Completed:  4  Assistance:  CGA  Supportive Device:  1 rail + SPC   Height:   6\"    Curb       Assistance:  SBA  Supportive Device:  RW  Height:  4\"    Uneven Surfaces:       Assistance:  SBA  Device:    RW  Surfaces Completed:   [x]  Green mat with bean bags beneath      []  Throw rugs             []  Outdoor pavements      []  Grass          []  Loose gravel        []  Other:       Additional Therapeutic activities/exercises completed this date:     [x]   Nu-step:  Time: 10 minutes       Level:  1      (for strengthening and endurance)    [] Rebounder:    []  Seated     []  Standing        []   Balance training         []   Postural training    []   Supine ther ex (reps/sets):     []   Seated ther ex (reps/sets):     []   Standing ther ex (reps/sets):     []   Picked up object from floor     Assist Level:                     []   Reacher used   []   Other:   []   Other:   []   Other:    Group:   Total time in group = 60 min   Exercise / Magnolia Lin participated in exercise and discussion on benefits and precautions during exercise. This provided the opportunity to have fun, build camaraderie, increase endurance, improve breathing techniques, balance, and strength. Wendy Garcia performed a variety of seated activities as able, with focus on technique and safety during exercise. Also focused on warm-up, warm-down, endurance monitoring, strengthening & strategies, function, safety, and general social & communication opportunities with other group members. Functional areas targeted:   [] Communication [x] Memory  [x] Coordination    [] Kitchen Safety [x] Problem Solving  [x] Strengthening     [x] Attention  [x] Endurance   [] Mobility    [x] Awareness/Insight [] Balance  [] Transfers  [x] Safety   [] Other (specify)  Participation:  [x] Actively participated         Education completed: benefits of exercise; signs and symptoms of exercise intolerance; how to exercise after discharge from ARU. Cognitive fx during this group: pt did well with following instructions and paying attention during group. Family present: no           Patient/Caregiver Education and Training:   [x]   Bed Mobility/Transfer technique/safety  [x]   Gait technique/sequencing  [x]   Proper use of assistive device  [x]   Advanced mobility safety and technique  []   Reinforced patient's precautions/mobility while maintaining precautions  []   Postural awareness  []   Family training  [x]   Progress was updated in Rehabtracker this date.     Treatment Plan for Next Session: gait; transfers; exercises; balance training; advanced gait; stair training    Assessment:    Treatment/Activity Tolerance:   [x] Tolerated treatment with no adverse effects    [] Patient limited by fatigue  [] Patient limited by pain   [] Patient limited by medical complications:    [] Adverse reaction to Tx:   [] Significant change in status    Safety:       []  bed alarm set    [x]  chair alarm set    []  Pt refused alarms                []  Telesitter activated      [x]  Gait belt used during tx session      [x]other: pt left up in recliner with call light at end of treatment.          Number of Minutes/Billable Intervention  Gait Training 35   Therapeutic Exercise 10   Neuro Re-Ed    Therapeutic Activity 20   Wheelchair Propulsion    Group 60   Other:    TOTAL 125         Social History  Social/Functional History  Lives With: Spouse(Maggie )  Type of Home: House  Home Layout: Two level, Bed/Bath upstairs(equipped with stair lift to access 2nd floor )  Home Access: Stairs to enter with rails  Entrance Stairs - Number of Steps: 3  Entrance Stairs - Rails: Right  Bathroom Shower/Tub: Tub/Shower unit  Bathroom Toilet: Handicap height  Bathroom Equipment: Grab bars around toilet, Shower chair  Bathroom Accessibility: Accessible  Home Equipment: Rolling walker, Cane(stair lift)  Receives Help From: Family  ADL Assistance: Independent  Homemaking Responsibilities: No(Pt reports light cleaning)  Ambulation Assistance: Independent(used walker at home, SPC in the community )  Transfer Assistance: Independent  Active : Yes  Mode of Transportation: Car  Additional Comments: sleeps in regular/flat bed, not on supplemental O2 at home, no falls in the past year; spouse organized pt's meds in pill box organizer due to hand limitations from arthritis, pt was able to manage meds Ind once organized by spouse; information verified by spouse due to pt's memory deficits     Objective

## 2019-03-21 NOTE — PROGRESS NOTES
3/21/2019 3:13 PM  Patient Room #: 1010/1010-A  Patient Name: Wiliam Post    (Step 1 Done by RN if possible otherwise call Pulmonary Diagnostics)  1. Place patient on room air at rest for at least 30 minutes. If patient falls below 88% before 30 minutes then you can record the level and stop. Record room air saturation level _95 %. If patient is at 88% or below, they will qualify for home oxygen and you can stop. If level does not fall below 88%, fill in level above. If indicated continue to Step 2. Signature:_Arian Vick RRT Date: 03/21/2019  (Step 2&3 Done by [facility])  2. Ambulate patient on room air until saturation falls below 89%. Record level of room air saturation with ambulation_94_ %. Next, place patient back on ____lpm oxygen and ambulate, record level __%. (Note:  this level must show improvement from room air level done with ambulation.)  If patients saturation on room air with ambulation is 88% or below AND patient shows improvement with oxygen during ambulation, they will qualify for home oxygen and you can stop. If patient does not drop below 89%, then patient should have an overnight oximetry trending on room air to see if level falls below 88%. Complete level in Step 3 below. 3. Room air overnight oximetry level 88 % for 2 hrs 52 cumulative minutes. If patients room air oxygen level is < 89% for at least 5 cumulative minutes, patient will qualify for home oxygen and you can stop.         (Attach Night Trending Report)    Complete order below: Diagnosis:___Pneumonia_____  Home oxygen at:  Length of Need: ? Lifetime X 3 Months     _2_lpm or __%   via  [x] nasal cannula  []mask  [] other:________         []continuous []  with activity  [x]  Nocturnal   [] Portable Tanks [x]  Concentrator        Therapist Signature:  Abbie Archer RRT__     Date:  _3/22/2019__  Physician Signature:  ___Electronically Signed in EMR_    Date: ______    Physician Printed Name:  Didi Torres Ascencion Velarde MD  NPI:  [id number]  [x] Patient Qualifies      [] Patient Does NOT qualify

## 2019-03-21 NOTE — PROGRESS NOTES
Case mgt met with patient in room to review Saturday's discharge. Patient has no concerns. He has spoken with [facility], [facility] . Case mgt will fax notes and AVS to [place] @[phone]. Patient reports he has a SW and RW. No new DME needs. Patient had [address] pt/ot/slp/RN/SW PTA. MMS signed and filed. [phone]:  Patient reviewed at care conference. No changes to discharge plan. 1420:  Notes and orders faxed to Rula Vasques @ [place] home based care. 1435:  Case mgt notified by respiratory that home o2 eval performed at [facility] is only valid for [place] clients for one month. In one month, if patient still needs o2, a new eval must be provided by [place] and provider switched to [place] o2 provider. Case mgt will followup 3/22 to see if patient qualifies for home o2. If so, case mgt will followup with patient and his spouse, and patient's [facility]  Rula Vasques.

## 2019-03-21 NOTE — PROGRESS NOTES
Nutrition Assessment    Type and Reason for Visit: Reassess(ongoing follow up )    Nutrition Recommendations:    Continue diet consistency per SLP   Encourage consistent meal intake   Will continue to offer magic cups during stay     Nutrition Assessment: Diet advanced from dysphagia I to dysphagia II per SLP but remains on nectar thick liquids with ice chips allowed between meals. Recent meals improved, % with 360 ml fluid at meals. Eats small amount of magic cup. Willing to drink glytrol supplement at home if weight decreases. Patient prefers thickener used at home, dislikes most thickened liquids provided at this time. Remains high nutrition risk but intake slowly improving. Moderate malnutrition in acute illness. Malnutrition Assessment:  · Malnutrition Status: Meets the criteria for moderate malnutrition  · Context: Acute illness or injury  · Findings of the 6 clinical characteristics of malnutrition (Minimum of 2 out of 6 clinical characteristics is required to make the diagnosis of moderate or severe Protein Calorie Malnutrition based on AND/ASPEN Guidelines):  1. Energy Intake-Less than or equal to 75% of estimated energy requirement, Greater than or equal to 7 days    2. Weight Loss-No significant weight loss,    3. Fat Loss-Mild subcutaneous fat loss, Orbital  4. Muscle Loss-Moderate muscle mass loss(some expected with age ), Temples (temporalis muscle), Clavicles (pectoralis and deltoids), Interosseous  5. Fluid Accumulation-No significant fluid accumulation, Extremities  6.   Strength-Not measured    Nutrition Risk Level: High    Nutrient Needs:  · Estimated Daily Total Kcal: 8629-7097 (25-30 calvin/kg)  · Estimated Daily Protein (g): 69-83 (1-1.2 g/kg)  · Estimated Daily Total Fluid (ml/day): 7443-1966 ( 1ml/calvin)    Nutrition Diagnosis:   · Problem: Inadequate oral intake  · Etiology: related to Difficulty swallowing, Insufficient energy/nutrient consumption     Signs and symptoms:  as evidenced by Diet history of poor intake    Objective Information:  · Nutrition-Focused Physical Findings: advanced age, mssing most teeth, difficulty hearing  · Wound Type: None  · Current Nutrition Therapies:  · Oral Diet Orders: Dysphagia 2, Nectar Thick   · Oral Diet intake: 51-75%, %(recent meals )  · Oral Nutrition Supplement (ONS) Orders: Frozen Oral Supplement  · ONS intake: 26-50%  · Anthropometric Measures:  · Ht: 5' 10\" (177.8 cm)   · Current Body Wt: 152 lb 12.5 oz (69.3 kg)  · Admission Body Wt: 154 lb 8.7 oz (70.1 kg)  · Usual Body Wt: (140-150# usual range per patient )  · % Weight Change:  ,  hx loss, varying weights during stay   · Ideal Body Wt: 166 lb (75.3 kg), % Ideal Body 91  · BMI Classification: BMI 18.5 - 24.9 Normal Weight    Nutrition Interventions:   Continue current diet, Continue current ONS  Feeding Assistance/Environment Change, Continued Inpatient Monitoring    Nutrition Evaluation:   · Evaluation: Progressing toward goals   · Goals: Patient will consume at least 70% of meals during stay     · Monitoring: Meal Intake, Supplement Intake, Pertinent Labs, Weight, Chewing/Swallowing      Electronically signed by Genevieve Lujan RD, LD on 3/21/19 at 11:35 AM    Contact Number: [phone]

## 2019-03-22 NOTE — PROGRESS NOTES
___Electronically Signed in EMR_    Date: ______     Physician Printed Name:  Anthony Barnes MD  [facility]:  [id number]  [x] Patient Qualifies      [] Patient Does NOT qualify

## 2019-03-22 NOTE — PROGRESS NOTES
Doctor Please copy and paste below in your progress note per DME requirment. Patient was seen in hospital for Pneumonia.  I am prescribing Nocturnal oxygen because the diagnosis and testing requires the patient to have oxygen in the home. Conditions will improve or be benefited by oxygen use.

## 2019-03-22 NOTE — PROGRESS NOTES
Pt qualifies for home oxygen while sleeping. Paperwork is complete and faxed to [facility]. Please call PFT Lab when being discharged. Pt will be given an IGO before leaving. If after 5 pm, call Respiratory.

## 2019-03-22 NOTE — PROGRESS NOTES
[facility]  ACUTE REHAB UNIT  SPEECH/LANGUAGE PATHOLOGY      [x] Daily           [x] Discharge    Patient:Tai Fuentes      SNU:[birth year]  QYV:[id number]  Rehab Dx/Hx: Pneumonia [J18.9]   No Known Allergies  Precautions: Sit up for all meals and thereafter for 30 minutes, Eat with small bites (1/2 tsp; 1 tsp), Drink from a cup only with small sips, No straws and Take your medication with apple sauce; Restrictions/Precautions: Fall Risk, General Precautions, Isolation, Swallowing - Thickened Liquids(droplet precautions )          Home Situation/IADL:   Social/Functional History  Lives With: Spouse(Maggie )  Type of Home: House  Home Layout: Two level, Bed/Bath upstairs(equipped with stair lift to access 2nd floor )  Home Access: Stairs to enter with rails  Entrance Stairs - Number of Steps: 3  Entrance Stairs - Rails: Right  Bathroom Shower/Tub: Tub/Shower unit  Bathroom Toilet: Handicap height  Bathroom Equipment: Grab bars around toilet, Shower chair  Bathroom Accessibility: Accessible  Home Equipment: Rolling walker, Cane(stair lift)  Receives Help From: Family  ADL Assistance: Independent  Homemaking Responsibilities: No(Pt reports light cleaning)  Ambulation Assistance: Independent(used walker at home, SPC in the community )  Transfer Assistance: Independent  Active : Yes  Mode of Transportation: Car  Additional Comments: sleeps in regular/flat bed, not on supplemental O2 at home, no falls in the past year; spouse organized pt's meds in pill box organizer due to hand limitations from arthritis, pt was able to manage meds Ind once organized by spouse; information verified by spouse due to pt's memory deficits     POC: Pt will be seen 5x/week for a minimum of 30 minutes per day. Co-treats where appropriate with PT or OT to facilitate patient goals in functional tasks.     Date of Admit: 3/12/2019  Room #: 1010/1010-A     ST Dx:   []   Cognition/  Memory Deficits  [x]   Dysphagia []  Dysarthria  []  Apraxia  []   Aphasia  []   Other   # billable minutes per area  35         Date: 3/22/2019  Day of ARU Week:  4       SLP Individual Minutes  Time In: 5609  Time Out: 3654  Minutes: 35     Variance/Reason:  [] Refusal due to   [] Medical hold/reason  [] Illness   [] Off Unit for test/procedure  [] Extra time needed to complete task  [] Other (specify)    Activity completed: swallow exercises and review for discharge for safe swallow protocol. Pain: denies  Current Diet: Dietary Nutrition Supplements: Frozen Oral Supplement  DIET GENERAL; Dysphagia II Mechanically Altered; Nectar Thick  Subjective: Pt alert and cooperative      Goals and POC:  LTG                                Short-term Goals  Timeframe for Short-term Goals: LTG: Pt will safely consume least restrictive diet with no s/s aspiration  FIM Goal: Eatin  Goal 1: Pt will tolerate nectar liquids/dysphagia 2 without s/s aspiration. Meeting goal--intake has improved. Goal 2: Pt will complete lingual and pharyngeal ROM/STR for safe diet advancement in the absence of aspiration. [place] Pressure Instrument was used to target lingual STR at 42kPa goal pressure. 10  Lingual Pulses were completed x 8 sets. Improvement was evident by increased achievement of target goal during session. Modified shakers completed using red theraband with pt up in chair. Completed head flexions and extensions x10 for 6 sets with resistance. Airway protection exercises were completed this date. Adduction was completed x3 using an effortful grunt x 3 sets. Pulling techniques were able to be demonstrated by pt upon request with good airway clearance. Goal 3: Pt will complete therapeutic PO trials with SLP for thins and soft solids. Pt at least restrictive diet for dysphagia 2 which is what he has been on for the last year at home. Unable to safely tolerate solids.   Thin liquids by tsp result in delayed cough and by cup overt coughing and choking for prolonged time with difficulty clearing. Goal 4: Pt will complete cog eval by 3/19/19--Goal met. No tx recommended for cognition                Pt being discharged to home with wife [facility] Dysphagia tx has targeted lingual and pharyngeal STR, airway protection, and education. Pt on a dysphagia 2 diet (likely least restrictive at this time) with nectar thick liquids and ice chips. See results from [facility] for info. 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 Vocal quality has remained breathy and hoarse--recommend ENT follow up. Wife alluded to pt having seen ENT in past but pt nor wife were able to give specifics. Pt with extensive written education that has been practiced, reviewed and demonstrated. Prognosis for improvement is fair due to hx of dysphagia, dysphonia, and presence of cervical osteophyte. Alarm placed: []bed [x]chair   []other:   [] activated        Barriers to progress:   [x] Fatigue        [x] Cognitive Deficits   [x] Memory Deficits   [] Reduced Attn   [] Self Limiting Behaviors    [] Reduced insight/awareness     [] Visual Deficits   [] Premorbid Conditions     [] Other      Education/Interventions used this date: swallow protocol and exercises. [x]   Progress was updated and reviewed in Rehabtracker with patient and/or family this  date. [] Attending Care Conference for pt this date. See Team Patient Care Conference Note for updates.     Interventions used this date:  [] Speech/Language Treatment    [] Instruction in HEP    Group   [x] Dysphagia Treatment   [] Cognitive Skill Juan    Other:         Assessment / Impression                                                          Treatment/Activity Tolerance:   [x] Tolerated Treatment well:     [] Patient limited by fatigue/pain:       [] Patient limited by medical complications:    [] Adverse Reaction to Tx:   [] Significant change in status:      Electronically Signed by  Kendell Zuluaga Marc [place], [address] [facility]    3/22/2019  10:39 AM

## 2019-03-22 NOTE — PROGRESS NOTES
Patient qualified for [address] [place] o2. [facility] will [facility] 3[facility]23 in afternoon. They will contact patient and spouse directly. Case mgt will followup again this afternoon with SHARONE. Case mgt left extensive msg for  [facility], followup with Dr yLnda May, ENT followup. Requested return call. 1046:  Case mgt spoke with Amina Crenshaw RN at [address] [place] [address].   Refused to schedule PCP appt without AVS.  Advised that Dr Lynda May will set up ENT referral.

## 2019-03-22 NOTE — PROGRESS NOTES
Overnight Home O2 evaluation completed. Patient qualifies for home O2 while sleeping. Time with SpO2<90 is 2:56:24. Time spent with SpO2 <88 is 2:52:48. Longest continuous time with saturation <88 was 1:48:08. Placed patient on 2L [place]. Will continue to monitor.

## 2019-03-22 NOTE — PROGRESS NOTES
Occupational Therapy  Physical Rehabilitation: OCCUPATIONAL THERAPY     [x] daily progress note       [] discharge       Patient Name:  Darion Hernandez   :  7/15/1933 MRN: [id number]  Room:  [address] [place] [address] Date of Admission: 3/12/2019  Rehabilitation Diagnosis:   Pneumonia [J18.9]       Date 3/22/2019       Day of ARU Week:  4   Time IN/OUT 8:37/9:27   Individual Tx Minutes 50   Group Tx Minutes    Co-Treat Minutes    Concurrent Tx Minutes    TOTAL Tx Time Mins 50   Variance Time    Variance Time []   Refusal due to:     []   Medical hold/reason:    []   Illness   []   Off Unit for test/procedure  []   Extra time needed to complete task  []   Therapeutic need  []   Other (specify):   Restrictions Restrictions/Precautions  Restrictions/Precautions: Fall Risk, General Precautions, Isolation, Swallowing - Thickened Liquids(droplet precautions )      Communication with other providers: [x]   OK to see per nursing:     []   Spoke with team member regarding:      Subjective observations and cognitive status: Upon entering room Pt shaking head stating, \"What are we ganna do\"? 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 No LOB or unsteadiness when completing cleanliness of groin/buttock area. Dressing:      Upper Body Dressing:  Mod(I); Pt retrieves UB clothing from closet @ w/c level and transports into bathroom. Lower Body Dressing: Set up;  Pt completes seated in w/c w/o LOB to don pants/underpants over hips      Toileting:   Mod(I); Pt completes using grab bar w/o LOB and no signs of unsteadiness during clothing management. Toilet Transfers:  Supervision;  No LOB, however, slight unsteadiness when performing pivot  Device Used:    []   Standard Toilet         [x]   Grab Bars           []  Bedside Commode       []   Elevated Toilet          []   Other:        Tub/Shower Transfer: SBA  Device Used:    []   Shower Bench      [x]   Shower Chair      []   Tub Transfer Bench           []   Bathtub    []   Shower         [x]   Other:  Grab bar; declines shower task reports will sponge bath upon returning home       Bed Mobility:           [x]   Pt received out of bed       Transfers:    Sit--> Stand:  Supervision  Stand --> Sit:   Supervision  Stand-Pivot:  SBA from bed<>w/c; min VC given for controlled descent  Other:    Assistive device required for transfer:  w/c      Functional Mobility:    Assistance:  Supervision  Device:   []   Rolling Walker     []   Standard Walker [x]   Wheelchair        []   [employer]       []   4-Wheeled [place]         []   [employer]iac [place]       []   Other:        Homemaking Tasks:  n/a      Additional Therapeutic activities/exercises completed this date:     [x]   ADL Training   [x]   Balance/Postural training: VC given throughout ADL session     []   Bed/Transfer Training   []   Endurance Training   []   Neuromuscular Re-ed   []   Nu-step:  Time:        Level:         #Steps:       []   Rebounder:    []  Seated     []  Standing        []   Supine Ther Ex (reps/sets):     []   Seated Ther Ex (reps/sets):     []   Standing Ther Ex (reps/sets):     []   Other:      Comments:      Patient/Caregiver Education and Training:   []   [employer] Equipment Use  []   Bed Mobility/Transfer Technique/Safety  [x]   Energy Conservation Tips  [x]   Family training  [x]   Postural Awareness  [x]   Safety During Functional Activities  [x]   Reinforced Patient's Precautions   [x] Progress was updated and reviewed in Rehabtracker with patient and/or family this         date. Treatment Plan for Next Session: Cont. OT POC as needed; Possible D/C tomorrow 3/22. Assessment:  Pt demonstrates improvement in overall functional status from admission as evidenced by  Exceeding a majority of goals set forth. Pt with improved activity tolerance able to tolerate entire ADL session w/o rest breaks. Pt would benefit from [facility] for continued carry over of technique and reduced CG burden.        Treatment/Activity Tolerance:   [x] Tolerated treatment with no adverse effects    [x] Patient limited by fatigue  [] Patient limited by pain   [] Patient limited by medical complications:    [] Adverse reaction to Tx:   [] Significant change in status    Safety:       [x]  bed alarm set    []  chair alarm set    []  Pt refused alarms                []  Telesitter activated      [x]  Gait belt used during tx session      []other:       Number of Minutes/Billable Intervention  Therapeutic Exercise    ADL Self-care 50   Neuro Re-Ed    Therapeutic Activity    Group    Other:    TOTAL 50       Social History  Social/Functional History  Lives With: Spouse(Maggie )  Type of Home: House  Home Layout: Two level, Bed/Bath upstairs(equipped with stair lift to access 2nd floor )  Home Access: Stairs to enter with rails  Entrance Stairs - Number of Steps: 3  Entrance Stairs - Rails: Right  Bathroom Shower/Tub: Tub/Shower unit  Bathroom Toilet: Handicap height  Bathroom Equipment: Grab bars around toilet, Shower chair  Bathroom Accessibility: Accessible  Home Equipment: Rolling walker, Cane(stair lift)  Receives Help From: Family  ADL Assistance: Independent  Homemaking Responsibilities: No(Pt reports light cleaning)  Ambulation Assistance: Independent(used walker at home, SPC in the community )  Transfer Assistance: Independent  Active : Yes  Mode of Transportation: Car  Additional Comments: sleeps in regular/flat bed, not on supplemental O2 at home, no falls in the past year; spouse organized pt's meds in pill box organizer due to hand limitations from arthritis, pt was able to manage meds Ind once organized by spouse; information verified by spouse due to pt's memory deficits     Objective                                                                                    Goals:  (Update in navigator)  Short term goals  Time Frame for Short term goals: 7-10 days  Short term goal 1: Pt to complete eating with (I)  Short term goal 2: Pt to complete bathing with min(A)  Short term goal 3: Pt to complete LB dressing with Mod(A) using AE as needed  Short term goal 4: Pt to complete toileting with Min(A)  Short term goal 5: Pt to complete all functional transfers with min(A):  Long term goals  Time Frame for Long term goals : 14-21 days  Long term goal 1: Pt to complete grooming with Mod(I) standing at sink  Long term goal 2: Pt to complete bathing with SBA  Long term goal 3: Pt to complete UB dressing with Mod(I)  Long term goal 4: Pt to complete LB dressing with SBA using AE as needed  Long term goal 5: Pt to complete toileting with SBA  Long term goals 6: Pt to complete all functional transfers (bed, shower, toilet) with SBA  Long term goal 7: Pt to participate in ongoing ther ex/act. with emphasis on >5 min static/dynamic standing tolerance :        Plan of Care                                                                              Times per week: 5 days per week for a minimum of 60 minutes/day plus group as appropriate for 60 minutes.   Treatment to include Plan  Times per day: Daily  Current Treatment Recommendations: Strengthening, Endurance Training, ROM, Pain Management, Balance Training, Functional Mobility Training, Safety Education & Training, Positioning, Self-Care / ADL, Equipment Evaluation, Education, & procurement, Patient/Caregiver Education & Training    Electronically signed by   Payton Colbert MS, OTR/L  3/22/2019, 8:48 AM

## 2019-03-22 NOTE — PROGRESS NOTES
Patient was seen in hospital for Pneumonia.  I am prescribing Nocturnal oxygen because the diagnosis and testing requires the patient to have oxygen in the home.  Conditions will improve or be benefited by oxygen use.

## 2019-03-22 NOTE — FLOWSHEET NOTE
[x] daily progress note       [x] discharge       Patient Name:  Edvin Gross   :  7/15/1933 MRN: [id number]  Room:  [address] [place] [address] Date of Admission: 3/12/2019  Rehabilitation Diagnosis:   Pneumonia [J18.9]       Date 3/22/2019       Day of ARU Week:  4   Time IN/OUT 5779-3468  [id number]   Individual Tx Minutes 41   Group Tx Minutes 60   TOTAL Tx Time Mins 101   Restrictions Restrictions/Precautions  Restrictions/Precautions: Fall Risk, General Precautions, Isolation, Swallowing - Thickened Liquids(droplet precautions )      Communication with other providers: [x]   OK to see per nursing:     []   Spoke with team member regarding:      Subjective observations and cognitive status: Pt seen up in w/c at beginning of treatment. Agreeable to therapy. Pain level/location: 0/10       Discharge recommendations   Anticipated discharge date:  19  Destination: []home alone   []home alone with assist PRN     [x] home w/ family      [x] Continuous supervision  []SNF    [] Assisted living     [] Other:   Continued therapy: [x]C PT  []OUTPATIENT  PT   [] No Further PT  Equipment needs: has RW     Bed Mobility:           [x]   Pt received out of bed   Rolling R/L:  Independent   Scooting:  Independent  Supine --> Sit:  Independent   Sit --> Supine:  Independent     Transfers:    Sit--> Stand:  Supervision   Stand --> Sit:   Supervision   Stand-Pivot:   supervision   Car Transfers:  Supervision   Assistive device required for transfer:   RW  vcs for proper hand placement. Gait:    Distance:  160'   Assistance:  Supervision   Device:  RW  Gait Quality:  Reciprocal pattern     Stairs   # Completed:  12  Assistance:   Supervision   Supportive Device:  2 rails   Height:   6\"  Pt refused to perform stair training with 1 rail today d/t increasing agitation and wanting to return to his room. Curb       Assistance:   SBA  Supportive Device:  RW  Height:  4\"   vcs for walker safety.      Uneven Surfaces:       Assistance: and Training:   [x]   Bed Mobility/Transfer technique/safety  [x]   Gait technique/sequencing  [x]   Proper use of assistive device  [x]   Advanced mobility safety and technique  []   Reinforced patient's precautions/mobility while maintaining precautions  []   Postural awareness  []   Family training  [x]   Progress was updated in Rehabtracker this date. Treatment Plan for Next Session: pt to discharge from ARU tomorrow (3-)       Assessment:    Treatment/Activity Tolerance:   [x] Tolerated treatment with no adverse effects    [] Patient limited by fatigue  [] Patient limited by pain   [] Patient limited by medical complications:    [] Adverse reaction to Tx:   [] Significant change in status    Safety:       []  bed alarm set    [x]  chair alarm set    []  Pt refused alarms                []  Telesitter activated      [x]  Gait belt used during tx session      [x]other: pt left supine in bed with call light at end of treatment.           Number of Minutes/Billable Intervention  Gait Training 30   Therapeutic Exercise    Neuro Re-Ed    Therapeutic Activity 11   Wheelchair Propulsion    Group 60   Other:    TOTAL 101         Social History  Social/Functional History  Lives With: Spouse(Maggie )  Type of Home: House  Home Layout: Two level, Bed/Bath upstairs(equipped with stair lift to access 2nd floor )  Home Access: Stairs to enter with rails  Entrance Stairs - Number of Steps: 3  Entrance Stairs - Rails: Right  Bathroom Shower/Tub: Tub/Shower unit  Bathroom Toilet: Handicap height  Bathroom Equipment: Grab bars around toilet, Shower chair  Bathroom Accessibility: Accessible  Home Equipment: Rolling walker, Cane(stair lift)  Receives Help From: Family  ADL Assistance: Independent  Homemaking Responsibilities: No(Pt reports light cleaning)  Ambulation Assistance: Independent(used walker at home, SPC in the community )  Transfer Assistance: Independent  Active : Yes  Mode of Transportation: Car  Additional Comments: sleeps in regular/flat bed, not on supplemental O2 at home, no falls in the past year; spouse organized pt's meds in pill box organizer due to hand limitations from arthritis, pt was able to manage meds Ind once organized by spouse; information verified by spouse due to pt's memory deficits     Objective                                                                                    Goals:  (Update in navigator)  Short term goals  Time Frame for Short term goals: 5 tx days:   Short term goal 1: Pt will complete sit<->stand transfers from elevated seat height with Min A. Short term goal 2: Pt will ambulate 150 ft using RW with CGA. Short term goal 3: Pt will ascend/descend 4 steps using bilat rails with CGA   Short term goal 4: Pt will ascend/descend curb step and ambulate over uneven surfaces using RW with CGA. :  Long term goals  Time Frame for Long term goals : 12 tx days or until discharge:   Long term goal 1: Pt will complete bed mobility and sup<->sit Ind. Long term goal 2: Pt will complete OOB transfers with RW with Sup. Long term goal 3: Pt will ambulate >/=150 ft using RW with Sup. Long term goal 4: Pt will ascend/descend 4 steps using 1 rail + SPC with Sup. Long term goal 5: Pt will ascend/descend curb step and ambulate over uneven surfaces using RW with Sup. :        Plan of Care                                                                              Times per week: 5 days per week for a minimum of 60 minutes/day plus group as appropriate for 60 minutes.   Treatment to include Current Treatment Recommendations: Strengthening, Transfer Training, Endurance Training, Cognitive Reorientation, Patient/Caregiver Education & Training, ROM, Equipment Evaluation, Education, & procurement, Balance Training, Gait Training, Home Exercise Program, Functional Mobility Training, Cognitive/Perceptual Training, Stair training, Safety Education & Training    Electronically signed by   Petr Mckenna, [id number]  3/22/2019, 2:43 PM

## 2019-03-23 NOTE — PROGRESS NOTES
Records reviewed. Pt examined on the ARU this AM.     ADMITTING DIAGNOSIS:  Pneumonia. COMORBID DIAGNOSES IMPACTING REHABILITATION:  Influenza A with hypoxia,  uncontrolled hypertension, diabetes, acute renal failure with chronic  kidney disease stage II, and dysphagia. CHIEF COMPLAINT:  Once to take his oxygen off some during the day. .    Eating better. Slept well last night. Bowels are more formed and controlled. No dysuria. No chills or rigors. The remainder of his review of systems is negative. PHYSICAL EXAMINATION:  VITAL SIGNS: Blood pressure (!) 126/59, pulse 99, temperature 97.7 °F (36.5 °C), temperature source Oral, resp. rate 16, height 5' 10\" (1.778 m), weight 154 lb 9.6 oz (70.1 kg), SpO2 90 %. GENERAL:  The patient is seen sitting up in a wheelchair. Alert and oriented. Asking fair questions about discharge. Breathing easier. HEENT:   Visual fields are full. Mucous membranes are moist.    NECK:  Supple. LUNGS:  There are faint rhonchi throughout his lungs  No rales or wheezes were noted. HEART:  Sounds reveal a pansystolic murmur with a regular rate and rhythm. ABDOMEN:  His thin abdomen is soft. Bowel sounds are present. There is  no rebound, guarding, or masses noted. EXTREMITIES:  Some crepitus at the shoulder when he tries to raise his arms up dexterity is fair. In the lower limbs, he has  4/5 strength with hip flexion and knee extension. He has trace edema about the ankles. Both heels are clear and there are no signs of DVT. Sitting balance is good. Standing balance is fair. VC's and supervision with transfers. Improving overall. LABORATORY TESTING:  Potassium is 4.0, BUN and creatinine are 21 and 1.2,  calcium 7.7. White blood count 9.9, hemoglobin 11.1, platelet count  573,320.     IMPRESSION:  An 80-year-old male with pneumonia and influenza A with  hypoxia, uncontrolled hypertension, diabetes, acute renal failure,  superimposed upon chronic kidney

## 2019-03-23 NOTE — FLOWSHEET NOTE
Pt, wife, and son given discharge instructions. [facility]. Taken by wheelchair to car to discharge home with wife.

## 2019-03-26 NOTE — DISCHARGE SUMMARY
nightly             metFORMIN (GLUCOPHAGE) 500 MG tablet  Take 250 mg by mouth 2 times daily (with meals)              omeprazole (PRILOSEC) 20 MG delayed release capsule  Take 20 mg by mouth Daily with supper              simvastatin (ZOCOR) 80 MG tablet  Take 40 mg by mouth nightly                 CONDITION ON DISCHARGE: Stable. The prognosis is fair for further improvements in ADL's and safety with adapted gait/transfers. Record review, patient exam, discharge instructions, medication reconciliation and summary for this discharge visit took more than 30 minutes.

## 2019-03-28 PROBLEM — I21.4 NSTEMI (NON-ST ELEVATED MYOCARDIAL INFARCTION) (HCC): Status: ACTIVE | Noted: 2019-01-01

## 2019-03-28 NOTE — ED TRIAGE NOTES
Patient presents to ED by EMS with complaint of lethargy.   Patient had recent admission for respiratory, discharged to home with O2

## 2019-03-28 NOTE — ED PROVIDER NOTES
[facility]      TRIAGE CHIEF COMPLAINT:   Fatigue      [place]da:  Michael Joyner is a 80 y.o. male that presents with complaint of fatigue, not himself. Wife states patient has been not himself the past few days has been fatigued. Patient denies any complaints he does have a cough. He is alert and oriented he denies any headache chest pain shortness of breath abdominal pain weakness numbness tingling. No other questions or concerns. REVIEW OF SYSTEMS:  At least 10 systems reviewed and otherwise acutely negative except as in the [address] Review of Systems   Constitutional: Positive for fatigue. HENT: Negative. Eyes: Negative. Respiratory: Positive for cough. Cardiovascular: Negative. Gastrointestinal: Negative. Endocrine: Negative. Genitourinary: Negative. Musculoskeletal: Negative. Skin: Negative. Allergic/Immunologic: Negative. Neurological: Positive for weakness. Hematological: Negative. Psychiatric/Behavioral: Negative. All other systems reviewed and are negative. Past Medical History:   Diagnosis Date    Diabetes mellitus ([facility] [address]     Hypertension      History reviewed. No pertinent surgical history. History reviewed. No pertinent family history.   Social History     Socioeconomic History    Marital status:      Spouse name: Not on file    Number of children: Not on file    Years of education: Not on file    Highest education level: Not on file   Occupational History    Not on file   Social Needs    Financial resource strain: Not on file    Food insecurity:     Worry: Not on file     Inability: Not on file    Transportation needs:     Medical: Not on file     Non-medical: Not on file   Tobacco Use    Smoking status: Never Smoker    Smokeless tobacco: Never Used   Substance and Sexual Activity    Alcohol use: Never     Frequency: Never    Drug use: Never    Sexual activity: Not Currently   Lifestyle    Physical activity: no guarding, no tenderness at McBurney's point and negative Vemra's sign. Musculoskeletal: Normal range of motion. He exhibits no edema, tenderness or deformity. Neurological: He is alert and oriented to person, place, and time. He has normal strength. He is not disoriented. He displays no atrophy and no tremor. No cranial nerve deficit or sensory deficit. He exhibits normal muscle tone. He displays no seizure activity. Coordination normal. GCS eye subscore is 4. GCS verbal subscore is 5. GCS motor subscore is 6. Skin: Skin is warm. No rash noted. He is not diaphoretic. No erythema. No pallor. Psychiatric: He has a normal mood and affect. His behavior is normal. Judgment and thought content normal.   Nursing note and vitals reviewed.         I have reviewed andinterpreted all of the currently available lab results from this visit (if applicable):    Results for orders placed or performed during the hospital encounter of 03/28/19   CBC Auto Differential   Result Value Ref Range    WBC 10.7 (H) 4.0 - 10.5 K/CU MM    RBC 3.64 (L) 4.6 - 6.2 M/CU MM    Hemoglobin 10.4 (L) 13.5 - 18.0 GM/DL    Hematocrit 36.0 (L) 42 - 52 %    MCV 98.9 78 - 100 FL    MCH 28.6 27 - 31 PG    MCHC 28.9 (L) 32.0 - 36.0 %    RDW 14.6 11.7 - 14.9 %    Platelets 162 687 - 022 K/CU MM    MPV 10.6 7.5 - 11.1 FL    Differential Type AUTOMATED DIFFERENTIAL     Segs Relative 84.9 (H) 36 - 66 %    Lymphocytes % 5.4 (L) 24 - 44 %    Monocytes % 8.7 (H) 0 - 4 %    Eosinophils % 0.0 0 - 3 %    Basophils % 0.3 0 - 1 %    Segs Absolute 9.1 K/CU MM    Lymphocytes # 0.6 K/CU MM    Monocytes # 0.9 K/CU MM    Eosinophils # 0.0 K/CU MM    Basophils # 0.0 K/CU MM    Nucleated RBC % 0.0 %    Total Nucleated RBC 0.0 K/CU MM    Total Immature Neutrophil 0.07 K/CU MM    Immature Neutrophil % 0.7 (H) 0 - 0.43 %   Blood Gas, Venous   Result Value Ref Range    pH, Bert 7.38 7.32 - 7.42    pCO2, Bert 73 (H) 38 - 52 mmHG    pO2, Bert 250 (H) 28 - 48 mmHG    Base Exc, Mixed 14.5  PLUS   (H) 0 - 1.2    HCO3, Venous 43.2 (H) 19 - 25 MMOL/L    O2 Sat, Summer 93.6 (H) 50 - 70 %    Comment SUMMER PH    EKG 12 Lead   Result Value Ref Range    Ventricular Rate 91 BPM    Atrial Rate 91 BPM    P-R Interval 160 ms    QRS Duration 82 ms    Q-T Interval 354 ms    QTc Calculation (Bazett) 435 ms    P Axis 82 degrees    R Axis 18 degrees    T Axis -11 degrees    Diagnosis       Sinus rhythm with premature atrial complexes  Septal infarct , age undetermined  Abnormal ECG  When compared with ECG of 08-MAR-2019 21:55,  premature atrial complexes are now present  Septal infarct is now present  ST no longer elevated in Anterior leads  Nonspecific T wave abnormality now evident in Inferior leads  T wave amplitude has decreased in Anterior leads          Radiographs (if obtained):  [] The following radiograph was interpreted by myself in the absence of a radiologist:  [x] Radiologist's Report Reviewed:    CXR    Xr Chest Portable    Result Date: 3/8/2019  EXAMINATION: SINGLE XRAY VIEW OF THE CHEST 3/8/2019 9:55 pm COMPARISON: None. HISTORY: ORDERING SYSTEM PROVIDED HISTORY: sob TECHNOLOGIST PROVIDED HISTORY: Reason for exam:->sob Ordering Physician Provided Reason for Exam: SOB Acuity: Acute Type of Exam: Initial FINDINGS: Cardiomediastinal silhouette is within normal limits. Right midlung zone airspace opacification. No pulmonary vascular congestion or edema. No pleural effusion. Right midlung zone airspace opacification could represent pneumonia. Follow-up is recommended to ensure resolution     Cta Pulmonary W Contrast    Result Date: 3/9/2019  EXAMINATION: CTA OF THE CHEST 3/9/2019 12:05 am TECHNIQUE: CTA of the chest was performed after the administration of intravenous contrast.  Multiplanar reformatted images are provided for review. MIP images are provided for review.  Dose modulation, iterative reconstruction, and/or weight based adjustment of the mA/kV was utilized to reduce the radiation dose to as low as reasonably achievable. COMPARISON: None. HISTORY: ORDERING SYSTEM PROVIDED HISTORY: sob, tachy TECHNOLOGIST PROVIDED HISTORY: Ordering Physician Provided Reason for Exam: sob Acuity: Acute Type of Exam: Initial Mechanism of Injury: Patient arrived via [facility] with complaints of increasing fatigue to bilateral lower extremities Relevant Medical/Surgical History: isovue 370 80 ml FINDINGS: Pulmonary Arteries: The main pulmonary artery is normal in size. There is no large or central pulmonary embolism. The subsegmental pulmonary arteries are not well visualized secondary to the timing of the contrast bolus and respiratory motion. Mediastinum: The heart is normal in size. There is no pericardial effusion. The thoracic aorta is atherosclerotic, but not aneurysmal.  No mediastinal lymphadenopathy is demonstrated. Hilar lymph nodes measure up to 1.5 cm on the right. Lungs/pleura: There is mild emphysema. There are nodular and interstitial infiltrates within the bilateral lower and right middle lobes. No pleural effusion or pneumothorax is demonstrated. Upper Abdomen: Visualized portions of the upper abdomen demonstrate a 1.8 cm left renal cyst. Soft Tissues/Bones: No suspicious osteolytic or osteoblastic lesions are demonstrated. 1. No large or central pulmonary embolism. The subsegmental pulmonary arteries are not well evaluated secondary to respiratory motion and the timing of the contrast bolus. If there remains a high clinical concern for a small PE, a repeat exam is suggested. 2. Nodular infiltrates within the lower lungs, which are most likely infectious in etiology. Although less likely, malignancy cannot be excluded. A follow-up chest CT following antibiotic therapy in 6-12 weeks is recommended to document resolution. 3. Right hilar lymphadenopathy. This can be reassessed on the follow-up chest CT.      Fl Modified Barium Swallow W Video    Result Date: troponin levels elevated may have pleural effusions. Labs and imaging otherwise negative I did talk to cardiology and to hospital medicine patient minutes observation he does have EKG changes no signs of ST elevation. Likely heart failure admitted. CLINICAL IMPRESSION:  Final diagnoses:   Fatigue, unspecified type   General weakness   Cough   Acute electrocardiogram changes       (Please note that portions of this note may have been completed with a voice recognition program. Efforts were made to edit the dictations but occasionally words aremis-transcribed.)    DISPOSITION REFERRAL (if applicable):  No follow-up provider specified.     DISPOSITION MEDICATIONS (if applicable):  New Prescriptions    No medications on file          Gustavo Florentino, 9 [facility],   03/28/19 [id number]

## 2019-03-28 NOTE — ED NOTES
[place],lactic acid,venous ph and type & screen was drawn on patient     Valerie Gonzalezem  03/28/19 0181

## 2019-03-28 NOTE — CONSULTS
CARDIOLOGY CONSULT NOTE     Reason for consultation:  NSTEMI    Referring physician:  No admitting provider for patient encounter. Primary care physician: No primary care provider on file. Dear  Dr. Opal Ashraf admitting provider for patient encounter. Thanks for the consult. Chief Complaints :  Chief Complaint   Patient presents with    Fatigue        History of present illness:Tai is a 80 y. o.year old AA Male who presents with fatigue and weakness. Wife is at bedside and is providing information. Wife reports that her  was admitted to the hospital on March 8th for flu and pneumonia. He was discharged on the 23rd of March. She reports that since he has been home he has been sleeping a lot, fatigued and weak. She states that a nurse from the [place] was at their house today to assess him and suggested he go to the ER. The wife states that he has not been able to get out of bed because he is weak. The patient denies chest pain, SOB, edema, palpitations, dizziness or syncope. He has a medical history of HLD, DM. Cardiology was consulted for NSTEMI. Past medical history:    has a past medical history of Diabetes mellitus ([address] and Hypertension. Past surgical history:   has no past surgical history on file. Social History:   reports that he has never smoked. He has never used smokeless tobacco. He reports that he does not drink alcohol or use drugs. Family history:   no family history of CAD, STROKE of DM at early age    No Known Allergies      No current facility-administered medications for this encounter. No current facility-administered medications for this encounter.       Current Outpatient Medications   Medication Sig Dispense Refill    simvastatin (ZOCOR) 80 MG tablet Take 40 mg by mouth nightly      omeprazole (PRILOSEC) 20 MG delayed release capsule Take 20 mg by mouth Daily with supper       metFORMIN (GLUCOPHAGE) 500 MG tablet Take 250 mg by mouth 2 times daily (with meals)       gabapentin (NEURONTIN) 400 MG capsule Take 400 mg by mouth 2 times daily.  latanoprost (XALATAN) 0.005 % ophthalmic solution Place 1 drop into both eyes nightly       Review of Systems:   · Constitutional: No Fever or Weight Loss   · Eyes: No Decreased Vision  · ENT: No Headaches, Hearing Loss or Vertigo  · Cardiovascular: As per HPI  · Respiratory: As per HPI  · Gastrointestinal: No abdominal pain, + appetite loss,  -blood in stools, constipation, diarrhea or heartburn  · Genitourinary: No dysuria,+ trouble voiding, - hematuria  · Musculoskeletal:  + gait disturbance, + weakness   · Integumentary: No rash or pruritis  · Neurological: No TIA or stroke symptoms  · Psychiatric: No anxiety or depression  · Endocrine: No malaise, + fatigue, - temperature intolerance  · Hematologic/Lymphatic: No bleeding problems, blood clots or swollen lymph nodes  · Allergic/Immunologic: No nasal congestion or hives  All systems negative except as marked. Physical Examination:    Vitals:    03/28/19 1331 03/28/19 1342 03/28/19 1417   BP:  121/65    Pulse:  92    Resp:  24 22   Temp:  98.6 °F (37 °C)    TempSrc:  Axillary    SpO2:  99% 98%   Weight: 154 lb (69.9 kg)     Height: 5' 10\" (1.778 m)         General Appearance:  No distress, conversant    Constitutional:  Thin, weak, frail appearance  HENT:  Normocephalic, Atraumatic, Bilateral external ears normal, Oropharynx dry,+ oral exudates, Nose normal. Neck- Normal range of motion, No tenderness, Supple,   Lymphatics : no palpable lymph nodes  Eyes:  PERRL,  Conjunctiva normal, No discharge. Respiratory:  Diminished breath sounds, No respiratory distress, No wheezing, No chest tenderness. ,no use of accessory muscles, crackles Absent   Cardiovascular: No JVD present. s1 s2. No murmur, rub or gallop appreciated.     Abdomen /GI:  Bowel sounds normal, Soft, No tenderness, No masses, No gross visceromegaly   :  No costovertebral angle tenderness Musculoskeletal:  No edema, no tenderness, no deformities. Back- no tenderness  Integument:  Dry, flaky  Neurologic:  Alert & oriented x 3, CN 2-12 normal, normal motor function, normal sensory function, no focal deficits noted           Medical decision making and Data review:    Lab Review   Recent Labs     03/28/19  1345   WBC 10.7*   HGB 10.4*   HCT 36.0*         Recent Labs     03/28/19  1345   *   K 4.6      CO2 36*   BUN 34*   CREATININE 1.4*     Recent Labs     03/28/19  1345   AST 19   ALT 5*   BILITOT 0.5   ALKPHOS 76     Recent Labs     03/28/19  1345   TROPONINT 0.141*       Recent Labs     03/28/19  1345   PROBNP 25,822*     Lab Results   Component Value Date    INR 1.13 03/28/2019    PROTIME 12.9 (H) 03/28/2019       EKG: (reviewed by myself) ? ASMI ? age    ECHO:(reviewed by myself)    Chest Xray:(reviewed by myself)  Xr Chest Portable    Result Date: 3/28/2019  EXAMINATION: SINGLE XRAY VIEW OF THE CHEST 3/28/2019 2:01 pm COMPARISON: 03/08/2019 HISTORY: ORDERING SYSTEM PROVIDED HISTORY: cough/fatigue TECHNOLOGIST PROVIDED HISTORY: Reason for exam:->cough/fatigue Ordering Physician Provided Reason for Exam: cough/fatigue Acuity: Unknown Type of Exam: Unknown Additional signs and symptoms: discharged 3/23/19 FINDINGS: Normal heart size. Somewhat prominent pulmonary vasculature. Haziness at the lung bases may represent atelectasis or layering pleural fluid. No pneumothorax. Haziness at the lung bases may represent atelectasis or layering pleural fluid. Recommend obtaining PA and lateral chest radiographs for confirmation. Xr Chest Portable    Result Date: 3/8/2019  EXAMINATION: SINGLE XRAY VIEW OF THE CHEST 3/8/2019 9:55 pm COMPARISON: None.  HISTORY: ORDERING SYSTEM PROVIDED HISTORY: sob TECHNOLOGIST PROVIDED HISTORY: Reason for exam:->sob Ordering Physician Provided Reason for Exam: SOB Acuity: Acute Type of Exam: Initial FINDINGS: Cardiomediastinal silhouette is within large or central pulmonary embolism. The subsegmental pulmonary arteries are not well evaluated secondary to respiratory motion and the timing of the contrast bolus. If there remains a high clinical concern for a small PE, a repeat exam is suggested. 2. Nodular infiltrates within the lower lungs, which are most likely infectious in etiology. Although less likely, malignancy cannot be excluded. A follow-up chest CT following antibiotic therapy in 6-12 weeks is recommended to document resolution. 3. Right hilar lymphadenopathy. This can be reassessed on the follow-up chest CT. Fl Modified Barium Swallow W Video    Result Date: 3/12/2019  EXAMINATION: MODIFIED BARIUM SWALLOW WAS PERFORMED IN CONJUNCTION WITH SPEECH PATHOLOGY SERVICES 03/11/2019 TECHNIQUE: Fluoroscopic evaluation of the swallowing mechanism was performed with multiple consistency of barium product. FLUOROSCOPY DOSE AND TYPE OR TIME AND EXPOSURES: Fluoroscopy time 2.7 minutes, dose 8.80 mGy COMPARISON: None HISTORY: ORDERING SYSTEM PROVIDED HISTORY: aspiration TECHNOLOGIST PROVIDED HISTORY: Reason for exam:->aspiration Initial evaluation. FINDINGS: Early bolus spillover into the valleculae and piriform sinuses. Residue predominantly in the piriform sinuses, less prominently involving the valleculae. Deep laryngeal penetration with thin liquids. No definite tracheal aspiration. 1. Deep laryngeal penetration with thin liquids but no definite tracheal aspiration. 2. Early bolus spillover and predominantly piriform sinus residue. Please see separate speech pathology report for full discussion of findings and recommendations. All labs, medications and tests reviewed by myself including data  from outside source , patient and available family . Continue all other medications of all above medical condition listed as is. Assessment: 80 y. o.year old with PMH of  has a past medical history of Diabetes mellitus ([facility] [address] and Hypertension. Plan and Recommendations:    Elevated Troponin : Check serial troponin. If troponin are rising , patient may need further invasive / non invasive work up. BNP is elevated. Continue Aspirin. Patient denies cardiac complaints. ECHO pending. EKG reviewed,as noted above. Was started on heparin. Add ASA. On Statin  Elevated BNP; no evidence of fluid overload. Chest xray suggesting atelectasis vs layer pleural fluid. Elevated creatinine of 1.4. Malnurished  5. ; per primary team on IV fluids      Thank you  much for consult and giving us the opportunity in contributing in the care of this patient. Please feel free to call me for any questions. Elmer Linares, APRN - CNP, 3/28/2019 4:13 PM     I have seen ,spoken to  and examined this patient personally, independently of the nurse practitioner. I have reviewed the hospital care given to date and reviewed all pertinent labs and imaging. The plan was developed mutually at the time of the visit with the patient, Clinical Nurse Practitioner  and myself. I have spoken with patient, nursing staff and provided written and verbal instructions . The above note has been reviewed and I agree with the assessment, diagnosis, and treatment plan with changes made by me as follows     CARDIOLOGY ATTENDING ADDENDUM    HPI:  I have reviewed the above HPI  And agree with above   Shanthi Pierson is a 80 y. o.year old who and presents with had concerns including Fatigue. Chief Complaint   Patient presents with    Fatigue     Physical Exam:  General:  Awake, alert, NAD  Head:normal  Eye:normal  Neck:  No JVD   Chest:  Clear to auscultation, respiration easy  Cardiovascular:  RRR S1S2  Abdomen:   nontender  Extremities:  no edema  Pulses; palpable  Neuro: grossly normal      MEDICAL DECISION MAKING;    I agree with the above plan, which was planned by myself and discussed with NP.       Silvia Teran MD [facility]

## 2019-03-28 NOTE — H&P
History and Physical      Name:  Alejandra Lay /Age/Sex: 7/15/1933  (80 y.o. male)   MRN & CSN:  [id number] & [id number] Admission Date/Time: 3/28/2019  1:13 PM   Location:  ED15/ED-15 PCP: No primary care provider on file. Hospital Day: 1    Assessment and Plan:   Alejandra Lay is a 80 y.o.  male  who presents with NSTEMI (non-ST elevated myocardial infarction) ([facility]    1. NSTEMI: elevated troponin. Has generalized body weakness, no chest pain. Has elevated BNP. No recent echo. EKG with nonspecific ST-T wave changes. Started on aspirin and statin. Started on heparin. For echocardiogram.  Cardiology on consult. 2. Elevated BNP: Does not appear to be with fluid overload. Chest x-ray with pleural effusion. For echocardiogram  3. Hypernatremia: Sodium 147. Increase oral fluid intake. 4. Acute kidney injury: Due to dehydration. Creatinine 1.4. Repeat BMP tomorrow. 5. Type 2 DM: Last A1C. Lantus, Sliding scale. Hypoglycemia protocol. Accucheck. Hold oral hypoglycemic agents for now  6. Pleural Effusion: for CXR    Diet No diet orders on file   DVT Prophylaxis [] Lovenox, [x]  Heparin, [] SCDs, [] Warfarin  [] NOAC     GI Prophylaxis [x] PPI,  [] H2 Blocker,  [] Carafate,  [] Diet/Tube Feeds   Code Status Prior   MDM [] Low, [] Moderate,[x]  High     History of Present Illness:     Chief Complaint: NSTEMI (non-ST elevated myocardial infarction) ([facility]  Alejandra Lay is a 80 y.o.  male, with past medical history significant for hyperlipidemia, diabetes, who presented to the ED from home due generalized body weakness. He was recently discharged from rehab unit. He was admitted on  due to pneumonia and influenza. The finished a course of antibiotic and antiviral.  Since discharge, he noted worsening generalized body weakness. He wasn't able to get out of bed. She denied lateralizing weakness or numbness. Still with cough. Denied shortness of breath. No fever.   Denied lower extremity swelling or orthopnea. He has no chest pain. He was brought to the ED and was subsequently admitted    At the ED, vital signs blood pressure 121/65, heart rate 92, respiration 24. Significant laboratories were the following: Sodium 147, creatinine 1.4, BNP 25,000, troponin 0.141. Chest x-ray with pleural effusion. Ten point ROS reviewed negative, unless as noted above    Objective:   No intake or output data in the 24 hours ending 03/28/19 1451   Vitals:   Vitals:    03/28/19 1417   BP:    Pulse:    Resp: 22   Temp:    SpO2: 98%     Physical Exam:   GEN Awake male, sitting upright in bed in no apparent distress. Appears given age. Weak looking. EYES Pupils are equally round. No scleral erythema, discharge, or conjunctivitis. HENT Mucous membranes are moist. Oral pharynx without exudates, no evidence of thrush. NECK Supple, no apparent thyromegaly or masses. RESP decreased breath sounds. No rales or wheezes. CARDIO/VASC S1/S2 auscultated. Regular rate without appreciable murmurs, rubs, or gallops. No JVD or carotid bruits. Peripheral pulses equal bilaterally and palpable. No peripheral edema. GI Abdomen is soft without significant tenderness, masses, or guarding. Bowel sounds are normoactive. Rectal exam deferred. MSK No gross joint deformities. SKIN Normal coloration, warm, dry. NEURO Cranial nerves appear grossly intact, normal speech, no lateralizing weakness. PSYCH Awake, alert, oriented x 4. Affect appropriate. Past Medical History:      Past Medical History:   Diagnosis Date    Diabetes mellitus ([facility]     Hypertension      PSHX:  has no past surgical history on file. Allergies: No Known Allergies    FAM HX: family history is not on file.   Soc HX:   Social History     Socioeconomic History    Marital status:      Spouse name: None    Number of children: None    Years of education: None    Highest education level: None   Occupational History    None   Social Needs    Financial resource strain: None    Food insecurity:     Worry: None     Inability: None    Transportation needs:     Medical: None     Non-medical: None   Tobacco Use    Smoking status: Never Smoker    Smokeless tobacco: Never Used   Substance and Sexual Activity    Alcohol use: Never     Frequency: Never    Drug use: Never    Sexual activity: Not Currently   Lifestyle    Physical activity:     Days per week: None     Minutes per session: None    Stress: None   Relationships    Social connections:     Talks on phone: None     Gets together: None     Attends Baptism service: None     Active member of club or organization: None     Attends meetings of clubs or organizations: None     Relationship status: None    Intimate partner violence:     Fear of current or ex partner: None     Emotionally abused: None     Physically abused: None     Forced sexual activity: None   Other Topics Concern    None   Social History Narrative    None       Medications:     Home Medication   Prior to Admission medications    Medication Sig Start Date End Date Taking? Authorizing Provider   simvastatin (ZOCOR) 80 MG tablet Take 40 mg by mouth nightly    Historical Provider, MD   omeprazole (PRILOSEC) 20 MG delayed release capsule Take 20 mg by mouth Daily with supper     Historical Provider, MD   metFORMIN (GLUCOPHAGE) 500 MG tablet Take 250 mg by mouth 2 times daily (with meals)     Historical Provider, MD   gabapentin (NEURONTIN) 400 MG capsule Take 400 mg by mouth 2 times daily.     Historical Provider, MD   latanoprost (XALATAN) 0.005 % ophthalmic solution Place 1 drop into both eyes nightly    Historical Provider, MD     Medications:    sodium chloride  1,000 mL Intravenous Once    aspirin  324 mg Oral Once      Infusions:   PRN Meds:      Recent Labs     03/28/19  1345   WBC 10.7*   HGB 10.4*   HCT 36.0*         Recent Labs     03/28/19  1345   *   K 4.6      CO2 36*   BUN 34*   CREATININE 1.4*

## 2019-03-28 NOTE — ED NOTES
Bed: ED-15  Expected date:   Expected time:   Means of arrival:   Comments:  [facility] pt     Jerel Diaz RN  03/28/19 0117

## 2019-03-28 NOTE — DISCHARGE INSTR - COC
Continuity of Care Form    Patient Name: Ese Mooney   :  7/15/1933  MRN:  [id number]    Admit date:  3/28/2019  Discharge date:  19    Code Status Order: Prior   Advance Directives:     Admitting Physician:  No admitting provider for patient encounter. PCP: No primary care provider on file. Discharging Nurse: [facility] Unit/Room#: H03/H-03  Discharging Unit Phone Number:  [phone]    Emergency Contact:   Extended Emergency Contact Information  Primary Emergency Contact: Mina Manriquez  Home Phone: [phone]  Relation: Spouse  Secondary Emergency Contact: [facility]  Home Phone: [phone]  Relation: Child    Past Surgical History:  No past surgical history on file. Immunization History: There is no immunization history on file for this patient. Active Problems:  Patient Active Problem List   Diagnosis Code    Community acquired pneumonia due to influenza A virus J09. X1    Pneumonia J18.9    Essential hypertension I10    Type 2 diabetes mellitus with diabetic polyneuropathy, without long-term current use of insulin ([facility] E11.42    KERA (acute kidney injury) ([facility] N17.9    Oropharyngeal dysphagia R13.12       Isolation/Infection:   Isolation          No Isolation            Nurse Assessment:  Last Vital Signs: There were no vitals taken for this visit. Last documented pain score (0-10 scale):    Last Weight:   Wt Readings from Last 1 Encounters:   19 154 lb 9.6 oz (70.1 kg)     Mental Status:  oriented and alert    IV Access:  - PICC - site  R Upper Arm, insertion date: 19    Nursing Mobility/ADLs:  Walking   Dependent  Transfer  Dependent  Bathing  Dependent  Dressing  Dependent  Toileting  Dependent  Feeding  Dependent PEG- Jevity @55 ml/hr  Med Admin  Dependent  Med Delivery   crushed- PEG tube     Wound Care Documentation and Therapy:        Elimination:  Continence:    · Bowel: unknown   Urinary Catheter:  Insertion Date: 3/28/19 Sammy Purdy  is necessary for the continuing treatment of the diagnosis listed and that he requires LTAC for greater 30 days.      Update Admission H&P: No change in H&P    PHYSICIAN SIGNATURE:  Electronically signed by Rodger Mosquera MD on 4/16/19 at 12:10 PM

## 2019-03-28 NOTE — CARE COORDINATION
CM review of chart for readmission risk. Last admission was 3/8-3/12/19 discharged to ARU 3/12-3/23/19. Pt has Medicare insurance. Into speak with pt and wife/Maggie at bed side. Pt very weak today, appears ill, alert and oriented states he still had difficulties with ambulating once he was discharged to home. Discussed possibility of SNF rehab. Pt not ready to discuss at this time. Brandon Gutierrez stated that something would have to be done, she didn't say if but it appears from pt state of health, she may be unable to care for pt at home at this time. Explain CM would follow up with them for a safe discharge plan at time of discharge. Pt is being admitted today no alternatives to admission at this time. CM to follow for discharge planning.  JETHRORN/CM

## 2019-03-28 NOTE — ED NOTES
Patient admitted to room 3106. Report called to DEBBY Keane. Transport team to transport patient to room.         Brissa Lorenzo RN  03/28/19 8432

## 2019-03-28 NOTE — PROGRESS NOTES
Skin checked with Glory Moscoso RN. BLE dry and flaky but intact.  Left knee has soft, hanging skin pocket pink in color possible cyst.

## 2019-03-29 NOTE — PROGRESS NOTES
Physical Therapy/Occupational Therapy  Attempted PT/OT eval, per RN, hold until patient has heart cath, attempted this morning and physician pulled away for other patient emergency. Will re-attempt as able.

## 2019-03-29 NOTE — PLAN OF CARE
Problem: Falls - Risk of:  Goal: Will remain free from falls  3/29/2019 1617 by Savage Iyer RN  Outcome: Ongoing  3/29/2019 0451 by Shira Mitchell RN  Outcome: Ongoing  Goal: Absence of physical injury  3/29/2019 1617 by Savage Iyer RN  Outcome: Ongoing  3/29/2019 0451 by Shira Mitchell RN  Outcome: Ongoing     Problem: Nutrition  Goal: Optimal nutrition therapy  3/29/2019 1617 by Savage Iyer RN  Outcome: Ongoing  3/29/2019 1459 by Sapna Jacobs RD, LD  Outcome: Ongoing

## 2019-03-29 NOTE — PROGRESS NOTES
Nutrition Assessment    Type and Reason for Visit: Initial, Positive Nutrition Screen    Nutrition Recommendations:   · Continue current diet  · Start diabetic supplements TID    Nutrition Assessment: Pt seen due to a positive screen for weight loss. Pt has lost 1.9% of his body weight in the past week and has severe muscle and sub q fat wasting. Pt states he would like strawberry Glucerna but no other flavors. He also states that he \"just doesn't feel like eating. \" Will order and continue to follow. Malnutrition Assessment:  · Malnutrition Status: Meets the criteria for severe malnutrition  · Context: Chronic illness  · Findings of the 6 clinical characteristics of malnutrition (Minimum of 2 out of 6 clinical characteristics is required to make the diagnosis of moderate or severe Protein Calorie Malnutrition based on AND/ASPEN Guidelines):  1. Energy Intake-Less than or equal to 50% of estimated energy requirement, Greater than or equal to 3 months    2. Weight Loss-2% loss or greater, in 1 week  3. Fat Loss-Severe subcutaneous fat loss, Orbital  4. Muscle Loss-Severe muscle mass loss, Clavicles (pectoralis and deltoids)  5. Fluid Accumulation-No significant fluid accumulation, Extremities  6.   Strength-Not measured    Nutrition Risk Level: High    Nutrient Needs:  · Estimated Daily Total Kcal: 7077-4027 based on [address] jeor  · Estimated Daily Protein (g): 75-90 based on 1-1.2 g/kg/IBW  · Estimated Daily Total Fluid (ml/day): 3213-3906 based on 1 mL/kcal    Nutrition Diagnosis:   · Problem: Severe malnutrition, In context of chronic illness  · Etiology: related to Insufficient energy/nutrient consumption     Signs and symptoms:  as evidenced by Severe loss of subcutaneous fat, Severe muscle loss, Weight loss greater than or equal to 2% in 1 week    Objective Information:  · Wound Type: None  · Current Nutrition Therapies:  · Oral Diet Orders: Cardiac, Dysphagia 2, Nectar Thick   · Oral Diet intake: 26-50%  · Oral Nutrition Supplement (ONS) Orders: None  · Anthropometric Measures:  · Ht: 5' 10\" (177.8 cm)   · Current Body Wt: 149 lb (67.6 kg)  · Admission Body Wt: 149 lb (67.6 kg)  · Usual Body Wt: 154 lb (69.9 kg)  · % Weight Change: -1.9% in one week  · Ideal Body Wt: 166 lb (75.3 kg), % Ideal Body 90%  · BMI Classification: BMI 18.5 - 24.9 Normal Weight    Nutrition Interventions:   Continue current diet, Start ONS  Continued Inpatient Monitoring, Education Not Indicated, Coordination of Care    Nutrition Evaluation:   · Evaluation: Goals set   · Goals: pt will consume greater than 75% of his meals and supplements provided    · Monitoring: Meal Intake, Weight, Pertinent Labs, Supplement Intake, Mental Status/Confusion      Electronically signed by Tesfaye Garcia RD, LD on 7/78/99 at 2:58 PM    Contact Number: [phone]

## 2019-03-29 NOTE — PROGRESS NOTES
CARDIOLOGY PROGRESS NOTE               Jeff Horvath MD Donata Stamp Date:  3/28/2019      SUMMARY:  · Pt seen today for  :                     NSTEMI                  HTN                  DM                  PNEUMONIA / BRONCHOSPASM    · PLAN:                  NSTEMI: Cath could not be done as patient became acutely SOB. Will try to schedule for tomorrow. HTN: well controlled                  DM: per hospitalist                  PNEUMONIA / BRONCHOSPASM:  on the case. Subjective:    CC on admssion: Fatigue    HPI:  Randell Gray is a 80 y. o.year old who and presents with had concerns including Fatigue. Chief Complaint   Patient presents with    Fatigue       Objective: Temperature:  Current - Temp: 98 °F (36.7 °C); Max - Temp  Av.8 °F (36.6 °C)  Min: 97.6 °F (36.4 °C)  Max: 98.1 °F (36.7 °C)    Respiratory Rate : Resp  Av.7  Min: 18  Max: 22    Pulse Range: Pulse  Av.8  Min: 86  Max: 94    Blood Presuure Range:  Systolic (78PQQ), SWI:017 , Min:111 , ANGELA:518   ; Diastolic (39GPW), IEE:49, Min:55, Max:74      Pulse ox Range: SpO2  Av.5 %  Min: 94 %  Max: 98 %    24hr I & O:      Intake/Output Summary (Last 24 hours) at 3/29/2019 1848  Last data filed at 3/29/2019 1434  Gross per 24 hour   Intake 60 ml   Output 800 ml   Net -740 ml       /72   Pulse 91   Temp 98 °F (36.7 °C) (Oral)   Resp 22   Ht 5' 10\" (1.778 m)   Wt 149 lb 0.5 oz (67.6 kg)   SpO2 96%   BMI 21.38 kg/m²      Review of Systems:    As noted in the consult notes    TELEMETRY: Sinus      has a past medical history of Diabetes mellitus ([address] and Hypertension. has no past surgical history on file. Physical Exam:  GENERAL - Alert, oriented, pleasant, in no apparent distress. EYES: No jaundice, no conjunctival pallor. SKIN: It is warm & dry. No rashes.  No Echhymosis    HEENT - No clinically significant abnormalities seen. Neck - Supple. No jugular venous distention noted. No carotid bruits. Cardiovascular - Normal S1 and S2 without obvious murmur or gallop. Extremities - No cyanosis, clubbing, or significant edema. Pulmonary - No respiratory distress. Clear to auscultation. No wheezes or rales. Abdomen - Non tender,No masses, tenderness, or organomegaly. Musculoskeletal - No significant edema. No joint deformities. No muscle wasting. Neurologic - Cranial nerves II through XII are grossly intact. There were no gross focal neurologic abnormalities. Allergies  Patient has no known allergies.     Medications:    cefepime  2 g Intravenous Q12H    [START ON 3/30/2019] vancomycin  1,250 mg Intravenous Q24H    gabapentin  400 mg Oral BID    latanoprost  1 drop Both Eyes Nightly    pantoprazole  40 mg Oral QAM AC    simvastatin  40 mg Oral Nightly    sodium chloride flush  10 mL Intravenous 2 times per day    aspirin  81 mg Oral Daily    ipratropium-albuterol  1 ampule Inhalation Q4H WA    sodium chloride flush  10 mL Intravenous 2 times per day    insulin lispro  0-12 Units Subcutaneous TID WC    insulin lispro  0-6 Units Subcutaneous Nightly    insulin glargine  5 Units Subcutaneous Nightly      dextrose      heparin (porcine) 12 Units/kg/hr (03/29/19 0156)    sodium chloride 75 mL/hr at 03/28/19 2145     glucose, dextrose, glucagon (rDNA), dextrose, sodium chloride flush, magnesium hydroxide, ondansetron, heparin (porcine), heparin (porcine), sodium chloride flush, diazepam, diphenhydrAMINE, acetaminophen    Lab Data:  CBC:   Recent Labs     03/28/19  1345 03/28/19  1855 03/29/19  1034   WBC 10.7* 11.4* 9.9   HGB 10.4* 11.4* 10.3*   HCT 36.0* 39.3* 36.4*   MCV 98.9 98.7 101.4*    276 313     BMP:   Recent Labs     03/28/19  1345 03/29/19  1034   * 146*   K 4.6 5.0    104   CO2 36* 36*   BUN 34* 31*   CREATININE 1.4* 1.1     LIVER PROFILE:   Recent Labs 03/28/19  1345   AST 19   ALT 5*   LIPASE 27   BILITOT 0.5   ALKPHOS 76     PT/INR:   Recent Labs     03/28/19  1332   PROTIME 12.9*   INR 1.13     APTT:   Recent Labs     03/29/19  0053 03/29/19  1034 03/29/19  1647   APTT 63.8* 41.5* 41.6*     PRO BNP:  Invalid input(s): PRO BNP  TROPONIN: @TROPONINT:3@  Labs, consult, tests reviewed    Patient Active Problem List   Diagnosis Code    Community acquired pneumonia due to influenza A virus J09. X1    Pneumonia J18.9    Essential hypertension I10    Type 2 diabetes mellitus with diabetic polyneuropathy, without long-term current use of insulin ([facility]) E11.42    KERA (acute kidney injury) ([facility] N17.9    Oropharyngeal dysphagia R13.12    NSTEMI (non-ST elevated myocardial infarction) ([facility]) I21.4    Fatigue R53.83       Assessment/ PLAN:  See Summary & Plans as described above.

## 2019-03-29 NOTE — PROGRESS NOTES
Hospitalist Progress Note      Name:  Juani Ellis /Age/Sex: 7/15/1933  (80 y.o. male)   MRN & CSN:  [id number] & [id number] Admission Date/Time: 3/28/2019  1:13 PM   Location:  [address] [place] [address] PCP: No primary care provider on file. Hospital Day: 2    Assessment and Plan:   Juani Ellis is a 80 y.o.  male  who presents with NSTEMI (non-ST elevated myocardial infarction) ([facility]    1. NSTEMI: elevated troponin. Has generalized body weakness, no chest pain. Has elevated BNP. No recent echo. EKG with nonspecific ST-T wave changes. Started on aspirin and statin. Started on heparin. For echocardiogram.  Cardiology on consult. 2. Elevated BNP: Does not appear to be with fluid overload. Chest x-ray with pleural effusion. For echocardiogram  3. Hypernatremia: Sodium 146. Increase oral fluid intake. 4. Acute kidney injury, Resolved: Due to dehydration. Creatinine 1.4 on admission, now 1.1  5. Type 2 DM: Last A1C. Lantus, Sliding scale. Hypoglycemia protocol. Accucheck. Hold oral hypoglycemic agents for now  6. Pleural Effusion: repeat CXR with bibasilar consolidation and bilateral pleural effusion. Start Cefepime and Vanc, MRSA. Pulmonology on consult    Diet Diet NPO Time Specified   DVT Prophylaxis [] Lovenox, [x]  Heparin, [] SCDs, [] Warfarin  [] NOAC     GI Prophylaxis [x] PPI,  [] H2 Blocker,  [] Carafate,  [] Diet/Tube Feeds   Code Status Full Code   MDM [] Low, [] Moderate,[x]  High     History of Present Illness:     Chief Complaint: NSTEM[facility] (non-ST elevated [facility] infarction) ([facility]    Juani Ellis is a 80 y.o.  male, with past medical history significant for hyperlipidemia, diabetes, who presented to the ED from home due generalized body weakness. He was recently discharged from rehab unit. He was admitted on  due to pneumonia and influenza. The finished a course of antibiotic and antiviral.  Since discharge, he noted worsening generalized body weakness.   He wasn't able to get out of bed.  She denied lateralizing weakness or numbness. Still with cough. Denied shortness of breath. No fever. Denied lower extremity swelling or orthopnea. He has no chest pain. He was brought to the ED and was subsequently admitted     At the ED, vital signs blood pressure 121/65, heart rate 92, respiration 24. Significant laboratories were the following: Sodium 147, creatinine 1.4, BNP 25,000, troponin 0.141. Chest x-ray with pleural effusion. He was seen and examined today. He still complained of body weakness. He has no chest pain or SOB. He still has cough. Ten point ROS reviewed negative, unless as noted above    Objective: Intake/Output Summary (Last 24 hours) at 3/29/2019 1029  Last data filed at 3/29/2019 0339  Gross per 24 hour   Intake --   Output 1400 ml   Net -1400 ml      Vitals:   Vitals:    03/29/19 0800   BP: (!) 121/55   Pulse: 93   Resp: 19   Temp: 97.9 °F (36.6 °C)   SpO2:      Physical Exam:   GEN    Awake male, sitting upright in bed in no apparent distress. Appears given age. Weak looking. EYES   Pupils are equally round. No scleral erythema, discharge, or conjunctivitis. HENT  Mucous membranes are moist. Oral pharynx without exudates, no evidence of thrush. NECK  Supple, no apparent thyromegaly or masses. RESP  decreased breath sounds. No rales or wheezes. CARDIO/VASC           S1/S2 auscultated. Regular rate without appreciable murmurs, rubs, or gallops. No JVD or carotid bruits. Peripheral pulses equal bilaterally and palpable. No peripheral edema. GI        Abdomen is soft without significant tenderness, masses, or guarding. Bowel sounds are normoactive. Rectal exam deferred. MSK    No gross joint deformities. SKIN    Normal coloration, warm, dry. NEURO           Cranial nerves appear grossly intact, normal speech, no lateralizing weakness. PSYCH            Awake, alert, oriented x 4. Affect appropriate.         Medications:   Medications:    cefepime  2 g Intravenous Q12H    vancomycin  1,250 mg Intravenous Once    gabapentin  400 mg Oral BID    latanoprost  1 drop Both Eyes Nightly    pantoprazole  40 mg Oral QAM AC    simvastatin  40 mg Oral Nightly    sodium chloride flush  10 mL Intravenous 2 times per day    aspirin  81 mg Oral Daily    ipratropium-albuterol  1 ampule Inhalation Q4H WA    sodium chloride flush  10 mL Intravenous 2 times per day    insulin lispro  0-12 Units Subcutaneous TID WC    insulin lispro  0-6 Units Subcutaneous Nightly    insulin glargine  5 Units Subcutaneous Nightly      Infusions:    dextrose      heparin (porcine) 12 Units/kg/hr (03/29/19 0156)    sodium chloride 75 mL/hr at 03/28/19 2145     PRN Meds:   glucose 15 g PRN   dextrose 12.5 g PRN   glucagon (rDNA) 1 mg PRN   dextrose 100 mL/hr PRN   sodium chloride flush 10 mL PRN   magnesium hydroxide 30 mL Daily PRN   ondansetron 4 mg Q6H PRN   heparin (porcine) 60 Units/kg PRN   heparin (porcine) 30 Units/kg PRN   sodium chloride flush 10 mL PRN   diazepam 5 mg Once PRN   diphenhydrAMINE 25 mg Once PRN   acetaminophen 650 mg Q4H PRN       Recent Labs     03/28/19  1345 03/28/19  1855   WBC 10.7* 11.4*   HGB 10.4* 11.4*   HCT 36.0* 39.3*    276      Recent Labs     03/28/19  1345   *   K 4.6      CO2 36*   BUN 34*   CREATININE 1.4*     Recent Labs     03/28/19  1345   AST 19   ALT 5*   BILITOT 0.5   ALKPHOS 76     Recent Labs     03/28/19  1332   INR 1.13     Recent Labs     03/28/19  1345 03/28/19  1855 03/29/19  0053   CKTOTAL 49  --   --    TROPONINT 0.141* 0.141* 0.128*        Imaging reviewed      Electronically signed by Sea Buitrago MD on 3/29/2019 at 10:29 AM

## 2019-03-29 NOTE — PROGRESS NOTES
Notified CARL Perez, pt requesting Tylenol for pain; order modified (see MAR). Pt with blood glucose of 365 with no insulin orders in place. Insulin orders placed (see MAR). Will continue to monitor.

## 2019-03-29 NOTE — PLAN OF CARE
Nutrition Problem: Severe malnutrition, In context of chronic illness  Intervention: Food and/or Nutrient Delivery: Continue current diet, Start ONS  Nutritional Goals: pt will consume greater than 75% of his meals and supplements provided

## 2019-03-29 NOTE — PROGRESS NOTES
[id number] [facility] SPEECH/LANGUAGE PATHOLOGY    Prasad Goins  3/29/2019  [phone]    Attempted to see Prasad Goins for bedside swallow evaluation. Pt is irritable and refusing to cooperate with assessment at this time. D/w RN, pt's current diet was most recent known recommended diet at [facility], likely appropriate if no changes in medical status have occurred that may impact oropharyngeal swallow function. Pt is known to have aspiration with thin liquids per previous MBS. Recommend continued current diet with SLP to follow-up and reattempt assessment as pt participation allows and/or if there are concerns for poor tolerance of current diet textures.     Kenneth Dixon MA CCC-SLP  3/29/2019  5:05 PM

## 2019-03-29 NOTE — CONSULTS
Subjective:   CHIEF COMPLAINT / HPI:  80year old male who was recently discharged from rehab is readmitted because of generalized weakness. he still has cough with out any sputum production. He has mild sob and is on o2      Past Medical History:  Past Medical History:   Diagnosis Date    Diabetes mellitus ([address]     Hypertension        Past Surgical History:    History reviewed. No pertinent surgical history.     Current Medications:    Current Facility-Administered Medications: cefepime (MAXIPIME) 2 g in dextrose 5 % 50 mL IVPB, 2 g, Intravenous, Q12H  vancomycin (VANCOCIN) 1,250 mg in dextrose 5 % 250 mL IVPB, 1,250 mg, Intravenous, Once  gabapentin (NEURONTIN) capsule 400 mg, 400 mg, Oral, BID  latanoprost (XALATAN) 0.005 % ophthalmic solution 1 drop, 1 drop, Both Eyes, Nightly  pantoprazole (PROTONIX) tablet 40 mg, 40 mg, Oral, QAM AC  simvastatin (ZOCOR) tablet 40 mg, 40 mg, Oral, Nightly  glucose (GLUTOSE) 40 % oral gel 15 g, 15 g, Oral, PRN  dextrose 50 % solution 12.5 g, 12.5 g, Intravenous, PRN  glucagon (rDNA) injection 1 mg, 1 mg, Intramuscular, PRN  dextrose 5 % solution, 100 mL/hr, Intravenous, PRN  sodium chloride flush 0.9 % injection 10 mL, 10 mL, Intravenous, 2 times per day  sodium chloride flush 0.9 % injection 10 mL, 10 mL, Intravenous, PRN  magnesium hydroxide (MILK OF MAGNESIA) 400 MG/5ML suspension 30 mL, 30 mL, Oral, Daily PRN  ondansetron (ZOFRAN) injection 4 mg, 4 mg, Intravenous, Q6H PRN  aspirin chewable tablet 81 mg, 81 mg, Oral, Daily  ipratropium-albuterol (DUONEB) nebulizer solution 1 ampule, 1 ampule, Inhalation, Q4H WA  heparin (porcine) injection 4,190 Units, 60 Units/kg, Intravenous, PRN  heparin (porcine) injection 2,100 Units, 30 Units/kg, Intravenous, PRN  heparin 25,000 units in dextrose 5% 250 mL infusion, 12 Units/kg/hr, Intravenous, Continuous  0.9 % sodium chloride infusion, , Intravenous, Continuous  sodium chloride flush 0.9 % injection 10 mL, 10 mL, Intravenous, 2 times per day  sodium chloride flush 0.9 % injection 10 mL, 10 mL, Intravenous, PRN  diazepam (VALIUM) tablet 5 mg, 5 mg, Oral, Once PRN  diphenhydrAMINE (BENADRYL) tablet 25 mg, 25 mg, Oral, Once PRN  acetaminophen (TYLENOL) tablet 650 mg, 650 mg, Oral, Q4H PRN  insulin lispro (HUMALOG) injection vial 0-12 Units, 0-12 Units, Subcutaneous, TID WC  insulin lispro (HUMALOG) injection vial 0-6 Units, 0-6 Units, Subcutaneous, Nightly  insulin glargine (LANTUS) injection vial 5 Units, 5 Units, Subcutaneous, Nightly    No Known Allergies    Social History:    Social History     Socioeconomic History    Marital status:      Spouse name: None    Number of children: None    Years of education: None    Highest education level: None   Occupational History    None   Social Needs    Financial resource strain: None    Food insecurity:     Worry: None     Inability: None    Transportation needs:     Medical: None     Non-medical: None   Tobacco Use    Smoking status: Never Smoker    Smokeless tobacco: Never Used   Substance and Sexual Activity    Alcohol use: Never     Frequency: Never    Drug use: Never    Sexual activity: Not Currently   Lifestyle    Physical activity:     Days per week: None     Minutes per session: None    Stress: None   Relationships    Social connections:     Talks on phone: None     Gets together: None     Attends Buddhist service: None     Active member of club or organization: None     Attends meetings of clubs or organizations: None     Relationship status: None    Intimate partner violence:     Fear of current or ex partner: None     Emotionally abused: None     Physically abused: None     Forced sexual activity: None   Other Topics Concern    None   Social History Narrative    None       Family History:   History reviewed. No pertinent family history. Immunization:    There is no immunization history on file for this patient.       Objective:   PHYSICAL EXAM:      VITALS: Vitals:    03/29/19 0338 03/29/19 0450 03/29/19 0750 03/29/19 0800   BP: 131/74   (!) 121/55   Pulse: 94 94  93   Resp: 18 18 20 19   Temp: 97.6 °F (36.4 °C) 97.6 °F (36.4 °C)  97.9 °F (36.6 °C)   TempSrc: Oral   Oral   SpO2: 94%  96%    Weight: 149 lb 0.5 oz (67.6 kg)      Height:               NECK:  Supple, symmetrical, trachea midline, no adenopathy, thyroid symmetric, not enlarged and no tenderness  CHEST: Chest expansion equal and symmetrical, no intercostal retraction. LUNGS:  no increased work of breathing, has expiratory wheezes both lungs, no crackles. CARDIOVASCULAR: S1 and S2, no edema and no JVD  ABDOMEN:  normal bowel sounds, non-distended and no masses palpated, and no tenderness to palpation. No hepatospleenomegaly  LYMPHADENOPATHY:  no axillary or supraclavicular adenopathy. No cervical adnenopathy    MUSCULOSKELETAL: No obvious misalignment or effusion of the joints. No clubbing, cyanosis of the digits. RIGHT AND LEFT LOWER EXTREMITIES: No edema, no inflammation, no tenderness. SKIN:  normal skin color, texture, turgor and no redness, warmth, or swelling. No palpable nodules    DATA:       EXAMINATION:   TWO VIEWS OF THE CHEST       3/28/2019 10:47 pm       COMPARISON:   Chest 03/20/2019 2:29 p.m.       HISTORY:   ORDERING SYSTEM PROVIDED HISTORY: Pleural Effusion   TECHNOLOGIST PROVIDED HISTORY:   Reason for exam:->Pleural Effusion   Ordering Physician Provided Reason for Exam: Pleural Effusion   Acuity: Unknown   Type of Exam: Unknown   Additional signs and symptoms: cough   Relevant Medical/Surgical History: htn       FINDINGS:   Calcifications involving the aorta reflect atherosclerosis. The   cardiomediastinal and hilar silhouettes appear otherwise unremarkable.    Bibasilar consolidation and bilateral pleural effusion, right greater than   left confirmed compared with prior study.  Chronic appearing coarse   interstitial densities predominate parahilar regions and lung bases, typical of sequela from smoking or other previous infectious/inflammatory process. No pneumothorax is seen. No acute osseous abnormality is identified.           Impression   Confirmation of bibasilar consolidation and bilateral pleural effusion, right   greater than left, concerning for pneumonia.       Chronic appearing coarse interstitial densities predominate parahilar regions   and lung bases, typical of sequela from smoking or other previous   infectious/inflammatory process.       Calcific atherosclerotic disease aorta. Assessment:     1. nicanor ll pneumonia  2. nicanor pleural effusion  3. Ac bronchospasm          Plan:     1. D/w pt and wife  2. Adequate o2 adm  3. Bd rx  4. Agree with antibx  5. I will get a ct chest and if it shows significant fluid then will request IR to tap  6.  Thanks will [email or web address]

## 2019-03-29 NOTE — CONSULTS
[address] [facility]  consulted by Dr. Jen Otto for monitoring and adjustment. Indication for treatment: Pneumonia  Goal trough: 15-20 mcg/mL     Pertinent Laboratory Values:   Temp Readings from Last 3 Encounters:   03/29/19 97.9 °F (36.6 °C) (Oral)   03/23/19 97.7 °F (36.5 °C) (Oral)   03/12/19 97.8 °F (36.6 °C) (Oral)     Recent Labs     03/28/19  1223 03/28/19  1345 03/28/19  1855 03/29/19  1034   WBC  --  10.7* 11.4* 9.9   LACTATE 1.1  --   --   --      Recent Labs     03/28/19  1345 03/29/19  1034   BUN 34* 31*   CREATININE 1.4* 1.1     Estimated Creatinine Clearance: 47 mL/min (based on SCr of 1.1 mg/dL). Intake/Output Summary (Last 24 hours) at 3/29/2019 1312  Last data filed at 3/29/2019 0339  Gross per 24 hour   Intake --   Output 1400 ml   Net -1400 ml       Pertinent Cultures:  Date    Source    Results  03/29   MRSA screen   Sent  03/29   Sputum   No result     Vancomycin level:   TROUGH:  No results for input(s): VANCOTROUGH in the last 72 hours. RANDOM:  No results for input(s): VANCORANDOM in the last 72 hours. Assessment:  · WBC and temperature: Trending down/ WNL   · SCr, BUN, and urine output: Trending down 1.4 -> 1.1  · Day(s) of therapy: 1   · Vancomycin level: To be collected     Plan:  · Dosing comments: Will start Mr. Nair on vancomycin 1250mg q24h   · Pharmacy will continue to monitor patient and adjust therapy as indicated    VANCOMYCIN TROUGH SCHEDULED FOR 04/01 @1000    Thank you for the consult.   Philippe MunozD, [facility]  3/29/2019 1:17 PM

## 2019-03-29 NOTE — PROGRESS NOTES
After transporting pt to cath lab 1 on port 02 and port monitor, respiratory therapy notified to administer breathing treatment to pt per physician request;

## 2019-03-29 NOTE — CARE COORDINATION
Pt was recently d/c'd from ARU. Requested PT/OT to eval for ARU vs SNF placement. Pt has Medicare and would not require a pre-cert.   TE

## 2019-03-30 NOTE — PROGRESS NOTES
Physical Therapy  Hold PT eval 3/30 as patient has not had cardiac cath done yet, attempted yesterday but was deferred to today d/t acute SOB. Will re-attempt as able when patient is medically stable.

## 2019-03-30 NOTE — PLAN OF CARE
Problem: Falls - Risk of:  Goal: Will remain free from falls  Outcome: Not Met This Shift  Goal: Absence of physical injury  Outcome: Not Met This Shift     Problem: Nutrition  Goal: Optimal nutrition therapy  Outcome: Not Met This Shift

## 2019-03-30 NOTE — PROGRESS NOTES
Occupational Therapy  Held OT eval this date as cardiac cath has yet to be performed. Will follow pt and re-attempt evaluation as pt condition permits. Ming Vasquez MS, OTR/L  License #[id number]. [id number]

## 2019-03-30 NOTE — PROGRESS NOTES
Hospitalist Progress Note      Name:  Ese Mooney /Age/Sex: 7/15/1933  (80 y.o. male)   MRN & CSN:  [id number] & [id number] Admission Date/Time: 3/28/2019  1:13 PM   Location:  [facility] PCP: No primary care provider on file. Hospital Day: 3    Assessment and Plan:   Ese Mooney is a 80 y.o.  male  who presents with NSTEMI (non-ST elevated myocardial infarction) ([facility] [address]    1. NSTEMI: elevated troponin. Has generalized body weakness, no chest pain. Has elevated BNP. No recent echo. EKG with nonspecific ST-T wave changes. Started on aspirin and statin. Started on heparin. Echocardiogram with EF of 60-65%, severely dilated right atrium, moderate to severe TR. Cardiology on consult. LHC not done yesterday because of SOB. 2. Elevated BNP: Does not appear to be with fluid overload. Chest x-ray with pleural effusion. 3. Hypernatremia: Sodium 146. Increase oral fluid intake. Awaiting BMP today  4. Acute kidney injury, Resolved: Due to dehydration. Creatinine 1.4 on admission, now 1.1  5. Type 2 DM: Last A1C. Lantus, Sliding scale. Hypoglycemia protocol. Accucheck. Hold oral hypoglycemic agents for now  6. Pleural Effusion: repeat CXR with bibasilar consolidation and bilateral pleural effusion. Start Cefepime and Vanc, MRSA. Pulmonology on consult    Diet Dietary Nutrition Supplements: Diabetic Oral Supplement  DIET CARDIAC; Dysphagia II Mechanically Altered; Nectar Thick; No Caffeine   DVT Prophylaxis [] Lovenox, [x]  Heparin, [] SCDs, [] Warfarin  [] NOAC     GI Prophylaxis [x] PPI,  [] H2 Blocker,  [] Carafate,  [] Diet/Tube Feeds   Code Status Full Code   MDM [] Low, [] Moderate,[x]  High     History of Present Illness:     Chief Complaint: NSTEMI (non-ST elevated myocardial infarction) ([facility] [address]    Ese Mooney is a 80 y.o.  male, with past medical history significant for hyperlipidemia, diabetes, who presented to the ED from home due generalized body weakness.   He was recently discharged from rehab unit. He was admitted on March 8 due to pneumonia and influenza. The finished a course of antibiotic and antiviral.  Since discharge, he noted worsening generalized body weakness. He wasn't able to get out of bed. She denied lateralizing weakness or numbness. Still with cough. Denied shortness of breath. No fever. Denied lower extremity swelling or orthopnea. He has no chest pain. He was brought to the ED and was subsequently admitted     At the ED, vital signs blood pressure 121/65, heart rate 92, respiration 24. Significant laboratories were the following: Sodium 147, creatinine 1.4, BNP 25,000, troponin 0.141. Chest x-ray with pleural effusion. He was seen and examined today. Still with cough. No SOB or chest pain. Ten point ROS reviewed negative, unless as noted above    Objective: Intake/Output Summary (Last 24 hours) at 3/30/2019 1203  Last data filed at 3/30/2019 0234  Gross per 24 hour   Intake 60 ml   Output 450 ml   Net -390 ml      Vitals:   Vitals:    03/30/19 1127   BP:    Pulse:    Resp: 13   Temp:    SpO2:      Physical Exam:   GEN    Awake male, sitting upright in bed in no apparent distress. Appears given age. Weak looking. EYES   Pupils are equally round. No scleral erythema, discharge, or conjunctivitis. HENT  Mucous membranes are moist. Oral pharynx without exudates, no evidence of thrush. NECK  Supple, no apparent thyromegaly or masses. RESP  decreased breath sounds. No rales or wheezes. CARDIO/VASC           S1/S2 auscultated. Regular rate without appreciable murmurs, rubs, or gallops. No JVD or carotid bruits. Peripheral pulses equal bilaterally and palpable. No peripheral edema. GI        Abdomen is soft without significant tenderness, masses, or guarding. Bowel sounds are normoactive. Rectal exam deferred. MSK    No gross joint deformities. SKIN    Normal coloration, warm, dry.   NEURO           Cranial nerves appear grossly intact, normal speech, no lateralizing weakness. PSYCH            Awake, alert, oriented x 4. Affect appropriate.     Medications:   Medications:    cefepime  2 g Intravenous Q12H    vancomycin  1,250 mg Intravenous Q24H    gabapentin  400 mg Oral BID    latanoprost  1 drop Both Eyes Nightly    pantoprazole  40 mg Oral QAM AC    simvastatin  40 mg Oral Nightly    sodium chloride flush  10 mL Intravenous 2 times per day    aspirin  81 mg Oral Daily    ipratropium-albuterol  1 ampule Inhalation Q4H WA    sodium chloride flush  10 mL Intravenous 2 times per day    insulin lispro  0-12 Units Subcutaneous TID WC    insulin lispro  0-6 Units Subcutaneous Nightly    insulin glargine  5 Units Subcutaneous Nightly      Infusions:    dextrose      heparin (porcine) 14 Units/kg/hr (03/30/19 0210)    sodium chloride 75 mL/hr at 03/30/19 0606     PRN Meds:     glucose 15 g PRN   dextrose 12.5 g PRN   glucagon (rDNA) 1 mg PRN   dextrose 100 mL/hr PRN   sodium chloride flush 10 mL PRN   magnesium hydroxide 30 mL Daily PRN   ondansetron 4 mg Q6H PRN   heparin (porcine) 60 Units/kg PRN   heparin (porcine) 30 Units/kg PRN   sodium chloride flush 10 mL PRN   acetaminophen 650 mg Q4H PRN       Recent Labs     03/28/19  1345 03/28/19  1855 03/29/19  1034 03/30/19  0431   WBC 10.7* 11.4* 9.9  --    HGB 10.4* 11.4* 10.3*  --    HCT 36.0* 39.3* 36.4*  --     276 313 253      Recent Labs     03/28/19  1345 03/29/19  1034   * 146*   K 4.6 5.0    104   CO2 36* 36*   BUN 34* 31*   CREATININE 1.4* 1.1     Recent Labs     03/28/19  1345   AST 19   ALT 5*   BILITOT 0.5   ALKPHOS 76     Recent Labs     03/28/19  1332   INR 1.13     Recent Labs     03/28/19  1345 03/28/19  1855 03/29/19  0053   CKTOTAL 49  --   --    TROPONINT 0.141* 0.141* 0.128*        Imaging reviewed      Electronically signed by Bre Merino MD on 3/30/2019 at 12:03 PM

## 2019-03-30 NOTE — CONSULTS
[address] [facility]  consulted by Dr. Lauren Reilly for monitoring and adjustment. Indication for treatment: Pneumonia  Goal trough: 15-20 mcg/mL     Pertinent Laboratory Values:   Temp Readings from Last 3 Encounters:   19 97.2 °F (36.2 °C) (Axillary)   19 97.7 °F (36.5 °C) (Oral)   19 97.8 °F (36.6 °C) (Oral)     Recent Labs     19  1223 19  1345 19  1855 19  1034   WBC  --  10.7* 11.4* 9.9   LACTATE 1.1  --   --   --      Recent Labs     19  1345 19  1034 19  0431   BUN 34* 31* 29*   CREATININE 1.4* 1.1 1.2     Estimated Creatinine Clearance: 43 mL/min (based on SCr of 1.2 mg/dL). Intake/Output Summary (Last 24 hours) at 3/30/2019 1540  Last data filed at 3/30/2019 0234  Gross per 24 hour   Intake --   Output 450 ml   Net -450 ml       Pertinent Cultures:  Date    Source    Results     MRSA screen   Ordered     Sputum   Ordered    Vancomycin level:   TROUGH:  No results for input(s): VANCOTROUGH in the last 72 hours. RANDOM:  No results for input(s): VANCORANDOM in the last 72 hours. Assessment:  · WBC and temperature: No new WBC. Patient is afebrile. · SCr, BUN, and urine output: Stable  · Day(s) of therapy: #2  · Vancomycin level: To be collected     Plan:  · Renal labs are stable. · Plan to continue current vancomycin dosin mg IVPB every 24 hours. · Plan to check a trough level: 19 @ 1000. · Pharmacy will continue to monitor patient and adjust therapy as indicated.     Thank you for the consult,    Magnolia Martin, PharmD, [facility]

## 2019-03-30 NOTE — PROGRESS NOTES
Cardiology Progress Note     Admit Date:  3/28/2019    Consult reason/ Seen today for :   NSTEMI  SOB      Subjective and  Overnight Events :  Confused , somnolent and coughing , unable o lie down, cath got cancelled       Chief complain on admission : 80 y. o.year old who is admitted for  Chief Complaint   Patient presents with    Fatigue      Assessment / Plan:  ASCVD: Continue aspirin and add plavix for atleast one yr, continue statins, ACEinhibitors, betablockers  Elevated Troponin : will continue medical management for now. Continue Aspirin and anti anginal therapy. DAPT   Plan cath vs stress once res[piratory status improves and mental status clears , troponin is tredning down now  Stop heparin gtt  Bronchitis with pleural effusion as per primary team   HTN: stable, continue To titrate up medication as needed  DVT Prophylaxis if no contraindication    Past medical history:    has a past medical history of Diabetes mellitus ([facility] [address] and Hypertension. Past surgical history:   has no past surgical history on file. Social History:   reports that he has never smoked. He has never used smokeless tobacco. He reports that he does not drink alcohol or use drugs. Family history:  family history is not on file. No Known Allergies    Review of Systems:    All 14 systems were reviewed and are negative  Except for the positive findings  which as documented     BP (!) 122/56   Pulse (!) 45   Temp 97.2 °F (36.2 °C) (Axillary)   Resp 11   Ht 5' 10\" (1.778 m)   Wt 149 lb 0.5 oz (67.6 kg)   SpO2 100%   BMI 21.38 kg/m²       Intake/Output Summary (Last 24 hours) at 3/30/2019 1611  Last data filed at 3/30/2019 0234  Gross per 24 hour   Intake --   Output 450 ml   Net -450 ml     Physical Exam:  Constitutional:  Well developed, Well nourished, No acute distress, Non-toxic appearance.    HENT:  Normocephalic, Atraumatic, Bilateral external ears 11.4* 10.3*  --    HCT 36.0* 39.3* 36.4*  --    MCV 98.9 98.7 101.4*  --     276 313 253     BMP:   Recent Labs     03/28/19  1345 03/29/19  1034 03/30/19  0431   * 146* 148*   K 4.6 5.0 4.9    104 104   CO2 36* 36* 38*   BUN 34* 31* 29*   CREATININE 1.4* 1.1 1.2     PT/INR:   Recent Labs     03/28/19  1332   PROTIME 12.9*   INR 1.13     BNP:    Recent Labs     03/28/19  1345   PROBNP 25,822*     TROPONIN:   Recent Labs     03/28/19  1345 03/28/19  1855 03/29/19  0053   TROPONINT 0.141* 0.141* 0.128*        ECHO :   echocardiogram    Assessment:  80 y. o.year old who is admitted for  Chief Complaint   Patient presents with    Fatigue    , active issues as noted below:  Impression:  Principal Problem:    NSTEMI (non-ST elevated myocardial infarction) ([facility] [address]  Active Problems:    Fatigue  Resolved Problems:    * No resolved hospital problems. *            All labs, medications and tests reviewed by myself , continue all other medications of all above medical condition listed as is except for changes mentioned above. Thank you very much for consult , please call with questions.     Mitra Monaco MD 3/30/2019 4:11 PM

## 2019-03-31 NOTE — PROGRESS NOTES
Propofol paused for 10 minutes. Pt moving all extremities. Opens eyes to name, however does not follow commands. Sedation restarted as pt bucking the vent.  Dr. Mary Castaneda made aware    Kellen Fletcher RN

## 2019-03-31 NOTE — PROGRESS NOTES
Dr. Ochoa Moreno made aware of urine output - pt barely making 30 ml/hr for 2-3 hours and less than 30 cc within the last hour. Urine also becoming more and more bloody.  See new orders    Mary Garcia RN

## 2019-03-31 NOTE — PROGRESS NOTES
Telemetry called to report desat with good pleth. Pt diaphoretic. Sat in the low 80s. Repositioned. . Respiratory called to assist with evaluation and treatment. RT placed non-rebreather. Sats increased, but back down again. RT performed deep suction. Pt not coughing as expected to deep suction. Pt barely rousable. Difficult to hear air movement through lungs, sound tight. RT administered breathing txt. Pt still not ablet o keep sats up above 88. Rapid response called.

## 2019-03-31 NOTE — PROGRESS NOTES
Dr. Domnick Mohs updated on overnight events. Informed of post-intubation ABG's and vent settings. Orders to change vent settings to [facility] [place]  PEEP 5 Fio2 50% and repeat ABG's in 2 hours. Will continue to monitor.

## 2019-03-31 NOTE — PROGRESS NOTES
Cardiology Progress Note     Admit Date:  3/28/2019    Consult reason/ Seen today for :   NSTEMI  SOB      Subjective and  Overnight Events : overngith got intubated  Due to respiratory failure and altered mental status, on pressors    Chief complain on admission : 80 y. o.year old who is admitted for  Chief Complaint   Patient presents with    Fatigue      Assessment / Plan:  ASCVD: Continue aspirin and add plavix for atleast one yr, continue statins, ACEinhibitors, beta blockers  Try to wean off pressors   Elevated Troponin : will continue medical management for now. Continue Aspirin and anti anginal therapy. DAPT   Plan cath vs stress once res[piratory status improves and mental status clears , troponin is tredning down now  Respiratory failure as per primary team   Bronchitis with pleural effusion as per primary team   HTN: stable, continue To titrate up medication as needed  DVT Prophylaxis if no contraindication    Past medical history:    has a past medical history of Diabetes mellitus ([facility] [address] and Hypertension. Past surgical history:   has no past surgical history on file. Social History:   reports that he has never smoked. He has never used smokeless tobacco. He reports that he does not drink alcohol or use drugs. Family history:  family history is not on file. No Known Allergies    Review of Systems:    pt intubated and sedated    BP (!) 77/43   Pulse 66   Temp 96.6 °F (35.9 °C) (Rectal)   Resp 11   Ht 5' 10\" (1.778 m)   Wt 149 lb 0.5 oz (67.6 kg)   SpO2 100%   BMI 21.38 kg/m²       Intake/Output Summary (Last 24 hours) at 3/31/2019 1425  Last data filed at 3/31/2019 1152  Gross per 24 hour   Intake 1059 ml   Output 1165 ml   Net -106 ml     Physical Exam: pt intubated and sedated  Constitutional:  Well developed, Well nourished, No acute distress, Non-toxic appearance.    HENT:  Normocephalic, Atraumatic, Bilateral external ears normal, Oropharynx moist, No oral exudates, Nose normal. Neck- Normal range of motion, No tenderness, Supple, No stridor. Eyes:  PERRL, EOMI, Conjunctiva normal, No discharge. Respiratory:  Normal breath sounds, No respiratory distress, No wheezing, No chest tenderness. Cardiovascular:  Normal heart rate, Normal rhythm, No murmurs, No rubs, No gallops, JVP not elevated  Abdomen/GI:  Bowel sounds normal, Soft, No tenderness, No masses, No pulsatile masses. Musculoskeletal:  Intact distal pulses, No edema, No tenderness, No cyanosis, No clubbing. Good range of motion in all major joints. No tenderness to palpation or major deformities noted. Back- No tenderness. Integument:  Warm, Dry, No erythema, No rash. Lymphatic:  No lymphadenopathy noted. Neurologic: intubated and sedated Normal motor function, Normal sensory function, No focal deficits noted.    Psychiatric:  Affect  and  Mood :no change    Medications:    chlorhexidine  15 mL Mouth/Throat BID    famotidine (PEPCID) injection  20 mg Intravenous Daily    piperacillin-tazobactam  3.375 g Intravenous Q8H    clopidogrel  75 mg Oral Daily    enoxaparin  40 mg Subcutaneous Daily    cefepime  2 g Intravenous Q12H    vancomycin  1,250 mg Intravenous Q24H    gabapentin  400 mg Oral BID    latanoprost  1 drop Both Eyes Nightly    simvastatin  40 mg Oral Nightly    sodium chloride flush  10 mL Intravenous 2 times per day    aspirin  81 mg Oral Daily    ipratropium-albuterol  1 ampule Inhalation Q4H WA    sodium chloride flush  10 mL Intravenous 2 times per day    insulin lispro  0-12 Units Subcutaneous TID     insulin lispro  0-6 Units Subcutaneous Nightly    insulin glargine  5 Units Subcutaneous Nightly      phenylephrine (BRIAN-SYNEPHRINE) 50mg/250mL infusion 75 mcg/min (03/31/19 1311)    EPINEPHrine Stopped (03/31/19 0505)    propofol 30 mcg/kg/min (03/31/19 1121)    dextrose 100 mL/hr at 03/31/19 1310    dextrose glucose, dextrose, glucagon (rDNA), dextrose, sodium chloride flush, magnesium hydroxide, ondansetron, sodium chloride flush, acetaminophen    Lab Data:  CBC:   Recent Labs     03/28/19  1855 03/29/19  1034 03/30/19  0431 03/31/19  0357   WBC 11.4* 9.9  --  8.2   HGB 11.4* 10.3*  --  11.0*   HCT 39.3* 36.4*  --  39.3*   MCV 98.7 101.4*  --  104.8*    313 253 270     BMP:   Recent Labs     03/30/19  0431 03/31/19  0357 03/31/19  0850   * 147* 147*   K 4.9 6.0  K CALLED TO SCOT POWELL RN AT 0430, [facility]  RESULTS READ BACK  * 4.2    103 104   CO2 38* 41* 36*   BUN 29* 23 24*   CREATININE 1.2 1.3 1.2     PT/INR:   Recent Labs     03/31/19 0357   PROTIME 12.1   INR 1.04     BNP:    No results for input(s): PROBNP in the last 72 hours. TROPONIN:   Recent Labs     03/28/19  1855 03/29/19  0053 03/31/19 0357   TROPONINT 0.141* 0.128* 0.203*        ECHO :   echocardiogram    Assessment:  80 y. o.year old who is admitted for  Chief Complaint   Patient presents with    Fatigue    , active issues as noted below:  Impression:  Principal Problem:    NSTEMI (non-ST elevated myocardial infarction) ([facility] [address]  Active Problems:    Fatigue  Resolved Problems:    * No resolved hospital problems. *            All labs, medications and tests reviewed by myself , continue all other medications of all above medical condition listed as is except for changes mentioned above. Thank you very much for consult , please call with questions.     Jesus Lira MD 3/31/2019 2:25 PM

## 2019-03-31 NOTE — CONSULTS
mg, 40 mg, Subcutaneous, Dailycefepime (MAXIPIME) 2 g in dextrose 5 % 50 mL IVPB, 2 g, Intravenous, [id number] (VANCOCIN) 1,250 mg in dextrose 5 % 250 mL IVPB, 1,250 mg, Intravenous, I69Xtjyxrwmtci (NEURONTIN) capsule 400 mg, 400 mg, Oral, BIDlatanoprost (XALATAN) 0.005 % ophthalmic solution 1 drop, 1 drop, Both Eyes, Nightlysimvastatin (ZOCOR) tablet 40 mg, 40 mg, Oral, Nightlyglucose (GLUTOSE) 40 % oral gel 15 g, 15 g, Oral, PRNdextrose 50 % solution 12.5 g, 12.5 g, Intravenous, PRNglucagon (rDNA) injection 1 mg, 1 mg, Intramuscular, PRNdextrose 5 % solution, 100 mL/hr, Intravenous, PRNsodium chloride flush 0.9 % injection 10 mL, 10 mL, Intravenous, 2 times per day sodium chloride flush 0.9 % injection 10 mL, 10 mL, Intravenous, PRNmagnesium hydroxide (MILK OF MAGNESIA) 400 MG/5ML suspension 30 mL, 30 mL, Oral, Daily PRNondansetron (ZOFRAN) injection 4 mg, 4 mg, Intravenous, Q6H PRNaspirin chewable tablet 81 mg, 81 mg, Oral, Dailyipratropium-albuterol (DUONEB) nebulizer solution 1 ampule, 1 ampule, Inhalation, Q4H WAsodium chloride flush 0.9 % injection 10 mL, 10 mL, Intravenous, 2 times per day sodium chloride flush 0.9 % injection 10 mL, 10 mL, Intravenous, PRNacetaminophen (TYLENOL) tablet 650 mg, 650 mg, Oral, Q4H PRNinsulin lispro (HUMALOG) injection vial 0-12 Units, 0-12 Units, Subcutaneous, TID WCinsulin lispro (HUMALOG) injection vial 0-6 Units, 0-6 Units, Subcutaneous, Nightlyinsulin glargine (LANTUS) injection vial 5 Units, 5 Units, Subcutaneous, Nightly   Allergies:  Patient has no known allergies. Social History:  TOBACCO:   reports that he has never smoked. He has never used smokeless tobacco.  ETOH:   reports that he does not drink alcohol. DRUGS:   reports that he does not use drugs. Family History:   History reviewed. No pertinent family history.       REVIEW OF SYSTEMS:  CONSTITUTIONAL:  negative  HEENT:  negative  RESPIRATORY:  negative  CARDIOVASCULAR:  negative  GASTROINTESTINAL: 03/31/2019                 BILITOT                  0.4                 03/31/2019                 BILITOT                  0.4                 03/31/2019                 ALKPHOS                  81                  03/31/2019                 ALKPHOS                  81                  03/31/2019                 AST                      18                  03/31/2019                 AST                      18                  03/31/2019                 ALT                      7                   03/31/2019                 ALT                      7                   03/31/2019            BMP:  Lab Results       Component                Value               Date                       NA                       147                 03/31/2019                 K                        4.2                 03/31/2019                 CL                       104                 03/31/2019                 CO2                      36                  03/31/2019                 BUN                      24                  03/31/2019                 LABALBU                  3.0                 03/31/2019                 LABALBU                  3.0                 03/31/2019                 CREATININE               1.2                 03/31/2019                 CALCIUM                  8.5                 03/31/2019                 GFRAA                    >60                 03/31/2019                 LABGLOM                  58                  03/31/2019                 GLUCOSE                  153                 03/31/2019            PT/INR:  Lab Results       Component                Value               Date                       PROTIME                  12.1                03/31/2019                 INR                      1.04                03/31/2019            PTT:  Lab Results       Component                Value               Date                       APTT                     34.0 Intravenous, Continuousmupirocin (BACTROBAN) 2 % ointment, , Nasal, BIDclopidogrel (PLAVIX) tablet 75 mg, 75 mg, Oral, Dailyenoxaparin (LOVENOX) injection 40 mg, 40 mg, Subcutaneous, Dailycefepime (MAXIPIME) 2 g in dextrose 5 % 50 mL IVPB, 2 g, Intravenous, [id number] (VANCOCIN) 1,250 mg in dextrose 5 % 250 mL IVPB, 1,250 mg, Intravenous, S18Hohtawhswzh (NEURONTIN) capsule 400 mg, 400 mg, Oral, BIDlatanoprost (XALATAN) 0.005 % ophthalmic solution 1 drop, 1 drop, Both Eyes, Nightlysimvastatin (ZOCOR) tablet 40 mg, 40 mg, Oral, Nightlyglucose (GLUTOSE) 40 % oral gel 15 g, 15 g, Oral, PRNdextrose 50 % solution 12.5 g, 12.5 g, Intravenous, PRNglucagon (rDNA) injection 1 mg, 1 mg, Intramuscular, PRNdextrose 5 % solution, 100 mL/hr, Intravenous, PRNsodium chloride flush 0.9 % injection 10 mL, 10 mL, Intravenous, 2 times per day sodium chloride flush 0.9 % injection 10 mL, 10 mL, Intravenous, PRNmagnesium hydroxide (MILK OF MAGNESIA) 400 MG/5ML suspension 30 mL, 30 mL, Oral, Daily PRNondansetron (ZOFRAN) injection 4 mg, 4 mg, Intravenous, Q6H PRNaspirin chewable tablet 81 mg, 81 mg, Oral, Dailyipratropium-albuterol (DUONEB) nebulizer solution 1 ampule, 1 ampule, Inhalation, Q4H WAsodium chloride flush 0.9 % injection 10 mL, 10 mL, Intravenous, 2 times per day sodium chloride flush 0.9 % injection 10 mL, 10 mL, Intravenous, PRNacetaminophen (TYLENOL) tablet 650 mg, 650 mg, Oral, Q4H PRNinsulin lispro (HUMALOG) injection vial 0-12 Units, 0-12 Units, Subcutaneous, TID WCinsulin lispro (HUMALOG) injection vial 0-6 Units, 0-6 Units, Subcutaneous, Nightlyinsulin glargine (LANTUS) injection vial 5 Units, 5 Units, Subcutaneous, Nightly   Allergies:  Patient has no known allergies. Social History:  TOBACCO:   reports that he has never smoked. He has never used smokeless tobacco.  ETOH:   reports that he does not drink alcohol. DRUGS:   reports that he does not use drugs. Family History:   History reviewed.   No pertinent family history. REVIEW OF SYSTEMS:  CONSTITUTIONAL:  negative  HEENT:  negative  RESPIRATORY:  negative  CARDIOVASCULAR:  negative  GASTROINTESTINAL:  negative  GENITOURINARY:  negative  MUSCULOSKELETAL:  negative  BEHAVIOR/PSYCH:  Negative    ROS unavailable    Family hx neg    PHYSICAL EXAM  ------------------------  Vitals:  BP (!) 111/54   Pulse 82   Temp 96.6 °F (35.9 °C) (Rectal)   Resp 10   Ht 5' 10\" (1.778 m)   Wt 149 lb 0.5 oz (67.6 kg)   SpO2 99%   BMI 21.38 kg/m²      General:  obtunded. Well developed, well nourished, well groomed. No apparent distress. HEENT:  Normocephalic, atraumatic. Pupils equal, round, reactive to light. No scleral icterus. No conjunctival injection. Normal lips, teeth, and gums. No nasal discharge. Neck:  Supple  Heart:  RRR, no murmurs, gallops, rubs  Lungs:  CTA bilaterally, bilat symmetrical expansion, no wheeze, rales, or rhonchi  Abdomen:   Bowel sounds present, soft, nontender, no masses, no organomegaly, no peritoneal signs  Extremities:  No clubbing, cyanosis, or edema  Skin:  Warm and dry, no open lesions or rash  Breast: deferred  Rectal: deferred  Genitalia:  deferred    NEUROLOGICAL EXAM  ---------------------------------    As above                                  CBC with Differential:  Lab Results       Component                Value               Date                       WBC                      8.2                 03/31/2019                 RBC                      3.75                03/31/2019                 HGB                      11.0                03/31/2019                 HCT                      39.3                03/31/2019                 PLT                      270                 03/31/2019                 MCV                      104.8               03/31/2019                 MCH                      29.3                03/31/2019                 MCHC                     28.0                03/31/2019                 RDW 14.6                03/31/2019                 SEGSPCT                  77.0                03/31/2019                 LYMPHOPCT                12.0                03/31/2019                 MONOPCT                  8.1                 03/31/2019                 BASOPCT                  0.5                 03/31/2019                 MONOSABS                 0.7                 03/31/2019                 LYMPHSABS                1.0                 03/31/2019                 EOSABS                   0.0                 03/31/2019                 BASOSABS                 0.0                 03/31/2019                 DIFFTYPE                                     03/31/2019             AUTOMATED DIFFERENTIAL  CMP:  Lab Results       Component                Value               Date                       NA                       147                 03/31/2019                 K                        4.2                 03/31/2019                 CL                       104                 03/31/2019                 CO2                      36                  03/31/2019                 BUN                      24                  03/31/2019                 CREATININE               1.2                 03/31/2019                 GFRAA                    >60                 03/31/2019                 LABGLOM                  58                  03/31/2019                 GLUCOSE                  153                 03/31/2019                 PROT                     6.1                 03/31/2019                 PROT                     6.1                 03/31/2019                 LABALBU                  3.0                 03/31/2019                 LABALBU                  3.0                 03/31/2019                 CALCIUM                  8.5                 03/31/2019                 BILITOT                  0.4                 03/31/2019                 BILITOT                  0.4 03/28/2019                 WBCUA                    <1                  03/28/2019                 RBCUA                    NONE SEEN           03/28/2019                 MUCUS                    RARE                03/28/2019                 TRICHOMONAS              NONE SEEN           03/28/2019                 BACTERIA                 RARE                03/28/2019                 CLARITYU                 CLEAR               03/28/2019                 SPECGRAV                 1.021               03/28/2019                 LEUKOCYTESUR             NEGATIVE            03/28/2019                 UROBILINOGEN             NORMAL              03/28/2019                 BILIRUBINUR              NEGATIVE            03/28/2019                 BLOODU                   NEGATIVE            03/28/2019            TSH:  No results found for: TSH  VITAMIN B12: No components found for: B12  FOLATE:  No results found for: FOLATE  RPR:  No results found for: RPR  KATIE:  No results found for: ANATITER, KATIE  Urine Toxicology:  No components found for: IAMMENTA, IBARBIT, IBENZO, ICOCAINE, IMARTHC, IOPIATES, IPHENCYC     IMPRESSION:    Hypoxic encephalopathy    Metabolic encephalopathy    sepsis    Hypernatremia    COPD    PLAN:    CT brain neg    CTA head neck neg    Continue supportive care    Discussed plan of care with pts nurse. Jaiden Dodge MD  BOARD CERTIFIED-NEUROLOGY.

## 2019-03-31 NOTE — PROGRESS NOTES
3/31/2019  0030  RRT suctioned pt with the alvertokauer's for moderate thick white cloudy secretions. 96% on 3lpm N/C.  , RR 25 BS diminished. Pt tolerating well.

## 2019-03-31 NOTE — PROGRESS NOTES
Dr. Tati Long at bedside. Updated on events over night and current lab values.  See new orders    Joselin Houston RN

## 2019-03-31 NOTE — PROGRESS NOTES
pulmonary      SUBJECTIVE: intubated on vent. He became unresponsive     OBJECTIVE    VITALS:  BP (!) 77/43   Pulse 74   Temp 97.7 °F (36.5 °C) (Rectal)   Resp 13   Ht 5' 10\" (1.778 m)   Wt 149 lb 0.5 oz (67.6 kg)   SpO2 100%   BMI 21.38 kg/m²   HEAD AND FACE EXAM:  No throat injection, no active exudate,no thrush  NECK EXAM;No JVD, no masses, symmetrical  CHEST EXAM; Expansion equal and symmetrical, no masses  LUNG EXAM; Good breath sounds bilaterally.  There are expiratory wheezes both lungs, there are crackles at both lung bases  CARDIOVASCULAR EXAM: Positive S1 and S2, no S3 or S4, no clicks ,no murmurs  RIGHT AND LEFT LOWER EXTRIMITY EXAM: No edema, no swelling, no inflamation            LABS   Lab Results   Component Value Date    WBC 8.2 03/31/2019    HGB 11.0 (L) 03/31/2019    HCT 39.3 (L) 03/31/2019    .8 (H) 03/31/2019     03/31/2019     Lab Results   Component Value Date    CREATININE 1.3 03/31/2019    BUN 23 03/31/2019     (H) 03/31/2019    K (HH) 03/31/2019     6.0  K CALLED TO SCOT POWELL RN AT [facility], [place]  RESULTS READ BACK       03/31/2019    CO2 41 (H) 03/31/2019     Lab Results   Component Value Date    INR 1.04 03/31/2019    PROTIME 12.1 03/31/2019        No results found for: PHOS     Recent Labs     03/31/19  0600   PH 7.68*   PO2ART 186*   WCF9YCV 22.0*   O2SAT 96.3         Wt Readings from Last 3 Encounters:   03/29/19 149 lb 0.5 oz (67.6 kg)   03/23/19 154 lb 9.6 oz (70.1 kg)   03/12/19 154 lb 12.8 oz (70.2 kg)     EXAMINATION:   SINGLE XRAY VIEW OF THE CHEST       3/31/2019 5:22 am       COMPARISON:   2 days prior       HISTORY:   ORDERING SYSTEM PROVIDED HISTORY: intubation and post cardiac arrest, check   ETT   TECHNOLOGIST PROVIDED HISTORY:   Reason for exam:->intubation and post cardiac arrest, check ETT   Ordering Physician Provided Reason for Exam: intubation and post cardiac   arrest, check ETT   Acuity: Unknown   Type of Exam: Unknown     FINDINGS:   Endotracheal tube is been placed, tip approximately 4 cm above the cyndi in   appropriate position.  Enteric tube tip and side port below diaphragm and   stomach.  There are diffuse hazy opacities over right greater than left lung,   combination increasing effusions, atelectatic changes, underlying   consolidation not excluded.  Pulmonary vasculature is indistinct. Cardiomediastinal silhouette is stable.           Impression   Increasing pleuroparenchymal opacities in both hemithoraces, right greater   than left.                   ASSESMENT  Ac hypoxic and hypercapneuic resp failure  nicanor pneumonia  nicanor pl effusion  Ac bronchospasm        PLAN  1. Vent management  2. Vent protocol  3.  Repeat abg  4. cpm    3/31/2019  Heather Ortiz M.D.

## 2019-03-31 NOTE — PROGRESS NOTES
Hospitalist Progress Note      Name:  Juani Ellis /Age/Sex: 7/15/1933  (80 y.o. male)   MRN & CSN:  [id number] & [id number] Admission Date/Time: 3/28/2019  1:13 PM   Location:  -A PCP: No primary care provider on file. Hospital Day: 4    Assessment and Plan:   Juani Ellis is a 80 y.o.  male  who presents with NSTEMI (non-ST elevated myocardial infarction) ([facility] [address]    1. Acute Metabolic Encephalopathy: unresponsive yesterday. S/P ACLS, given 1 epinephrine. Could be from hypotension, CO2 narcosis. Likely from sepsis or cardiogenic shock. CT Head negative. Neurology on consult  2. Acute Hypoxic and Hypercapneic Respiratory Failure: due to pleural effusion/pneumonia. S/P Intubation. ABG still with high pCO2. Will up the back up rate. 3. Hypotension: could be medication-induced, sepsis, cardiogenic. On Phenylephrine. Epi infusion discontinued. 4. NSTEMI: elevated troponin. Has generalized body weakness, no chest pain. Has elevated BNP. No recent echo. EKG with nonspecific ST-T wave changes. Started on aspirin and statin. Started on heparin. Echocardiogram with EF of 60-65%, severely dilated right atrium, moderate to severe TR. Cardiology on consult. [facility] not done   5. Elevated BNP: Does not appear to be with fluid overload. Chest x-ray with pleural effusion. 6. Hypernatremia: Sodium 146. Increase oral fluid intake. Awaiting BMP   7. Acute kidney injury, Resolved: Due to dehydration. Creatinine 1.4 on admission, now 1.2  8. Type 2 DM: Last A1C. Lantus, Sliding scale. Hypoglycemia protocol. Accucheck. Hold oral hypoglycemic agents for now  9. Pleural Effusion: repeat CXR with bibasilar consolidation and bilateral pleural effusion. Started on Cefepime and Vanc, MRSA. Pulmonology on consult. Zosyn started. Sputum culture    Diet Dietary Nutrition Supplements: Diabetic Oral Supplement  DIET CARDIAC; Dysphagia II Mechanically Altered; Nectar Thick;  No Caffeine   DVT Prophylaxis [] Lovenox, sedated, male, sitting upright in bed in no apparent distress. Appears given age. Weak looking. EYES   Pupils are equally round. No scleral erythema, discharge, or conjunctivitis. HENT  Mucous membranes are moist. Oral pharynx without exudates, no evidence of thrush. NECK  Supple, no apparent thyromegaly or masses. RESP  decreased breath sounds. No rales or wheezes. CARDIO/VASC           S1/S2 auscultated. Regular rate without appreciable murmurs, rubs, or gallops. No JVD or carotid bruits. Peripheral pulses equal bilaterally and palpable. No peripheral edema. GI        Abdomen is soft without significant tenderness, masses, or guarding. Bowel sounds are normoactive. Rectal exam deferred. MSK    No gross joint deformities. SKIN    Normal coloration, warm, dry. NEURO           intubated.      Medications:   Medications:    chlorhexidine  15 mL Mouth/Throat BID    famotidine (PEPCID) injection  20 mg Intravenous Daily    piperacillin-tazobactam  3.375 g Intravenous Q8H    clopidogrel  75 mg Oral Daily    enoxaparin  40 mg Subcutaneous Daily    cefepime  2 g Intravenous Q12H    vancomycin  1,250 mg Intravenous Q24H    gabapentin  400 mg Oral BID    latanoprost  1 drop Both Eyes Nightly    simvastatin  40 mg Oral Nightly    sodium chloride flush  10 mL Intravenous 2 times per day    aspirin  81 mg Oral Daily    ipratropium-albuterol  1 ampule Inhalation Q4H WA    sodium chloride flush  10 mL Intravenous 2 times per day    insulin lispro  0-12 Units Subcutaneous TID WC    insulin lispro  0-6 Units Subcutaneous Nightly    insulin glargine  5 Units Subcutaneous Nightly      Infusions:    phenylephrine (BRIAN-SYNEPHRINE) 50mg/250mL infusion 65 mcg/min (03/31/19 1156)    dextrose 5 % and 0.45 % NaCl 100 mL/hr at 03/31/19 0530    EPINEPHrine Stopped (03/31/19 0505)    propofol 30 mcg/kg/min (03/31/19 1121)    dextrose       PRN Meds:     glucose 15 g PRN   dextrose 12.5 g PRN   glucagon (rDNA) 1 mg PRN   dextrose 100 mL/hr PRN   sodium chloride flush 10 mL PRN   magnesium hydroxide 30 mL Daily PRN   ondansetron 4 mg Q6H PRN   sodium chloride flush 10 mL PRN   acetaminophen 650 mg Q4H PRN       Recent Labs     03/28/19  1855 03/29/19  1034 03/30/19  0431 03/31/19  0357   WBC 11.4* 9.9  --  8.2   HGB 11.4* 10.3*  --  11.0*   HCT 39.3* 36.4*  --  39.3*    313 253 270      Recent Labs     03/30/19  0431 03/31/19  0357 03/31/19  0850   * 147* 147*   K 4.9 6.0  K CALLED TO SCOT POWELL RN AT [address] [place] [address] MLS  RESULTS READ BACK  * 4.2    103 104   CO2 38* 41* 36*   BUN 29* 23 24*   CREATININE 1.2 1.3 1.2     Recent Labs     03/28/19  1345 03/31/19  0357   AST 19 18  18   ALT 5* 7*  7*   BILIDIR  --  0.2   BILITOT 0.5 0.4  0.4   ALKPHOS 76 81  81     Recent Labs     03/28/19  1332 03/31/19  0357   INR 1.13 1.04     Recent Labs     03/28/19  1345 03/28/19  1855 03/29/19  0053 03/31/19  0357   CKTOTAL 49  --   --   --    TROPONINT 0.141* 0.141* 0.128* 0.203*        Imaging reviewed      Electronically signed by Shila Soulier, MD on 3/31/2019 at 12:43 PM

## 2019-03-31 NOTE — PROGRESS NOTES
Dr. Florian Trivedi at bedside. Updated on events over night and current vent settings. RT coming up to bedside now to draw repeat ABGs (unable to draw back from femoral line).  Dr. Florian Trivedi states to call if ABGs abnormal.     Harpal Killian RN

## 2019-03-31 NOTE — PROGRESS NOTES
Pt turned to left side - Femoral Arterial line dampened - dressing changed with minimal changes seen to waveform. Manual BP taken with map > 65 - will keep Dave at current rate based on chrissy BP while pt turned to left side.      Harpal Killian RN

## 2019-03-31 NOTE — PROGRESS NOTES
Samson catheter irrigated with 60 mls of sterile water. Returned 90 mls.  Will notify MD Mayra Quezada, RN

## 2019-03-31 NOTE — PROGRESS NOTES
Pt turned. Needed cleaned up. Lungs sound very thick, rattly, and full. Respiratory called to evaluate and suction pt as needed.

## 2019-04-01 NOTE — PROGRESS NOTES
Hospitalist Progress Note      Name:  Johanna Avila /Age/Sex: 7/15/1933  (80 y.o. male)   MRN & CSN:  [id number] & [id number] Admission Date/Time: 3/28/2019  1:13 PM   Location:  -A PCP: No primary care provider on file. Hospital Day: 5    Assessment and Plan:   Johanna Avila is a 80 y.o.  male  who presents with NSTEMI (non-ST elevated myocardial infarction) ([facility] [address]    1. Acute Metabolic Encephalopathy: unresponsive. S/P ACLS, given 1 epinephrine. Could be from hypotension, CO2 narcosis. Likely from sepsis or cardiogenic shock. CT Head negative. Neurology on consult  2. Acute Hypoxic and Hypercapneic Respiratory Failure: due to pleural effusion/pneumonia. S/P Intubation. ABG with high pCO2 53.   3. Hypotension: could be medication-induced, sepsis, not cardiogenic per Cardiology. On Phenylephrine. Epi infusion discontinued. 4. NSTEMI: elevated troponin. Has generalized body weakness, no chest pain. Has elevated BNP. No recent echo. EKG with nonspecific ST-T wave changes. Started on aspirin and statin. Heparin discontinued. Echocardiogram with EF of 60-65%, severely dilated right atrium, moderate to severe TR. Cardiology on consult. C vs Stress test when stable. 5. Elevated BNP: Does not appear to be with fluid overload. Chest x-ray with pleural effusion. 6. Hypernatremia, Resolved: Sodium 141.    7. Acute kidney injury, Resolved: Due to dehydration. Creatinine 1.4 on admission, now 1.2  8. Type 2 DM: Last A1C. Lantus, Sliding scale. Hypoglycemia protocol. Accucheck. Hold oral hypoglycemic agents for now  9. Pleural Effusion: repeat CXR with bibasilar consolidation and bilateral pleural effusion. Started on Cefepime and Vanc, MRSA. Pulmonology on consult. Zosyn started. Sputum culture. Diet DIET TUBE FEED CONTINUOUS/CYCLIC NPO; Low Calorie High Protein (Vital HP);  Nasogastric; Continuous; 25; 60; 24   DVT Prophylaxis [] Lovenox, [x]  Heparin, [] SCDs, [] Warfarin  [] NOAC     GI Prophylaxis [x] PPI,  [] H2 Blocker,  [] Carafate,  [] Diet/Tube Feeds   Code Status Full Code   MDM [] Low, [] Moderate,[x]  High     History of Present Illness:     Chief Complaint: NSTEMI (non-ST elevated myocardial infarction) ([facility] [address]    Zari Haque is a 80 y.o.  male, with past medical history significant for hyperlipidemia, diabetes, who presented to the ED from home due generalized body weakness. He was recently discharged from rehab unit. He was admitted on March 8 due to pneumonia and influenza. The finished a course of antibiotic and antiviral.  Since discharge, he noted worsening generalized body weakness. He wasn't able to get out of bed. She denied lateralizing weakness or numbness. Still with cough. Denied shortness of breath. No fever. Denied lower extremity swelling or orthopnea. He has no chest pain. He was brought to the ED and was subsequently admitted     At the ED, vital signs blood pressure 121/65, heart rate 92, respiration 24. Significant laboratories were the following: Sodium 147, creatinine 1.4, BNP 25,000, troponin 0.141. Chest x-ray with pleural effusion. He was started on antibiotic. Cardiology and Pulmonology were consulted. He was supposed to have LHC but was cancelled due to respiratory distress. He became unresponsive yesterday necessitating intubation. He dropped his blood pressure when given Midazolam. He also lost his pulse. ACLS was started. He was transferred to ICU. He was seen and examined today. He is intubated and sedated. Ten point ROS reviewed negative, unless as noted above    Objective:        Intake/Output Summary (Last 24 hours) at 4/1/2019 1247  Last data filed at 4/1/2019 0602  Gross per 24 hour   Intake 5617 ml   Output 875 ml   Net 4742 ml      Vitals:   Vitals:    04/01/19 1232   BP: (!) 124/57   Pulse: 70   Resp: (!) 46   Temp:    SpO2:      Physical Exam:   GEN    intubated and sedated, male, sitting upright in bed in no apparent mg PRN   dextrose 100 mL/hr PRN   sodium chloride flush 10 mL PRN   magnesium hydroxide 30 mL Daily PRN   ondansetron 4 mg Q6H PRN   sodium chloride flush 10 mL PRN   acetaminophen 650 mg Q4H PRN       Recent Labs     03/30/19  0431 03/31/19  0357 04/01/19  0605   WBC  --  8.2 7.3   HGB  --  11.0* 8.7*   HCT  --  39.3* 30.0*    270 236      Recent Labs     03/31/19  0357 03/31/19  0850 04/01/19  0605   * 147* 141   K 6.0  K CALLED TO SCOT POWELL RN AT 0430, Brenda Martínez MLS  RESULTS READ BACK  * 4.2 3.7    104 101   CO2 41* 36* 34*   BUN 23 24* 18   CREATININE 1.3 1.2 1.3     Recent Labs     03/31/19  0357   AST 18  18   ALT 7*  7*   BILIDIR 0.2   BILITOT 0.4  0.4   ALKPHOS 81  81     Recent Labs     03/31/19  0357   INR 1.04     Recent Labs     03/31/19  0357   TROPONINT 0.203*        Imaging reviewed      Electronically signed by Dakotah An MD on 4/1/2019 at 12:47 PM

## 2019-04-01 NOTE — PROGRESS NOTES
Pharmacy to Dose Vancomycin per Dr Robb Harmanighton = Pneumonia   CULTURES = MRSA screening, results pending. Sputum and blood cultures have been ordered. OTHER ABT'S = Cefepime     TROUGH:  No results for input(s): VANCOTROUGH in the last 72 hours. RANDOM:  No results for input(s): VANCORANDOM in the last 72 hours. Estimated Creatinine Clearance: 42 mL/min (based on SCr of 1.3 mg/dL). Recent Labs     03/31/19  0357 03/31/19  0850 04/01/19  0605   WBC 8.2  --  7.3   BUN 23 24* 18   CREATININE 1.3 1.2 1.3   LACTATE 1.1  --   --      WBC   Date Value Ref Range Status   04/01/2019 7.3 4.0 - 10.5 K/CU MM Final     Lactate   Date Value Ref Range Status   03/31/2019 1.1 0.4 - 2.0 mMOL/L Final   03/28/2019 1.1 0.4 - 2.0 mMOL/L Final   03/08/2019 2.0 0.4 - 2.0 mMOL/L Final     Temp Readings from Last 3 Encounters:   04/01/19 99.1 °F (37.3 °C) (Rectal)   03/23/19 97.7 °F (36.5 °C) (Oral)   03/12/19 97.8 °F (36.6 °C) (Oral)       Intake/Output Summary (Last 24 hours) at 4/1/2019 1100  Last data filed at 4/1/2019 0602  Gross per 24 hour   Intake 5617 ml   Output 1050 ml   Net 4567 ml         DAY OF ANTIBIOTIC THERAPY:  4     WBC wnl. Patient has low grade fever. Renal function is stable   SCr = 1.3   CrCl = 42 ml/min  Vancomycin trough = 15.0   therapeutic level  Continue Vancomycin 1250mg IV every 24 hours.     Yandel Leroy, [address]  4/1/2019  12:21 PM      ______________________________________________________________________

## 2019-04-01 NOTE — CARE COORDINATION
Cm met with pt's wife at bedside to initiate discharge planning assessment. Pt is currently intubated and on the vent. Pt's wife provided the following information. Pt, spouse, son-Uriel and son-Howard, reside together in a 2 story home with pt's bedroom on the 2nd floor and bathroom on the 1st floor. Pt has a stair chair from 1st to 2nd floor provided by the [place]. Pt has 1 other son, Marilia Matamoros, who lives in [place]. Linnette Padgett and Yamila Chambers work outside the home different shifts. Wife states that she and pt drive. Pt uses home 02 at night and has a walker and cane. [facility] Plans/ Needs for discharge uncertain at present but this CM is concerned for possible need for rehab/SNF at discharge. Pt's insurance does not require precert for SNF. White board updated with CM name and ph#.

## 2019-04-01 NOTE — PROGRESS NOTES
Dr. Tarsha Piña at bedside. Morning ABGs, vent settings and CXR reviewed. Ordered to consult dietary to start tube feeding today.  Start weaning protocol in the morning - see titration orders     Mary Garcia RN

## 2019-04-01 NOTE — PROGRESS NOTES
Cardiology Progress Note     Admit Date:  3/28/2019    Consult reason/ Seen today for :   NSTEMI  SOB      Subjective and  Overnight Events :  intubated  Due to respiratory failure and altered mental status, on pressors no new issues overnight    Chief complain on admission : 80 y. o.year old who is admitted for  Chief Complaint   Patient presents with    Fatigue      Assessment / Plan:  ASCVD: Continue aspirin and add plavix for atleast one yr, continue statins, ACEinhibitors, beta blockers  Echo showed preserved EF and no wall motion abnormality so I dont think this is cardiogenic event , mostly respiratory cause  Supportive care Try to wean off pressors   Elevated Troponin : will continue medical management for now. Continue Aspirin and anti anginal therapy. DAPT   Plan cath vs stress once res[piratory status improves and mental status clears , troponin is tredning down now  Respiratory failure as per primary team   Bronchitis with pleural effusion as per primary team   HTN: stable, continue To titrate up medication as needed  DVT Prophylaxis if no contraindication    Past medical history:    has a past medical history of Diabetes mellitus ([address] and Hypertension. Past surgical history:   has no past surgical history on file. Social History:   reports that he has never smoked. He has never used smokeless tobacco. He reports that he does not drink alcohol or use drugs. Family history:  family history is not on file.     No Known Allergies    Review of Systems:    pt intubated and sedated    BP (!) 122/58   Pulse 66   Temp 99.1 °F (37.3 °C) (Rectal)   Resp 16   Ht 5' 10\" (1.778 m)   Wt 158 lb 6.4 oz (71.8 kg)   SpO2 100%   BMI 22.73 kg/m²       Intake/Output Summary (Last 24 hours) at 4/1/2019 0952  Last data filed at 4/1/2019 0602  Gross per 24 hour   Intake 5617 ml   Output 1050 ml   Net 4567 ml     Physical Exam: pt intubated and sedated  Constitutional:  Well developed, Well nourished, No acute distress, Non-toxic appearance. HENT:  Normocephalic, Atraumatic, Bilateral external ears normal, Oropharynx moist, No oral exudates, Nose normal. Neck- Normal range of motion, No tenderness, Supple, No stridor. Eyes:  PERRL, EOMI, Conjunctiva normal, No discharge. Respiratory:  Normal breath sounds, No respiratory distress, No wheezing, No chest tenderness. Cardiovascular:  Normal heart rate, Normal rhythm, No murmurs, No rubs, No gallops, JVP not elevated  Abdomen/GI:  Bowel sounds normal, Soft, No tenderness, No masses, No pulsatile masses. Musculoskeletal:  Intact distal pulses, No edema, No tenderness, No cyanosis, No clubbing. Good range of motion in all major joints. No tenderness to palpation or major deformities noted. Back- No tenderness. Integument:  Warm, Dry, No erythema, No rash. Lymphatic:  No lymphadenopathy noted. Neurologic: intubated and sedated Normal motor function, Normal sensory function, No focal deficits noted.    Psychiatric:  Affect  and  Mood :no change    Medications:    chlorhexidine  15 mL Mouth/Throat BID    famotidine (PEPCID) injection  20 mg Intravenous Daily    piperacillin-tazobactam  3.375 g Intravenous Q8H    mupirocin   Nasal BID    clopidogrel  75 mg Oral Daily    enoxaparin  40 mg Subcutaneous Daily    cefepime  2 g Intravenous Q12H    vancomycin  1,250 mg Intravenous Q24H    gabapentin  400 mg Oral BID    latanoprost  1 drop Both Eyes Nightly    simvastatin  40 mg Oral Nightly    sodium chloride flush  10 mL Intravenous 2 times per day    aspirin  81 mg Oral Daily    ipratropium-albuterol  1 ampule Inhalation Q4H WA    sodium chloride flush  10 mL Intravenous 2 times per day    insulin lispro  0-12 Units Subcutaneous TID WC    insulin lispro  0-6 Units Subcutaneous Nightly    insulin glargine  5 Units Subcutaneous Nightly      [START ON 4/2/2019] dexmedetomidine [facility]) IV infusion      phenylephrine (BRIAN-SYNEPHRINE) 50mg/250mL infusion 85 mcg/min (04/01/19 0115)    EPINEPHrine Stopped (03/31/19 0505)    propofol 30 mcg/kg/min (04/01/19 0124)    dextrose 100 mL/hr at 04/01/19 0115    dextrose       glucose, dextrose, glucagon (rDNA), dextrose, sodium chloride flush, magnesium hydroxide, ondansetron, sodium chloride flush, acetaminophen    Lab Data:  CBC:   Recent Labs     03/29/19  1034 03/30/19  0431 03/31/19  0357 04/01/19  0605   WBC 9.9  --  8.2 7.3   HGB 10.3*  --  11.0* 8.7*   HCT 36.4*  --  39.3* 30.0*   .4*  --  104.8* 100.7*    253 270 236     BMP:   Recent Labs     03/31/19  0357 03/31/19  0850 04/01/19  0605   * 147* 141   K 6.0  K CALLED TO SCOT POWELL RN AT 0430, Brenda Martínez MLS  RESULTS READ BACK  * 4.2 3.7    104 101   CO2 41* 36* 34*   BUN 23 24* 18   CREATININE 1.3 1.2 1.3     PT/INR:   Recent Labs     03/31/19 0357   PROTIME 12.1   INR 1.04     BNP:    No results for input(s): PROBNP in the last 72 hours. TROPONIN:   Recent Labs     03/31/19 0357   TROPONINT 0.203*        ECHO :   echocardiogram    Assessment:  80 y. o.year old who is admitted for  Chief Complaint   Patient presents with    Fatigue    , active issues as noted below:  Impression:  Principal Problem:    NSTEMI (non-ST elevated myocardial infarction) ([facility] [address]  Active Problems:    Fatigue  Resolved Problems:    * No resolved hospital problems. *            All labs, medications and tests reviewed by myself , continue all other medications of all above medical condition listed as is except for changes mentioned above. Thank you very much for consult , please call with questions.     [address] [place] [address] Avenue, MD 4/1/2019 9:52 AM

## 2019-04-01 NOTE — PROGRESS NOTES
endotracheal tube is seen with its tip at the level of the cyndi.  There   is a nasogastric tube which courses below the diaphragm.  The heart size and   pulmonary vasculature are stable.  There are hazy densities seen involving   the lungs bilaterally.  No new airspace disease is seen.  No pneumothoraces   are noted.  Overall, compared to the prior exam there has been little change.           Impression   1. Stable chest x-ray with bilateral hazy opacities likely represent   combination of pleural effusions and infiltrates. 2. Endotracheal tube with its tip at the level of the cyndi.  I would   suggest withdrawing the tube at least 2 cm.           Order History     Open Order Details               ASSESMENT  Ac resp failure  nicanor pneumonia  nicanor pl effusion  Ac bronchospasm        PLAN  1. cpm  2. Pull out et tube 2 cm  3. Start tube feed  4.  Weaning protocol in am    4/1/2019  Una Cardona M.D.

## 2019-04-01 NOTE — CONSULTS
Nutrition Assessment    Type and Reason for Visit: Reassess, Consult    Nutrition Recommendations:   · EN Order: low calorie, high protein at 60 mL/hr to provide the pt with 1706 kcal and 126 g of protein per day with current propofol rate    Nutrition Assessment: Pt is now vented following a rapid response. He is hemodynamically stable; will order EN  Malnutrition Assessment:  · Malnutrition Status: Meets the criteria for severe malnutrition  · Context: Chronic illness  · Findings of the 6 clinical characteristics of malnutrition (Minimum of 2 out of 6 clinical characteristics is required to make the diagnosis of moderate or severe Protein Calorie Malnutrition based on AND/ASPEN Guidelines):  1. Energy Intake-Less than or equal to 50% of estimated energy requirement, Greater than or equal to 3 months    2. Weight Loss-2% loss or greater, in 1 week  3. Fat Loss-Severe subcutaneous fat loss, Orbital  4. Muscle Loss-Severe muscle mass loss, Clavicles (pectoralis and deltoids)  5. Fluid Accumulation-No significant fluid accumulation, Extremities  6.   Strength-Not measured    Nutrition Risk Level: High    Nutrient Needs:  · Estimated Daily Total Kcal: 1661 based on [place] state 2003b  · Estimated Daily Protein (g): 113-150 based on 1.5-2 g/kg/IBW  · Estimated Daily Total Fluid (ml/day): 1661 based on 1 mL/kcal     Nutrition Diagnosis:   · Problem: Severe malnutrition, In context of chronic illness  · Etiology: related to Insufficient energy/nutrient consumption     Signs and symptoms:  as evidenced by Severe loss of subcutaneous fat, Severe muscle loss, Weight loss greater than or equal to 2% in 1 week    Objective Information:  · Wound Type: None  · Current Nutrition Therapies:  · Oral Diet Orders: Cardiac, Dysphagia 2, Nectar Thick   · Oral Diet intake: 26-50% (pre intubation)  · Anthropometric Measures:  · Ht: 5' 10\" (177.8 cm)   · Current Body Wt: 158 lb (71.7 kg)  · Admission Body Wt: 149 lb (67.6 kg)  · Usual Body Wt: 154 lb (69.9 kg)  · % Weight Change: -1.9% in one week  · Ideal Body Wt: 166 lb (75.3 kg), % Ideal Body 95%  · BMI Classification: BMI 18.5 - 24.9 Normal Weight    Nutrition Interventions:   Start Tube Feeding(for post extubation)  Continued Inpatient Monitoring, Education Not Indicated, Coordination of Care    Nutrition Evaluation:   · Evaluation: No progress toward goals   · Goals: pt will consume greater than 75% of his meals and supplements provided    · Monitoring: Weight, Pertinent Labs, Monitor Hemodynamic Status, Nutrition Progression, TF Intake, TF Tolerance      Electronically signed by Karla Mckeon RD, LD on 2/0/89 at 11:17 AM    Contact Number: [phone]

## 2019-04-01 NOTE — PROGRESS NOTES
Propofol paused - pt opens eyes to voice and able to weakly squeeze hands.  Sedation restarted as pt coughing and bucking vent     Kellen Fletcher RN

## 2019-04-01 NOTE — CARE COORDINATION
[address] Transitions Interview     2019    Patient: Jace Lai Patient : 7/15/1933   MRN: [id number]  Reason for Admission: NSTEMI; KERA; Pna; Ac Resp Fail; Hypernatremia  RARS: Readmission Risk Score: 21    Spoke with: Patient's Wife    Readmission Risk  Patient Active Problem List   Diagnosis    Community acquired pneumonia due to influenza A virus    Pneumonia    Essential hypertension    Type 2 diabetes mellitus with diabetic polyneuropathy, without long-term current use of insulin ([facility]    KERA (acute kidney injury) ([facility]    Oropharyngeal dysphagia    NSTEMI (non-ST elevated myocardial infarction) ([facility]    Fatigue       Inpatient Assessment  Care Transitions Summary    Care Transitions Inpatient Review  Medication Review  Are you able to afford your medications?:  Yes  How often do you have difficulty taking your medications?:  I always take them as prescribed.   Housing Review  Who do you live with?:  Partner/Spouse/SO  Are you an active caregiver in your home?:  No  Social Support  Do you have a ?:  No  Do you have a [address] [place] [address]?:  Yes  Dennis Huerta Name:   Bettinaelena Huerta services [facility] [address] [employer]:  [address] [place] [address]  Patient DME:  Other, Shower chair, Walker, Quad cane  Other Patient DME:  grab bars; stair lift  Patient Home Equipment:  Oxygen  Functional Review  Ability to seek help/take action for Emergent/Urgent situations i.e. fire, crime, inclement weather or health crisis.:  Needs Assistance  Ability handle personal hygiene needs (bathing/dressing/grooming):  Needs Assistance  Ability to manage medications:  Dependent  Ability to prepare food:  Dependent  Ability to maintain home (clean home, laundry):  Dependent  Ability to drive and/or has transportation:  Dependent  Ability to do shopping:  Dependent  Ability to manage finances:  Needs Assistance  Is patient able to live independently?:  Yes  Hearing and Vision  Visual Impairment:  None  Hearing Impairment:  None  Care Transitions Interventions  No Identified Needs         Follow Up:  0900 Patient sedated on vent. 1 Negin Flowers w/ Lucia Bahena, Wife. Oriented to [facility] program, given [facility] business card and BPCI letter. Recent admission  3/8/19 > 3/12/19 for Inf A, Pna; discharged to ARU. On 3/23/19 d/cd from ARU to home w/ [facility] for nsg/pt/ot/speech/social work. Lives with Wife, Ken Fong in 2 story home w/ stair lift system. Requires assist > dependent w/ adls. DME= walker, quad cane, grab bars, shower seat, o2 ([facility]). [facility], can afford rx copays. D/C plans uncertain at this time. Wife discussed need for SNF/ARU. Agreeable to continued BPCI follow-up. Future Appointments   Date Time Provider Elisabeth Metzger   4/2/2019  5:40 AM [facility] C-ARM 1 [facility] RAD [address] [place] Common Maintenance  There are no preventive care reminders to display for this patient.     Daryn Valdivia RN

## 2019-04-02 NOTE — PROGRESS NOTES
04/02/19 0440   Vent Information   Vent Type 980   Vent Mode AC/VC   Vt Ordered 400 mL   Rate Set 10 bmp   Peak Flow 50 L/min   Pressure Support 0 cmH20   FiO2  35 %   Sensitivity 3   PEEP/CPAP 5   I Time/ I Time % 0 s   Humidification Source HME   Vent Patient Data   High Peep/I Pressure 0   Peak Inspiratory Pressure 15 cmH2O   Mean Airway Pressure 7 cmH20   Rate Measured 11 br/min   Vt Exhaled 399 mL   Minute Volume 4.43 Liters   I:E Ratio 1:3.90   Cough/Sputum   Sputum How Obtained Endotracheal;Suctioned   $Obtained Sample $Induced Sputum   Cough Productive   Frequency Occasional   Sputum Amount Small   Sputum Color Clear   Tenacity Thin   Spontaneous Breathing Trial (SBT) RT Doc   Pulse 70   SpO2 100 %   Additional Respiratory  Assessments   Resp (!) 35   Alarm Settings   High Pressure Alarm 45 cmH2O   Delay Alarm 20 sec(s)   Low Minute Volume Alarm 2.5 L/min   Apnea (secs) 20 secs   High Respiratory Rate 40 br/min   Low Exhaled Vt  250 mL   ETT (adult)   Placement Date/Time: 03/31/19 (c) 5742   Preoxygenation: Yes  Mask Ventilation: Ventilated by mask with oral airway (2)  Technique: Direct laryngoscopy  Tube Size: 8 mm  Laryngoscope: Landaverde  Blade Size: 2  Grade View: Full view of the glottis  Insert. ..    Secured at 22 cm   Measured From Lips   ET Placement Right   Secured By Commercial tube odell   Site Condition Dry

## 2019-04-02 NOTE — PROGRESS NOTES
NEUROLOGY NOTE  DR. Carter Blackwell MD.  -------------------------------------------------  Subjective:    Pt is drowsy today    Pt is going to be extubated today    No seizures noted    Objective:    BP (!) 106/50   Pulse 78   Temp 99.9 °F (37.7 °C) (Rectal)   Resp 20   Ht 5' 10\" (1.778 m)   Wt 158 lb 14.4 oz (72.1 kg)   SpO2 97%   BMI 22.80 kg/m²   HEENT nl      Neuro exam    Drowsy follows simple commands  opend eyes to commands pupils 3 mm nicanor  Squeezes fingers to commands  resp on the vent. RADIOLOGY  -----------------    Xr Chest Standard (2 Vw)    Result Date: 3/28/2019  EXAMINATION: TWO VIEWS OF THE CHEST 3/28/2019 10:47 pm COMPARISON: Chest 03/20/2019 2:29 p.m. HISTORY: ORDERING SYSTEM PROVIDED HISTORY: Pleural Effusion TECHNOLOGIST PROVIDED HISTORY: Reason for exam:->Pleural Effusion Ordering Physician Provided Reason for Exam: Pleural Effusion Acuity: Unknown Type of Exam: Unknown Additional signs and symptoms: cough Relevant Medical/Surgical History: htn FINDINGS: Calcifications involving the aorta reflect atherosclerosis. The cardiomediastinal and hilar silhouettes appear otherwise unremarkable. Bibasilar consolidation and bilateral pleural effusion, right greater than left confirmed compared with prior study. Chronic appearing coarse interstitial densities predominate parahilar regions and lung bases, typical of sequela from smoking or other previous infectious/inflammatory process. No pneumothorax is seen. No acute osseous abnormality is identified. Confirmation of bibasilar consolidation and bilateral pleural effusion, right greater than left, concerning for pneumonia. Chronic appearing coarse interstitial densities predominate parahilar regions and lung bases, typical of sequela from smoking or other previous infectious/inflammatory process. Calcific atherosclerotic disease aorta.      Ct Head Wo Contrast    Result Date: 3/31/2019  EXAMINATION: CTA OF THE NECK; CTA OF THE HEAD WITH CONTRAST; CT OF THE HEAD WITHOUT CONTRAST 3/31/2019 4:57 am; 3/31/2019 4:55 am: TECHNIQUE: CTA of the neck was performed with the administration of intravenous contrast. Multiplanar reformatted images are provided for review. MIP images are provided for review. Stenosis of the internal carotid arteries measured using NASCET criteria. Dose modulation, iterative reconstruction, and/or weight based adjustment of the mA/kV was utilized to reduce the radiation dose to as low as reasonably achievable.; CTA of the head/brain was performed with the administration of intravenous contrast. Multiplanar reformatted images are provided for review. MIP images are provided for review. Dose modulation, iterative reconstruction, and/or weight based adjustment of the mA/kV was utilized to reduce the radiation dose to as low as reasonably achievable.; CT of the head was performed without the administration of intravenous contrast. Dose modulation, iterative reconstruction, and/or weight based adjustment of the mA/kV was utilized to reduce the radiation dose to as low as reasonably achievable. Noncontrast CT of the head with reconstructed 2-D images are also provided for review. COMPARISON: None. HISTORY: ORDERING SYSTEM PROVIDED HISTORY: r/o CVA TECHNOLOGIST PROVIDED HISTORY: Has a \"code stroke\" or \"stroke alert\" been called? ->Yes Ordering Physician Provided Reason for Exam: a\ms; ORDERING SYSTEM PROVIDED HISTORY: r/o CVA TECHNOLOGIST PROVIDED HISTORY: R/o CVA Has a \"code stroke\" or \"stroke alert\" been called? ->No Ordering Physician Provided Reason for Exam: ams; ORDERING SYSTEM PROVIDED HISTORY: unresponsive TECHNOLOGIST PROVIDED HISTORY: R/o hemorrhagic change Has a \"code stroke\" or \"stroke alert\" been called? ->No Ordering Physician Provided Reason for Exam: ams FINDINGS: CT HEAD: BRAIN/VENTRICLES:  No acute intracranial hemorrhage or extraaxial fluid collection. Grey-white differentiation is maintained.   No evidence of mass, mass effect or midline shift. No evidence of hydrocephalus. There is prominence of the ventricles and sulci due to global parenchymal volume loss. ORBITS: The visualized portion of the orbits demonstrate no acute abnormality. SINUSES:  The visualized paranasal sinuses and mastoid air cells demonstrate no acute abnormality. SOFT TISSUES/SKULL: No acute abnormality of the visualized skull or soft tissues. CTA NECK: AORTIC ARCH/ARCH VESSELS: There is a normal branch pattern of the aortic arch. No significant stenosis is seen of the innominate artery or subclavian arteries. CAROTID ARTERIES: The common carotid arteries are normal in appearance without evidence of a flow limiting stenosis. The internal carotid arteries are normal in appearance without evidence of a flow limiting stenosis by NASCET criteria. No dissection or arterial injury is seen. VERTEBRAL ARTERIES: Right vertebral artery is diminutive in caliber throughout. Left vertebral artery is dominant supplying basilar artery. SOFT TISSUES: The lung apices are clear. No cervical or superior mediastinal lymphadenopathy. There is endotracheal intubation. The parotid, submandibular and thyroid glands demonstrate no acute abnormality. BONES: The visualized osseous structures appear unremarkable. Large pleural effusions. CTA HEAD: ANTERIOR CIRCULATION: The internal carotid arteries are normal in course and caliber without focal stenosis. The anterior cerebral and middle cerebral arteries demonstrate no focal stenosis. POSTERIOR CIRCULATION: The posterior cerebral arteries demonstrate no focal stenosis. The vertebral and basilar arteries appear otherwise unremarkable. No CT evidence of acute territorial infarct. No significant stenosis visualized in the major intracranial arterial vasculature. Cervical carotid vasculature is patent. Diminutive right vertebral artery and widely patent left vertebral artery.      Ct Chest Wo Contrast    Result Date: Consider aspiration and etiology given tracheobronchial secretions and esophageal features suggesting dysmotility and reflux. Xr Chest Portable    Result Date: 4/1/2019  EXAMINATION: SINGLE XRAY VIEW OF THE CHEST 4/1/2019 8:46 am COMPARISON: 04/01/2019. HISTORY: ORDERING SYSTEM PROVIDED HISTORY: ETT placement TECHNOLOGIST PROVIDED HISTORY: Reason for exam:->ETT placement Ordering Physician Provided Reason for Exam: ETT placement Acuity: Acute Type of Exam: Initial Relevant Medical/Surgical History: diabetes mellitus; hypertension FINDINGS: The endotracheal tube has been repositioned with tip is now above the cyndi. A nasogastric tube courses below the diaphragm. The heart size and pulmonary vasculature stable. Again noted are hazy density seen throughout the lungs bilaterally. No pneumothoraces are seen. 1. Interval reposition of endotracheal tube with its tip now above the cyndi and in satisfactory position. 2. Otherwise, stable chest x-ray with findings likely reflecting pleural effusions and infiltrates. Xr Chest Portable    Result Date: 4/1/2019  EXAMINATION: SINGLE XRAY VIEW OF THE CHEST 4/1/2019 5:37 am COMPARISON: 03/31/2019. HISTORY: ORDERING SYSTEM PROVIDED HISTORY: tube placement TECHNOLOGIST PROVIDED HISTORY: Reason for exam:->tube placement Ordering Physician Provided Reason for Exam: tube placement Acuity: Unknown Type of Exam: Subsequent/Follow-up Additional signs and symptoms: tube placement Relevant Medical/Surgical History: tube placement FINDINGS: The endotracheal tube is seen with its tip at the level of the cyndi. There is a nasogastric tube which courses below the diaphragm. The heart size and pulmonary vasculature are stable. There are hazy densities seen involving the lungs bilaterally. No new airspace disease is seen. No pneumothoraces are noted. Overall, compared to the prior exam there has been little change.      1. Stable chest x-ray with bilateral hazy opacities likely represent combination of pleural effusions and infiltrates. 2. Endotracheal tube with its tip at the level of the cyndi. I would suggest withdrawing the tube at least 2 cm. Xr Chest Portable    Result Date: 3/28/2019  EXAMINATION: SINGLE XRAY VIEW OF THE CHEST 3/28/2019 2:01 pm COMPARISON: 03/08/2019 HISTORY: ORDERING SYSTEM PROVIDED HISTORY: cough/fatigue TECHNOLOGIST PROVIDED HISTORY: Reason for exam:->cough/fatigue Ordering Physician Provided Reason for Exam: cough/fatigue Acuity: Unknown Type of Exam: Unknown Additional signs and symptoms: discharged 3/23/19 FINDINGS: Normal heart size. Somewhat prominent pulmonary vasculature. Haziness at the lung bases may represent atelectasis or layering pleural fluid. No pneumothorax. Haziness at the lung bases may represent atelectasis or layering pleural fluid. Recommend obtaining PA and lateral chest radiographs for confirmation. Xr Chest Portable    Result Date: 3/8/2019  EXAMINATION: SINGLE XRAY VIEW OF THE CHEST 3/8/2019 9:55 pm COMPARISON: None. HISTORY: ORDERING SYSTEM PROVIDED HISTORY: sob TECHNOLOGIST PROVIDED HISTORY: Reason for exam:->sob Ordering Physician Provided Reason for Exam: SOB Acuity: Acute Type of Exam: Initial FINDINGS: Cardiomediastinal silhouette is within normal limits. Right midlung zone airspace opacification. No pulmonary vascular congestion or edema. No pleural effusion. Right midlung zone airspace opacification could represent pneumonia.  Follow-up is recommended to ensure resolution     Xr Chest 1 Vw    Result Date: 3/31/2019  EXAMINATION: SINGLE XRAY VIEW OF THE CHEST 3/31/2019 5:22 am COMPARISON: 2 days prior HISTORY: ORDERING SYSTEM PROVIDED HISTORY: intubation and post cardiac arrest, check ETT TECHNOLOGIST PROVIDED HISTORY: Reason for exam:->intubation and post cardiac arrest, check ETT Ordering Physician Provided Reason for Exam: intubation and post cardiac arrest, check ETT Acuity: Unknown Type of Exam: Unknown FINDINGS: Endotracheal tube is been placed, tip approximately 4 cm above the cyndi in appropriate position. Enteric tube tip and side port below diaphragm and stomach. There are diffuse hazy opacities over right greater than left lung, combination increasing effusions, atelectatic changes, underlying consolidation not excluded. Pulmonary vasculature is indistinct. Cardiomediastinal silhouette is stable. Increasing pleuroparenchymal opacities in both hemithoraces, right greater than left. Endotracheal and enteric tubes appear in appropriate position. Cta Neck W Contrast    Result Date: 3/31/2019  EXAMINATION: CTA OF THE NECK; CTA OF THE HEAD WITH CONTRAST; CT OF THE HEAD WITHOUT CONTRAST 3/31/2019 4:57 am; 3/31/2019 4:55 am: TECHNIQUE: CTA of the neck was performed with the administration of intravenous contrast. Multiplanar reformatted images are provided for review. MIP images are provided for review. Stenosis of the internal carotid arteries measured using NASCET criteria. Dose modulation, iterative reconstruction, and/or weight based adjustment of the mA/kV was utilized to reduce the radiation dose to as low as reasonably achievable.; CTA of the head/brain was performed with the administration of intravenous contrast. Multiplanar reformatted images are provided for review. MIP images are provided for review. Dose modulation, iterative reconstruction, and/or weight based adjustment of the mA/kV was utilized to reduce the radiation dose to as low as reasonably achievable.; CT of the head was performed without the administration of intravenous contrast. Dose modulation, iterative reconstruction, and/or weight based adjustment of the mA/kV was utilized to reduce the radiation dose to as low as reasonably achievable. Noncontrast CT of the head with reconstructed 2-D images are also provided for review. COMPARISON: None.  HISTORY: ORDERING SYSTEM PROVIDED HISTORY: r/o CVA TECHNOLOGIST PROVIDED Large pleural effusions. CTA HEAD: ANTERIOR CIRCULATION: The internal carotid arteries are normal in course and caliber without focal stenosis. The anterior cerebral and middle cerebral arteries demonstrate no focal stenosis. POSTERIOR CIRCULATION: The posterior cerebral arteries demonstrate no focal stenosis. The vertebral and basilar arteries appear otherwise unremarkable. No CT evidence of acute territorial infarct. No significant stenosis visualized in the major intracranial arterial vasculature. Cervical carotid vasculature is patent. Diminutive right vertebral artery and widely patent left vertebral artery. Cta Pulmonary W Contrast    Result Date: 3/9/2019  EXAMINATION: CTA OF THE CHEST 3/9/2019 12:05 am TECHNIQUE: CTA of the chest was performed after the administration of intravenous contrast.  Multiplanar reformatted images are provided for review. MIP images are provided for review. Dose modulation, iterative reconstruction, and/or weight based adjustment of the mA/kV was utilized to reduce the radiation dose to as low as reasonably achievable. COMPARISON: None. HISTORY: ORDERING SYSTEM PROVIDED HISTORY: sob, tachy TECHNOLOGIST PROVIDED HISTORY: Ordering Physician Provided Reason for Exam: sob Acuity: Acute Type of Exam: Initial Mechanism of Injury: Patient arrived via [facility] with complaints of increasing fatigue to bilateral lower extremities Relevant Medical/Surgical History: isovue 370 80 ml FINDINGS: Pulmonary Arteries: The main pulmonary artery is normal in size. There is no large or central pulmonary embolism. The subsegmental pulmonary arteries are not well visualized secondary to the timing of the contrast bolus and respiratory motion. Mediastinum: The heart is normal in size. There is no pericardial effusion. The thoracic aorta is atherosclerotic, but not aneurysmal.  No mediastinal lymphadenopathy is demonstrated. Hilar lymph nodes measure up to 1.5 cm on the right. Lungs/pleura: There is mild emphysema. There are nodular and interstitial infiltrates within the bilateral lower and right middle lobes. No pleural effusion or pneumothorax is demonstrated. Upper Abdomen: Visualized portions of the upper abdomen demonstrate a 1.8 cm left renal cyst. Soft Tissues/Bones: No suspicious osteolytic or osteoblastic lesions are demonstrated. 1. No large or central pulmonary embolism. The subsegmental pulmonary arteries are not well evaluated secondary to respiratory motion and the timing of the contrast bolus. If there remains a high clinical concern for a small PE, a repeat exam is suggested. 2. Nodular infiltrates within the lower lungs, which are most likely infectious in etiology. Although less likely, malignancy cannot be excluded. A follow-up chest CT following antibiotic therapy in 6-12 weeks is recommended to document resolution. 3. Right hilar lymphadenopathy. This can be reassessed on the follow-up chest CT. Cta Head W Contrast    Result Date: 3/31/2019  EXAMINATION: CTA OF THE NECK; CTA OF THE HEAD WITH CONTRAST; CT OF THE HEAD WITHOUT CONTRAST 3/31/2019 4:57 am; 3/31/2019 4:55 am: TECHNIQUE: CTA of the neck was performed with the administration of intravenous contrast. Multiplanar reformatted images are provided for review. MIP images are provided for review. Stenosis of the internal carotid arteries measured using NASCET criteria. Dose modulation, iterative reconstruction, and/or weight based adjustment of the mA/kV was utilized to reduce the radiation dose to as low as reasonably achievable.; CTA of the head/brain was performed with the administration of intravenous contrast. Multiplanar reformatted images are provided for review. MIP images are provided for review.  Dose modulation, iterative reconstruction, and/or weight based adjustment of the mA/kV was utilized to reduce the radiation dose to as low as reasonably achievable.; CT of the head was performed without the administration of intravenous contrast. Dose modulation, iterative reconstruction, and/or weight based adjustment of the mA/kV was utilized to reduce the radiation dose to as low as reasonably achievable. Noncontrast CT of the head with reconstructed 2-D images are also provided for review. COMPARISON: None. HISTORY: ORDERING SYSTEM PROVIDED HISTORY: r/o CVA TECHNOLOGIST PROVIDED HISTORY: Has a \"code stroke\" or \"stroke alert\" been called? ->Yes Ordering Physician Provided Reason for Exam: a\ms; ORDERING SYSTEM PROVIDED HISTORY: r/o CVA TECHNOLOGIST PROVIDED HISTORY: R/o CVA Has a \"code stroke\" or \"stroke alert\" been called? ->No Ordering Physician Provided Reason for Exam: ams; ORDERING SYSTEM PROVIDED HISTORY: unresponsive TECHNOLOGIST PROVIDED HISTORY: R/o hemorrhagic change Has a \"code stroke\" or \"stroke alert\" been called? ->No Ordering Physician Provided Reason for Exam: ams FINDINGS: CT HEAD: BRAIN/VENTRICLES:  No acute intracranial hemorrhage or extraaxial fluid collection. Grey-white differentiation is maintained. No evidence of mass, mass effect or midline shift. No evidence of hydrocephalus. There is prominence of the ventricles and sulci due to global parenchymal volume loss. ORBITS: The visualized portion of the orbits demonstrate no acute abnormality. SINUSES:  The visualized paranasal sinuses and mastoid air cells demonstrate no acute abnormality. SOFT TISSUES/SKULL: No acute abnormality of the visualized skull or soft tissues. CTA NECK: AORTIC ARCH/ARCH VESSELS: There is a normal branch pattern of the aortic arch. No significant stenosis is seen of the innominate artery or subclavian arteries. CAROTID ARTERIES: The common carotid arteries are normal in appearance without evidence of a flow limiting stenosis. The internal carotid arteries are normal in appearance without evidence of a flow limiting stenosis by NASCET criteria. No dissection or arterial injury is seen.  VERTEBRAL ARTERIES: Right vertebral artery is diminutive in caliber throughout. Left vertebral artery is dominant supplying basilar artery. SOFT TISSUES: The lung apices are clear. No cervical or superior mediastinal lymphadenopathy. There is endotracheal intubation. The parotid, submandibular and thyroid glands demonstrate no acute abnormality. BONES: The visualized osseous structures appear unremarkable. Large pleural effusions. CTA HEAD: ANTERIOR CIRCULATION: The internal carotid arteries are normal in course and caliber without focal stenosis. The anterior cerebral and middle cerebral arteries demonstrate no focal stenosis. POSTERIOR CIRCULATION: The posterior cerebral arteries demonstrate no focal stenosis. The vertebral and basilar arteries appear otherwise unremarkable. No CT evidence of acute territorial infarct. No significant stenosis visualized in the major intracranial arterial vasculature. Cervical carotid vasculature is patent. Diminutive right vertebral artery and widely patent left vertebral artery. Fl Modified Barium Swallow W Video    Result Date: 3/12/2019  EXAMINATION: MODIFIED BARIUM SWALLOW WAS PERFORMED IN CONJUNCTION WITH [facility] 03/11/2019 TECHNIQUE: Fluoroscopic evaluation of the swallowing mechanism was performed with multiple consistency of barium product. FLUOROSCOPY DOSE AND TYPE OR TIME AND EXPOSURES: Fluoroscopy time 2.7 minutes, dose 8.80 mGy COMPARISON: None HISTORY: ORDERING SYSTEM PROVIDED HISTORY: aspiration TECHNOLOGIST PROVIDED HISTORY: Reason for exam:->aspiration Initial evaluation. FINDINGS: Early bolus spillover into the valleculae and piriform sinuses. Residue predominantly in the piriform sinuses, less prominently involving the valleculae. Deep laryngeal penetration with thin liquids. No definite tracheal aspiration. 1. Deep laryngeal penetration with thin liquids but no definite tracheal aspiration.  2. Early bolus spillover and predominantly piriform sinus residue. Please see separate speech pathology report for full discussion of findings and recommendations.        LAB RESULTS  --------------------    Recent Results (from the past 24 hour(s))   POCT Glucose    Collection Time: 04/01/19  5:03 PM   Result Value Ref Range    POC Glucose 125 (H) 70 - 99 MG/DL   POCT Glucose    Collection Time: 04/01/19  8:49 PM   Result Value Ref Range    POC Glucose 172 (H) 70 - 99 MG/DL   POCT Glucose    Collection Time: 04/01/19 11:28 PM   Result Value Ref Range    POC Glucose 179 (H) 70 - 99 MG/DL   Hemoglobin and hematocrit, blood    Collection Time: 04/02/19  1:20 AM   Result Value Ref Range    Hemoglobin 8.1 (L) 13.5 - 18.0 GM/DL    Hematocrit 28.1 (L) 42 - 52 %   Blood gas, arterial    Collection Time: 04/02/19  6:00 AM   Result Value Ref Range    pH, Bld 7.40 7.34 - 7.45    pCO2, Arterial 67.0 (H) 32 - 45 MMHG    pO2, Arterial 102 (H) 75 - 100 MMHG    Base Exc, Mixed 13.5  PLUS   (H) 0 - 1.2    HCO3, Arterial 41.5 (H) 18 - 23 MMOL/L    CO2 Content 43.6 (H) 19 - 24 MMOL/L    O2 Sat 96.2 96 - 97 %    Carbon Monoxide, Blood 2.2 0 - 5 %    Methemoglobin, Arterial 0.8 <1.5 %    Comment AC 10  +5 35%    POCT Glucose    Collection Time: 04/02/19  9:56 AM   Result Value Ref Range    POC Glucose 122 (H) 70 - 99 MG/DL   Basic metabolic panel    Collection Time: 04/02/19 12:03 PM   Result Value Ref Range    Sodium 143 135 - 145 MMOL/L    Potassium 3.6 3.5 - 5.1 MMOL/L    Chloride 101 99 - 110 mMol/L    CO2 36 (H) 21 - 32 MMOL/L    Anion Gap 6 4 - 16    BUN 17 6 - 23 MG/DL    CREATININE 1.4 (H) 0.9 - 1.3 MG/DL    Glucose 137 (H) 70 - 99 MG/DL    Calcium 7.5 (L) 8.3 - 10.6 MG/DL    GFR Non- 48 (L) >60 mL/min/1.73m2    GFR  58 (L) >60 mL/min/1.73m2   POCT Glucose    Collection Time: 04/02/19 12:10 PM   Result Value Ref Range    POC Glucose 133 (H) 70 - 99 MG/DL         Medical problems    Patient Active Problem List:     Community acquired pneumonia due to influenza A virus     Pneumonia     Essential hypertension     Type 2 diabetes mellitus with diabetic polyneuropathy, without long-term current use of insulin ([facility])     KERA (acute kidney injury) ([facility] [address]     Oropharyngeal dysphagia     NSTEMI (non-ST elevated myocardial infarction) ([facility])     Fatigue      ASSESSMENT:  ---------------------    Hypoxic encephalopathy     Metabolic encephalopathy     sepsis     Hypernatremia     COPD     PLAN:     CT brain neg     CTA head neck neg     Continue supportive care    Pt is stable neurologically. pt doing better today and is following simple commands.             Electronically signed by Ab Zuñiga MD on 4/2/2019 at 2:25 PM

## 2019-04-02 NOTE — PROGRESS NOTES
pulmonary      SUBJECTIVE:  Intubated on vent   OBJECTIVE    VITALS:  BP (!) 124/57   Pulse 70   Temp 99 °F (37.2 °C) (Rectal)   Resp 27   Ht 5' 10\" (1.778 m)   Wt 158 lb 6.4 oz (71.8 kg)   SpO2 98%   BMI 22.73 kg/m²   HEAD AND FACE EXAM:  No throat injection, no active exudate,no thrush  NECK EXAM;No JVD, no masses, symmetrical  CHEST EXAM; Expansion equal and symmetrical, no masses  LUNG EXAM; Good breath sounds bilaterally. There are expiratory wheezes both lungs, there are crackles at both lung bases  CARDIOVASCULAR EXAM: Positive S1 and S2, no S3 or S4, no clicks ,no murmurs  RIGHT AND LEFT LOWER EXTRIMITY EXAM: No edema, no swelling, no inflamation  .           LABS   Lab Results   Component Value Date    WBC 7.3 04/01/2019    HGB 8.1 (L) 04/02/2019    HCT 28.1 (L) 04/02/2019    .7 (H) 04/01/2019     04/01/2019     Lab Results   Component Value Date    CREATININE 1.3 04/01/2019    BUN 18 04/01/2019     04/01/2019    K 3.7 04/01/2019     04/01/2019    CO2 34 (H) 04/01/2019     Lab Results   Component Value Date    INR 1.04 03/31/2019    PROTIME 12.1 03/31/2019        No results found for: PHOS     Recent Labs     03/31/19  0930 04/01/19  0600 04/02/19  0600   PH 7.34 7.47* 7.40   PO2ART 48* 142* 102*   [id number] 78.0* 53.0* 67.0*   O2SAT 84.3* 96.4 96.2         Wt Readings from Last 3 Encounters:   04/01/19 158 lb 6.4 oz (71.8 kg)   03/23/19 154 lb 9.6 oz (70.1 kg)   03/12/19 154 lb 12.8 oz (70.2 kg)       EXAMINATION:   SINGLE XRAY VIEW OF THE CHEST       4/2/2019 5:17 am       COMPARISON:   April 1, 2019       HISTORY:   ORDERING SYSTEM PROVIDED HISTORY: tube placement   TECHNOLOGIST PROVIDED HISTORY:   Reason for exam:->tube placement   Ordering Physician Provided Reason for Exam: tube placement   Acuity: Acute   Type of Exam: Subsequent/Follow-up       FINDINGS:   ETT tip is 6 cm above the cyndi.  Enteric catheter extends beyond the   inferior margin of the image.  Cardiac silhouette is within normal range. Small right pleural effusion.  No focal consolidation, left pleural effusion,   or pneumothorax.  Severe left glenohumeral degenerative changes with   suspected chronic left rotator cuff tear.           Impression   1. Small right pleural effusion.                 ASSESMENT  Ac respailure  pneumonmia  Ac bronchospasm        PLAN  1. cpm  2.  Wean after gi issue is taken care off    4/2/2019  Rubén Montenegro M.D.

## 2019-04-02 NOTE — PROGRESS NOTES
Occupational Therapy  Pt intubated at this time. Please re-order once pt medically stable and able to participate in PT/OT evaluations. The current order will be discontinued.      [address], OTR/L, [place]   [id number]   9:44 AM, 4/2/2019

## 2019-04-02 NOTE — PROGRESS NOTES
Pt setup on BiPAP 20/5 50%, pt tolerating BiPAP well. Pt is lethargic and semi-obtunded, ET CO2 readings have trended towards hypercapnia. ABG's to be collected and BiPAP trial underway per policy. Will call  and follow with his orders.

## 2019-04-02 NOTE — PROGRESS NOTES
NEUROLOGY NOTE  DR. Colleen Laurent MD.  -------------------------------------------------  Subjective:    Pt remains obtunded    No seizures noted    Objective:    BP (!) 112/55   Pulse 83   Temp 97.7 °F (36.5 °C) (Rectal)   Resp (!) 31   Ht 5' 10\" (1.778 m)   Wt 158 lb 6.4 oz (71.8 kg)   SpO2 100%   BMI 22.73 kg/m²   HEENT nl      Neuro exam    Obtunded no response to verbal commans  sluggush oculocephalics pupils 3 mm nicanor  Flaccid extremities  resp on the vent. RADIOLOGY  -----------------    Xr Chest Standard (2 Vw)    Result Date: 3/28/2019  EXAMINATION: TWO VIEWS OF THE CHEST 3/28/2019 10:47 pm COMPARISON: Chest 03/20/2019 2:29 p.m. HISTORY: ORDERING SYSTEM PROVIDED HISTORY: Pleural Effusion TECHNOLOGIST PROVIDED HISTORY: Reason for exam:->Pleural Effusion Ordering Physician Provided Reason for Exam: Pleural Effusion Acuity: Unknown Type of Exam: Unknown Additional signs and symptoms: cough Relevant Medical/Surgical History: htn FINDINGS: Calcifications involving the aorta reflect atherosclerosis. The cardiomediastinal and hilar silhouettes appear otherwise unremarkable. Bibasilar consolidation and bilateral pleural effusion, right greater than left confirmed compared with prior study. Chronic appearing coarse interstitial densities predominate parahilar regions and lung bases, typical of sequela from smoking or other previous infectious/inflammatory process. No pneumothorax is seen. No acute osseous abnormality is identified. Confirmation of bibasilar consolidation and bilateral pleural effusion, right greater than left, concerning for pneumonia. Chronic appearing coarse interstitial densities predominate parahilar regions and lung bases, typical of sequela from smoking or other previous infectious/inflammatory process. Calcific atherosclerotic disease aorta.      Ct Head Wo Contrast    Result Date: 3/31/2019  EXAMINATION: CTA OF THE NECK; CTA OF THE HEAD WITH CONTRAST; CT OF THE HEAD WITHOUT CONTRAST 3/31/2019 4:57 am; 3/31/2019 4:55 am: TECHNIQUE: CTA of the neck was performed with the administration of intravenous contrast. Multiplanar reformatted images are provided for review. MIP images are provided for review. Stenosis of the internal carotid arteries measured using NASCET criteria. Dose modulation, iterative reconstruction, and/or weight based adjustment of the mA/kV was utilized to reduce the radiation dose to as low as reasonably achievable.; CTA of the head/brain was performed with the administration of intravenous contrast. Multiplanar reformatted images are provided for review. MIP images are provided for review. Dose modulation, iterative reconstruction, and/or weight based adjustment of the mA/kV was utilized to reduce the radiation dose to as low as reasonably achievable.; CT of the head was performed without the administration of intravenous contrast. Dose modulation, iterative reconstruction, and/or weight based adjustment of the mA/kV was utilized to reduce the radiation dose to as low as reasonably achievable. Noncontrast CT of the head with reconstructed 2-D images are also provided for review. COMPARISON: None. HISTORY: ORDERING SYSTEM PROVIDED HISTORY: r/o CVA TECHNOLOGIST PROVIDED HISTORY: Has a \"code stroke\" or \"stroke alert\" been called? ->Yes Ordering Physician Provided Reason for Exam: a\ms; ORDERING SYSTEM PROVIDED HISTORY: r/o CVA TECHNOLOGIST PROVIDED HISTORY: R/o CVA Has a \"code stroke\" or \"stroke alert\" been called? ->No Ordering Physician Provided Reason for Exam: ams; ORDERING SYSTEM PROVIDED HISTORY: unresponsive TECHNOLOGIST PROVIDED HISTORY: R/o hemorrhagic change Has a \"code stroke\" or \"stroke alert\" been called? ->No Ordering Physician Provided Reason for Exam: ams FINDINGS: CT HEAD: BRAIN/VENTRICLES:  No acute intracranial hemorrhage or extraaxial fluid collection. Grey-white differentiation is maintained. No evidence of mass, mass effect or midline shift. No evidence of hydrocephalus. There is prominence of the ventricles and sulci due to global parenchymal volume loss. ORBITS: The visualized portion of the orbits demonstrate no acute abnormality. SINUSES:  The visualized paranasal sinuses and mastoid air cells demonstrate no acute abnormality. SOFT TISSUES/SKULL: No acute abnormality of the visualized skull or soft tissues. CTA NECK: AORTIC ARCH/ARCH VESSELS: There is a normal branch pattern of the aortic arch. No significant stenosis is seen of the innominate artery or subclavian arteries. CAROTID ARTERIES: The common carotid arteries are normal in appearance without evidence of a flow limiting stenosis. The internal carotid arteries are normal in appearance without evidence of a flow limiting stenosis by NASCET criteria. No dissection or arterial injury is seen. VERTEBRAL ARTERIES: Right vertebral artery is diminutive in caliber throughout. Left vertebral artery is dominant supplying basilar artery. SOFT TISSUES: The lung apices are clear. No cervical or superior mediastinal lymphadenopathy. There is endotracheal intubation. The parotid, submandibular and thyroid glands demonstrate no acute abnormality. BONES: The visualized osseous structures appear unremarkable. Large pleural effusions. CTA HEAD: ANTERIOR CIRCULATION: The internal carotid arteries are normal in course and caliber without focal stenosis. The anterior cerebral and middle cerebral arteries demonstrate no focal stenosis. POSTERIOR CIRCULATION: The posterior cerebral arteries demonstrate no focal stenosis. The vertebral and basilar arteries appear otherwise unremarkable. No CT evidence of acute territorial infarct. No significant stenosis visualized in the major intracranial arterial vasculature. Cervical carotid vasculature is patent. Diminutive right vertebral artery and widely patent left vertebral artery.      Ct Chest Wo Contrast    Result Date: 3/29/2019  EXAMINATION: CT OF THE CHEST WITHOUT CONTRAST 3/29/2019 12:52 pm TECHNIQUE: CT of the chest was performed without the administration of intravenous contrast. Multiplanar reformatted images are provided for review. Dose modulation, iterative reconstruction, and/or weight based adjustment of the mA/kV was utilized to reduce the radiation dose to as low as reasonably achievable. COMPARISON: Chest radiographs 03/28/2019 HISTORY: ORDERING SYSTEM PROVIDED HISTORY: nicanor ll pneumonia,r/o pleural effusion TECHNOLOGIST PROVIDED HISTORY: Ordering Physician Provided Reason for Exam: nicanor ll pneumonia,r/o pleural effusion Acuity: Acute Additional signs and symptoms: patient unable to follow commands Relevant Medical/Surgical History: unknown, poor historian FINDINGS: Mediastinum: Limited evaluation for lymphadenopathy without intravenous contrast.  Normal caliber thoracic aorta with mild atherosclerotic calcifications. Mildly enlarged main pulmonary artery measuring 3.5 cm in diameter. Normal heart size. No pericardial effusion. Segmental gaseous distention of the esophagus. Lungs/pleura: Moderate right and small left pleural effusions with partial lower lobe atelectasis. Additional patchy tree-in-bud/clustered nodularity in the left upper and lower lobes and middle lobe. Mild dependent secretions within the trachea and possibly the proximal left bronchial tree. No pneumothorax. Upper Abdomen: Oral contrast within the colon. Scattered calcified granulomas within the liver and spleen reflect remote granulomatous disease. Soft Tissues/Bones: No enlarged axillary or supraclavicular lymph nodes. Normal thyroid. Glenohumeral osteoarthrosis. Thoracic spondylosis. Moderate right and small left pleural effusions with associated atelectasis. Multifocal clustered/tree-in-bud nodularity in the left upper lobe, left lower lobe and middle lobe most likely represents an infectious or inflammatory bronchiolitis.   Consider aspiration and etiology given tracheobronchial secretions and esophageal features suggesting dysmotility and reflux. Xr Chest Portable    Result Date: 4/1/2019  EXAMINATION: SINGLE XRAY VIEW OF THE CHEST 4/1/2019 8:46 am COMPARISON: 04/01/2019. HISTORY: ORDERING SYSTEM PROVIDED HISTORY: ETT placement TECHNOLOGIST PROVIDED HISTORY: Reason for exam:->ETT placement Ordering Physician Provided Reason for Exam: ETT placement Acuity: Acute Type of Exam: Initial Relevant Medical/Surgical History: diabetes mellitus; hypertension FINDINGS: The endotracheal tube has been repositioned with tip is now above the cyndi. A nasogastric tube courses below the diaphragm. The heart size and pulmonary vasculature stable. Again noted are hazy density seen throughout the lungs bilaterally. No pneumothoraces are seen. 1. Interval reposition of endotracheal tube with its tip now above the cyndi and in satisfactory position. 2. Otherwise, stable chest x-ray with findings likely reflecting pleural effusions and infiltrates. Xr Chest Portable    Result Date: 4/1/2019  EXAMINATION: SINGLE XRAY VIEW OF THE CHEST 4/1/2019 5:37 am COMPARISON: 03/31/2019. HISTORY: ORDERING SYSTEM PROVIDED HISTORY: tube placement TECHNOLOGIST PROVIDED HISTORY: Reason for exam:->tube placement Ordering Physician Provided Reason for Exam: tube placement Acuity: Unknown Type of Exam: Subsequent/Follow-up Additional signs and symptoms: tube placement Relevant Medical/Surgical History: tube placement FINDINGS: The endotracheal tube is seen with its tip at the level of the cyndi. There is a nasogastric tube which courses below the diaphragm. The heart size and pulmonary vasculature are stable. There are hazy densities seen involving the lungs bilaterally. No new airspace disease is seen. No pneumothoraces are noted. Overall, compared to the prior exam there has been little change.      1. Stable chest x-ray with bilateral hazy opacities likely represent combination of pleural effusions and infiltrates. 2. Endotracheal tube with its tip at the level of the cyndi. I would suggest withdrawing the tube at least 2 cm. Xr Chest Portable    Result Date: 3/28/2019  EXAMINATION: SINGLE XRAY VIEW OF THE CHEST 3/28/2019 2:01 pm COMPARISON: 03/08/2019 HISTORY: ORDERING SYSTEM PROVIDED HISTORY: cough/fatigue TECHNOLOGIST PROVIDED HISTORY: Reason for exam:->cough/fatigue Ordering Physician Provided Reason for Exam: cough/fatigue Acuity: Unknown Type of Exam: Unknown Additional signs and symptoms: discharged 3/23/19 FINDINGS: Normal heart size. Somewhat prominent pulmonary vasculature. Haziness at the lung bases may represent atelectasis or layering pleural fluid. No pneumothorax. Haziness at the lung bases may represent atelectasis or layering pleural fluid. Recommend obtaining PA and lateral chest radiographs for confirmation. Xr Chest Portable    Result Date: 3/8/2019  EXAMINATION: SINGLE XRAY VIEW OF THE CHEST 3/8/2019 9:55 pm COMPARISON: None. HISTORY: ORDERING SYSTEM PROVIDED HISTORY: sob TECHNOLOGIST PROVIDED HISTORY: Reason for exam:->sob Ordering Physician Provided Reason for Exam: SOB Acuity: Acute Type of Exam: Initial FINDINGS: Cardiomediastinal silhouette is within normal limits. Right midlung zone airspace opacification. No pulmonary vascular congestion or edema. No pleural effusion. Right midlung zone airspace opacification could represent pneumonia.  Follow-up is recommended to ensure resolution     Xr Chest 1 Vw    Result Date: 3/31/2019  EXAMINATION: SINGLE XRAY VIEW OF THE CHEST 3/31/2019 5:22 am COMPARISON: 2 days prior HISTORY: ORDERING SYSTEM PROVIDED HISTORY: intubation and post cardiac arrest, check ETT TECHNOLOGIST PROVIDED HISTORY: Reason for exam:->intubation and post cardiac arrest, check ETT Ordering Physician Provided Reason for Exam: intubation and post cardiac arrest, check ETT Acuity: Unknown Type of Exam: Unknown FINDINGS: Endotracheal tube is been placed, tip approximately 4 cm above the cyndi in appropriate position. Enteric tube tip and side port below diaphragm and stomach. There are diffuse hazy opacities over right greater than left lung, combination increasing effusions, atelectatic changes, underlying consolidation not excluded. Pulmonary vasculature is indistinct. Cardiomediastinal silhouette is stable. Increasing pleuroparenchymal opacities in both hemithoraces, right greater than left. Endotracheal and enteric tubes appear in appropriate position. Cta Neck W Contrast    Result Date: 3/31/2019  EXAMINATION: CTA OF THE NECK; CTA OF THE HEAD WITH CONTRAST; CT OF THE HEAD WITHOUT CONTRAST 3/31/2019 4:57 am; 3/31/2019 4:55 am: TECHNIQUE: CTA of the neck was performed with the administration of intravenous contrast. Multiplanar reformatted images are provided for review. MIP images are provided for review. Stenosis of the internal carotid arteries measured using NASCET criteria. Dose modulation, iterative reconstruction, and/or weight based adjustment of the mA/kV was utilized to reduce the radiation dose to as low as reasonably achievable.; CTA of the head/brain was performed with the administration of intravenous contrast. Multiplanar reformatted images are provided for review. MIP images are provided for review. Dose modulation, iterative reconstruction, and/or weight based adjustment of the mA/kV was utilized to reduce the radiation dose to as low as reasonably achievable.; CT of the head was performed without the administration of intravenous contrast. Dose modulation, iterative reconstruction, and/or weight based adjustment of the mA/kV was utilized to reduce the radiation dose to as low as reasonably achievable. Noncontrast CT of the head with reconstructed 2-D images are also provided for review. COMPARISON: None.  HISTORY: ORDERING SYSTEM PROVIDED HISTORY: r/o CVA TECHNOLOGIST PROVIDED HISTORY: Has a \"code stroke\" or \"stroke alert\" been called? ->Yes Ordering Physician Provided Reason for Exam: a\ms; ORDERING SYSTEM PROVIDED HISTORY: r/o CVA TECHNOLOGIST PROVIDED HISTORY: R/o CVA Has a \"code stroke\" or \"stroke alert\" been called? ->No Ordering Physician Provided Reason for Exam: ams; ORDERING SYSTEM PROVIDED HISTORY: unresponsive TECHNOLOGIST PROVIDED HISTORY: R/o hemorrhagic change Has a \"code stroke\" or \"stroke alert\" been called? ->No Ordering Physician Provided Reason for Exam: ams FINDINGS: CT HEAD: BRAIN/VENTRICLES:  No acute intracranial hemorrhage or extraaxial fluid collection. Grey-white differentiation is maintained. No evidence of mass, mass effect or midline shift. No evidence of hydrocephalus. There is prominence of the ventricles and sulci due to global parenchymal volume loss. ORBITS: The visualized portion of the orbits demonstrate no acute abnormality. SINUSES:  The visualized paranasal sinuses and mastoid air cells demonstrate no acute abnormality. SOFT TISSUES/SKULL: No acute abnormality of the visualized skull or soft tissues. CTA NECK: AORTIC ARCH/ARCH VESSELS: There is a normal branch pattern of the aortic arch. No significant stenosis is seen of the innominate artery or subclavian arteries. CAROTID ARTERIES: The common carotid arteries are normal in appearance without evidence of a flow limiting stenosis. The internal carotid arteries are normal in appearance without evidence of a flow limiting stenosis by NASCET criteria. No dissection or arterial injury is seen. VERTEBRAL ARTERIES: Right vertebral artery is diminutive in caliber throughout. Left vertebral artery is dominant supplying basilar artery. SOFT TISSUES: The lung apices are clear. No cervical or superior mediastinal lymphadenopathy. There is endotracheal intubation. The parotid, submandibular and thyroid glands demonstrate no acute abnormality. BONES: The visualized osseous structures appear unremarkable. Large pleural effusions.  CTA HEAD: ANTERIOR CIRCULATION: The internal carotid arteries are normal in course and caliber without focal stenosis. The anterior cerebral and middle cerebral arteries demonstrate no focal stenosis. POSTERIOR CIRCULATION: The posterior cerebral arteries demonstrate no focal stenosis. The vertebral and basilar arteries appear otherwise unremarkable. No CT evidence of acute territorial infarct. No significant stenosis visualized in the major intracranial arterial vasculature. Cervical carotid vasculature is patent. Diminutive right vertebral artery and widely patent left vertebral artery. Cta Pulmonary W Contrast    Result Date: 3/9/2019  EXAMINATION: CTA OF THE CHEST 3/9/2019 12:05 am TECHNIQUE: CTA of the chest was performed after the administration of intravenous contrast.  Multiplanar reformatted images are provided for review. MIP images are provided for review. Dose modulation, iterative reconstruction, and/or weight based adjustment of the mA/kV was utilized to reduce the radiation dose to as low as reasonably achievable. COMPARISON: None. HISTORY: ORDERING SYSTEM PROVIDED HISTORY: sob, tachy TECHNOLOGIST PROVIDED HISTORY: Ordering Physician Provided Reason for Exam: sob Acuity: Acute Type of Exam: Initial Mechanism of Injury: Patient arrived via [facility] with complaints of increasing fatigue to bilateral lower extremities Relevant Medical/Surgical History: isovue 370 80 ml FINDINGS: Pulmonary Arteries: The main pulmonary artery is normal in size. There is no large or central pulmonary embolism. The subsegmental pulmonary arteries are not well visualized secondary to the timing of the contrast bolus and respiratory motion. Mediastinum: The heart is normal in size. There is no pericardial effusion. The thoracic aorta is atherosclerotic, but not aneurysmal.  No mediastinal lymphadenopathy is demonstrated. Hilar lymph nodes measure up to 1.5 cm on the right. Lungs/pleura: There is mild emphysema.   There are nodular and interstitial infiltrates within the bilateral lower and right middle lobes. No pleural effusion or pneumothorax is demonstrated. Upper Abdomen: Visualized portions of the upper abdomen demonstrate a 1.8 cm left renal cyst. Soft Tissues/Bones: No suspicious osteolytic or osteoblastic lesions are demonstrated. 1. No large or central pulmonary embolism. The subsegmental pulmonary arteries are not well evaluated secondary to respiratory motion and the timing of the contrast bolus. If there remains a high clinical concern for a small PE, a repeat exam is suggested. 2. Nodular infiltrates within the lower lungs, which are most likely infectious in etiology. Although less likely, malignancy cannot be excluded. A follow-up chest CT following antibiotic therapy in 6-12 weeks is recommended to document resolution. 3. Right hilar lymphadenopathy. This can be reassessed on the follow-up chest CT. Cta Head W Contrast    Result Date: 3/31/2019  EXAMINATION: CTA OF THE NECK; CTA OF THE HEAD WITH CONTRAST; CT OF THE HEAD WITHOUT CONTRAST 3/31/2019 4:57 am; 3/31/2019 4:55 am: TECHNIQUE: CTA of the neck was performed with the administration of intravenous contrast. Multiplanar reformatted images are provided for review. MIP images are provided for review. Stenosis of the internal carotid arteries measured using NASCET criteria. Dose modulation, iterative reconstruction, and/or weight based adjustment of the mA/kV was utilized to reduce the radiation dose to as low as reasonably achievable.; CTA of the head/brain was performed with the administration of intravenous contrast. Multiplanar reformatted images are provided for review. MIP images are provided for review.  Dose modulation, iterative reconstruction, and/or weight based adjustment of the mA/kV was utilized to reduce the radiation dose to as low as reasonably achievable.; CT of the head was performed without the administration of intravenous contrast. Dose modulation, iterative reconstruction, and/or weight based adjustment of the mA/kV was utilized to reduce the radiation dose to as low as reasonably achievable. Noncontrast CT of the head with reconstructed 2-D images are also provided for review. COMPARISON: None. HISTORY: ORDERING SYSTEM PROVIDED HISTORY: r/o CVA TECHNOLOGIST PROVIDED HISTORY: Has a \"code stroke\" or \"stroke alert\" been called? ->Yes Ordering Physician Provided Reason for Exam: a\ms; ORDERING SYSTEM PROVIDED HISTORY: r/o CVA TECHNOLOGIST PROVIDED HISTORY: R/o CVA Has a \"code stroke\" or \"stroke alert\" been called? ->No Ordering Physician Provided Reason for Exam: ams; ORDERING SYSTEM PROVIDED HISTORY: unresponsive TECHNOLOGIST PROVIDED HISTORY: R/o hemorrhagic change Has a \"code stroke\" or \"stroke alert\" been called? ->No Ordering Physician Provided Reason for Exam: ams FINDINGS: CT HEAD: BRAIN/VENTRICLES:  No acute intracranial hemorrhage or extraaxial fluid collection. Grey-white differentiation is maintained. No evidence of mass, mass effect or midline shift. No evidence of hydrocephalus. There is prominence of the ventricles and sulci due to global parenchymal volume loss. ORBITS: The visualized portion of the orbits demonstrate no acute abnormality. SINUSES:  The visualized paranasal sinuses and mastoid air cells demonstrate no acute abnormality. SOFT TISSUES/SKULL: No acute abnormality of the visualized skull or soft tissues. CTA NECK: AORTIC ARCH/ARCH VESSELS: There is a normal branch pattern of the aortic arch. No significant stenosis is seen of the innominate artery or subclavian arteries. CAROTID ARTERIES: The common carotid arteries are normal in appearance without evidence of a flow limiting stenosis. The internal carotid arteries are normal in appearance without evidence of a flow limiting stenosis by NASCET criteria. No dissection or arterial injury is seen.  VERTEBRAL ARTERIES: Right vertebral artery is diminutive in caliber throughout. Left vertebral artery is dominant supplying basilar artery. SOFT TISSUES: The lung apices are clear. No cervical or superior mediastinal lymphadenopathy. There is endotracheal intubation. The parotid, submandibular and thyroid glands demonstrate no acute abnormality. BONES: The visualized osseous structures appear unremarkable. Large pleural effusions. CTA HEAD: ANTERIOR CIRCULATION: The internal carotid arteries are normal in course and caliber without focal stenosis. The anterior cerebral and middle cerebral arteries demonstrate no focal stenosis. POSTERIOR CIRCULATION: The posterior cerebral arteries demonstrate no focal stenosis. The vertebral and basilar arteries appear otherwise unremarkable. No CT evidence of acute territorial infarct. No significant stenosis visualized in the major intracranial arterial vasculature. Cervical carotid vasculature is patent. Diminutive right vertebral artery and widely patent left vertebral artery. Fl Modified Barium Swallow W Video    Result Date: 3/12/2019  EXAMINATION: MODIFIED BARIUM SWALLOW WAS PERFORMED IN CONJUNCTION WITH [facility] 03/11/2019 TECHNIQUE: Fluoroscopic evaluation of the swallowing mechanism was performed with multiple consistency of barium product. FLUOROSCOPY DOSE AND TYPE OR TIME AND EXPOSURES: Fluoroscopy time 2.7 minutes, dose 8.80 mGy COMPARISON: None HISTORY: ORDERING SYSTEM PROVIDED HISTORY: aspiration TECHNOLOGIST PROVIDED HISTORY: Reason for exam:->aspiration Initial evaluation. FINDINGS: Early bolus spillover into the valleculae and piriform sinuses. Residue predominantly in the piriform sinuses, less prominently involving the valleculae. Deep laryngeal penetration with thin liquids. No definite tracheal aspiration. 1. Deep laryngeal penetration with thin liquids but no definite tracheal aspiration. 2. Early bolus spillover and predominantly piriform sinus residue.  Please see separate speech Glucose 154 (H) 70 - 99 MG/DL   POCT Glucose    Collection Time: 04/01/19  5:03 PM   Result Value Ref Range    POC Glucose 125 (H) 70 - 99 MG/DL   POCT Glucose    Collection Time: 04/01/19  8:49 PM   Result Value Ref Range    POC Glucose 172 (H) 70 - 99 MG/DL   POCT Glucose    Collection Time: 04/01/19 11:28 PM   Result Value Ref Range    POC Glucose 179 (H) 70 - 99 MG/DL         Medical problems    Patient Active Problem List:     Community acquired pneumonia due to influenza A virus     Pneumonia     Essential hypertension     Type 2 diabetes mellitus with diabetic polyneuropathy, without long-term current use of insulin ([facility])     KERA (acute kidney injury) ([facility] [address]     Oropharyngeal dysphagia     NSTEMI (non-ST elevated myocardial infarction) ([facility])     Fatigue      ASSESSMENT:  ---------------------    Hypoxic encephalopathy     Metabolic encephalopathy     sepsis     Hypernatremia     COPD     PLAN:     CT brain neg     CTA head neck neg     Continue supportive care    Pt is stable neurologically.             Electronically signed by Danielle Akins MD on 4/1/2019 at 11:57 PM

## 2019-04-02 NOTE — PROGRESS NOTES
Pt extubated at [phone] with RT Saravia and this nurse. Pt tolerated procedure well and is resting.

## 2019-04-02 NOTE — PROGRESS NOTES
Pt intubated per telephone order from [address] [place] [address] Dr. MILLER. I intubated the patient with the GlideScope size 3.0 blade with a 7.5 ET tube secured at 23cm, X1 attempt. I visualized the ET tube pass through the vocal cords, grade 2A view of anatomy, some edema noted. Positive colorimetric color change to yellow noted with BBS noted. Stat Portable chest xray called for. Pt attached to vent on AC 20 450 ml 50% and 5+. RT to follow closely.

## 2019-04-02 NOTE — PROGRESS NOTES
-275.1 ml     Physical Exam: pt intubated and sedated  Constitutional:  Well developed, Well nourished, No acute distress, Non-toxic appearance. HENT:  Normocephalic, Atraumatic, Bilateral external ears normal, Oropharynx moist, No oral exudates, Nose normal. Neck- Normal range of motion, No tenderness, Supple, No stridor. Eyes:  PERRL, EOMI, Conjunctiva normal, No discharge. Respiratory:  Normal breath sounds, No respiratory distress, No wheezing, No chest tenderness. Cardiovascular:  Normal heart rate, Normal rhythm, No murmurs, No rubs, No gallops, JVP not elevated  Abdomen/GI:  Bowel sounds normal, Soft, No tenderness, No masses, No pulsatile masses. Musculoskeletal:  Intact distal pulses, No edema, No tenderness, No cyanosis, No clubbing. Good range of motion in all major joints. No tenderness to palpation or major deformities noted. Back- No tenderness. Integument:  Warm, Dry, No erythema, No rash. Lymphatic:  No lymphadenopathy noted. Neurologic: intubated and sedated Normal motor function, Normal sensory function, No focal deficits noted.    Psychiatric:  Affect  and  Mood :no change    Medications:    pantoprazole  40 mg Intravenous BID    chlorhexidine  15 mL Mouth/Throat BID    piperacillin-tazobactam  3.375 g Intravenous Q8H    mupirocin   Nasal BID    [Held by provider] clopidogrel  75 mg Oral Daily    enoxaparin  40 mg Subcutaneous Daily    cefepime  2 g Intravenous Q12H    vancomycin  1,250 mg Intravenous Q24H    gabapentin  400 mg Oral BID    latanoprost  1 drop Both Eyes Nightly    simvastatin  40 mg Oral Nightly    sodium chloride flush  10 mL Intravenous 2 times per day    [Held by provider] aspirin  81 mg Oral Daily    ipratropium-albuterol  1 ampule Inhalation Q4H WA    sodium chloride flush  10 mL Intravenous 2 times per day    insulin lispro  0-12 Units Subcutaneous TID WC    insulin lispro  0-6 Units Subcutaneous Nightly    insulin glargine  5 Units Subcutaneous Nightly      dexmedetomidine (PRECEDEX) IV infusion 0.2 mcg/kg/hr (04/02/19 0519)    phenylephrine (BRIAN-SYNEPHRINE) 50mg/250mL infusion 50 mcg/min (04/02/19 0733)    EPINEPHrine Stopped (03/31/19 0505)    propofol 20 mcg/kg/min (04/02/19 0730)    dextrose 50 mL/hr at 04/01/19 1428    dextrose       glucose, dextrose, glucagon (rDNA), dextrose, sodium chloride flush, magnesium hydroxide, ondansetron, sodium chloride flush, acetaminophen    Lab Data:  CBC:   Recent Labs     03/31/19  0357 04/01/19  0605 04/02/19  0120   WBC 8.2 7.3  --    HGB 11.0* 8.7* 8.1*   HCT 39.3* 30.0* 28.1*   .8* 100.7*  --     236  --      BMP:   Recent Labs     03/31/19 0357 03/31/19  0850 04/01/19  0605   * 147* 141   K 6.0  K CALLED TO SCOT POWELL RN AT 0430, [facility]ule[facility]  RESULTS READ BACK  * 4.2 3.7    104 101   CO2 41* 36* 34*   BUN 23 24* 18   CREATININE 1.3 1.2 1.3     PT/INR:   Recent Labs     03/31/19 0357   PROTIME 12.1   INR 1.04     BNP:    No results for input(s): PROBNP in the last 72 hours. TROPONIN:   Recent Labs     03/31/19 0357   TROPONINT 0.203*        ECHO :   echocardiogram    Assessment:  80 y. o.year old who is admitted for  Chief Complaint   Patient presents with    Fatigue    , active issues as noted below:  Impression:  Principal Problem:    NSTEMI (non-ST elevated myocardial infarction) ([facility] [address]  Active Problems:    Fatigue  Resolved Problems:    * No resolved hospital problems. *            All labs, medications and tests reviewed by myself , continue all other medications of all above medical condition listed as is except for changes mentioned above. Thank you very much for consult , please call with questions.     Cody Borden MD 4/2/2019 7:53 AM

## 2019-04-02 NOTE — PROGRESS NOTES
Hospitalist Progress Note      Name:  Odilon Godwin /Age/Sex: 7/15/1933  (80 y.o. male)   MRN & CSN:  [id number] & [id number] Admission Date/Time: 3/28/2019  1:13 PM   Location:  -A PCP: No primary care provider on file. Hospital Day: 6    Assessment and Plan:   Odilon Godwin is a 80 y.o.  male  who presents with NSTEMI (non-ST elevated myocardial infarction) ([facility] [address]    1. Acute Metabolic Encephalopathy: unresponsive. S/P ACLS, given 1 epinephrine. Could be from hypotension, CO2 narcosis. Likely from sepsis or cardiogenic shock ? CT Head negative. Neurology on consult  2. Acute Hypoxic and Hypercapneic Respiratory Failure: due to pleural effusion/pneumonia. S/P Intubation. ABG with high pCO2 53. Will extubate with cleared by GI  3. Hypotension: could be medication-induced, sepsis, not cardiogenic per Cardiology. On Phenylephrine. Epi infusion discontinued. 4. NSTEMI: elevated troponin. Has generalized body weakness, no chest pain. Has elevated BNP. No recent echo. EKG with nonspecific ST-T wave changes. Started on aspirin and statin. Heparin discontinued. Echocardiogram with EF of 60-65%, severely dilated right atrium, moderate to severe TR. Cardiology on consult. [facility] vs Stress test when stable. 5. Elevated BNP: Does not appear to be with fluid overload. Chest x-ray with pleural effusion. 6. Hypernatremia, Resolved: Sodium 141.    7. Acute kidney injury, Resolved: Due to dehydration. Creatinine 1.4 on admission, now 1.2  8. Type 2 DM: Last A1C. Lantus, Sliding scale. Hypoglycemia protocol. Accucheck. Hold oral hypoglycemic agents for now  9. Pleural Effusion: repeat CXR with bibasilar consolidation and bilateral pleural effusion. Continue Cefepime and Zosyn, MRSA negative. Pulmonology on consult. Sputum culture. 10. Coffee Ground NG Output: resolved. On PPI BID.  Hold antiplatelets for now     Diet DIET TUBE FEED CONTINUOUS/CYCLIC NPO; Low Calorie High Protein (Vital HP); Nasogastric; Continuous; 25; 60; 24   DVT Prophylaxis [] Lovenox, [x]  Heparin, [] SCDs, [] Warfarin  [] NOAC     GI Prophylaxis [x] PPI,  [] H2 Blocker,  [] Carafate,  [] Diet/Tube Feeds   Code Status Full Code   MDM [] Low, [] Moderate,[x]  High     History of Present Illness:     Chief Complaint: NSTEMI (non-ST elevated myocardial infarction) ([facility] [address]    Geovanny Davis is a 80 y.o.  male, with past medical history significant for hyperlipidemia, diabetes, who presented to the ED from home due generalized body weakness. He was recently discharged from rehab unit. He was admitted on March 8 due to pneumonia and influenza. The finished a course of antibiotic and antiviral.  Since discharge, he noted worsening generalized body weakness. He wasn't able to get out of bed. She denied lateralizing weakness or numbness. Still with cough. Denied shortness of breath. No fever. Denied lower extremity swelling or orthopnea. He has no chest pain. He was brought to the ED and was subsequently admitted     At the ED, vital signs blood pressure 121/65, heart rate 92, respiration 24. Significant laboratories were the following: Sodium 147, creatinine 1.4, BNP 25,000, troponin 0.141. Chest x-ray with pleural effusion. He was started on antibiotic. Cardiology and Pulmonology were consulted. He was supposed to have LHC but was cancelled due to respiratory distress. He became unresponsive yesterday necessitating intubation. He dropped his blood pressure when given Midazolam. He also lost his pulse. ACLS was started. He was transferred to ICU. He was seen and examined today. He is still intubated and sedated. Ten point ROS reviewed negative, unless as noted above    Objective:        Intake/Output Summary (Last 24 hours) at 4/2/2019 1205  Last data filed at 4/2/2019 0542  Gross per 24 hour   Intake 1884.9 ml   Output 2160 ml   Net -275.1 ml      Vitals:   Vitals:    04/02/19 1142   BP:    Pulse: 78   Resp: 20 Temp:    SpO2: 97%     Physical Exam:   GEN    intubated and sedated, male, sitting upright in bed in no apparent distress. Appears given age. Weak looking. EYES   Pupils are equally round. No scleral erythema, discharge, or conjunctivitis. HENT  Mucous membranes are moist. Oral pharynx without exudates, no evidence of thrush. NECK  Supple, no apparent thyromegaly or masses. RESP  decreased breath sounds. No rales or wheezes. CARDIO/VASC           S1/S2 auscultated. Regular rate without appreciable murmurs, rubs, or gallops. No JVD or carotid bruits. Peripheral pulses equal bilaterally and palpable. No peripheral edema. GI        Abdomen is soft without significant tenderness, masses, or guarding. Bowel sounds are normoactive. Rectal exam deferred. MSK    No gross joint deformities. SKIN    Normal coloration, warm, dry. NEURO           intubated.      Medications:   Medications:    pantoprazole  40 mg Intravenous BID    chlorhexidine  15 mL Mouth/Throat BID    piperacillin-tazobactam  3.375 g Intravenous Q8H    mupirocin   Nasal BID    [Held by provider] clopidogrel  75 mg Oral Daily    enoxaparin  40 mg Subcutaneous Daily    cefepime  2 g Intravenous Q12H    vancomycin  1,250 mg Intravenous Q24H    gabapentin  400 mg Oral BID    latanoprost  1 drop Both Eyes Nightly    simvastatin  40 mg Oral Nightly    sodium chloride flush  10 mL Intravenous 2 times per day    [Held by provider] aspirin  81 mg Oral Daily    ipratropium-albuterol  1 ampule Inhalation Q4H WA    sodium chloride flush  10 mL Intravenous 2 times per day    insulin lispro  0-12 Units Subcutaneous TID WC    insulin lispro  0-6 Units Subcutaneous Nightly    insulin glargine  5 Units Subcutaneous Nightly      Infusions:    dexmedetomidine (PRECEDEX) IV infusion 0.2 mcg/kg/hr (04/02/19 6004)    phenylephrine (BRIAN-SYNEPHRINE) 50mg/250mL infusion 50 mcg/min (04/02/19 5791)    EPINEPHrine Stopped (03/31/19 6121)    propofol Stopped (04/02/19 1152)    dextrose 50 mL/hr at 04/01/19 1428    dextrose       PRN Meds:     glucose 15 g PRN   dextrose 12.5 g PRN   glucagon (rDNA) 1 mg PRN   dextrose 100 mL/hr PRN   sodium chloride flush 10 mL PRN   magnesium hydroxide 30 mL Daily PRN   ondansetron 4 mg Q6H PRN   sodium chloride flush 10 mL PRN   acetaminophen 650 mg Q4H PRN       Recent Labs     03/31/19  0357 04/01/19  0605 04/02/19  0120   WBC 8.2 7.3  --    HGB 11.0* 8.7* 8.1*   HCT 39.3* 30.0* 28.1*    236  --       Recent Labs     03/31/19  0357 03/31/19  0850 04/01/19  0605   * 147* 141   K 6.0  K CALLED TO SCOT POWELL RN AT 0430, [facility]  RESULTS READ BACK  * 4.2 3.7    104 101   CO2 41* 36* 34*   BUN 23 24* 18   CREATININE 1.3 1.2 1.3     Recent Labs     03/31/19  0357   AST 18  18   ALT 7*  7*   BILIDIR 0.2   BILITOT 0.4  0.4   ALKPHOS 81  81     Recent Labs     03/31/19  0357   INR 1.04     Recent Labs     03/31/19  0357   TROPONINT 0.203*        Imaging reviewed      Electronically signed by Michelle Carbajal MD on 4/2/2019 at 12:05 PM

## 2019-04-02 NOTE — PROGRESS NOTES
Some mild stridor noted post extubation, pt is non-symptomatic and has no c/o dyspnea, VSS. End-Tidal CO2 monitoring setup for pt's hypercapnia baseline. Discussed this with Robbi Corea, we will follow closely. RT will follow closely. ..       Plan:  ET CO2 monitoring  PRN Racemic Epi  O2 NC weaned to 2 LPM  Restart Q4 w/a duoneb

## 2019-04-02 NOTE — PROGRESS NOTES
Pt on full support. .. Spoke with Bethany Rashid, his initial plan was to for SAT/SBT attempt this a.m. but was held for concerns of possible GI bleed/issues. GI wash was negative from my understanding, awaiting on clearance from GI - Dr. Chris Rodriguez. Will wean as tolerated once coordinated with Casandra Blas. RT to follow closely. .. Wean as tolerated  Extubation?

## 2019-04-02 NOTE — PROGRESS NOTES
Pt extubated per verbal order from [address] [place] [address] Dr. MILLER, pt VS remained stable HR 87, RR 18, BBS are diminished but clear, his BP/MAP actually improved. The pt is alert and follows commands when prompted but he still presents with some mild sedative related lethargy. His Respiratory Mechanics were good, his NiF -33 cmH2O, RSB index was 15-25 range with his average RR 10-14 and 400-500 ml's. Placed pt on 4 Lt NC, RT to follow closely. ..     Plan  Supplemental FiO2 - titrate as tolerated  Bronchodilator's Q4 w/a  Hyperinflation therapy

## 2019-04-03 NOTE — PROGRESS NOTES
negative. Pulmonology on consult. Sputum culture. Deescalate antibiotics per culture results  10. Upper GI bleeding: resolved. On PPI BID. Hold plavix, per GI ok to start ASA  11. GI prophylaxis - PPI  12. DVT ppx - heparin    Subjective  Pt. seen and examined. The patient intubated and sedated when visited at bedside. Per nursing, patient was reintubated yesterday evening after going into respiratory distress. He remains on pressors per nursing. No further acute events noted overnight. Objective  Vitals:    04/03/19 1204   BP: (!) 143/59   Pulse: 52   Resp: 12   Temp: 96.3 °F (35.7 °C)   SpO2:          Intake/Output Summary (Last 24 hours) at 4/3/2019 1239  Last data filed at 4/3/2019 0501  Gross per 24 hour   Intake 2039.68 ml   Output 2525 ml   Net -485.32 ml       Exam:  GEN - afebrile, not diaphoretic, NAD  HEAD - normocephalic, atraumatic  EENT - PEERLA, EOMI, MMM  CVS - regular rhythm, bradycardic, no m/g/r, S1 and S2 heard, no thrills  PULM - bibasilar crackles, no wheezes, intubated on vent, normal inspiratory effort  ABD - soft, ND, +BS, no guarding, no rebound  NEURO -Intubated and sedated, unable to follow commands or respond to questioning  EXT - extremities warm, distal pulses 2+ and symmetric bilaterally, no clubbing, cyanosis or edema  SKIN - warm, no rashes visualized      Lab Results:  CBC   Recent Labs     04/01/19  0605 04/02/19  0120 04/02/19  1213 04/03/19  0515   WBC 7.3  --   --   --    HGB 8.7* 8.1* 8.4* 8.4*   HCT 30.0* 28.1* 28.5* 28.6*     --   --  178      RENAL  Recent Labs     04/01/19  0605 04/02/19  1203    143   K 3.7 3.6    101   CO2 34* 36*   BUN 18 17   CREATININE 1.3 1.4*     LFT'S  No results for input(s): AST, ALT, ALB, BILIDIR, BILITOT, ALKPHOS in the last 72 hours. COAG  No results for input(s): INR in the last 72 hours. CARDIAC ENZYMES  No results for input(s): CKTOTAL, CKMB, CKMBINDEX, TROPONINI in the last 72 hours.     U/A:    Lab Results Component Value Date    COLORU RED 03/31/2019    WBCUA NONE SEEN 03/31/2019    RBCUA 6,176 03/31/2019    MUCUS RARE 03/31/2019    BACTERIA NEGATIVE 03/31/2019    CLARITYU HAZY 03/31/2019    SPECGRAV 1.029 03/31/2019    LEUKOCYTESUR TRACE 03/31/2019    BLOODU LARGE 03/31/2019       ABG    Lab Results   Component Value Date    IWG0LQT 39.2 04/03/2019    LDP1GDF 59.0 04/03/2019    PO2ART 101 04/03/2019       Imaging Studies: Xr Chest Standard (2 Vw)    Result Date: 3/28/2019  EXAMINATION: TWO VIEWS OF THE CHEST 3/28/2019 10:47 pm COMPARISON: Chest 03/20/2019 2:29 p.m. HISTORY: ORDERING SYSTEM PROVIDED HISTORY: Pleural Effusion TECHNOLOGIST PROVIDED HISTORY: Reason for exam:->Pleural Effusion Ordering Physician Provided Reason for Exam: Pleural Effusion Acuity: Unknown Type of Exam: Unknown Additional signs and symptoms: cough Relevant Medical/Surgical History: htn FINDINGS: Calcifications involving the aorta reflect atherosclerosis. The cardiomediastinal and hilar silhouettes appear otherwise unremarkable. Bibasilar consolidation and bilateral pleural effusion, right greater than left confirmed compared with prior study. Chronic appearing coarse interstitial densities predominate parahilar regions and lung bases, typical of sequela from smoking or other previous infectious/inflammatory process. No pneumothorax is seen. No acute osseous abnormality is identified. Confirmation of bibasilar consolidation and bilateral pleural effusion, right greater than left, concerning for pneumonia. Chronic appearing coarse interstitial densities predominate parahilar regions and lung bases, typical of sequela from smoking or other previous infectious/inflammatory process. Calcific atherosclerotic disease aorta.      Ct Head Wo Contrast    Result Date: 3/31/2019  EXAMINATION: CTA OF THE NECK; CTA OF THE HEAD WITH CONTRAST; CT OF THE HEAD WITHOUT CONTRAST 3/31/2019 4:57 am; 3/31/2019 4:55 am: TECHNIQUE: CTA of the neck was performed with the administration of intravenous contrast. Multiplanar reformatted images are provided for review. MIP images are provided for review. Stenosis of the internal carotid arteries measured using NASCET criteria. Dose modulation, iterative reconstruction, and/or weight based adjustment of the mA/kV was utilized to reduce the radiation dose to as low as reasonably achievable.; CTA of the head/brain was performed with the administration of intravenous contrast. Multiplanar reformatted images are provided for review. MIP images are provided for review. Dose modulation, iterative reconstruction, and/or weight based adjustment of the mA/kV was utilized to reduce the radiation dose to as low as reasonably achievable.; CT of the head was performed without the administration of intravenous contrast. Dose modulation, iterative reconstruction, and/or weight based adjustment of the mA/kV was utilized to reduce the radiation dose to as low as reasonably achievable. Noncontrast CT of the head with reconstructed 2-D images are also provided for review. COMPARISON: None. HISTORY: ORDERING SYSTEM PROVIDED HISTORY: r/o CVA TECHNOLOGIST PROVIDED HISTORY: Has a \"code stroke\" or \"stroke alert\" been called? ->Yes Ordering Physician Provided Reason for Exam: a\ms; ORDERING SYSTEM PROVIDED HISTORY: r/o CVA TECHNOLOGIST PROVIDED HISTORY: R/o CVA Has a \"code stroke\" or \"stroke alert\" been called? ->No Ordering Physician Provided Reason for Exam: ams; ORDERING SYSTEM PROVIDED HISTORY: unresponsive TECHNOLOGIST PROVIDED HISTORY: R/o hemorrhagic change Has a \"code stroke\" or \"stroke alert\" been called? ->No Ordering Physician Provided Reason for Exam: ams FINDINGS: CT HEAD: BRAIN/VENTRICLES:  No acute intracranial hemorrhage or extraaxial fluid collection. Grey-white differentiation is maintained. No evidence of mass, mass effect or midline shift. No evidence of hydrocephalus.   There is prominence of the ventricles and sulci due to global parenchymal volume loss. ORBITS: The visualized portion of the orbits demonstrate no acute abnormality. SINUSES:  The visualized paranasal sinuses and mastoid air cells demonstrate no acute abnormality. SOFT TISSUES/SKULL: No acute abnormality of the visualized skull or soft tissues. CTA NECK: AORTIC ARCH/ARCH VESSELS: There is a normal branch pattern of the aortic arch. No significant stenosis is seen of the innominate artery or subclavian arteries. CAROTID ARTERIES: The common carotid arteries are normal in appearance without evidence of a flow limiting stenosis. The internal carotid arteries are normal in appearance without evidence of a flow limiting stenosis by NASCET criteria. No dissection or arterial injury is seen. VERTEBRAL ARTERIES: Right vertebral artery is diminutive in caliber throughout. Left vertebral artery is dominant supplying basilar artery. SOFT TISSUES: The lung apices are clear. No cervical or superior mediastinal lymphadenopathy. There is endotracheal intubation. The parotid, submandibular and thyroid glands demonstrate no acute abnormality. BONES: The visualized osseous structures appear unremarkable. Large pleural effusions. CTA HEAD: ANTERIOR CIRCULATION: The internal carotid arteries are normal in course and caliber without focal stenosis. The anterior cerebral and middle cerebral arteries demonstrate no focal stenosis. POSTERIOR CIRCULATION: The posterior cerebral arteries demonstrate no focal stenosis. The vertebral and basilar arteries appear otherwise unremarkable. No CT evidence of acute territorial infarct. No significant stenosis visualized in the major intracranial arterial vasculature. Cervical carotid vasculature is patent. Diminutive right vertebral artery and widely patent left vertebral artery.      Ct Chest Wo Contrast    Result Date: 3/29/2019  EXAMINATION: CT OF THE CHEST WITHOUT CONTRAST 3/29/2019 12:52 pm TECHNIQUE: CT of the chest was performed without the administration of intravenous contrast. Multiplanar reformatted images are provided for review. Dose modulation, iterative reconstruction, and/or weight based adjustment of the mA/kV was utilized to reduce the radiation dose to as low as reasonably achievable. COMPARISON: Chest radiographs 03/28/2019 HISTORY: ORDERING SYSTEM PROVIDED HISTORY: nicanor ll pneumonia,r/o pleural effusion TECHNOLOGIST PROVIDED HISTORY: Ordering Physician Provided Reason for Exam: nicanor ll pneumonia,r/o pleural effusion Acuity: Acute Additional signs and symptoms: patient unable to follow commands Relevant Medical/Surgical History: unknown, poor historian FINDINGS: Mediastinum: Limited evaluation for lymphadenopathy without intravenous contrast.  Normal caliber thoracic aorta with mild atherosclerotic calcifications. Mildly enlarged main pulmonary artery measuring 3.5 cm in diameter. Normal heart size. No pericardial effusion. Segmental gaseous distention of the esophagus. Lungs/pleura: Moderate right and small left pleural effusions with partial lower lobe atelectasis. Additional patchy tree-in-bud/clustered nodularity in the left upper and lower lobes and middle lobe. Mild dependent secretions within the trachea and possibly the proximal left bronchial tree. No pneumothorax. Upper Abdomen: Oral contrast within the colon. Scattered calcified granulomas within the liver and spleen reflect remote granulomatous disease. Soft Tissues/Bones: No enlarged axillary or supraclavicular lymph nodes. Normal thyroid. Glenohumeral osteoarthrosis. Thoracic spondylosis. Moderate right and small left pleural effusions with associated atelectasis. Multifocal clustered/tree-in-bud nodularity in the left upper lobe, left lower lobe and middle lobe most likely represents an infectious or inflammatory bronchiolitis. Consider aspiration and etiology given tracheobronchial secretions and esophageal features suggesting dysmotility and reflux.      Indira Marker Chest Portable    Result Date: 4/3/2019  EXAMINATION: SINGLE XRAY VIEW OF THE CHEST 4/3/2019 5:30 am COMPARISON: April 2, 2019 HISTORY: ORDERING SYSTEM PROVIDED HISTORY: tube placement TECHNOLOGIST PROVIDED HISTORY: Reason for exam:->tube placement Ordering Physician Provided Reason for Exam: tube placement Acuity: Acute Type of Exam: Subsequent/Follow-up FINDINGS: The ETT is 3.8 cm above the cyndi. The feeding tube is inserted with its tip below the diaphragm and beyond the field of view. The cardiomediastinal silhouette is stable. There is airspace opacity at the right lung base, may be related to layering pleural effusion, atelectasis or pneumonia, stable. There is no pneumothorax. There is no acute osseous abnormality. Airspace opacity at the right lung base, may be related to layering pleural effusion, atelectasis or pneumonia, stable. Xr Chest Portable    Result Date: 4/2/2019  EXAMINATION: SINGLE XRAY VIEW OF THE CHEST 4/2/2019 9:25 pm COMPARISON: Chest x-ray 15 arch prior HISTORY: ORDERING SYSTEM PROVIDED HISTORY: post intubation/ ETT placement TECHNOLOGIST PROVIDED HISTORY: Reason for exam:->post intubation/ ETT placement Ordering Physician Provided Reason for Exam: POST INTUBATION AND NG TUBE PLACEMENT FINDINGS: Endotracheal tube tip projects 3.5 cm from the cyndi. Enteric tube identified with side port at the level of the GE junction. Tip projects over the expected location of the stomach. Recommend advancement. Opacities project over the bilateral lung bases, right greater than left which is suspicious for layering pleural effusions. No pneumothorax identified. Osseous structures appear stable. 1. Endotracheal tube tip projects approximately 3.5 cm from the cyndi. 2. Enteric tube side port at the level of the GE junction. Recommend advancement. 3. Findings suggesting layering effusions, right greater than left.      Xr Chest Portable    Result Date: 4/2/2019  EXAMINATION: SINGLE XRAY VIEW OF THE CHEST 4/2/2019 5:17 am COMPARISON: April 1, 2019 HISTORY: ORDERING SYSTEM PROVIDED HISTORY: tube placement TECHNOLOGIST PROVIDED HISTORY: Reason for exam:->tube placement Ordering Physician Provided Reason for Exam: tube placement Acuity: Acute Type of Exam: Subsequent/Follow-up FINDINGS: ETT tip is 6 cm above the cyndi. Enteric catheter extends beyond the inferior margin of the image. Cardiac silhouette is within normal range. Small right pleural effusion. No focal consolidation, left pleural effusion, or pneumothorax. Severe left glenohumeral degenerative changes with suspected chronic left rotator cuff tear. 1. Small right pleural effusion. Xr Chest Portable    Result Date: 4/1/2019  EXAMINATION: SINGLE XRAY VIEW OF THE CHEST 4/1/2019 8:46 am COMPARISON: 04/01/2019. HISTORY: ORDERING SYSTEM PROVIDED HISTORY: ETT placement TECHNOLOGIST PROVIDED HISTORY: Reason for exam:->ETT placement Ordering Physician Provided Reason for Exam: ETT placement Acuity: Acute Type of Exam: Initial Relevant Medical/Surgical History: diabetes mellitus; hypertension FINDINGS: The endotracheal tube has been repositioned with tip is now above the cyndi. A nasogastric tube courses below the diaphragm. The heart size and pulmonary vasculature stable. Again noted are hazy density seen throughout the lungs bilaterally. No pneumothoraces are seen. 1. Interval reposition of endotracheal tube with its tip now above the cyndi and in satisfactory position. 2. Otherwise, stable chest x-ray with findings likely reflecting pleural effusions and infiltrates. Xr Chest Portable    Result Date: 4/1/2019  EXAMINATION: SINGLE XRAY VIEW OF THE CHEST 4/1/2019 5:37 am COMPARISON: 03/31/2019.  HISTORY: ORDERING SYSTEM PROVIDED HISTORY: tube placement TECHNOLOGIST PROVIDED HISTORY: Reason for exam:->tube placement Ordering Physician Provided Reason for Exam: tube placement Acuity: Unknown Type of Exam: Subsequent/Follow-up Additional signs and symptoms: tube placement Relevant Medical/Surgical History: tube placement FINDINGS: The endotracheal tube is seen with its tip at the level of the cyndi. There is a nasogastric tube which courses below the diaphragm. The heart size and pulmonary vasculature are stable. There are hazy densities seen involving the lungs bilaterally. No new airspace disease is seen. No pneumothoraces are noted. Overall, compared to the prior exam there has been little change. 1. Stable chest x-ray with bilateral hazy opacities likely represent combination of pleural effusions and infiltrates. 2. Endotracheal tube with its tip at the level of the cyndi. I would suggest withdrawing the tube at least 2 cm. Xr Chest Portable    Result Date: 3/28/2019  EXAMINATION: SINGLE XRAY VIEW OF THE CHEST 3/28/2019 2:01 pm COMPARISON: 03/08/2019 HISTORY: ORDERING SYSTEM PROVIDED HISTORY: cough/fatigue TECHNOLOGIST PROVIDED HISTORY: Reason for exam:->cough/fatigue Ordering Physician Provided Reason for Exam: cough/fatigue Acuity: Unknown Type of Exam: Unknown Additional signs and symptoms: discharged 3/23/19 FINDINGS: Normal heart size. Somewhat prominent pulmonary vasculature. Haziness at the lung bases may represent atelectasis or layering pleural fluid. No pneumothorax. Haziness at the lung bases may represent atelectasis or layering pleural fluid. Recommend obtaining PA and lateral chest radiographs for confirmation. Xr Chest Portable    Result Date: 3/8/2019  EXAMINATION: SINGLE XRAY VIEW OF THE CHEST 3/8/2019 9:55 pm COMPARISON: None. HISTORY: ORDERING SYSTEM PROVIDED HISTORY: sob TECHNOLOGIST PROVIDED HISTORY: Reason for exam:->sob Ordering Physician Provided Reason for Exam: SOB Acuity: Acute Type of Exam: Initial FINDINGS: Cardiomediastinal silhouette is within normal limits. Right midlung zone airspace opacification. No pulmonary vascular congestion or edema.   No pleural effusion. Right midlung zone airspace opacification could represent pneumonia. Follow-up is recommended to ensure resolution     Xr Chest 1 Vw    Result Date: 3/31/2019  EXAMINATION: SINGLE XRAY VIEW OF THE CHEST 3/31/2019 5:22 am COMPARISON: 2 days prior HISTORY: ORDERING SYSTEM PROVIDED HISTORY: intubation and post cardiac arrest, check ETT TECHNOLOGIST PROVIDED HISTORY: Reason for exam:->intubation and post cardiac arrest, check ETT Ordering Physician Provided Reason for Exam: intubation and post cardiac arrest, check ETT Acuity: Unknown Type of Exam: Unknown FINDINGS: Endotracheal tube is been placed, tip approximately 4 cm above the cyndi in appropriate position. Enteric tube tip and side port below diaphragm and stomach. There are diffuse hazy opacities over right greater than left lung, combination increasing effusions, atelectatic changes, underlying consolidation not excluded. Pulmonary vasculature is indistinct. Cardiomediastinal silhouette is stable. Increasing pleuroparenchymal opacities in both hemithoraces, right greater than left. Endotracheal and enteric tubes appear in appropriate position. Cta Neck W Contrast    Result Date: 3/31/2019  EXAMINATION: CTA OF THE NECK; CTA OF THE HEAD WITH CONTRAST; CT OF THE HEAD WITHOUT CONTRAST 3/31/2019 4:57 am; 3/31/2019 4:55 am: TECHNIQUE: CTA of the neck was performed with the administration of intravenous contrast. Multiplanar reformatted images are provided for review. MIP images are provided for review. Stenosis of the internal carotid arteries measured using NASCET criteria. Dose modulation, iterative reconstruction, and/or weight based adjustment of the mA/kV was utilized to reduce the radiation dose to as low as reasonably achievable.; CTA of the head/brain was performed with the administration of intravenous contrast. Multiplanar reformatted images are provided for review. MIP images are provided for review.  Dose modulation, iterative reconstruction, and/or weight based adjustment of the mA/kV was utilized to reduce the radiation dose to as low as reasonably achievable.; CT of the head was performed without the administration of intravenous contrast. Dose modulation, iterative reconstruction, and/or weight based adjustment of the mA/kV was utilized to reduce the radiation dose to as low as reasonably achievable. Noncontrast CT of the head with reconstructed 2-D images are also provided for review. COMPARISON: None. HISTORY: ORDERING SYSTEM PROVIDED HISTORY: r/o CVA TECHNOLOGIST PROVIDED HISTORY: Has a \"code stroke\" or \"stroke alert\" been called? ->Yes Ordering Physician Provided Reason for Exam: a\ms; ORDERING SYSTEM PROVIDED HISTORY: r/o CVA TECHNOLOGIST PROVIDED HISTORY: R/o CVA Has a \"code stroke\" or \"stroke alert\" been called? ->No Ordering Physician Provided Reason for Exam: ams; ORDERING SYSTEM PROVIDED HISTORY: unresponsive TECHNOLOGIST PROVIDED HISTORY: R/o hemorrhagic change Has a \"code stroke\" or \"stroke alert\" been called? ->No Ordering Physician Provided Reason for Exam: ams FINDINGS: CT HEAD: BRAIN/VENTRICLES:  No acute intracranial hemorrhage or extraaxial fluid collection. Grey-white differentiation is maintained. No evidence of mass, mass effect or midline shift. No evidence of hydrocephalus. There is prominence of the ventricles and sulci due to global parenchymal volume loss. ORBITS: The visualized portion of the orbits demonstrate no acute abnormality. SINUSES:  The visualized paranasal sinuses and mastoid air cells demonstrate no acute abnormality. SOFT TISSUES/SKULL: No acute abnormality of the visualized skull or soft tissues. CTA NECK: AORTIC ARCH/ARCH VESSELS: There is a normal branch pattern of the aortic arch. No significant stenosis is seen of the innominate artery or subclavian arteries. CAROTID ARTERIES: The common carotid arteries are normal in appearance without evidence of a flow limiting stenosis.  The internal carotid arteries are normal in appearance without evidence of a flow limiting stenosis by NASCET criteria. No dissection or arterial injury is seen. VERTEBRAL ARTERIES: Right vertebral artery is diminutive in caliber throughout. Left vertebral artery is dominant supplying basilar artery. SOFT TISSUES: The lung apices are clear. No cervical or superior mediastinal lymphadenopathy. There is endotracheal intubation. The parotid, submandibular and thyroid glands demonstrate no acute abnormality. BONES: The visualized osseous structures appear unremarkable. Large pleural effusions. CTA HEAD: ANTERIOR CIRCULATION: The internal carotid arteries are normal in course and caliber without focal stenosis. The anterior cerebral and middle cerebral arteries demonstrate no focal stenosis. POSTERIOR CIRCULATION: The posterior cerebral arteries demonstrate no focal stenosis. The vertebral and basilar arteries appear otherwise unremarkable. No CT evidence of acute territorial infarct. No significant stenosis visualized in the major intracranial arterial vasculature. Cervical carotid vasculature is patent. Diminutive right vertebral artery and widely patent left vertebral artery. Cta Pulmonary W Contrast    Result Date: 3/9/2019  EXAMINATION: CTA OF THE CHEST 3/9/2019 12:05 am TECHNIQUE: CTA of the chest was performed after the administration of intravenous contrast.  Multiplanar reformatted images are provided for review. MIP images are provided for review. Dose modulation, iterative reconstruction, and/or weight based adjustment of the mA/kV was utilized to reduce the radiation dose to as low as reasonably achievable. COMPARISON: None.  HISTORY: ORDERING SYSTEM PROVIDED HISTORY: quentin ulloa TECHNOLOGIST PROVIDED HISTORY: Ordering Physician Provided Reason for Exam: sob Acuity: Acute Type of Exam: Initial Mechanism of Injury: Patient arrived via [facility] with complaints of increasing fatigue to bilateral lower extremities Relevant Medical/Surgical History: isovue 370 80 ml FINDINGS: Pulmonary Arteries: The main pulmonary artery is normal in size. There is no large or central pulmonary embolism. The subsegmental pulmonary arteries are not well visualized secondary to the timing of the contrast bolus and respiratory motion. Mediastinum: The heart is normal in size. There is no pericardial effusion. The thoracic aorta is atherosclerotic, but not aneurysmal.  No mediastinal lymphadenopathy is demonstrated. Hilar lymph nodes measure up to 1.5 cm on the right. Lungs/pleura: There is mild emphysema. There are nodular and interstitial infiltrates within the bilateral lower and right middle lobes. No pleural effusion or pneumothorax is demonstrated. Upper Abdomen: Visualized portions of the upper abdomen demonstrate a 1.8 cm left renal cyst. Soft Tissues/Bones: No suspicious osteolytic or osteoblastic lesions are demonstrated. 1. No large or central pulmonary embolism. The subsegmental pulmonary arteries are not well evaluated secondary to respiratory motion and the timing of the contrast bolus. If there remains a high clinical concern for a small PE, a repeat exam is suggested. 2. Nodular infiltrates within the lower lungs, which are most likely infectious in etiology. Although less likely, malignancy cannot be excluded. A follow-up chest CT following antibiotic therapy in 6-12 weeks is recommended to document resolution. 3. Right hilar lymphadenopathy. This can be reassessed on the follow-up chest CT. Cta Head W Contrast    Result Date: 3/31/2019  EXAMINATION: CTA OF THE NECK; CTA OF THE HEAD WITH CONTRAST; CT OF THE HEAD WITHOUT CONTRAST 3/31/2019 4:57 am; 3/31/2019 4:55 am: TECHNIQUE: CTA of the neck was performed with the administration of intravenous contrast. Multiplanar reformatted images are provided for review. MIP images are provided for review.  Stenosis of the internal carotid arteries measured using NASCET criteria. Dose modulation, iterative reconstruction, and/or weight based adjustment of the mA/kV was utilized to reduce the radiation dose to as low as reasonably achievable.; CTA of the head/brain was performed with the administration of intravenous contrast. Multiplanar reformatted images are provided for review. MIP images are provided for review. Dose modulation, iterative reconstruction, and/or weight based adjustment of the mA/kV was utilized to reduce the radiation dose to as low as reasonably achievable.; CT of the head was performed without the administration of intravenous contrast. Dose modulation, iterative reconstruction, and/or weight based adjustment of the mA/kV was utilized to reduce the radiation dose to as low as reasonably achievable. Noncontrast CT of the head with reconstructed 2-D images are also provided for review. COMPARISON: None. HISTORY: ORDERING SYSTEM PROVIDED HISTORY: r/o CVA TECHNOLOGIST PROVIDED HISTORY: Has a \"code stroke\" or \"stroke alert\" been called? ->Yes Ordering Physician Provided Reason for Exam: a\ms; ORDERING SYSTEM PROVIDED HISTORY: r/o CVA TECHNOLOGIST PROVIDED HISTORY: R/o CVA Has a \"code stroke\" or \"stroke alert\" been called? ->No Ordering Physician Provided Reason for Exam: ams; ORDERING SYSTEM PROVIDED HISTORY: unresponsive TECHNOLOGIST PROVIDED HISTORY: R/o hemorrhagic change Has a \"code stroke\" or \"stroke alert\" been called? ->No Ordering Physician Provided Reason for Exam: ams FINDINGS: CT HEAD: BRAIN/VENTRICLES:  No acute intracranial hemorrhage or extraaxial fluid collection. Grey-white differentiation is maintained. No evidence of mass, mass effect or midline shift. No evidence of hydrocephalus. There is prominence of the ventricles and sulci due to global parenchymal volume loss. ORBITS: The visualized portion of the orbits demonstrate no acute abnormality.  SINUSES:  The visualized paranasal sinuses and mastoid air cells demonstrate no acute abnormality. SOFT TISSUES/SKULL: No acute abnormality of the visualized skull or soft tissues. CTA NECK: AORTIC ARCH/ARCH VESSELS: There is a normal branch pattern of the aortic arch. No significant stenosis is seen of the innominate artery or subclavian arteries. CAROTID ARTERIES: The common carotid arteries are normal in appearance without evidence of a flow limiting stenosis. The internal carotid arteries are normal in appearance without evidence of a flow limiting stenosis by NASCET criteria. No dissection or arterial injury is seen. VERTEBRAL ARTERIES: Right vertebral artery is diminutive in caliber throughout. Left vertebral artery is dominant supplying basilar artery. SOFT TISSUES: The lung apices are clear. No cervical or superior mediastinal lymphadenopathy. There is endotracheal intubation. The parotid, submandibular and thyroid glands demonstrate no acute abnormality. BONES: The visualized osseous structures appear unremarkable. Large pleural effusions. CTA HEAD: ANTERIOR CIRCULATION: The internal carotid arteries are normal in course and caliber without focal stenosis. The anterior cerebral and middle cerebral arteries demonstrate no focal stenosis. POSTERIOR CIRCULATION: The posterior cerebral arteries demonstrate no focal stenosis. The vertebral and basilar arteries appear otherwise unremarkable. No CT evidence of acute territorial infarct. No significant stenosis visualized in the major intracranial arterial vasculature. Cervical carotid vasculature is patent. Diminutive right vertebral artery and widely patent left vertebral artery. Fl Modified Barium Swallow W Video    Result Date: 3/12/2019  EXAMINATION: MODIFIED BARIUM SWALLOW WAS PERFORMED IN CONJUNCTION WITH SPEECH PATHOLOGY SERVICES 03/11/2019 TECHNIQUE: Fluoroscopic evaluation of the swallowing mechanism was performed with multiple consistency of barium product.  FLUOROSCOPY DOSE AND TYPE OR TIME AND EXPOSURES: Fluoroscopy time 2.7 minutes, dose 8.80 mGy COMPARISON: None HISTORY: ORDERING SYSTEM PROVIDED HISTORY: aspiration TECHNOLOGIST PROVIDED HISTORY: Reason for exam:->aspiration Initial evaluation. FINDINGS: Early bolus spillover into the valleculae and piriform sinuses. Residue predominantly in the piriform sinuses, less prominently involving the valleculae. Deep laryngeal penetration with thin liquids. No definite tracheal aspiration. 1. Deep laryngeal penetration with thin liquids but no definite tracheal aspiration. 2. Early bolus spillover and predominantly piriform sinus residue. Please see separate speech pathology report for full discussion of findings and recommendations.        Medications Reviewed    Electronically signed by: Kashmir Banegas MD 4/3/2019 12:39 PM

## 2019-04-03 NOTE — PROGRESS NOTES
[facility]        Progress Note       4/3/2019  11:33 AM    Patient:    Odilon Godwin  : 7/15/1933   80 y.o. MRN: [id number]  Admitted: 3/28/2019  1:13 PM ATT: Tyrell Kwon MD   [id number]  AdmitDx: Cough [R05]  General weakness [R53.1]  Serum creatinine raised [R79.89]  NSTEMI (non-ST elevated myocardial infarction) ([facility] [I21.4]  Troponin level elevated [R74.8]  Elevated brain natriuretic peptide (BNP) level [R79.89]  Acute electrocardiogram changes [R94.31]  Fatigue, unspecified type [R53.83]  Congestive heart failure, unspecified HF chronicity, unspecified heart failure type ([facility] [I50.9]  PCP: No primary care provider on file. SUBJECTIVE:  Chart reviewed, events noted  Patient continues to be intubated and sedated. Was extubated yesterday, but then re-intubated within the hour. No bleeding noted from NG or ET tube. No BM. No family at bedside. ROS: Unable to obtain    OBJECTIVE:      BP (!) 125/53   Pulse 50   Temp 96.8 °F (36 °C) (Rectal)   Resp 12   Ht 5' 10\" (1.778 m)   Wt 158 lb 12.8 oz (72 kg)   SpO2 100%   BMI 22.79 kg/m²     Intubated and sedated. Lips and mucous membranes pink and moist, +NG  RRR, Nl s1s2  Lungs decreased with wheezes bilaterally, respirations even and unlabored on ventilation    Abdomen soft, ND, hypoactive bowel sounds  Skin pink, warm and dry  No edema bilateral lower extremities      CBC:   Recent Labs     19  0605 19  0120 19  1213 19  0515   WBC 7.3  --   --   --    HGB 8.7* 8.1* 8.4* 8.4*     --   --  178     BMP:    Recent Labs     19  0605 19  1203    143   K 3.7 3.6    101   CO2 34* 36*   BUN 18 17   CREATININE 1.3 1.4*   GLUCOSE 153* 137*     Magnesium:   Lab Results   Component Value Date    MG 2.3 2019     Hepatic: No results for input(s): AST, ALT, ALB, BILITOT, ALKPHOS in the last 72 hours.   INR: No results for input(s): INR in the last 72 hours.      Intake/Output Summary (Last 24 hours) at 4/3/2019 1133  Last data filed at 4/3/2019 0501  Gross per 24 hour   Intake 2039.68 ml   Output 2525 ml   Net -485.32 ml         Medications    Scheduled Medications:    pantoprazole  40 mg Intravenous BID    chlorhexidine  15 mL Mouth/Throat BID    piperacillin-tazobactam  3.375 g Intravenous Q8H    mupirocin   Nasal BID    [Held by provider] clopidogrel  75 mg Oral Daily    enoxaparin  40 mg Subcutaneous Daily    gabapentin  400 mg Oral BID    latanoprost  1 drop Both Eyes Nightly    simvastatin  40 mg Oral Nightly    sodium chloride flush  10 mL Intravenous 2 times per day    [Held by provider] aspirin  81 mg Oral Daily    ipratropium-albuterol  1 ampule Inhalation Q4H WA    sodium chloride flush  10 mL Intravenous 2 times per day    insulin lispro  0-12 Units Subcutaneous TID WC    insulin lispro  0-6 Units Subcutaneous Nightly    insulin glargine  5 Units Subcutaneous Nightly     PRN Medications: racepinephrine HCl, sodium chloride nebulizer, glucose, dextrose, glucagon (rDNA), dextrose, sodium chloride flush, magnesium hydroxide, ondansetron, sodium chloride flush, acetaminophen  Infusions:    dexmedetomidine (PRECEDEX) IV infusion 0.2 mcg/kg/hr (04/02/19 1159)    phenylephrine (BRIAN-SYNEPHRINE) 50mg/250mL infusion 25 mcg/min (04/03/19 1006)    EPINEPHrine Stopped (03/31/19 0505)    propofol 15 mcg/kg/min (04/03/19 0921)    dextrose 50 mL/hr at 04/01/19 1428    dextrose             Patient Active Problem List   Diagnosis Code    Community acquired pneumonia due to influenza A virus J09. X1    Pneumonia J18.9    Essential hypertension I10    Type 2 diabetes mellitus with diabetic polyneuropathy, without long-term current use of insulin ([facility]) E11.42    KERA (acute kidney injury) ([facility] N17.9    Oropharyngeal dysphagia R13.12    NSTEMI (non-ST elevated myocardial infarction) ([facility]) I21.4    Fatigue R53.83     Assessment:  coffee ground and patrick blood from NG and ET - RESOLVED  Most likely trauma from tubes in setting of Heparin drip for 2 days  Hgb 8.4 Stable     RECOMMENDATIONS:  PPI BID  H&H Q 12 hrs  Continue to hold Plavix    ok to continue ASA   Start  tube feeds per dietitian recommendations   Will treat symptomatically for now    Patient clinical, biochemical, and radiological information discussed with Dr. Frances Cordoba  He agrees with the assessment and plan. Ramirez Acosta CNP  4/3/2019  11:33 AM     I have seen and examined the patient myself. I have reviewed the hospital care given to date and reviewed all pertinent labs and imaging. The plan was developed mutually at the time of visit with the patient, Ramirez Acosta CNP and myself. I have spoken with the patient, nursing staff and provided written and verbal instructions. The above note has been reviewed and I agree with the assessment, diagnosis, and treatment plan with as suggested by Ramirez Acosta CNP with changes made by me as above.     [address] [facility]

## 2019-04-03 NOTE — PROGRESS NOTES
ABG's were not WNL and Dr. Ethan Frye was called and read the results. Changes were made to vent settings as follows: AC 12 down from 20 and TV changed to 400 down from 450 the rest of the settings remain same.  Peep 5 fiO2 50%    RT updated

## 2019-04-03 NOTE — PROGRESS NOTES
Cardiology Progress Note     Admit Date:  3/28/2019    Consult reason/ Seen today for :   NSTEMI  SOB      Subjective and  Overnight Events : developed coffee ground emesis and dropped hct by 10 points  Chief complain on admission : 80 y. o.year old who is admitted for  Chief Complaint   Patient presents with    Fatigue      Assessment / Plan:  ASCVD: Continue aspirin , agree with holding plavix due to GI bleed , continue statins, ACEinhibitors, beta blockers if bp tolerates   Echo showed preserved EF and no wall motion abnormality so I dont think this is cardiogenic event , mostly respiratory cause  Elevated Troponin : will continue medical management for now. Continue Aspirin and anti anginal therapy. DAPT   Gives his GI bleeding will plan conservative management for now no plan for cath   troponin is tredning down  Respiratory failure as per primary team   Bronchitis with pleural effusion as per primary team   HTN: stable, continue To titrate up medication as needed  DVT Prophylaxis if no contraindication    Past medical history:    has a past medical history of Diabetes mellitus ([address] and Hypertension. Past surgical history:   has no past surgical history on file. Social History:   reports that he has never smoked. He has never used smokeless tobacco. He reports that he does not drink alcohol or use drugs. Family history:  family history is not on file.     No Known Allergies    Review of Systems:    pt intubated and sedated    BP (!) 107/50   Pulse 55   Temp 96.3 °F (35.7 °C) (Rectal)   Resp 12   Ht 5' 10\" (1.778 m)   Wt 158 lb 12.8 oz (72 kg)   SpO2 100%   BMI 22.79 kg/m²       Intake/Output Summary (Last 24 hours) at 4/3/2019 1349  Last data filed at 4/3/2019 0501  Gross per 24 hour   Intake 2039.68 ml   Output 2525 ml   Net -485.32 ml     Physical Exam: pt intubated and sedated  Constitutional:  Well developed, Well  EPINEPHrine Stopped (03/31/19 0505)    propofol 15 mcg/kg/min (04/03/19 0921)    dextrose 50 mL/hr at 04/01/19 1428    dextrose       racepinephrine HCl, sodium chloride nebulizer, glucose, dextrose, glucagon (rDNA), dextrose, sodium chloride flush, magnesium hydroxide, ondansetron, sodium chloride flush, acetaminophen    Lab Data:  CBC:   Recent Labs     04/01/19  0605 04/02/19  0120 04/02/19  1213 04/03/19  0515   WBC 7.3  --   --   --    HGB 8.7* 8.1* 8.4* 8.4*   HCT 30.0* 28.1* 28.5* 28.6*   .7*  --   --   --      --   --  178     BMP:   Recent Labs     04/01/19  0605 04/02/19  1203    143   K 3.7 3.6    101   CO2 34* 36*   BUN 18 17   CREATININE 1.3 1.4*     PT/INR:   No results for input(s): PROTIME, INR in the last 72 hours. BNP:    No results for input(s): PROBNP in the last 72 hours. TROPONIN:   No results for input(s): TROPONINT in the last 72 hours. ECHO :   echocardiogram    Assessment:  80 y. o.year old who is admitted for  Chief Complaint   Patient presents with    Fatigue    , active issues as noted below:  Impression:  Principal Problem:    NSTEMI (non-ST elevated myocardial infarction) ([facility]  Active Problems:    Fatigue  Resolved Problems:    * No resolved hospital problems. *            All labs, medications and tests reviewed by myself , continue all other medications of all above medical condition listed as is except for changes mentioned above. Thank you very much for consult , please call with questions.     Hazel Veliz MD 4/3/2019 1:49 PM

## 2019-04-03 NOTE — PROGRESS NOTES
04/03/19 0442   Vent Information   Vent Type 980   Vent Mode AC/VC   Vt Ordered 400 mL   Rate Set 12 bmp   Peak Flow 60 L/min   FiO2  40 %   Sensitivity 3   PEEP/CPAP 5   I Time/ I Time % 0 s   Humidification Source HME   Vent Patient Data   High Peep/I Pressure 40   Peak Inspiratory Pressure 22 cmH2O   Mean Airway Pressure 7.7 cmH20   Rate Measured 12 br/min   Vt Exhaled 358 mL   Minute Volume 4.27 Liters   I:E Ratio 1:5.9   Cough/Sputum   Sputum How Obtained Endotracheal;Suctioned   $Obtained Sample $Induced Sputum   Cough Productive   Frequency Infrequent   Sputum Amount Small   Sputum Color Clear   Tenacity Thick   Spontaneous Breathing Trial (SBT) RT Doc   Pulse 68   Additional Respiratory  Assessments   Resp 12   Alarm Settings   High Pressure Alarm 40 cmH2O   Delay Alarm 20 sec(s)   Low Minute Volume Alarm 2.5 L/min   Apnea (secs) 20 secs   High Respiratory Rate 35 br/min   Low Exhaled Vt  250 mL   ETT (adult)   Placement Date/Time: 03/31/19 (c) 4762   Preoxygenation: Yes  Mask Ventilation: Ventilated by mask with oral airway (2)  Technique: Direct laryngoscopy  Tube Size: 8 mm  Laryngoscope: Landaverde  Blade Size: 2  Grade View: Full view of the glottis  Insert. ..    Secured at 24 cm   Measured From Lips   ET Placement Right   Secured By Commercial tube odell   Site Condition Dry

## 2019-04-03 NOTE — PROGRESS NOTES
pulmonary      SUBJECTIVE:  He was reintubated as did not not tolerate extubation     OBJECTIVE    VITALS:  BP (!) 108/52   Pulse 53   Temp 96.8 °F (36 °C) (Rectal)   Resp 12   Ht 5' 10\" (1.778 m)   Wt 158 lb 12.8 oz (72 kg)   SpO2 100%   BMI 22.79 kg/m²   HEAD AND FACE EXAM:  No throat injection, no active exudate,no thrush  NECK EXAM;No JVD, no masses, symmetrical  CHEST EXAM; Expansion equal and symmetrical, no masses  LUNG EXAM; Good breath sounds bilaterally.  There are expiratory wheezes both lungs, there are crackles at both lung bases  CARDIOVASCULAR EXAM: Positive S1 and S2, no S3 or S4, no clicks ,no murmurs  RIGHT AND LEFT LOWER EXTRIMITY EXAM: No edema, no swelling, no inflamation            LABS   Lab Results   Component Value Date    WBC 7.3 04/01/2019    HGB 8.4 (L) 04/03/2019    HCT 28.6 (L) 04/03/2019    .7 (H) 04/01/2019     04/03/2019     Lab Results   Component Value Date    CREATININE 1.4 (H) 04/02/2019    BUN 17 04/02/2019     04/02/2019    K 3.6 04/02/2019     04/02/2019    CO2 36 (H) 04/02/2019     Lab Results   Component Value Date    INR 1.04 03/31/2019    PROTIME 12.1 03/31/2019        No results found for: PHOS     Recent Labs     04/02/19  0600 04/02/19  2030 04/03/19  0600   PH 7.40 7.65* 7.43   PO2ART 102* 88 101*   WFZ6LCD 67.0* 33.0 59.0*   O2SAT 96.2 95.4* 95.4*         information found for this patient   Narrative   EXAMINATION:   SINGLE XRAY VIEW OF THE CHEST       4/3/2019 5:30 am       COMPARISON:   April 2, 2019       HISTORY:   ORDERING SYSTEM PROVIDED HISTORY: tube placement   TECHNOLOGIST PROVIDED HISTORY:   Reason for exam:->tube placement   Ordering Physician Provided Reason for Exam: tube placement   Acuity: Acute   Type of Exam: Subsequent/Follow-up       FINDINGS:   The ETT is 3.8 cm above the cyndi.  The feeding tube is inserted with its   tip below the diaphragm and beyond the field of view.       The cardiomediastinal silhouette is stable.  There is airspace opacity at the   right lung base, may be related to layering pleural effusion, atelectasis or   pneumonia, stable.  There is no pneumothorax. Genie Jose is no acute osseous   abnormality.           Impression   Airspace opacity at the right lung base, may be related to layering pleural   effusion, atelectasis or pneumonia, stable.           Wt Readings from Last 3 Encounters:   04/03/19 158 lb 12.8 oz (72 kg)   03/23/19 154 lb 9.6 oz (70.1 kg)   03/12/19 154 lb 12.8 oz (70.2 kg)               ASSESMENT  Ac resp failure  Pneumonia  Ac bronchospasm        PLAN  1. cpm  2.  Cont full vent support    4/3/2019  Gopi Khan M.D.

## 2019-04-03 NOTE — PROGRESS NOTES
Speech Language Pathology      Pt reintubated and on mechanical ventilation. Will discontinue order at this time. Please order when pt is extubated and appropriate for assessment.     Tanisha Rosenberg MA [facility] SLP  Speech Language Pathologist

## 2019-04-03 NOTE — CARE COORDINATION
Follow up visit with pt's wife. Per notes pt was extubated for a very short time yesterday but had to go back on the ventilator. Pt's wife is a woman of kasi and relying on her spiritual beliefs for help. Support offered. Cm will continue to follow for discharge planning.

## 2019-04-04 NOTE — PROGRESS NOTES
CT OF THE HEAD WITHOUT CONTRAST 3/31/2019 4:57 am; 3/31/2019 4:55 am: TECHNIQUE: CTA of the neck was performed with the administration of intravenous contrast. Multiplanar reformatted images are provided for review. MIP images are provided for review. Stenosis of the internal carotid arteries measured using NASCET criteria. Dose modulation, iterative reconstruction, and/or weight based adjustment of the mA/kV was utilized to reduce the radiation dose to as low as reasonably achievable.; CTA of the head/brain was performed with the administration of intravenous contrast. Multiplanar reformatted images are provided for review. MIP images are provided for review. Dose modulation, iterative reconstruction, and/or weight based adjustment of the mA/kV was utilized to reduce the radiation dose to as low as reasonably achievable.; CT of the head was performed without the administration of intravenous contrast. Dose modulation, iterative reconstruction, and/or weight based adjustment of the mA/kV was utilized to reduce the radiation dose to as low as reasonably achievable. Noncontrast CT of the head with reconstructed 2-D images are also provided for review. COMPARISON: None. HISTORY: ORDERING SYSTEM PROVIDED HISTORY: r/o CVA TECHNOLOGIST PROVIDED HISTORY: Has a \"code stroke\" or \"stroke alert\" been called? ->Yes Ordering Physician Provided Reason for Exam: a\ms; ORDERING SYSTEM PROVIDED HISTORY: r/o CVA TECHNOLOGIST PROVIDED HISTORY: R/o CVA Has a \"code stroke\" or \"stroke alert\" been called? ->No Ordering Physician Provided Reason for Exam: ams; ORDERING SYSTEM PROVIDED HISTORY: unresponsive TECHNOLOGIST PROVIDED HISTORY: R/o hemorrhagic change Has a \"code stroke\" or \"stroke alert\" been called? ->No Ordering Physician Provided Reason for Exam: ams FINDINGS: CT HEAD: BRAIN/VENTRICLES:  No acute intracranial hemorrhage or extraaxial fluid collection. Grey-white differentiation is maintained.   No evidence of mass, mass effect or midline shift. No evidence of hydrocephalus. There is prominence of the ventricles and sulci due to global parenchymal volume loss. ORBITS: The visualized portion of the orbits demonstrate no acute abnormality. SINUSES:  The visualized paranasal sinuses and mastoid air cells demonstrate no acute abnormality. SOFT TISSUES/SKULL: No acute abnormality of the visualized skull or soft tissues. CTA NECK: AORTIC ARCH/ARCH VESSELS: There is a normal branch pattern of the aortic arch. No significant stenosis is seen of the innominate artery or subclavian arteries. CAROTID ARTERIES: The common carotid arteries are normal in appearance without evidence of a flow limiting stenosis. The internal carotid arteries are normal in appearance without evidence of a flow limiting stenosis by NASCET criteria. No dissection or arterial injury is seen. VERTEBRAL ARTERIES: Right vertebral artery is diminutive in caliber throughout. Left vertebral artery is dominant supplying basilar artery. SOFT TISSUES: The lung apices are clear. No cervical or superior mediastinal lymphadenopathy. There is endotracheal intubation. The parotid, submandibular and thyroid glands demonstrate no acute abnormality. BONES: The visualized osseous structures appear unremarkable. Large pleural effusions. CTA HEAD: ANTERIOR CIRCULATION: The internal carotid arteries are normal in course and caliber without focal stenosis. The anterior cerebral and middle cerebral arteries demonstrate no focal stenosis. POSTERIOR CIRCULATION: The posterior cerebral arteries demonstrate no focal stenosis. The vertebral and basilar arteries appear otherwise unremarkable. No CT evidence of acute territorial infarct. No significant stenosis visualized in the major intracranial arterial vasculature. Cervical carotid vasculature is patent. Diminutive right vertebral artery and widely patent left vertebral artery.      Ct Chest Wo Contrast    Result Date: 3/29/2019  EXAMINATION: CT OF THE CHEST WITHOUT CONTRAST 3/29/2019 12:52 pm TECHNIQUE: CT of the chest was performed without the administration of intravenous contrast. Multiplanar reformatted images are provided for review. Dose modulation, iterative reconstruction, and/or weight based adjustment of the mA/kV was utilized to reduce the radiation dose to as low as reasonably achievable. COMPARISON: Chest radiographs 03/28/2019 HISTORY: ORDERING SYSTEM PROVIDED HISTORY: nicanor ll pneumonia,r/o pleural effusion TECHNOLOGIST PROVIDED HISTORY: Ordering Physician Provided Reason for Exam: nicanor ll pneumonia,r/o pleural effusion Acuity: Acute Additional signs and symptoms: patient unable to follow commands Relevant Medical/Surgical History: unknown, poor historian FINDINGS: Mediastinum: Limited evaluation for lymphadenopathy without intravenous contrast.  Normal caliber thoracic aorta with mild atherosclerotic calcifications. Mildly enlarged main pulmonary artery measuring 3.5 cm in diameter. Normal heart size. No pericardial effusion. Segmental gaseous distention of the esophagus. Lungs/pleura: Moderate right and small left pleural effusions with partial lower lobe atelectasis. Additional patchy tree-in-bud/clustered nodularity in the left upper and lower lobes and middle lobe. Mild dependent secretions within the trachea and possibly the proximal left bronchial tree. No pneumothorax. Upper Abdomen: Oral contrast within the colon. Scattered calcified granulomas within the liver and spleen reflect remote granulomatous disease. Soft Tissues/Bones: No enlarged axillary or supraclavicular lymph nodes. Normal thyroid. Glenohumeral osteoarthrosis. Thoracic spondylosis. Moderate right and small left pleural effusions with associated atelectasis. Multifocal clustered/tree-in-bud nodularity in the left upper lobe, left lower lobe and middle lobe most likely represents an infectious or inflammatory bronchiolitis.   Consider aspiration and etiology given tracheobronchial secretions and esophageal features suggesting dysmotility and reflux. Xr Chest Portable    Result Date: 4/1/2019  EXAMINATION: SINGLE XRAY VIEW OF THE CHEST 4/1/2019 8:46 am COMPARISON: 04/01/2019. HISTORY: ORDERING SYSTEM PROVIDED HISTORY: ETT placement TECHNOLOGIST PROVIDED HISTORY: Reason for exam:->ETT placement Ordering Physician Provided Reason for Exam: ETT placement Acuity: Acute Type of Exam: Initial Relevant Medical/Surgical History: diabetes mellitus; hypertension FINDINGS: The endotracheal tube has been repositioned with tip is now above the cyndi. A nasogastric tube courses below the diaphragm. The heart size and pulmonary vasculature stable. Again noted are hazy density seen throughout the lungs bilaterally. No pneumothoraces are seen. 1. Interval reposition of endotracheal tube with its tip now above the cyndi and in satisfactory position. 2. Otherwise, stable chest x-ray with findings likely reflecting pleural effusions and infiltrates. Xr Chest Portable    Result Date: 4/1/2019  EXAMINATION: SINGLE XRAY VIEW OF THE CHEST 4/1/2019 5:37 am COMPARISON: 03/31/2019. HISTORY: ORDERING SYSTEM PROVIDED HISTORY: tube placement TECHNOLOGIST PROVIDED HISTORY: Reason for exam:->tube placement Ordering Physician Provided Reason for Exam: tube placement Acuity: Unknown Type of Exam: Subsequent/Follow-up Additional signs and symptoms: tube placement Relevant Medical/Surgical History: tube placement FINDINGS: The endotracheal tube is seen with its tip at the level of the cyndi. There is a nasogastric tube which courses below the diaphragm. The heart size and pulmonary vasculature are stable. There are hazy densities seen involving the lungs bilaterally. No new airspace disease is seen. No pneumothoraces are noted. Overall, compared to the prior exam there has been little change.      1. Stable chest x-ray with bilateral hazy opacities likely represent combination of pleural effusions and infiltrates. 2. Endotracheal tube with its tip at the level of the cyndi. I would suggest withdrawing the tube at least 2 cm. Xr Chest Portable    Result Date: 3/28/2019  EXAMINATION: SINGLE XRAY VIEW OF THE CHEST 3/28/2019 2:01 pm COMPARISON: 03/08/2019 HISTORY: ORDERING SYSTEM PROVIDED HISTORY: cough/fatigue TECHNOLOGIST PROVIDED HISTORY: Reason for exam:->cough/fatigue Ordering Physician Provided Reason for Exam: cough/fatigue Acuity: Unknown Type of Exam: Unknown Additional signs and symptoms: discharged 3/23/19 FINDINGS: Normal heart size. Somewhat prominent pulmonary vasculature. Haziness at the lung bases may represent atelectasis or layering pleural fluid. No pneumothorax. Haziness at the lung bases may represent atelectasis or layering pleural fluid. Recommend obtaining PA and lateral chest radiographs for confirmation. Xr Chest Portable    Result Date: 3/8/2019  EXAMINATION: SINGLE XRAY VIEW OF THE CHEST 3/8/2019 9:55 pm COMPARISON: None. HISTORY: ORDERING SYSTEM PROVIDED HISTORY: sob TECHNOLOGIST PROVIDED HISTORY: Reason for exam:->sob Ordering Physician Provided Reason for Exam: SOB Acuity: Acute Type of Exam: Initial FINDINGS: Cardiomediastinal silhouette is within normal limits. Right midlung zone airspace opacification. No pulmonary vascular congestion or edema. No pleural effusion. Right midlung zone airspace opacification could represent pneumonia.  Follow-up is recommended to ensure resolution     Xr Chest 1 Vw    Result Date: 3/31/2019  EXAMINATION: SINGLE XRAY VIEW OF THE CHEST 3/31/2019 5:22 am COMPARISON: 2 days prior HISTORY: ORDERING SYSTEM PROVIDED HISTORY: intubation and post cardiac arrest, check ETT TECHNOLOGIST PROVIDED HISTORY: Reason for exam:->intubation and post cardiac arrest, check ETT Ordering Physician Provided Reason for Exam: intubation and post cardiac arrest, check ETT Acuity: Unknown Type of Exam: Unknown FINDINGS: Endotracheal \"stroke alert\" been called? ->Yes Ordering Physician Provided Reason for Exam: a\ms; ORDERING SYSTEM PROVIDED HISTORY: r/o CVA TECHNOLOGIST PROVIDED HISTORY: R/o CVA Has a \"code stroke\" or \"stroke alert\" been called? ->No Ordering Physician Provided Reason for Exam: ams; ORDERING SYSTEM PROVIDED HISTORY: unresponsive TECHNOLOGIST PROVIDED HISTORY: R/o hemorrhagic change Has a \"code stroke\" or \"stroke alert\" been called? ->No Ordering Physician Provided Reason for Exam: ams FINDINGS: CT HEAD: BRAIN/VENTRICLES:  No acute intracranial hemorrhage or extraaxial fluid collection. Grey-white differentiation is maintained. No evidence of mass, mass effect or midline shift. No evidence of hydrocephalus. There is prominence of the ventricles and sulci due to global parenchymal volume loss. ORBITS: The visualized portion of the orbits demonstrate no acute abnormality. SINUSES:  The visualized paranasal sinuses and mastoid air cells demonstrate no acute abnormality. SOFT TISSUES/SKULL: No acute abnormality of the visualized skull or soft tissues. CTA NECK: AORTIC ARCH/ARCH VESSELS: There is a normal branch pattern of the aortic arch. No significant stenosis is seen of the innominate artery or subclavian arteries. CAROTID ARTERIES: The common carotid arteries are normal in appearance without evidence of a flow limiting stenosis. The internal carotid arteries are normal in appearance without evidence of a flow limiting stenosis by NASCET criteria. No dissection or arterial injury is seen. VERTEBRAL ARTERIES: Right vertebral artery is diminutive in caliber throughout. Left vertebral artery is dominant supplying basilar artery. SOFT TISSUES: The lung apices are clear. No cervical or superior mediastinal lymphadenopathy. There is endotracheal intubation. The parotid, submandibular and thyroid glands demonstrate no acute abnormality. BONES: The visualized osseous structures appear unremarkable. Large pleural effusions.  CTA HEAD: ANTERIOR CIRCULATION: The internal carotid arteries are normal in course and caliber without focal stenosis. The anterior cerebral and middle cerebral arteries demonstrate no focal stenosis. POSTERIOR CIRCULATION: The posterior cerebral arteries demonstrate no focal stenosis. The vertebral and basilar arteries appear otherwise unremarkable. No CT evidence of acute territorial infarct. No significant stenosis visualized in the major intracranial arterial vasculature. Cervical carotid vasculature is patent. Diminutive right vertebral artery and widely patent left vertebral artery. Cta Pulmonary W Contrast    Result Date: 3/9/2019  EXAMINATION: CTA OF THE CHEST 3/9/2019 12:05 am TECHNIQUE: CTA of the chest was performed after the administration of intravenous contrast.  Multiplanar reformatted images are provided for review. MIP images are provided for review. Dose modulation, iterative reconstruction, and/or weight based adjustment of the mA/kV was utilized to reduce the radiation dose to as low as reasonably achievable. COMPARISON: None. HISTORY: ORDERING SYSTEM PROVIDED HISTORY: sob, tachy TECHNOLOGIST PROVIDED HISTORY: Ordering Physician Provided Reason for Exam: sob Acuity: Acute Type of Exam: Initial Mechanism of Injury: Patient arrived via [facility] with complaints of increasing fatigue to bilateral lower extremities Relevant Medical/Surgical History: isovue 370 80 ml FINDINGS: Pulmonary Arteries: The main pulmonary artery is normal in size. There is no large or central pulmonary embolism. The subsegmental pulmonary arteries are not well visualized secondary to the timing of the contrast bolus and respiratory motion. Mediastinum: The heart is normal in size. There is no pericardial effusion. The thoracic aorta is atherosclerotic, but not aneurysmal.  No mediastinal lymphadenopathy is demonstrated. Hilar lymph nodes measure up to 1.5 cm on the right. Lungs/pleura: There is mild emphysema. There are nodular and interstitial infiltrates within the bilateral lower and right middle lobes. No pleural effusion or pneumothorax is demonstrated. Upper Abdomen: Visualized portions of the upper abdomen demonstrate a 1.8 cm left renal cyst. Soft Tissues/Bones: No suspicious osteolytic or osteoblastic lesions are demonstrated. 1. No large or central pulmonary embolism. The subsegmental pulmonary arteries are not well evaluated secondary to respiratory motion and the timing of the contrast bolus. If there remains a high clinical concern for a small PE, a repeat exam is suggested. 2. Nodular infiltrates within the lower lungs, which are most likely infectious in etiology. Although less likely, malignancy cannot be excluded. A follow-up chest CT following antibiotic therapy in 6-12 weeks is recommended to document resolution. 3. Right hilar lymphadenopathy. This can be reassessed on the follow-up chest CT. Cta Head W Contrast    Result Date: 3/31/2019  EXAMINATION: CTA OF THE NECK; CTA OF THE HEAD WITH CONTRAST; CT OF THE HEAD WITHOUT CONTRAST 3/31/2019 4:57 am; 3/31/2019 4:55 am: TECHNIQUE: CTA of the neck was performed with the administration of intravenous contrast. Multiplanar reformatted images are provided for review. MIP images are provided for review. Stenosis of the internal carotid arteries measured using NASCET criteria. Dose modulation, iterative reconstruction, and/or weight based adjustment of the mA/kV was utilized to reduce the radiation dose to as low as reasonably achievable.; CTA of the head/brain was performed with the administration of intravenous contrast. Multiplanar reformatted images are provided for review. MIP images are provided for review.  Dose modulation, iterative reconstruction, and/or weight based adjustment of the mA/kV was utilized to reduce the radiation dose to as low as reasonably achievable.; CT of the head was performed without the administration of intravenous contrast. Dose modulation, iterative reconstruction, and/or weight based adjustment of the mA/kV was utilized to reduce the radiation dose to as low as reasonably achievable. Noncontrast CT of the head with reconstructed 2-D images are also provided for review. COMPARISON: None. HISTORY: ORDERING SYSTEM PROVIDED HISTORY: r/o CVA TECHNOLOGIST PROVIDED HISTORY: Has a \"code stroke\" or \"stroke alert\" been called? ->Yes Ordering Physician Provided Reason for Exam: a\ms; ORDERING SYSTEM PROVIDED HISTORY: r/o CVA TECHNOLOGIST PROVIDED HISTORY: R/o CVA Has a \"code stroke\" or \"stroke alert\" been called? ->No Ordering Physician Provided Reason for Exam: ams; ORDERING SYSTEM PROVIDED HISTORY: unresponsive TECHNOLOGIST PROVIDED HISTORY: R/o hemorrhagic change Has a \"code stroke\" or \"stroke alert\" been called? ->No Ordering Physician Provided Reason for Exam: ams FINDINGS: CT HEAD: BRAIN/VENTRICLES:  No acute intracranial hemorrhage or extraaxial fluid collection. Grey-white differentiation is maintained. No evidence of mass, mass effect or midline shift. No evidence of hydrocephalus. There is prominence of the ventricles and sulci due to global parenchymal volume loss. ORBITS: The visualized portion of the orbits demonstrate no acute abnormality. SINUSES:  The visualized paranasal sinuses and mastoid air cells demonstrate no acute abnormality. SOFT TISSUES/SKULL: No acute abnormality of the visualized skull or soft tissues. CTA NECK: AORTIC ARCH/ARCH VESSELS: There is a normal branch pattern of the aortic arch. No significant stenosis is seen of the innominate artery or subclavian arteries. CAROTID ARTERIES: The common carotid arteries are normal in appearance without evidence of a flow limiting stenosis. The internal carotid arteries are normal in appearance without evidence of a flow limiting stenosis by NASCET criteria. No dissection or arterial injury is seen.  VERTEBRAL ARTERIES: Right vertebral artery is diminutive in caliber throughout. Left vertebral artery is dominant supplying basilar artery. SOFT TISSUES: The lung apices are clear. No cervical or superior mediastinal lymphadenopathy. There is endotracheal intubation. The parotid, submandibular and thyroid glands demonstrate no acute abnormality. BONES: The visualized osseous structures appear unremarkable. Large pleural effusions. CTA HEAD: ANTERIOR CIRCULATION: The internal carotid arteries are normal in course and caliber without focal stenosis. The anterior cerebral and middle cerebral arteries demonstrate no focal stenosis. POSTERIOR CIRCULATION: The posterior cerebral arteries demonstrate no focal stenosis. The vertebral and basilar arteries appear otherwise unremarkable. No CT evidence of acute territorial infarct. No significant stenosis visualized in the major intracranial arterial vasculature. Cervical carotid vasculature is patent. Diminutive right vertebral artery and widely patent left vertebral artery. Fl Modified Barium Swallow W Video    Result Date: 3/12/2019  EXAMINATION: MODIFIED BARIUM SWALLOW WAS PERFORMED IN CONJUNCTION WITH [facility] 03/11/2019 TECHNIQUE: Fluoroscopic evaluation of the swallowing mechanism was performed with multiple consistency of barium product. FLUOROSCOPY DOSE AND TYPE OR TIME AND EXPOSURES: Fluoroscopy time 2.7 minutes, dose 8.80 mGy COMPARISON: None HISTORY: ORDERING SYSTEM PROVIDED HISTORY: aspiration TECHNOLOGIST PROVIDED HISTORY: Reason for exam:->aspiration Initial evaluation. FINDINGS: Early bolus spillover into the valleculae and piriform sinuses. Residue predominantly in the piriform sinuses, less prominently involving the valleculae. Deep laryngeal penetration with thin liquids. No definite tracheal aspiration. 1. Deep laryngeal penetration with thin liquids but no definite tracheal aspiration. 2. Early bolus spillover and predominantly piriform sinus residue.  Please see separate speech pathology report for full discussion of findings and recommendations.        LAB RESULTS  --------------------    Recent Results (from the past 24 hour(s))   Blood gas, arterial    Collection Time: 04/02/19  8:30 PM   Result Value Ref Range    pH, Bld 7.65 (H) 7.34 - 7.45    pCO2, Arterial 33.0 32 - 45 MMHG    pO2, Arterial 88 75 - 100 MMHG    Base Exc, Mixed 14.9  PLUS   (H) 0 - 1.2    HCO3, Arterial 36.4 (H) 18 - 23 MMOL/L    CO2 Content 37.4 (H) 19 - 24 MMOL/L    O2 Sat 95.4 (L) 96 - 97 %    Carbon Monoxide, Blood 2.1 0 - 5 %    Methemoglobin, Arterial 1.6 (H) <1.5 %    Comment AC 20  +5 50%    POCT Glucose    Collection Time: 04/02/19 11:55 PM   Result Value Ref Range    POC Glucose 128 (H) 70 - 99 MG/DL   APTT    Collection Time: 04/03/19  5:15 AM   Result Value Ref Range    aPTT 35.8 (H) 21.2 - 33.0 SECONDS   Hemoglobin and hematocrit, blood    Collection Time: 04/03/19  5:15 AM   Result Value Ref Range    Hemoglobin 8.4 (L) 13.5 - 18.0 GM/DL    Hematocrit 28.6 (L) 42 - 52 %   Platelet count    Collection Time: 04/03/19  5:15 AM   Result Value Ref Range    Platelets 956 818 - 872 K/CU MM   POCT Glucose    Collection Time: 04/03/19  5:17 AM   Result Value Ref Range    POC Glucose 124 (H) 70 - 99 MG/DL   Blood gas, arterial    Collection Time: 04/03/19  6:00 AM   Result Value Ref Range    pH, Bld 7.43 7.34 - 7.45    pCO2, Arterial 59.0 (H) 32 - 45 MMHG    pO2, Arterial 101 (H) 75 - 100 MMHG    Base Exc, Mixed 12.3  PLUS   (H) 0 - 1.2    HCO3, Arterial 39.2 (H) 18 - 23 MMOL/L    CO2 Content 41.0 (H) 19 - 24 MMOL/L    O2 Sat 95.4 (L) 96 - 97 %    Carbon Monoxide, Blood 2.0 0 - 5 %    Methemoglobin, Arterial 1.7 (H) <1.5 %    Comment AC 12  40% +5    POCT Glucose    Collection Time: 04/03/19  9:28 AM   Result Value Ref Range    POC Glucose 142 (H) 70 - 99 MG/DL   POCT Glucose    Collection Time: 04/03/19 11:30 AM   Result Value Ref Range    POC Glucose 108 (H) 70 - 99 MG/DL   POCT Glucose    Collection

## 2019-04-04 NOTE — PROGRESS NOTES
Patient is super sensitive to the candice. Tried numerous times to wean off of the vasopressor and bp will not tolerate a lower dose. He has copious amounts of secretions has to be suctioned with yonker and inline. He will follow commands and go right back to sleep.  His bs is on the low side at 82 but has checked him through the night and he has not fallen below     Will continue to monitor

## 2019-04-04 NOTE — PROGRESS NOTES
04/04/19 0418   Vent Information   Vent Type 980   Vent Mode AC/VC   Vt Ordered 400 mL   Rate Set 12 bmp   Peak Flow 45 L/min   Pressure Support 0 cmH20   FiO2  40 %   Sensitivity 3   PEEP/CPAP 5   I Time/ I Time % 0 s   Humidification Source HME   Vent Patient Data   High Peep/I Pressure 0   Peak Inspiratory Pressure 20 cmH2O   Mean Airway Pressure 7.9 cmH20   Rate Measured 12 br/min   Vt Exhaled 355 mL   Minute Volume 4.26 Liters   I:E Ratio 1:4.20   Spontaneous Breathing Trial (SBT) RT Doc   Pulse 56   SpO2 100 %   Additional Respiratory  Assessments   Resp 12   Position Semi-Dubose's   Alarm Settings   High Pressure Alarm 40 cmH2O   Low Minute Volume Alarm 2.5 L/min   Apnea (secs) 20 secs   High Respiratory Rate 35 br/min   Low Exhaled Vt  250 mL   ETT (adult)   Placement Date/Time: 03/31/19 (c) 8033   Preoxygenation: Yes  Mask Ventilation: Ventilated by mask with oral airway (2)  Technique: Direct laryngoscopy  Tube Size: 8 mm  Laryngoscope: Landaverde  Blade Size: 2  Grade View: Full view of the glottis  Insert. ..    Secured at 24 cm   Measured From Lips   ET Placement Right   Secured By Commercial tube odell   Site Condition Dry   no wean this am

## 2019-04-04 NOTE — PROGRESS NOTES
[facility] Hospitalist Daily Progress Note  Hospitalist: Romina Rodriguez M.D. Hospitalist Progress Note Date: 2019    Patient Name: Darrian Reyes : 7/15/1933 Med Rec # [id number]    Disposition: continue inpatient care    Cough [R05]  General weakness [R53.1]  Serum creatinine raised [R79.89]  NSTEMI (non-ST elevated myocardial infarction) ([facility] [address] [I21.4]  Troponin level elevated [R74.8]  Elevated brain natriuretic peptide (BNP) level [R79.89]  Acute electrocardiogram changes [R94.31]  Fatigue, unspecified type [R53.83]  Congestive heart failure, unspecified HF chronicity, unspecified heart failure type ([facility] [address] [I50.9]      Assessment/ Plan:  1. Acute Metabolic Encephalopathy: unresponsive. S/P ACLS, given 1 epinephrine. Could be from hypotension, CO2 narcosis. Neurology on consult. Unable to further assess further as patient is still intubated and sedated. 2. Acute Hypoxic and Hypercapneic Respiratory Failure 2/2 pleural effusion and RLLB bacterial pneumonia - continue vent support, Pulmonary medicine following, continue IV antibiotics, wean vent settings as tolerated. Per Pulm no weaning today. 3. Hypotension 2/2 Sepsis from lobar bacterial pneumonia: continue pressors, wean as tolerated, continue antibiotics and IVF for sepsis. 4. NSTEMI: elevated troponin. Started on aspirin and statin. Echocardiogram with EF of 60-65%, severely dilated right atrium, moderate to severe TR.  Cardiology on consult. C vs Stress test when stable. 5. Elevated BNP: Does not appear to be with fluid overload.  Chest x-ray with pleural effusion.    6. Hypernatremia, Resolved: Sodium 141.    7. Acute kidney injury, Resolved: Due to dehydration.  Creatinine 1.4 on admission, now WNL  8. Type 2 DM: Last A1C. Lantus, Sliding scale. Hypoglycemia protocol. Accucheck. Hold oral hypoglycemic agents for now  9. Bilateral Pleural Effusion/Bacterial Pneumonia: repeat CXR with bibasilar consolidation and bilateral pleural effusion. Value Date    COLORU RED 03/31/2019    WBCUA NONE SEEN 03/31/2019    RBCUA 6,176 03/31/2019    MUCUS RARE 03/31/2019    BACTERIA NEGATIVE 03/31/2019    CLARITYU HAZY 03/31/2019    SPECGRAV 1.029 03/31/2019    LEUKOCYTESUR TRACE 03/31/2019    BLOODU LARGE 03/31/2019       ABG    Lab Results   Component Value Date    QCH9MZE 40.0 04/04/2019    GOW5LGY 55.0 04/04/2019    PO2ART 119 04/04/2019       Imaging Studies: Xr Chest Standard (2 Vw)    Result Date: 3/28/2019  EXAMINATION: TWO VIEWS OF THE CHEST 3/28/2019 10:47 pm COMPARISON: Chest 03/20/2019 2:29 p.m. HISTORY: ORDERING SYSTEM PROVIDED HISTORY: Pleural Effusion TECHNOLOGIST PROVIDED HISTORY: Reason for exam:->Pleural Effusion Ordering Physician Provided Reason for Exam: Pleural Effusion Acuity: Unknown Type of Exam: Unknown Additional signs and symptoms: cough Relevant Medical/Surgical History: htn FINDINGS: Calcifications involving the aorta reflect atherosclerosis. The cardiomediastinal and hilar silhouettes appear otherwise unremarkable. Bibasilar consolidation and bilateral pleural effusion, right greater than left confirmed compared with prior study. Chronic appearing coarse interstitial densities predominate parahilar regions and lung bases, typical of sequela from smoking or other previous infectious/inflammatory process. No pneumothorax is seen. No acute osseous abnormality is identified. Confirmation of bibasilar consolidation and bilateral pleural effusion, right greater than left, concerning for pneumonia. Chronic appearing coarse interstitial densities predominate parahilar regions and lung bases, typical of sequela from smoking or other previous infectious/inflammatory process. Calcific atherosclerotic disease aorta.      Ct Head Wo Contrast    Result Date: 3/31/2019  EXAMINATION: CTA OF THE NECK; CTA OF THE HEAD WITH CONTRAST; CT OF THE HEAD WITHOUT CONTRAST 3/31/2019 4:57 am; 3/31/2019 4:55 am: TECHNIQUE: CTA of the neck was performed with the administration of intravenous contrast. Multiplanar reformatted images are provided for review. MIP images are provided for review. Stenosis of the internal carotid arteries measured using NASCET criteria. Dose modulation, iterative reconstruction, and/or weight based adjustment of the mA/kV was utilized to reduce the radiation dose to as low as reasonably achievable.; CTA of the head/brain was performed with the administration of intravenous contrast. Multiplanar reformatted images are provided for review. MIP images are provided for review. Dose modulation, iterative reconstruction, and/or weight based adjustment of the mA/kV was utilized to reduce the radiation dose to as low as reasonably achievable.; CT of the head was performed without the administration of intravenous contrast. Dose modulation, iterative reconstruction, and/or weight based adjustment of the mA/kV was utilized to reduce the radiation dose to as low as reasonably achievable. Noncontrast CT of the head with reconstructed 2-D images are also provided for review. COMPARISON: None. HISTORY: ORDERING SYSTEM PROVIDED HISTORY: r/o CVA TECHNOLOGIST PROVIDED HISTORY: Has a \"code stroke\" or \"stroke alert\" been called? ->Yes Ordering Physician Provided Reason for Exam: a\ms; ORDERING SYSTEM PROVIDED HISTORY: r/o CVA TECHNOLOGIST PROVIDED HISTORY: R/o CVA Has a \"code stroke\" or \"stroke alert\" been called? ->No Ordering Physician Provided Reason for Exam: ams; ORDERING SYSTEM PROVIDED HISTORY: unresponsive TECHNOLOGIST PROVIDED HISTORY: R/o hemorrhagic change Has a \"code stroke\" or \"stroke alert\" been called? ->No Ordering Physician Provided Reason for Exam: ams FINDINGS: CT HEAD: BRAIN/VENTRICLES:  No acute intracranial hemorrhage or extraaxial fluid collection. Grey-white differentiation is maintained. No evidence of mass, mass effect or midline shift. No evidence of hydrocephalus.   There is prominence of the ventricles and sulci due to global parenchymal volume loss. ORBITS: The visualized portion of the orbits demonstrate no acute abnormality. SINUSES:  The visualized paranasal sinuses and mastoid air cells demonstrate no acute abnormality. SOFT TISSUES/SKULL: No acute abnormality of the visualized skull or soft tissues. CTA NECK: AORTIC ARCH/ARCH VESSELS: There is a normal branch pattern of the aortic arch. No significant stenosis is seen of the innominate artery or subclavian arteries. CAROTID ARTERIES: The common carotid arteries are normal in appearance without evidence of a flow limiting stenosis. The internal carotid arteries are normal in appearance without evidence of a flow limiting stenosis by NASCET criteria. No dissection or arterial injury is seen. VERTEBRAL ARTERIES: Right vertebral artery is diminutive in caliber throughout. Left vertebral artery is dominant supplying basilar artery. SOFT TISSUES: The lung apices are clear. No cervical or superior mediastinal lymphadenopathy. There is endotracheal intubation. The parotid, submandibular and thyroid glands demonstrate no acute abnormality. BONES: The visualized osseous structures appear unremarkable. Large pleural effusions. CTA HEAD: ANTERIOR CIRCULATION: The internal carotid arteries are normal in course and caliber without focal stenosis. The anterior cerebral and middle cerebral arteries demonstrate no focal stenosis. POSTERIOR CIRCULATION: The posterior cerebral arteries demonstrate no focal stenosis. The vertebral and basilar arteries appear otherwise unremarkable. No CT evidence of acute territorial infarct. No significant stenosis visualized in the major intracranial arterial vasculature. Cervical carotid vasculature is patent. Diminutive right vertebral artery and widely patent left vertebral artery.      Ct Chest Wo Contrast    Result Date: 3/29/2019  EXAMINATION: CT OF THE CHEST WITHOUT CONTRAST 3/29/2019 12:52 pm TECHNIQUE: CT of the chest was performed without the administration of intravenous contrast. Multiplanar reformatted images are provided for review. Dose modulation, iterative reconstruction, and/or weight based adjustment of the mA/kV was utilized to reduce the radiation dose to as low as reasonably achievable. COMPARISON: Chest radiographs 03/28/2019 HISTORY: ORDERING SYSTEM PROVIDED HISTORY: nicanor ll pneumonia,r/o pleural effusion TECHNOLOGIST PROVIDED HISTORY: Ordering Physician Provided Reason for Exam: nicanor ll pneumonia,r/o pleural effusion Acuity: Acute Additional signs and symptoms: patient unable to follow commands Relevant Medical/Surgical History: unknown, poor historian FINDINGS: Mediastinum: Limited evaluation for lymphadenopathy without intravenous contrast.  Normal caliber thoracic aorta with mild atherosclerotic calcifications. Mildly enlarged main pulmonary artery measuring 3.5 cm in diameter. Normal heart size. No pericardial effusion. Segmental gaseous distention of the esophagus. Lungs/pleura: Moderate right and small left pleural effusions with partial lower lobe atelectasis. Additional patchy tree-in-bud/clustered nodularity in the left upper and lower lobes and middle lobe. Mild dependent secretions within the trachea and possibly the proximal left bronchial tree. No pneumothorax. Upper Abdomen: Oral contrast within the colon. Scattered calcified granulomas within the liver and spleen reflect remote granulomatous disease. Soft Tissues/Bones: No enlarged axillary or supraclavicular lymph nodes. Normal thyroid. Glenohumeral osteoarthrosis. Thoracic spondylosis. Moderate right and small left pleural effusions with associated atelectasis. Multifocal clustered/tree-in-bud nodularity in the left upper lobe, left lower lobe and middle lobe most likely represents an infectious or inflammatory bronchiolitis. Consider aspiration and etiology given tracheobronchial secretions and esophageal features suggesting dysmotility and reflux.      Regan Vasquez Chest Portable    Result Date: 4/3/2019  EXAMINATION: SINGLE XRAY VIEW OF THE CHEST 4/3/2019 5:30 am COMPARISON: April 2, 2019 HISTORY: ORDERING SYSTEM PROVIDED HISTORY: tube placement TECHNOLOGIST PROVIDED HISTORY: Reason for exam:->tube placement Ordering Physician Provided Reason for Exam: tube placement Acuity: Acute Type of Exam: Subsequent/Follow-up FINDINGS: The ETT is 3.8 cm above the cyndi. The feeding tube is inserted with its tip below the diaphragm and beyond the field of view. The cardiomediastinal silhouette is stable. There is airspace opacity at the right lung base, may be related to layering pleural effusion, atelectasis or pneumonia, stable. There is no pneumothorax. There is no acute osseous abnormality. Airspace opacity at the right lung base, may be related to layering pleural effusion, atelectasis or pneumonia, stable. Xr Chest Portable    Result Date: 4/2/2019  EXAMINATION: SINGLE XRAY VIEW OF THE CHEST 4/2/2019 9:25 pm COMPARISON: Chest x-ray 15 arch prior HISTORY: ORDERING SYSTEM PROVIDED HISTORY: post intubation/ ETT placement TECHNOLOGIST PROVIDED HISTORY: Reason for exam:->post intubation/ ETT placement Ordering Physician Provided Reason for Exam: POST INTUBATION AND NG TUBE PLACEMENT FINDINGS: Endotracheal tube tip projects 3.5 cm from the cyndi. Enteric tube identified with side port at the level of the GE junction. Tip projects over the expected location of the stomach. Recommend advancement. Opacities project over the bilateral lung bases, right greater than left which is suspicious for layering pleural effusions. No pneumothorax identified. Osseous structures appear stable. 1. Endotracheal tube tip projects approximately 3.5 cm from the cyndi. 2. Enteric tube side port at the level of the GE junction. Recommend advancement. 3. Findings suggesting layering effusions, right greater than left.      Xr Chest Portable    Result Date: 4/2/2019  EXAMINATION: SINGLE XRAY VIEW OF THE CHEST 4/2/2019 5:17 am COMPARISON: April 1, 2019 HISTORY: ORDERING SYSTEM PROVIDED HISTORY: tube placement TECHNOLOGIST PROVIDED HISTORY: Reason for exam:->tube placement Ordering Physician Provided Reason for Exam: tube placement Acuity: Acute Type of Exam: Subsequent/Follow-up FINDINGS: ETT tip is 6 cm above the cyndi. Enteric catheter extends beyond the inferior margin of the image. Cardiac silhouette is within normal range. Small right pleural effusion. No focal consolidation, left pleural effusion, or pneumothorax. Severe left glenohumeral degenerative changes with suspected chronic left rotator cuff tear. 1. Small right pleural effusion. Xr Chest Portable    Result Date: 4/1/2019  EXAMINATION: SINGLE XRAY VIEW OF THE CHEST 4/1/2019 8:46 am COMPARISON: 04/01/2019. HISTORY: ORDERING SYSTEM PROVIDED HISTORY: ETT placement TECHNOLOGIST PROVIDED HISTORY: Reason for exam:->ETT placement Ordering Physician Provided Reason for Exam: ETT placement Acuity: Acute Type of Exam: Initial Relevant Medical/Surgical History: diabetes mellitus; hypertension FINDINGS: The endotracheal tube has been repositioned with tip is now above the cyndi. A nasogastric tube courses below the diaphragm. The heart size and pulmonary vasculature stable. Again noted are hazy density seen throughout the lungs bilaterally. No pneumothoraces are seen. 1. Interval reposition of endotracheal tube with its tip now above the cyndi and in satisfactory position. 2. Otherwise, stable chest x-ray with findings likely reflecting pleural effusions and infiltrates. Xr Chest Portable    Result Date: 4/1/2019  EXAMINATION: SINGLE XRAY VIEW OF THE CHEST 4/1/2019 5:37 am COMPARISON: 03/31/2019.  HISTORY: ORDERING SYSTEM PROVIDED HISTORY: tube placement TECHNOLOGIST PROVIDED HISTORY: Reason for exam:->tube placement Ordering Physician Provided Reason for Exam: tube placement Acuity: Unknown Type of Exam: Subsequent/Follow-up Additional signs and symptoms: tube placement Relevant Medical/Surgical History: tube placement FINDINGS: The endotracheal tube is seen with its tip at the level of the cyndi. There is a nasogastric tube which courses below the diaphragm. The heart size and pulmonary vasculature are stable. There are hazy densities seen involving the lungs bilaterally. No new airspace disease is seen. No pneumothoraces are noted. Overall, compared to the prior exam there has been little change. 1. Stable chest x-ray with bilateral hazy opacities likely represent combination of pleural effusions and infiltrates. 2. Endotracheal tube with its tip at the level of the cyndi. I would suggest withdrawing the tube at least 2 cm. Xr Chest Portable    Result Date: 3/28/2019  EXAMINATION: SINGLE XRAY VIEW OF THE CHEST 3/28/2019 2:01 pm COMPARISON: 03/08/2019 HISTORY: ORDERING SYSTEM PROVIDED HISTORY: cough/fatigue TECHNOLOGIST PROVIDED HISTORY: Reason for exam:->cough/fatigue Ordering Physician Provided Reason for Exam: cough/fatigue Acuity: Unknown Type of Exam: Unknown Additional signs and symptoms: discharged 3/23/19 FINDINGS: Normal heart size. Somewhat prominent pulmonary vasculature. Haziness at the lung bases may represent atelectasis or layering pleural fluid. No pneumothorax. Haziness at the lung bases may represent atelectasis or layering pleural fluid. Recommend obtaining PA and lateral chest radiographs for confirmation. Xr Chest Portable    Result Date: 3/8/2019  EXAMINATION: SINGLE XRAY VIEW OF THE CHEST 3/8/2019 9:55 pm COMPARISON: None. HISTORY: ORDERING SYSTEM PROVIDED HISTORY: sob TECHNOLOGIST PROVIDED HISTORY: Reason for exam:->sob Ordering Physician Provided Reason for Exam: SOB Acuity: Acute Type of Exam: Initial FINDINGS: Cardiomediastinal silhouette is within normal limits. Right midlung zone airspace opacification. No pulmonary vascular congestion or edema.   No pleural effusion. Right midlung zone airspace opacification could represent pneumonia. Follow-up is recommended to ensure resolution     Xr Chest 1 Vw    Result Date: 3/31/2019  EXAMINATION: SINGLE XRAY VIEW OF THE CHEST 3/31/2019 5:22 am COMPARISON: 2 days prior HISTORY: ORDERING SYSTEM PROVIDED HISTORY: intubation and post cardiac arrest, check ETT TECHNOLOGIST PROVIDED HISTORY: Reason for exam:->intubation and post cardiac arrest, check ETT Ordering Physician Provided Reason for Exam: intubation and post cardiac arrest, check ETT Acuity: Unknown Type of Exam: Unknown FINDINGS: Endotracheal tube is been placed, tip approximately 4 cm above the cyndi in appropriate position. Enteric tube tip and side port below diaphragm and stomach. There are diffuse hazy opacities over right greater than left lung, combination increasing effusions, atelectatic changes, underlying consolidation not excluded. Pulmonary vasculature is indistinct. Cardiomediastinal silhouette is stable. Increasing pleuroparenchymal opacities in both hemithoraces, right greater than left. Endotracheal and enteric tubes appear in appropriate position. Cta Neck W Contrast    Result Date: 3/31/2019  EXAMINATION: CTA OF THE NECK; CTA OF THE HEAD WITH CONTRAST; CT OF THE HEAD WITHOUT CONTRAST 3/31/2019 4:57 am; 3/31/2019 4:55 am: TECHNIQUE: CTA of the neck was performed with the administration of intravenous contrast. Multiplanar reformatted images are provided for review. MIP images are provided for review. Stenosis of the internal carotid arteries measured using NASCET criteria. Dose modulation, iterative reconstruction, and/or weight based adjustment of the mA/kV was utilized to reduce the radiation dose to as low as reasonably achievable.; CTA of the head/brain was performed with the administration of intravenous contrast. Multiplanar reformatted images are provided for review. MIP images are provided for review.  Dose modulation, iterative reconstruction, and/or weight based adjustment of the mA/kV was utilized to reduce the radiation dose to as low as reasonably achievable.; CT of the head was performed without the administration of intravenous contrast. Dose modulation, iterative reconstruction, and/or weight based adjustment of the mA/kV was utilized to reduce the radiation dose to as low as reasonably achievable. Noncontrast CT of the head with reconstructed 2-D images are also provided for review. COMPARISON: None. HISTORY: ORDERING SYSTEM PROVIDED HISTORY: r/o CVA TECHNOLOGIST PROVIDED HISTORY: Has a \"code stroke\" or \"stroke alert\" been called? ->Yes Ordering Physician Provided Reason for Exam: a\ms; ORDERING SYSTEM PROVIDED HISTORY: r/o CVA TECHNOLOGIST PROVIDED HISTORY: R/o CVA Has a \"code stroke\" or \"stroke alert\" been called? ->No Ordering Physician Provided Reason for Exam: ams; ORDERING SYSTEM PROVIDED HISTORY: unresponsive TECHNOLOGIST PROVIDED HISTORY: R/o hemorrhagic change Has a \"code stroke\" or \"stroke alert\" been called? ->No Ordering Physician Provided Reason for Exam: ams FINDINGS: CT HEAD: BRAIN/VENTRICLES:  No acute intracranial hemorrhage or extraaxial fluid collection. Grey-white differentiation is maintained. No evidence of mass, mass effect or midline shift. No evidence of hydrocephalus. There is prominence of the ventricles and sulci due to global parenchymal volume loss. ORBITS: The visualized portion of the orbits demonstrate no acute abnormality. SINUSES:  The visualized paranasal sinuses and mastoid air cells demonstrate no acute abnormality. SOFT TISSUES/SKULL: No acute abnormality of the visualized skull or soft tissues. CTA NECK: AORTIC ARCH/ARCH VESSELS: There is a normal branch pattern of the aortic arch. No significant stenosis is seen of the innominate artery or subclavian arteries. CAROTID ARTERIES: The common carotid arteries are normal in appearance without evidence of a flow limiting stenosis.  The internal carotid arteries are normal in appearance without evidence of a flow limiting stenosis by NASCET criteria. No dissection or arterial injury is seen. VERTEBRAL ARTERIES: Right vertebral artery is diminutive in caliber throughout. Left vertebral artery is dominant supplying basilar artery. SOFT TISSUES: The lung apices are clear. No cervical or superior mediastinal lymphadenopathy. There is endotracheal intubation. The parotid, submandibular and thyroid glands demonstrate no acute abnormality. BONES: The visualized osseous structures appear unremarkable. Large pleural effusions. CTA HEAD: ANTERIOR CIRCULATION: The internal carotid arteries are normal in course and caliber without focal stenosis. The anterior cerebral and middle cerebral arteries demonstrate no focal stenosis. POSTERIOR CIRCULATION: The posterior cerebral arteries demonstrate no focal stenosis. The vertebral and basilar arteries appear otherwise unremarkable. No CT evidence of acute territorial infarct. No significant stenosis visualized in the major intracranial arterial vasculature. Cervical carotid vasculature is patent. Diminutive right vertebral artery and widely patent left vertebral artery. Cta Pulmonary W Contrast    Result Date: 3/9/2019  EXAMINATION: CTA OF THE CHEST 3/9/2019 12:05 am TECHNIQUE: CTA of the chest was performed after the administration of intravenous contrast.  Multiplanar reformatted images are provided for review. MIP images are provided for review. Dose modulation, iterative reconstruction, and/or weight based adjustment of the mA/kV was utilized to reduce the radiation dose to as low as reasonably achievable. COMPARISON: None.  HISTORY: ORDERING SYSTEM PROVIDED HISTORY: quentin ulloa TECHNOLOGIST PROVIDED HISTORY: Ordering Physician Provided Reason for Exam: sob Acuity: Acute Type of Exam: Initial Mechanism of Injury: Patient arrived via [facility] with complaints of increasing fatigue to bilateral lower extremities Relevant Medical/Surgical History: isovue 370 80 ml FINDINGS: Pulmonary Arteries: The main pulmonary artery is normal in size. There is no large or central pulmonary embolism. The subsegmental pulmonary arteries are not well visualized secondary to the timing of the contrast bolus and respiratory motion. Mediastinum: The heart is normal in size. There is no pericardial effusion. The thoracic aorta is atherosclerotic, but not aneurysmal.  No mediastinal lymphadenopathy is demonstrated. Hilar lymph nodes measure up to 1.5 cm on the right. Lungs/pleura: There is mild emphysema. There are nodular and interstitial infiltrates within the bilateral lower and right middle lobes. No pleural effusion or pneumothorax is demonstrated. Upper Abdomen: Visualized portions of the upper abdomen demonstrate a 1.8 cm left renal cyst. Soft Tissues/Bones: No suspicious osteolytic or osteoblastic lesions are demonstrated. 1. No large or central pulmonary embolism. The subsegmental pulmonary arteries are not well evaluated secondary to respiratory motion and the timing of the contrast bolus. If there remains a high clinical concern for a small PE, a repeat exam is suggested. 2. Nodular infiltrates within the lower lungs, which are most likely infectious in etiology. Although less likely, malignancy cannot be excluded. A follow-up chest CT following antibiotic therapy in 6-12 weeks is recommended to document resolution. 3. Right hilar lymphadenopathy. This can be reassessed on the follow-up chest CT. Cta Head W Contrast    Result Date: 3/31/2019  EXAMINATION: CTA OF THE NECK; CTA OF THE HEAD WITH CONTRAST; CT OF THE HEAD WITHOUT CONTRAST 3/31/2019 4:57 am; 3/31/2019 4:55 am: TECHNIQUE: CTA of the neck was performed with the administration of intravenous contrast. Multiplanar reformatted images are provided for review. MIP images are provided for review.  Stenosis of the internal carotid arteries measured using NASCET criteria. Dose modulation, iterative reconstruction, and/or weight based adjustment of the mA/kV was utilized to reduce the radiation dose to as low as reasonably achievable.; CTA of the head/brain was performed with the administration of intravenous contrast. Multiplanar reformatted images are provided for review. MIP images are provided for review. Dose modulation, iterative reconstruction, and/or weight based adjustment of the mA/kV was utilized to reduce the radiation dose to as low as reasonably achievable.; CT of the head was performed without the administration of intravenous contrast. Dose modulation, iterative reconstruction, and/or weight based adjustment of the mA/kV was utilized to reduce the radiation dose to as low as reasonably achievable. Noncontrast CT of the head with reconstructed 2-D images are also provided for review. COMPARISON: None. HISTORY: ORDERING SYSTEM PROVIDED HISTORY: r/o CVA TECHNOLOGIST PROVIDED HISTORY: Has a \"code stroke\" or \"stroke alert\" been called? ->Yes Ordering Physician Provided Reason for Exam: a\ms; ORDERING SYSTEM PROVIDED HISTORY: r/o CVA TECHNOLOGIST PROVIDED HISTORY: R/o CVA Has a \"code stroke\" or \"stroke alert\" been called? ->No Ordering Physician Provided Reason for Exam: ams; ORDERING SYSTEM PROVIDED HISTORY: unresponsive TECHNOLOGIST PROVIDED HISTORY: R/o hemorrhagic change Has a \"code stroke\" or \"stroke alert\" been called? ->No Ordering Physician Provided Reason for Exam: ams FINDINGS: CT HEAD: BRAIN/VENTRICLES:  No acute intracranial hemorrhage or extraaxial fluid collection. Grey-white differentiation is maintained. No evidence of mass, mass effect or midline shift. No evidence of hydrocephalus. There is prominence of the ventricles and sulci due to global parenchymal volume loss. ORBITS: The visualized portion of the orbits demonstrate no acute abnormality.  SINUSES:  The visualized paranasal sinuses and mastoid air cells Fluoroscopy time 2.7 minutes, dose 8.80 mGy COMPARISON: None HISTORY: ORDERING SYSTEM PROVIDED HISTORY: aspiration TECHNOLOGIST PROVIDED HISTORY: Reason for exam:->aspiration Initial evaluation. FINDINGS: Early bolus spillover into the valleculae and piriform sinuses. Residue predominantly in the piriform sinuses, less prominently involving the valleculae. Deep laryngeal penetration with thin liquids. No definite tracheal aspiration. 1. Deep laryngeal penetration with thin liquids but no definite tracheal aspiration. 2. Early bolus spillover and predominantly piriform sinus residue. Please see separate speech pathology report for full discussion of findings and recommendations.        Medications Reviewed    Electronically signed by: Kashmir Banegas MD 4/4/2019 12:07 PM

## 2019-04-04 NOTE — PROGRESS NOTES
Output 805 ml   Net -178.74 ml     Physical Exam: pt intubated and sedated  Constitutional:  Well developed, Well nourished, No acute distress, Non-toxic appearance. HENT:  Normocephalic, Atraumatic, Bilateral external ears normal, Oropharynx moist, No oral exudates, Nose normal. Neck- Normal range of motion, No tenderness, Supple, No stridor. Eyes:  PERRL, EOMI, Conjunctiva normal, No discharge. Respiratory:  Normal breath sounds, No respiratory distress, No wheezing, No chest tenderness. Cardiovascular:  Normal heart rate, Normal rhythm, No murmurs, No rubs, No gallops, JVP not elevated  Abdomen/GI:  Bowel sounds normal, Soft, No tenderness, No masses, No pulsatile masses. Musculoskeletal:  Intact distal pulses, No edema, No tenderness, No cyanosis, No clubbing. Good range of motion in all major joints. No tenderness to palpation or major deformities noted. Back- No tenderness. Integument:  Warm, Dry, No erythema, No rash. Lymphatic:  No lymphadenopathy noted. Neurologic: intubated and sedated Normal motor function, Normal sensory function, No focal deficits noted.    Psychiatric:  Affect  and  Mood :no change    Medications:    potassium chloride  20 mEq Intravenous Q1H    pantoprazole  40 mg Intravenous BID    chlorhexidine  15 mL Mouth/Throat BID    piperacillin-tazobactam  3.375 g Intravenous Q8H    mupirocin   Nasal BID    clopidogrel  75 mg Oral Daily    enoxaparin  40 mg Subcutaneous Daily    gabapentin  400 mg Oral BID    latanoprost  1 drop Both Eyes Nightly    simvastatin  40 mg Oral Nightly    sodium chloride flush  10 mL Intravenous 2 times per day    aspirin  81 mg Oral Daily    ipratropium-albuterol  1 ampule Inhalation Q4H WA    sodium chloride flush  10 mL Intravenous 2 times per day    insulin lispro  0-12 Units Subcutaneous TID WC    insulin lispro  0-6 Units Subcutaneous Nightly    insulin glargine  5 Units Subcutaneous Nightly      dexmedetomidine (PRECEDEX)

## 2019-04-04 NOTE — OP NOTE
portion with bile suctioned. Visualization of the duodenal bulb was normal.  The second portion of the duodenum was normal.    The scope was then withdrawn back into the stomach, it was decompressed, and the scope was completely withdrawn. The patient tolerated the procedure well and was taken to the post anesthesia care unit in good condition. Complications: None  Estimated Blood Loss: <5cc  Specimens: biopsies were not obtained    Impression:    Nonbleeding reflux esophagitis  Nonbleeding esophageal NG trauma (suspect this as cause for bleeding)  Otherwise normal EGD with no active bleeding or any old heme seen on exam.      Recommendations:   Continue bid PPI  Okay to resume any antiplatelet agents. Okay to resume anticoagulation cautiously.       Sha Taveras MD  [facility]  4/4/2019  12:59 PM

## 2019-04-04 NOTE — PROGRESS NOTES
04/03/19 2053   Vent Information   Vent Type 980   Vent Mode AC/VC   Vt Ordered 400 mL   Rate Set 12 bmp   Peak Flow 45 L/min   Pressure Support 0 cmH20   FiO2  40 %   Sensitivity 3   PEEP/CPAP 5   I Time/ I Time % 0 s   Humidification Source HME   Vent Patient Data   High Peep/I Pressure 0   Peak Inspiratory Pressure 21 cmH2O   Mean Airway Pressure 8 cmH20   Rate Measured 12 br/min   Vt Exhaled 360 mL   Minute Volume 4.3 Liters   I:E Ratio 1:4.20   Plateau Pressure 14 [id number]   Static Compliance 37 mL/cmH2O   Dynamic Compliance 18 mL/cmH2O   Cough/Sputum   Sputum How Obtained Endotracheal;Suctioned   $Obtained Sample $Nasotracheal Suction   Cough Productive   Sputum Amount Large   Sputum Color Cloudy   Spontaneous Breathing Trial (SBT) RT Doc   Pulse 66   SpO2 100 %   Additional Respiratory  Assessments   Resp 14   Position Semi-Dubose's   Alarm Settings   High Pressure Alarm 40 cmH2O   Low Minute Volume Alarm 2.5 L/min   Apnea (secs) 20 secs   High Respiratory Rate 35 br/min   Low Exhaled Vt  250 mL   ETT (adult)   Placement Date/Time: 03/31/19 (c) 9914   Preoxygenation: Yes  Mask Ventilation: Ventilated by mask with oral airway (2)  Technique: Direct laryngoscopy  Tube Size: 8 mm  Laryngoscope: Landaverde  Blade Size: 2  Grade View: Full view of the glottis  Insert. ..    Secured at 24 cm   Measured From Lips   ET Placement Right   Secured By Commercial tube odell   Site Condition Dry

## 2019-04-04 NOTE — ANESTHESIA PRE PROCEDURE
Department of Anesthesiology  Preprocedure Note       Name:  Wiliam Post   Age:  80 y.o.  :  7/15/1933                                          MRN:  [id number]         Date:  2019      Surgeon: Eliana Love):  Parth Claudio MD    Procedure: EGD ESOPHAGOGASTRODUODENOSCOPY (N/A )    Medications prior to admission:   Prior to Admission medications    Medication Sig Start Date End Date Taking? Authorizing Provider   simvastatin (ZOCOR) 80 MG tablet Take 40 mg by mouth nightly    Historical Provider, MD   omeprazole (PRILOSEC) 20 MG delayed release capsule Take 20 mg by mouth Daily with supper     Historical Provider, MD   metFORMIN (GLUCOPHAGE) 500 MG tablet Take 250 mg by mouth 2 times daily (with meals)     Historical Provider, MD   gabapentin (NEURONTIN) 400 MG capsule Take 400 mg by mouth 2 times daily.     Historical Provider, MD   latanoprost (XALATAN) 0.005 % ophthalmic solution Place 1 drop into both eyes nightly    Historical Provider, MD       Current medications:    Current Facility-Administered Medications   Medication Dose Route Frequency Provider Last Rate Last Dose    racepinephrine HCl (VAPONEFPRIN) 2.25 % nebulizer solution NEBU 11.25 mg  11.25 mg Nebulization Q20 Min PRN Shai Hutchinson MD        sodium chloride nebulizer 0.9 % solution 3 mL  3 mL Nebulization Q4H PRN Shai Hutchinson MD        pantoprazole (PROTONIX) injection 40 mg  40 mg Intravenous BID Hafsa Keenan MD   40 mg at 19 0829    dexmedetomidine (PRECEDEX) 400 mcg in sodium chloride 0.9 % 100 mL infusion  0.2 mcg/kg/hr Intravenous Continuous Shai Hutchinson MD 3.6 mL/hr at 19 1159 0.2 mcg/kg/hr at 19 1159    chlorhexidine (PERIDEX) 0.12 % solution 15 mL  15 mL Mouth/Throat BID Nikolas Ibarra MD   15 mL at 19 0829    phenylephrine (BRIAN-SYNEPHRINE) 50 mg in dextrose 5 % 250 mL infusion  50 mcg/min Intravenous Continuous Trang Guthrie MD 15 mL/hr at 19 1140 50 mcg/min at 19 1140    EPINEPHrine (EPINEPHrine HCL) 5 mg in dextrose 5 % 250 mL infusion  1 mcg/min Intravenous Continuous Nikolas Ibarra MD   Stopped at 03/31/19 0505    propofol 1000 MG/100ML injection  10 mcg/kg/min Intravenous Titrated Shai Hutchinson MD 8.1 mL/hr at 04/04/19 1045 20 mcg/kg/min at 04/04/19 1045    piperacillin-tazobactam (ZOSYN) 3.375 g in dextrose 50 mL IVPB extended infusion (premix)  3.375 g Intravenous Q8H Hafsa Keenan MD 12.5 mL/hr at 04/04/19 0829 3.375 g at 04/04/19 0829    dextrose 5 % solution   Intravenous Continuous Hafsa Keenan MD 50 mL/hr at 04/04/19 1044      mupirocin (BACTROBAN) 2 % ointment   Nasal BID Hafsa Keenan MD        [facility] [place] by provider] clopidogrel (PLAVIX) tablet 75 mg  75 mg Oral Daily Jen Hernadez MD   75 mg at 03/31/19 0941    enoxaparin (LOVENOX) injection 40 mg  40 mg Subcutaneous Daily Jen Hernadez MD   Stopped at 04/04/19 0850    gabapentin (NEURONTIN) capsule 400 mg  400 mg Oral BID Hafsa Keenan MD   Stopped at 04/04/19 0850    latanoprost (XALATAN) 0.005 % ophthalmic solution 1 drop  1 drop Both Eyes Nightly Hafsa Keenan MD   1 drop at 04/03/19 2045    simvastatin (ZOCOR) tablet 40 mg  40 mg Oral Nightly Hafsa Keenan MD   40 mg at 04/03/19 2041    glucose (GLUTOSE) 40 % oral gel 15 g  15 g Oral PRN Hafsa Keenan MD        dextrose 50 % solution 12.5 g  12.5 g Intravenous PRN Hafsa Keenan MD        glucagon (rDNA) injection 1 mg  1 mg Intramuscular PRN Hafsa Keenan MD        dextrose 5 % solution  100 mL/hr Intravenous PRN Hafsa Keenan MD        sodium chloride flush 0.9 % injection 10 mL  10 mL Intravenous 2 times per day Hafsa Keenan MD   10 mL at 04/04/19 0830    sodium chloride flush 0.9 % injection 10 mL  10 mL Intravenous PRN Hafsa Keenan MD   10 mL at 04/02/19 0125    magnesium hydroxide (MILK OF MAGNESIA) 400 MG/5ML suspension 30 mL  30 mL Oral Daily PRN Hafsase Shlomo MD        ondansetron [facility] injection 4 mg  4 mg Intravenous Q6H PRN Ed Killian MD        [facility] aspirin chewable tablet 81 mg  81 mg Oral Daily Will MD Maria D   81 mg at 04/01/19 0851    ipratropium-albuterol (DUONEB) nebulizer solution 1 ampule  1 ampule Inhalation Q4H WA Ed Killian MD   1 ampule at 04/04/19 1101    sodium chloride flush 0.9 % injection 10 mL  10 mL Intravenous 2 times per day CARL Jauregui CNP   10 mL at 04/04/19 0830    sodium chloride flush 0.9 % injection 10 mL  10 mL Intravenous PRN CARL Jauregui CNP        acetaminophen (TYLENOL) tablet 650 mg  650 mg Oral Q4H PRN CARL Baker CNP   650 mg at 03/28/19 2151    insulin lispro (HUMALOG) injection vial 0-12 Units  0-12 Units Subcutaneous TID WC CARL Baker CNP   2 Units at 04/03/19 0939    insulin lispro (HUMALOG) injection vial 0-6 Units  0-6 Units Subcutaneous Nightly CARL Baker CNP   1 Units at 04/01/19 2328    insulin glargine (LANTUS) injection vial 5 Units  5 Units Subcutaneous Nightly CARL Baker CNP   5 Units at 04/01/19 2328       Allergies:  No Known Allergies    Problem List:    Patient Active Problem List   Diagnosis Code    Community acquired pneumonia due to influenza A virus J09. X1    Pneumonia J18.9    Essential hypertension I10    Type 2 diabetes mellitus with diabetic polyneuropathy, without long-term current use of insulin ([facility]) E11.42    KERA (acute kidney injury) ([facility] N17.9    Oropharyngeal dysphagia R13.12    NSTEMI (non-ST elevated myocardial infarction) ([facility]) I21.4    Fatigue R53.83       Past Medical History:        Diagnosis Date    Diabetes mellitus ([facility] [facility]     Hypertension        Past Surgical History:  History reviewed. No pertinent surgical history.     Social History:    Social History     Tobacco Use    Smoking status: Never Smoker    Smokeless tobacco: Never Used   Substance Use Topics    Alcohol use: Never     Frequency: Never Counseling given: Not Answered      Vital Signs (Current):   Vitals:    04/04/19 0935 04/04/19 0942 04/04/19 1002 04/04/19 1102   BP: (!) 93/38 (!) 99/42 (!) 111/44    Pulse: 53 52 50 57   Resp: 12 12 12 12   Temp:       TempSrc:       SpO2: 100% 100% 100% 100%   Weight:       Height:                                                  BP Readings from Last 3 Encounters:   04/04/19 (!) 111/44   03/23/19 (!) 126/59   03/12/19 132/78       NPO Status:                                                                                 BMI:   Wt Readings from Last 3 Encounters:   04/03/19 158 lb 12.8 oz (72 kg)   03/23/19 154 lb 9.6 oz (70.1 kg)   03/12/19 154 lb 12.8 oz (70.2 kg)     Body mass index is 22.79 kg/m².     CBC:   Lab Results   Component Value Date    WBC 7.3 04/04/2019    RBC 3.07 04/04/2019    HGB 8.8 04/04/2019    HCT 29.9 04/04/2019    MCV 97.4 04/04/2019    RDW 15.2 04/04/2019     04/04/2019       CMP:   Lab Results   Component Value Date     04/04/2019    K 3.1 04/04/2019    CL 98 04/04/2019    CO2 35 04/04/2019    BUN 17 04/04/2019    CREATININE 1.2 04/04/2019    GFRAA >60 04/04/2019    LABGLOM 58 04/04/2019    GLUCOSE 90 04/04/2019    PROT 5.1 04/04/2019    CALCIUM 8.3 04/04/2019    BILITOT 0.5 04/04/2019    ALKPHOS 102 04/04/2019    AST 13 04/04/2019    ALT <5 04/04/2019       POC Tests:   Recent Labs     04/04/19  0840   POCGLU 84       Coags:   Lab Results   Component Value Date    PROTIME 12.1 03/31/2019    INR 1.04 03/31/2019    APTT 35.5 04/04/2019       HCG (If Applicable): No results found for: PREGTESTUR, PREGSERUM, HCG, HCGQUANT     ABGs:   Lab Results   Component Value Date    PO2ART 119 04/04/2019    WMR2ZSG 55.0 04/04/2019    ERA9ERL 40.0 04/04/2019        Type & Screen (If Applicable):  No results found for: LABABO, [address]    Anesthesia Evaluation  Patient summary reviewed and Nursing notes reviewed no history of anesthetic complications:   Airway:        Comment: RICHI pt is already intubated and on ventilator, sedated     Dental:      Comment: Poor dentition with multiple missing teeth. Unable to obtain full exam re: pt is intubated and sedated    Pulmonary:   (+) pneumonia (Intubated/ventilated): unresolved,  decreased breath sounds,                             Cardiovascular:  Exercise tolerance: poor (<4 METS),   (+) hypertension:, past MI:,         Rhythm: regular  Rate: abnormal           Beta Blocker:  No for medical reason      ROS comment: ECHO:  Summary   Left ventricular systolic function is normal.   Ejection fraction is visually estimated at 60-65%.   No regional wall motion abnormalities were detected.   D-shaped septum consistent with elevated right sided pressures.   Severely dilated right atrium.   Moderately dilated right ventricle.   Increased right ventricle wall thickness.   Bicuspid aortic valve. 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   Moderate-to-severe tricuspid regurgitation.  RVSP is 71 mmHg.   Severe Pulmonary HTN.   Trivial pericardial effusion.   Pleural effusion present. PE comment: SB, on phenylephrine gtt @ 50 mcg/min   Neuro/Psych:   (+) neuromuscular disease:,             GI/Hepatic/Renal:   (+) renal disease: ARF,          ROS comment: Oropharyngeal dysphagia. Endo/Other:    (+) DiabetesType II DM, , .                 Abdominal:           Vascular:                                      Anesthesia Plan      MAC     ASA 4 - emergent     (Pt is already intubated and sedated. Will provide light sedation in addition and will monitor hemodynamic status during procedure)  Induction: intravenous. Anesthetic plan and risks discussed with spouse. Plan discussed with CRNA.     Attending anesthesiologist reviewed and agrees with Pre Eval content              CARL Mohr - CRNA   4/4/2019

## 2019-04-04 NOTE — PROGRESS NOTES
04/04/19 0050   Vent Information   Vent Type 980   Vent Mode AC/VC   Vt Ordered 400 mL   Rate Set 12 bmp   Peak Flow 45 L/min   Pressure Support 0 cmH20   FiO2  40 %   Sensitivity 3   PEEP/CPAP 5   I Time/ I Time % 0 s   Humidification Source HME   Vent Patient Data   High Peep/I Pressure 0   Peak Inspiratory Pressure 22 cmH2O   Mean Airway Pressure 8.1 cmH20   Rate Measured 12 br/min   Vt Exhaled 356 mL   Minute Volume 4.26 Liters   I:E Ratio 1:4.20   Spontaneous Breathing Trial (SBT) RT Doc   Pulse 68   SpO2 100 %   Additional Respiratory  Assessments   Resp 12   Position Semi-Dubose's   Alarm Settings   High Pressure Alarm 40 cmH2O   Low Minute Volume Alarm 2.5 L/min   Apnea (secs) 20 secs   High Respiratory Rate 35 br/min   Low Exhaled Vt  250 mL   ETT (adult)   Placement Date/Time: 03/31/19 (c) 2349   Preoxygenation: Yes  Mask Ventilation: Ventilated by mask with oral airway (2)  Technique: Direct laryngoscopy  Tube Size: 8 mm  Laryngoscope: Landaverde  Blade Size: 2  Grade View: Full view of the glottis  Insert. ..    Secured at 24 cm   ET Placement Right   Secured By Commercial tube odell   Site Condition Dry

## 2019-04-04 NOTE — CARE COORDINATION
[address] Transitions Interview     2019    Patient: Wiliam Post Patient : 7/15/1933   MRN: [id number]  Reason for Admission:   RARS: Readmission Risk Score: 22         Spoke with:   patient      Readmission Risk  Patient Active Problem List   Diagnosis    Community acquired pneumonia due to influenza A virus    Pneumonia    Essential hypertension    Type 2 diabetes mellitus with diabetic polyneuropathy, without long-term current use of insulin ([facility]    KERA (acute kidney injury) ([facility] [facility]    Oropharyngeal dysphagia    NSTEMI (non-ST elevated myocardial infarction) ([facility]    Fatigue       Inpatient Assessment  Care Transitions Summary    Care Transitions Inpatient Review  Medication Review  Are you able to afford your medications?:  Yes  How often do you have difficulty taking your medications?:  I always take them as prescribed.   Housing Review  Who do you live with?:  Partner/Spouse/SO  Are you an active caregiver in your home?:  No  Social Support  Do you have a ?:  No  Do you have a [address] [place] [address]?:  Yes  [facility] Name:   [facility] services [facility] [address] [place]:  [address] [place] [address]  Patient DME:  Other, Shower chair, Walker, Quad cane  Other Patient DME:  grab bars; stair lift  Patient Home Equipment:  Oxygen  Functional Review  Ability to seek help/take action for Emergent/Urgent situations i.e. fire, crime, inclement weather or health crisis.:  Needs Assistance  Ability handle personal hygiene needs (bathing/dressing/grooming):  Needs Assistance  Ability to manage medications:  Dependent  Ability to prepare food:  Dependent  Ability to maintain home (clean home, laundry):  Dependent  Ability to drive and/or has transportation:  Dependent  Ability to do shopping:  Dependent  Ability to manage finances:  Needs Assistance  Is patient able to live independently?:  Yes  Hearing and Vision  Visual Impairment:  None  Hearing Impairment:  None  Care Transitions Interventions  No Identified Needs         Follow Up:  Patient is resting in room with no family present. T.C. To patient's spouse. Re-oriented to the role of Care Transition with CTC contact information given. Patient's spouse reports that patient has been difficult to rouse. Reports that she is considering SNF placement for patient at discharge for short term rehab. Patient's spouse reports that she was given a list of facilities for review; but has not selected a facility at this point. Patient spouse is agreeable to update CM to confirm discharge plan. Denies any questions or concerns. Confirmed CTC contact information. Encouraged call back if patient needs arise. Future Appointments   Date Time Provider Elisabeth Metzger   4/5/2019 12:05 AM [facility] C-ARM 1 [facility] RAD [address] Maintenance  There are no preventive care reminders to display for this patient.     Joie Hwang RN

## 2019-04-04 NOTE — PROGRESS NOTES
pulmonary      SUBJECTIVE:  Intubated on vent,large amount of secretions thru et tube     OBJECTIVE    VITALS:  BP (!) 111/44   Pulse 57   Temp 96.8 °F (36 °C) (Rectal)   Resp 12   Ht 5' 10\" (1.778 m)   Wt 158 lb 12.8 oz (72 kg)   SpO2 100%   BMI 22.79 kg/m²   HEAD AND FACE EXAM:  No throat injection, no active exudate,no thrush  NECK EXAM;No JVD, no masses, symmetrical  CHEST EXAM; Expansion equal and symmetrical, no masses  LUNG EXAM; Good breath sounds bilaterally. There are expiratory wheezes both lungs, there are crackles at both lung bases  CARDIOVASCULAR EXAM: Positive S1 and S2, no S3 or S4, no clicks ,no murmurs  RIGHT AND LEFT LOWER EXTRIMITY EXAM: No edema, no swelling, no inflamation            LABS   Lab Results   Component Value Date    WBC 7.3 04/04/2019    HGB 8.8 (L) 04/04/2019    HCT 29.9 (L) 04/04/2019    MCV 97.4 04/04/2019     04/04/2019     Lab Results   Component Value Date    CREATININE 1.2 04/04/2019    BUN 17 04/04/2019     04/04/2019    K 3.1 (L) 04/04/2019    CL 98 (L) 04/04/2019    CO2 35 (H) 04/04/2019     Lab Results   Component Value Date    INR 1.04 03/31/2019    PROTIME 12.1 03/31/2019        No results found for: PHOS     Recent Labs     04/02/19  2030 04/03/19  0600 04/04/19  0600   PH 7.65* 7.43 7.47*   PO2ART 88 101* 119*   RHD4IRZ 33.0 59.0* 55.0*   O2SAT 95.4* 95.4* 96.2         Wt Readings from Last 3 Encounters:   04/03/19 158 lb 12.8 oz (72 kg)   03/23/19 154 lb 9.6 oz (70.1 kg)   03/12/19 154 lb 12.8 oz (70.2 kg)               ASSESMENT  Ac resp failure  Pneumonia  Ac bronchospasm  nicanor pl effusion        PLAN  1. cpm  2.  Cont full vent support  3. egd today    4/4/2019  Shannen Gallegos M.D.

## 2019-04-04 NOTE — PROGRESS NOTES
04/04/19 0418   Vent Information   Vent Type 980   Vent Mode AC/VC   Vt Ordered 400 mL   Rate Set 12 bmp   Peak Flow 45 L/min   Pressure Support 0 cmH20   FiO2  40 %   Sensitivity 3   PEEP/CPAP 5   I Time/ I Time % 0 s   Humidification Source HME   Vent Patient Data   High Peep/I Pressure 0   Peak Inspiratory Pressure 20 cmH2O   Mean Airway Pressure 7.9 cmH20   Rate Measured 12 br/min   Vt Exhaled 355 mL   Minute Volume 4.26 Liters   I:E Ratio 1:4.20   Spontaneous Breathing Trial (SBT) RT Doc   Pulse 56   SpO2 100 %   Additional Respiratory  Assessments   Resp 12   Position Semi-Dubose's   Alarm Settings   High Pressure Alarm 40 cmH2O   Low Minute Volume Alarm 2.5 L/min   Apnea (secs) 20 secs   High Respiratory Rate 35 br/min   Low Exhaled Vt  250 mL   ETT (adult)   Placement Date/Time: 03/31/19 (c) 6605   Preoxygenation: Yes  Mask Ventilation: Ventilated by mask with oral airway (2)  Technique: Direct laryngoscopy  Tube Size: 8 mm  Laryngoscope: Landaverde  Blade Size: 2  Grade View: Full view of the glottis  Insert. ..    Secured at 24 cm   Measured From Lips   ET Placement Right   Secured By Commercial tube odell   Site Condition Dry

## 2019-04-05 PROBLEM — E43 SEVERE MALNUTRITION (HCC): Status: ACTIVE | Noted: 2019-01-01

## 2019-04-05 NOTE — ANESTHESIA POSTPROCEDURE EVALUATION
Department of Anesthesiology  Postprocedure Note    Patient: Wiliam Post  MRN: [id number]  YOB: 1933  Date of evaluation: 4/4/2019  Time:  11:49 PM     Procedure Summary     Date:  04/04/19 Room / Location:  [address] [place] [address] ENDOSCOPY    Anesthesia Start:  0449 Anesthesia Stop:  1310    Procedure:  EGD DIAGNOSTIC ONLY (N/A ) Diagnosis:  (gi bleed)    Surgeon:  Parth Cluadio MD Responsible Provider:  Jesus Pablo MD    Anesthesia Type:  MAC ASA Status:  4 - Emergent          Anesthesia Type: MAC    [place]bin[place] Phase I:      Obinna Phase II:      Last vitals: Reviewed and per EMR flowsheets.        Anesthesia Post Evaluation    Patient location during evaluation: ICU  Patient participation: complete - patient cannot participate  Level of consciousness: awake and alert  Pain score: 0  Airway patency: patent  Nausea & Vomiting: no nausea and no vomiting  Complications: no  Cardiovascular status: blood pressure returned to baseline  Respiratory status: intubated and acceptable  Hydration status: euvolemic

## 2019-04-05 NOTE — PROGRESS NOTES
Patient started coughing when meds and water was pushed through ng tube with he syringe.   Looked at cxr and saw ng tube positioned in upper stomach, so ng tube was advanced to 70 cm from 65 cm

## 2019-04-05 NOTE — PROGRESS NOTES
Nutrition Assessment (Enteral Nutrition)    Type and Reason for Visit: Reassess    Nutrition Recommendations:   · Continue current EN at 50 mL/hr    Nutrition Assessment: Pt is now at 50 mL/hr. Spoke with RN and he did not receive the Kefir yesterday. Please continue current EN at 50 mL/hr. Current EN with the addition of propofol and Kefir will provide the pt with 1554 kcal and 116 g of protein per day    Malnutrition Assessment:  · Malnutrition Status: Meets the criteria for severe malnutrition  · Context: Chronic illness  · Findings of the 6 clinical characteristics of malnutrition (Minimum of 2 out of 6 clinical characteristics is required to make the diagnosis of moderate or severe Protein Calorie Malnutrition based on AND/ASPEN Guidelines):  1. Energy Intake-Less than or equal to 75% of estimated energy requirement, Greater than or equal to 7 days    2. Weight Loss-2% loss or greater, in 1 week  3. Fat Loss-Severe subcutaneous fat loss, Orbital  4. Muscle Loss-Severe muscle mass loss, Clavicles (pectoralis and deltoids)  5. Fluid Accumulation-No significant fluid accumulation, Extremities  6.   Strength-Not measured    Nutrition Risk Level: High    Nutrition Needs:  · Estimated Daily Total Kcal: 1392 based on [place] state 2003b  · Estimated Daily Protein (g): 113-150 based on 1.5-2 g/kg/IBW  · Estimated Daily Fluid (ml/day): 1392 based on 1 mL/kcal    Nutrition Diagnosis:   · Problem: Severe malnutrition, In context of chronic illness  · Etiology: related to Insufficient energy/nutrient consumption     Signs and symptoms:  as evidenced by Severe loss of subcutaneous fat, Severe muscle loss, Weight loss greater than or equal to 2% in 1 week    Objective Information:  · Wound Type: None  · Current Nutrition Therapies:  · Oral Diet Orders: NPO   · Tube Feeding (TF) Orders:   · Feeding Route: Nasogastric  · Formula: Low Calorie, High Protein  · Rate (ml/hr):50    · Volume (ml/day): 1200  · Duration: Continuous  · Current TF & Flush Orders Provides: 1200 kcal and 105 g of protein  · Additional Calories: 214 kcal from propofol  · Anthropometric Measures:  · Ht: 5' 10\" (177.8 cm)   · Current Body Wt: 158 lb (71.7 kg)  · Admission Body Wt: 149 lb (67.6 kg)  · Usual Body Wt: 154 lb (69.9 kg)  · Weight Change: -1.9% in one week   · Ideal Body Wt: 166 lb (75.3 kg), % Ideal Body 95%  · BMI Classification: BMI 18.5 - 24.9 Normal Weight    Nutrition Interventions:   Modify current Tube Feeding  Continued Inpatient Monitoring, Education Not Indicated, Coordination of Care    Nutrition Evaluation:   · Evaluation: Progressing toward goals   · Goals: pt will consume greater than 75% of his meals and supplements provided   · Monitoring: Weight, Pertinent Labs, Monitor Hemodynamic Status, Nutrition Progression, TF Intake, TF Tolerance      Electronically signed by Diana Rapp RD, LD on 4/5/89 at 10:16 AM    Contact Number: [phone]

## 2019-04-05 NOTE — PROGRESS NOTES
303.58 ml     Physical Exam: pt intubated and sedated  Constitutional:  Well developed, Well nourished, No acute distress, Non-toxic appearance. HENT:  Normocephalic, Atraumatic, Bilateral external ears normal, Oropharynx moist, No oral exudates, Nose normal. Neck- Normal range of motion, No tenderness, Supple, No stridor. Eyes:  PERRL, EOMI, Conjunctiva normal, No discharge. Respiratory:  Normal breath sounds, No respiratory distress, No wheezing, No chest tenderness. Cardiovascular:  Normal heart rate, Normal rhythm, No murmurs, No rubs, No gallops, JVP not elevated  Abdomen/GI:  Bowel sounds normal, Soft, No tenderness, No masses, No pulsatile masses. Musculoskeletal:  Intact distal pulses, No edema, No tenderness, No cyanosis, No clubbing. Good range of motion in all major joints. No tenderness to palpation or major deformities noted. Back- No tenderness. Integument:  Warm, Dry, No erythema, No rash. Lymphatic:  No lymphadenopathy noted. Neurologic: intubated and sedated Normal motor function, Normal sensory function, No focal deficits noted.    Psychiatric:  Affect  and  Mood :no change    Medications:    pantoprazole  40 mg Intravenous BID    chlorhexidine  15 mL Mouth/Throat BID    piperacillin-tazobactam  3.375 g Intravenous Q8H    clopidogrel  75 mg Oral Daily    enoxaparin  40 mg Subcutaneous Daily    gabapentin  400 mg Oral BID    latanoprost  1 drop Both Eyes Nightly    simvastatin  40 mg Oral Nightly    sodium chloride flush  10 mL Intravenous 2 times per day    aspirin  81 mg Oral Daily    ipratropium-albuterol  1 ampule Inhalation Q4H WA    sodium chloride flush  10 mL Intravenous 2 times per day    insulin lispro  0-12 Units Subcutaneous TID WC    insulin lispro  0-6 Units Subcutaneous Nightly    insulin glargine  5 Units Subcutaneous Nightly      dexmedetomidine (PRECEDEX) IV infusion 0.2 mcg/kg/hr (04/02/19 4052)    phenylephrine (BRIAN-SYNEPHRINE) 50mg/250mL infusion 50 mcg/min (04/05/19 0516)    EPINEPHrine Stopped (03/31/19 0505)    propofol 20 mcg/kg/min (04/05/19 0429)    dextrose       racepinephrine HCl, sodium chloride nebulizer, glucose, dextrose, glucagon (rDNA), dextrose, sodium chloride flush, magnesium hydroxide, ondansetron, sodium chloride flush, acetaminophen    Lab Data:  CBC:   Recent Labs     04/03/19  0515 04/04/19  0400 04/05/19  0530   WBC  --  7.3 7.4   HGB 8.4* 8.8* 8.4*   HCT 28.6* 29.9* 28.8*   MCV  --  97.4 97.3    200 176     BMP:   Recent Labs     04/02/19  1203 04/04/19  0400 04/05/19  0440    139 142   K 3.6 3.1* 3.5    98* 100   CO2 36* 35* 35*   BUN 17 17 18   CREATININE 1.4* 1.2 1.1     PT/INR:   No results for input(s): PROTIME, INR in the last 72 hours. BNP:    No results for input(s): PROBNP in the last 72 hours. TROPONIN:   No results for input(s): TROPONINT in the last 72 hours. ECHO :   echocardiogram    Assessment:  80 y. o.year old who is admitted for  Chief Complaint   Patient presents with    Fatigue    , active issues as noted below:  Impression:  Principal Problem:    NSTEMI (non-ST elevated myocardial infarction) ([facility]  Active Problems:    Fatigue  Resolved Problems:    * No resolved hospital problems. *            All labs, medications and tests reviewed by myself , continue all other medications of all above medical condition listed as is except for changes mentioned above. Thank you very much for consult , please call with questions.     Jen Hernadez MD 4/5/2019 9:28 AM

## 2019-04-05 NOTE — PROGRESS NOTES
[facility]        Progress Note       2019  6:37 PM    Patient:    Monik Richardson  : 7/15/1933   80 y.o. MRN: [id number]  Admitted: 3/28/2019  1:13 PM ATT: Hetal Sun MD   [id number]  AdmitDx: Cough [R05]  General weakness [R53.1]  Serum creatinine raised [R79.89]  NSTEMI (non-ST elevated myocardial infarction) ([facility] [I21.4]  Troponin level elevated [R74.8]  Elevated brain natriuretic peptide (BNP) level [R79.89]  Acute electrocardiogram changes [R94.31]  Fatigue, unspecified type [R53.83]  Congestive heart failure, unspecified HF chronicity, unspecified heart failure type ([facility] [I50.9]  PCP: No primary care provider on file. SUBJECTIVE:  Chart reviewed, events noted  Patient intubated and sedated. Tube feeds almost at goal.  No high residuals. No BM. No signs of bleeding. Wife at bedside. ROS:Unable to obtian  OBJECTIVE:      BP (!) 115/55   Pulse 79   Temp 98.8 °F (37.1 °C) (Rectal)   Resp 14   Ht 5' 10\" (1.778 m)   Wt 158 lb 12.8 oz (72 kg)   SpO2 100%   BMI 22.79 kg/m²     Intubated and sedated. Lips and mucous membranes pink and moist, +NG  RRR, Nl s1s2,  Lungs CTA bilaterally, respirations even and unlabored, ventilator   Abdomen soft, ND, bowel sounds normal  Skin pink, warm and dry  No edema bilateral lower extremities     CBC:   Recent Labs     1915 190 19  0530   WBC  --  7.3 7.4   HGB 8.4* 8.8* 8.4*    200 176     BMP:    Recent Labs     19  0400 19  0440    142   K 3.1* 3.5   CL 98* 100   CO2 35* 35*   BUN 17 18   CREATININE 1.2 1.1   GLUCOSE 90 203*     Magnesium:   Lab Results   Component Value Date    MG 2.3 2019     Hepatic:   Recent Labs     19  0400   AST 13*   ALT <5*   BILITOT 0.5   ALKPHOS 102     INR: No results for input(s): INR in the last 72 hours.       Intake/Output Summary (Last 24 hours) at 2019 1837  Last data filed at 2019 1800  Gross per 24 hour Intake 1455.58 ml   Output 2423 ml   Net -967.42 ml         Medications    Scheduled Medications:    alteplase  1 mg Intracatheter Once    pantoprazole  40 mg Intravenous BID    chlorhexidine  15 mL Mouth/Throat BID    piperacillin-tazobactam  3.375 g Intravenous Q8H    clopidogrel  75 mg Oral Daily    enoxaparin  40 mg Subcutaneous Daily    gabapentin  400 mg Oral BID    latanoprost  1 drop Both Eyes Nightly    simvastatin  40 mg Oral Nightly    sodium chloride flush  10 mL Intravenous 2 times per day    aspirin  81 mg Oral Daily    ipratropium-albuterol  1 ampule Inhalation Q4H WA    sodium chloride flush  10 mL Intravenous 2 times per day    insulin lispro  0-12 Units Subcutaneous TID WC    insulin lispro  0-6 Units Subcutaneous Nightly    insulin glargine  5 Units Subcutaneous Nightly     PRN Medications: racepinephrine HCl, sodium chloride nebulizer, glucose, dextrose, glucagon (rDNA), dextrose, sodium chloride flush, magnesium hydroxide, ondansetron, sodium chloride flush, acetaminophen  Infusions:    dexmedetomidine (PRECEDEX) IV infusion 0.2 mcg/kg/hr (04/02/19 1159)    phenylephrine (BRIAN-SYNEPHRINE) 50mg/250mL infusion 48 mcg/min (04/05/19 1517)    EPINEPHrine Stopped (03/31/19 0505)    propofol 20 mcg/kg/min (04/05/19 1635)    dextrose             Patient Active Problem List   Diagnosis Code    Community acquired pneumonia due to influenza A virus J09. X1    Pneumonia J18.9    Essential hypertension I10    Type 2 diabetes mellitus with diabetic polyneuropathy, without long-term current use of insulin ([facility]) E11.42    KERA (acute kidney injury) ([facility] N17.9    Oropharyngeal dysphagia R13.12    NSTEMI (non-ST elevated myocardial infarction) ([facility]) I21.4    Fatigue R53.83    Severe malnutrition ([facility]) E43   Assessment:  coffee ground and patrick blood from NG and ET - RESOLVED  S/P EGD 4/4/19  Reflux esophagitis, nonbleeding esophageal NG trauma   Hgb 8.4 Stable     RECOMMENDATIONS:  PPI BID  D/C h/h  Continue ASA and Plavix  Continue tube feeds per dietitian recommendations     Will sign off at this time. Please call for further needs. Discussed plan of care with family     Patient clinical, biochemical, and radiological information discussed with Dr. Ean Sim  He agrees with the assessment and plan. Jada Zhang  4/5/2019  6:37 PM     ASA and plavix resumed yesterday. No bleeding. Hgb stable. Will sign off. I have seen and examined the patient myself. I have reviewed the hospital care given to date and reviewed all pertinent labs and imaging. The plan was developed mutually at the time of visit with the patient, Milton Gutierrez CNP and myself. I have spoken with the patient, nursing staff and provided written and verbal instructions. The above note has been reviewed and I agree with the assessment, diagnosis, and treatment plan with as suggested by Milton Gutierrez CNP with changes made by me as above.     [address] [facility]

## 2019-04-05 NOTE — PLAN OF CARE
Nutrition Problem: Severe malnutrition, In context of chronic illness  Intervention: Food and/or Nutrient Delivery: Modify current Tube Feeding  Nutritional Goals: pt will consume greater than 75% of his meals and supplements provided

## 2019-04-05 NOTE — PROGRESS NOTES
Susana Soriano MD, [address]                Internal Medicine Hospitalist             Daily Progress  Note   Subjective:     Chief Complaint   Patient presents with    Fatigue     Mr. Nair Complains of nil, on vent in ICU, does open eyes but does not follow any commands. Objective:    BP (!) 112/55   Pulse 76   Temp 98.2 °F (36.8 °C) (Rectal)   Resp 16   Ht 5' 10\" (1.778 m)   Wt 158 lb 12.8 oz (72 kg)   SpO2 100%   BMI 22.79 kg/m²      Intake/Output Summary (Last 24 hours) at 4/5/2019 0733  Last data filed at 4/5/2019 0602  Gross per 24 hour   Intake 2386.58 ml   Output 2083 ml   Net 303.58 ml      Physical Exam:  Heart:  Regular rate and rhythm, normal S1 and S2 in all 4 auscultatory areas. No rubs  Murmurs or gallops heard. Distant heart sounds. Lungs: Mostly clear to auscultation, decreased breath sounds at bases. No wheezes appreciated no crackles heard. Abdomen: Soft, non distended. Bowel sounds not appreciated. No obvious hepato-splenomegaly noted. Non tender, no rebound noted. Extremities: Non tender, no swelling noted, strength 5/5 both legs.   CNS: unable to examine properly as he does not follow any commands, but does open eyes and pupils are reactive and equal.    Labs:  CBC with Differential:    Lab Results   Component Value Date    WBC 7.4 04/05/2019    RBC 2.96 04/05/2019    HGB 8.4 04/05/2019    HCT 28.8 04/05/2019     04/05/2019    MCV 97.3 04/05/2019    MCH 28.4 04/05/2019    MCHC 29.2 04/05/2019    RDW 15.0 04/05/2019    SEGSPCT 77.0 03/31/2019    LYMPHOPCT 12.0 03/31/2019    MONOPCT 8.1 03/31/2019    BASOPCT 0.5 03/31/2019    MONOSABS 0.7 03/31/2019    LYMPHSABS 1.0 03/31/2019    EOSABS 0.0 03/31/2019    BASOSABS 0.0 03/31/2019    DIFFTYPE AUTOMATED DIFFERENTIAL 03/31/2019     CMP:    Lab Results   Component Value Date     04/05/2019    K 3.5 04/05/2019     04/05/2019    CO2 35 04/05/2019    BUN 18 04/05/2019    CREATININE 1.1 04/05/2019    GFRAA >60 04/05/2019 LABGLOM >60 04/05/2019    GLUCOSE 203 04/05/2019    PROT 5.1 04/04/2019    LABALBU 2.5 04/04/2019    CALCIUM 7.8 04/05/2019    BILITOT 0.5 04/04/2019    ALKPHOS 102 04/04/2019    AST 13 04/04/2019    ALT <5 04/04/2019     No results for input(s): TROPONINT in the last 72 hours.   Lab Results   Component Value Date    TSHHS 0.790 03/28/2019         dexmedetomidine (PRECEDEX) IV infusion 0.2 mcg/kg/hr (04/02/19 1159)    phenylephrine (BRIAN-SYNEPHRINE) 50mg/250mL infusion 50 mcg/min (04/05/19 0516)    EPINEPHrine Stopped (03/31/19 0505)    propofol 20 mcg/kg/min (04/05/19 0429)    dextrose 50 mL/hr at 04/04/19 1044    dextrose        pantoprazole  40 mg Intravenous BID    chlorhexidine  15 mL Mouth/Throat BID    piperacillin-tazobactam  3.375 g Intravenous Q8H    mupirocin   Nasal BID    clopidogrel  75 mg Oral Daily    enoxaparin  40 mg Subcutaneous Daily    gabapentin  400 mg Oral BID    latanoprost  1 drop Both Eyes Nightly    simvastatin  40 mg Oral Nightly    sodium chloride flush  10 mL Intravenous 2 times per day    aspirin  81 mg Oral Daily    ipratropium-albuterol  1 ampule Inhalation Q4H WA    sodium chloride flush  10 mL Intravenous 2 times per day    insulin lispro  0-12 Units Subcutaneous TID WC    insulin lispro  0-6 Units Subcutaneous Nightly    insulin glargine  5 Units Subcutaneous Nightly         Assessment:       Patient Active Problem List    Diagnosis Date Noted    [facility]STEM[facility] (non-ST elevated [facility]) [facility] 03/28/2019    Fatigue     Pneumonia 03/12/2019    Essential hypertension 03/12/2019    Type 2 diabetes mellitus with diabetic polyneuropathy, without long-term current use of insulin ([facility] 03/12/2019    KERA (acute kidney injury) ([facility] 03/12/2019    Oropharyngeal dysphagia 03/12/2019    Community acquired pneumonia due to influenza A virus 03/09/2019       Plan:     Problems being addressed this admission:     Acute Metabolic Encephalopathy  4/0/16-DMAEA Metabolic Encephalopathy: unresponsive. S/P ACLS, given 1 epinephrine. Could be from hypotension, CO2 narcosis. Neurology on consult. Unable to further assess further as patient is still intubated and sedated. 4/5/19-neuro signed off wrote yesterday that follows some commands. Will doe to be extubated to see if he has any neuro deficits. Acute Hypoxic Hypercapnic Respiratory failure / Sepsis  4/4/19-Acute Hypoxic and Hypercapneic Respiratory Failure 2/2 pleural effusion and RLLB bacterial pneumonia - continue vent support, Pulmonary medicine following, continue IV antibiotics, wean vent settings as tolerated. Per Pulm no weaning today. Bilateral Pleural Effusion/Bacterial Pneumonia: repeat CXR with bibasilar consolidation and bilateral pleural effusion. Continue Cefepime and Zosyn, MRSA negative. Pulmonology on consult. Sputum culture. Deescalate antibiotics per culture results. Hypotension 2/2 Sepsis from lobar bacterial pneumonia: continue pressors, wean as tolerated, continue antibiotics and IVF for sepsis. 4/5/19- much improved he possibly could be extubated as his oxygenation is better. Will need to continue with his antibiotics. Hypotension resolved on Dave. NSTEMI:   4/4/19-elevated troponin. Started on aspirin and statin. Echocardiogram with EF of 60-65%, severely dilated right atrium, moderate to severe TR.  Cardiology on consult. LHC vs Stress test when stable. Elevated BNP: Does not appear to be with fluid overload.  Chest x-ray with pleural effusion.    4/5/19- cardiology may decide to do cath or stress test once off vent and more stable. Continue as before for now. KERA  4/4/19-Acute kidney injury, Resolved: Due to dehydration.  Creatinine 1.4 on admission, now WNLHypernatremia, Resolved: Sodium 141.    4/5/19- no change     DM 2  4/4/19-Type 2 DM: Last A1C. Lantus, Sliding scale. Hypoglycemia protocol. Accucheck.  Hold oral hypoglycemic agents for now  4/5/19-on I and tube feeds and so will

## 2019-04-06 NOTE — PROGRESS NOTES
04/06/19 1930   Vent Information   Vent Type 980   Vent Mode AC/VC   Vt Ordered 450 mL   Rate Set 12 bmp   Peak Flow 45 L/min   Pressure Support 0 cmH20   FiO2  30 %   Sensitivity 3   PEEP/CPAP 5   I Time/ I Time % 0 s   Humidification Source Heated wire   Humidification Temp 37.1   Circuit Condensation Drained   Vent Patient Data   High Peep/I Pressure 0   Peak Inspiratory Pressure 19 cmH2O   Mean Airway Pressure 5.6 cmH20   Rate Measured 23 br/min   Vt Exhaled 404 mL   Minute Volume 4.54 Liters   I:E Ratio 1:3.50   Plateau Pressure 18 DRL44   Static Compliance 36 mL/cmH2O   Dynamic Compliance 23 mL/cmH2O   Cough/Sputum   Sputum How Obtained Endotracheal;Suctioned   $Obtained Sample $Nasotracheal Suction   Cough Productive   Sputum Amount Moderate   Sputum Color Cloudy   Tenacity Thick   Spontaneous Breathing Trial (SBT) RT Doc   Pulse 72   SpO2 100 %   Additional Respiratory  Assessments   Position Left Side   Alarm Settings   High Pressure Alarm 40 cmH2O   Low Minute Volume Alarm 2.5 L/min   Apnea (secs) 20 secs   High Respiratory Rate 35 br/min   Low Exhaled Vt  250 mL   ETT (adult)   Placement Date/Time: 03/31/19 (c) 5989   Preoxygenation: Yes  Mask Ventilation: Ventilated by mask with oral airway (2)  Technique: Direct laryngoscopy  Tube Size: 8 mm  Laryngoscope: Landaverde  Blade Size: 2  Grade View: Full view of the glottis  Insert. ..    Secured at 24 cm   Measured From Lips   ET Placement Left   Secured By Commercial tube odell   Site Condition Dry

## 2019-04-06 NOTE — PROGRESS NOTES
Sedation interruption performed for approximately 20 minutes. Pt opens eyes to voice. Follows commands, shakes head \"no\" when asked if in pain. Sedation restarted for comfort.

## 2019-04-06 NOTE — PROGRESS NOTES
Pulmonary and Critical Care  Progress Note      VITALS:  BP (!) 105/51   Pulse 75   Temp 98.1 °F (36.7 °C) (Rectal)   Resp 12   Ht 5' 10\" (1.778 m)   Wt 160 lb (72.6 kg)   SpO2 100%   BMI 22.96 kg/m²     Subjective:   CHIEF COMPLAINT :SOB      HPI:                The patient is a 80 y.o. male with significant past medical history of HTL, DM II, hypercapnea and Pulmonary HTN  presents with complaints of Gen weakness. He was recently discharged from the hospital after pneumonia. He was at rehab unit. He was intubated for the cardiac arrest,. He was extubated a few days later but he yhad to be reintubated of the stridor. He is on the vent. He is getting PRBC. He is on Neosynephrine. He is opening his eyes and following commads. He is not in acute resp distress. Objective:   PHYSICAL EXAM:    LUNGS:Decrease air entry bilateral bases  Abd-soft, BS+, NT  Ext- no pedal edema  CVS-s1s2, no murmurs      DATA:    CBC:  Recent Labs     04/04/19  0400 04/05/19  0530 04/06/19  0430   WBC 7.3 7.4 8.7   RBC 3.07* 2.96* 2.56*   HGB 8.8* 8.4* 7.4*   HCT 29.9* 28.8* 25.0*    176 170   MCV 97.4 97.3 97.7   MCH 28.7 28.4 28.9   MCHC 29.4* 29.2* 29.6*   RDW 15.2* 15.0* 15.4*      BMP:  Recent Labs     04/04/19  0400 04/05/19  0440 04/06/19  0430    142 142   K 3.1* 3.5 3.4*   CL 98* 100 99   CO2 35* 35* 39*   BUN 17 18 17   CREATININE 1.2 1.1 1.0   CALCIUM 8.3 7.8* 8.3   GLUCOSE 90 203* 150*      ABG:  Recent Labs     04/04/19  0600 04/05/19  0600 04/06/19  0600   PH 7.47* 7.46* 7.49*   PO2ART 119* 99 98   PMC9HBB 55.0* 58.0* 55.0*   O2SAT 96.2 96.0 95.8*     BNP  No results found for: BNP   D-Dimer:  No results found for: DDIMER   1.  Radiology: Reviewed      Assessment/Plan     Patient Active Problem List    Diagnosis Date Noted    Severe malnutrition ([facility] 04/05/2019    NSTEMI (non-ST elevated myocardial infarction) ([facility] 03/28/2019    Fatigue     Pneumonia 03/12/2019    Essential hypertension 03/12/2019  Type 2 diabetes mellitus with diabetic polyneuropathy, without long-term current use of insulin ([facility] 03/12/2019    KERA (acute kidney injury) ([facility] 03/12/2019    Oropharyngeal dysphagia 03/12/2019    Community acquired pneumonia due to influenza A virus 03/09/2019   Hypokalemia  Anemia   Acute on chronic hypoxic hypercaoneic resp failure  Chronic metabolic alkalosis  VDRF  ? Pneumonia  Bibasilar atelectasis  Severe Pulmonary HTN     1. F/u H&H  2. C/w tube feeds  3. Wean off Dave synephrine for a MAP > 65 mmhg  4. SATand SBT in am  5. C/w nebs  6. Keep sats > 92%  7. Kcl  8. BMP in am  9. Check Mg level  10. Protonix  11. DVT prophylaxis  12. De escalete abx  13. CXR in am                                                            Discussed with the nursing and more than 30 minutes of CC time spent on the patient  No follow-ups on file.     Electronically signed by Zachary Brittle, MD on 4/6/2019 at 12:31 PM

## 2019-04-06 NOTE — PROGRESS NOTES
XR CHEST PORTABLE   Status: Preliminary result   Order Providers     Colette Trejo MD          [facility]     Read Date Phone Pager   Lizette Hernandez Apr 6, 2019 [phone]    Routing History     Priority Sent On From To Message Type    4/5/2019  5:14 AM [facility] Incoming Lab Results From [place] ([facility]) Frantz Reeves MD     3/31/2019  2:01 PM [facility] Incoming Lab Results From [place] ([facility]) Ed Killian MD     3/31/2019  6:32 AM [facility] Incoming Lab Results From [place] ([facility]) Joaquina Boudreaux MD    Radiation Dose Estimates     No radiation information found for this patient   Narrative   EXAMINATION:   SINGLE XRAY VIEW OF THE CHEST       4/6/2019 6:27 am       COMPARISON:   04/05/2019 and prior       HISTORY:   ORDERING SYSTEM PROVIDED HISTORY: tube placement   TECHNOLOGIST PROVIDED HISTORY:   Reason for exam:->tube placement   Ordering Physician Provided Reason for Exam: tube placement   Acuity: Unknown   Type of Exam: Unknown       FINDINGS:   Study limited by overlying support and monitoring apparatus.       Endotracheal tube projects approximately 5 cm above the cyndi, unchanged. Nasogastric tube is seen overlying the esophagus.  The tip is below the field   of view and below the diaphragm.       No definite new lines or tubes are noted.       Heart size is within normal limits.  Pulmonary vascularity appears within   normal limits.       Linear densities overlying the right side of the chest compatible with skin   folds.  Increased hazy opacities noted on the right similar to the prior   study.  No significant change in left basilar pleural and parenchymal   disease. Rupa Edi is evidence of old granulomatous disease.  Osseous structures   demonstrate no acute abnormality.

## 2019-04-06 NOTE — PROGRESS NOTES
Dallin Chaney MD, [address]                Internal Medicine Hospitalist             Daily Progress  Note   Subjective:     Chief Complaint   Patient presents with    Fatigue     Mr. Nair Complains of nil, intubated in icu and some sedated. No family by his side. Objective:    BP (!) 127/52   Pulse 68   Temp 98.4 °F (36.9 °C) (Rectal)   Resp 12   Ht 5' 10\" (1.778 m)   Wt 160 lb (72.6 kg)   SpO2 100%   BMI 22.96 kg/m²      Intake/Output Summary (Last 24 hours) at 4/6/2019 0725  Last data filed at 4/6/2019 0434  Gross per 24 hour   Intake 4565.87 ml   Output 1500 ml   Net 3065.87 ml      Physical Exam:  Heart:  Distant heart sounds. Lungs: Mostly clear to auscultation, decreased breath sounds at bases. No wheezes appreciated no crackles heard. Abdomen: Soft, non distended. Bowel sounds appreciated. No obvious hepato-splenomegaly noted. Non tender, no rebound noted. Extremities: Non tender, tired spacing noted. Can move all 4. CNS: Grossly intact. Can follow all commands.      Labs:  CBC with Differential:    Lab Results   Component Value Date    WBC 8.7 04/06/2019    RBC 2.56 04/06/2019    HGB 7.4 04/06/2019    HCT 25.0 04/06/2019     04/06/2019    MCV 97.7 04/06/2019    MCH 28.9 04/06/2019    MCHC 29.6 04/06/2019    RDW 15.4 04/06/2019    SEGSPCT 77.0 03/31/2019    LYMPHOPCT 12.0 03/31/2019    MONOPCT 8.1 03/31/2019    BASOPCT 0.5 03/31/2019    MONOSABS 0.7 03/31/2019    LYMPHSABS 1.0 03/31/2019    EOSABS 0.0 03/31/2019    BASOSABS 0.0 03/31/2019    DIFFTYPE AUTOMATED DIFFERENTIAL 03/31/2019     CMP:    Lab Results   Component Value Date     04/06/2019    K 3.4 04/06/2019    CL 99 04/06/2019    CO2 39 04/06/2019    BUN 17 04/06/2019    CREATININE 1.0 04/06/2019    GFRAA >60 04/06/2019    LABGLOM >60 04/06/2019    GLUCOSE 150 04/06/2019    PROT 5.1 04/04/2019    LABALBU 2.5 04/04/2019    CALCIUM 8.3 04/06/2019    BILITOT 0.5 04/04/2019    ALKPHOS 102 04/04/2019    AST 13 04/04/2019 Unable to further assess further as patient is still intubated and sedated. 4/5/19-neuro signed off wrote yesterday that follows some commands. Will doe to be extubated to see if he has any neuro deficits. 4/6/19- seems neurologically intact.     Acute Hypoxic Hypercapnic Respiratory failure / Septic shock  4/4/19-Acute Hypoxic and Hypercapneic Respiratory Failure 2/2 pleural effusion and RLLB bacterial pneumonia - continue vent support, Pulmonary medicine following, continue IV antibiotics, wean vent settings as tolerated. Per Pulm no weaning today. Bilateral Pleural Effusion/Bacterial Pneumonia: repeat CXR with bibasilar consolidation and bilateral pleural effusion. Continue Cefepime and Zosyn, MRSA negative. Pulmonology on consult. Sputum culture. Deescalate antibiotics per culture results. Hypotension 2/2 Sepsis from lobar bacterial pneumonia: continue pressors, wean as tolerated, continue antibiotics and IVF for sepsis. 4/5/19- much improved he possibly could be extubated as his oxygenation is better. Will need to continue with his antibiotics. Hypotension resolved on Dave.  4/6/19- has primary metabolic alkalosis with respiratory acidosis compensation. Will need to r/o contraction alkalosis. Respiratory to decide when to extubate. I think he needs some volume. On Zosyn. Will have his femoral catheter taken out and have PICC in. I question use of Dave synephrine and may need to use Levophed instead as can cause gastritis as a side effect. So I suggest taper of dave synephrine and if his b/p drops then use Levphed instead. RN will check with pulmonary.     NSTEMI:   4/4/19-elevated troponin. Started on aspirin and statin. Echocardiogram with EF of 60-65%, severely dilated right atrium, moderate to severe TR.  Cardiology on consult. LHC vs Stress test when stable.  Elevated BNP: Does not appear to be with fluid overload.  Chest x-ray with pleural effusion.    4/5/19- cardiology may decide to do cath or stress test once off vent and more stable. Continue as before for now. 4/6/19-echo shows preserved EF, cardiology does not think it is cardiogenic event. Continue DAPT.    KERA / hypokalemia  4/4/19-Acute kidney injury, Resolved: Due to dehydration.  Creatinine 1.4 on admission, now WNLHypernatremia, Resolved: Sodium 141.    4/5/19- no change   4/6/19- replace potassium.     DM 2  4/4/19-Type 2 DM: Last A1C. Lantus, Sliding scale. Hypoglycemia protocol. Accucheck. Hold oral hypoglycemic agents for now  4/5/19-on I and tube feeds and so will continue to monitor. 4/6/19- sugar is 133 today.        GI bleed / Gastritis  4/4/19-Upper GI bleeding: resolved. On PPI BID. Hold plavix, per GI ok to start ASA  4/5/19- Hb is marginal will doe to continue to monitor, tranfuse if <8.0. Stop aptt. 4/6/19- will transfuse one unit. Check anemia panel.     Nutrition  4/5/19- On tube feeds will continue. Hold for extubation if needed. 4/6/19- on 60 ml per hr and 200 ml q6h free water. Tolerating well.     Consultants:  GI  Cardiology  Pulmonary  Neurology    General Orders:  Repeat basic labs again in am.  I have explained to the patient and discussed with him/her the treatment plan.   Jayda Antony MD, [address]

## 2019-04-07 NOTE — PROGRESS NOTES
04/07/19 0420   Vent Information   Vent Type 980   Vent Mode AC/VC   Vt Ordered 450 mL   Rate Set 12 bmp   Peak Flow 45 L/min   Pressure Support 0 cmH20   FiO2  30 %   Sensitivity 3   PEEP/CPAP 5   I Time/ I Time % 0 s   Humidification Source Heated wire   Circuit Condensation Drained   Vent Patient Data   High Peep/I Pressure 0   Peak Inspiratory Pressure 20 cmH2O   Mean Airway Pressure 8.4 cmH20   Rate Measured 12 br/min   Vt Exhaled 452 mL   Minute Volume 5.47 Liters   I:E Ratio 1:3.50   Spontaneous Breathing Trial (SBT) RT Doc   Pulse 67   SpO2 100 %   Additional Respiratory  Assessments   Resp 12   Position Semi-Dubose's   Alarm Settings   High Pressure Alarm 40 cmH2O   Low Minute Volume Alarm 2.5 L/min   Apnea (secs) 20 secs   High Respiratory Rate 35 br/min   Low Exhaled Vt  250 mL   ETT (adult)   Placement Date/Time: 03/31/19 (c) 7138   Preoxygenation: Yes  Mask Ventilation: Ventilated by mask with oral airway (2)  Technique: Direct laryngoscopy  Tube Size: 8 mm  Laryngoscope: Landaverde  Blade Size: 2  Grade View: Full view of the glottis  Insert. ..    Secured at 24 cm   Measured From [address] [place], [place] [address] By [address] tube odell   Site Condition Dry

## 2019-04-07 NOTE — PROGRESS NOTES
Progress Note (Hospitalist, Internal Medicine)  IDENTIFYING INFORMATION   PATIENT:  Prasad Goins  MRN:  [id number]  ADMIT DATE: 3/28/2019  TIME OF EVALUATION: 4/7/2019 4:52 PM      HISTORY OF PRESENT ILLNESS   \"Tai Dominguez is a 80 y.o.  male, with past medical history significant for hyperlipidemia, diabetes, who presented to the ED from home due generalized body weakness. He was recently discharged from rehab unit. He was admitted on March 8 due to pneumonia and influenza. The finished a course of antibiotic and antiviral.  Since discharge, he noted worsening generalized body weakness. He wasn't able to get out of bed. She denied lateralizing weakness or numbness. Still with cough. Denied shortness of breath. No fever. Denied lower extremity swelling or orthopnea. He has no chest pain. He was brought to the ED and was subsequently admitted\"    SUBJECTIVE     Unable to wean properly today - apneic. Back on vent. MEDICATIONS   Medications Prior to Admission  Medications Prior to Admission: simvastatin (ZOCOR) 80 MG tablet, Take 40 mg by mouth nightly  omeprazole (PRILOSEC) 20 MG delayed release capsule, Take 20 mg by mouth Daily with supper   metFORMIN (GLUCOPHAGE) 500 MG tablet, Take 250 mg by mouth 2 times daily (with meals)   gabapentin (NEURONTIN) 400 MG capsule, Take 400 mg by mouth 2 times daily.   latanoprost (XALATAN) 0.005 % ophthalmic solution, Place 1 drop into both eyes nightly    Current Medications  Current Facility-Administered Medications   Medication Dose Route Frequency Provider Last Rate Last Dose    midodrine (PROAMATINE) tablet 10 mg  10 mg Oral TID  Nate Gomez MD        potassium chloride 10 mEq/100 mL IVPB (Peripheral Line)  10 mEq Intravenous PRN Therese Cruz  mL/hr at 04/06/19 0950 10 mEq at 04/06/19 0950    sodium chloride flush 0.9 % injection 10 mL  10 mL Intravenous 2 times per day Therese Cruz MD   10 mL at 04/07/19 0926    sodium chloride flush 0.9 % injection 10 mL  10 mL Intravenous PRN Maurilio Zazueta MD        norepinephrine (LEVOPHED) 16 mg in dextrose 5 % 250 mL infusion  2 mcg/min Intravenous Continuous Robin Henry MD 2.8 mL/hr at 04/07/19 1433 3 mcg/min at 04/07/19 1433    racepinephrine HCl (VAPONEFPRIN) 2.25 % nebulizer solution NEBU 11.25 mg  11.25 mg Nebulization Q20 Min PRN Gamal Villalobos MD        sodium chloride nebulizer 0.9 % solution 3 mL  3 mL Nebulization Q4H PRN Gamal Villalobos MD        pantoprazole (PROTONIX) injection 40 mg  40 mg Intravenous BID Michelle Carbajal MD   40 mg at 04/07/19 0926    dexmedetomidine (PRECEDEX) 400 mcg in sodium chloride 0.9 % 100 mL infusion  0.2 mcg/kg/hr Intravenous Continuous Gamal Villalobos MD 5.4 mL/hr at 04/07/19 1545 0.3 mcg/kg/hr at 04/07/19 1545    chlorhexidine (PERIDEX) 0.12 % solution 15 mL  15 mL Mouth/Throat BID Katie Hassan MD   15 mL at 04/07/19 0926    propofol 1000 MG/100ML injection  10 mcg/kg/min Intravenous Titrated Gamal Villalobos MD   Stopped at 04/07/19 0520    piperacillin-tazobactam (ZOSYN) 3.375 g in dextrose 50 mL IVPB extended infusion (premix)  3.375 g Intravenous Q8H Michelle Carbajal MD 12.5 mL/hr at 04/07/19 1246 3.375 g at 04/07/19 1246    clopidogrel (PLAVIX) tablet 75 mg  75 mg Oral Daily Mandi Salinas MD   75 mg at 04/07/19 0926    enoxaparin (LOVENOX) injection 40 mg  40 mg Subcutaneous Daily Mandi Salinas MD   40 mg at 04/07/19 0930    gabapentin (NEURONTIN) capsule 400 mg  400 mg Oral BID Michelle Carbajal MD   400 mg at 04/07/19 0926    latanoprost (XALATAN) 0.005 % ophthalmic solution 1 drop  1 drop Both Eyes Nightly Michelle Carbajal MD   1 drop at 04/06/19 1114    simvastatin (ZOCOR) tablet 40 mg  40 mg Oral Nightly Cosimo Sayres, MD   40 mg at 04/06/19 2140    glucose (GLUTOSE) 40 % oral gel 15 g  15 g Oral PRN Michelle Carbajal MD        dextrose 50 % solution 12.5 g  12.5 g Intravenous PRN Michelle Carbajal MD        glucagon (rDNA) injection 1 mg  1 mg Intramuscular PRN Ryan Esposito MD        dextrose 5 % solution  100 mL/hr Intravenous PRN Ryan Esposito MD        sodium chloride flush 0.9 % injection 10 mL  10 mL Intravenous 2 times per day Ryan Esposito MD   10 mL at 04/06/19 2144    sodium chloride flush 0.9 % injection 10 mL  10 mL Intravenous PRN Ryan Esposito MD   10 mL at 04/02/19 0125    magnesium hydroxide (MILK OF MAGNESIA) 400 MG/5ML suspension 30 mL  30 mL Oral Daily PRN Ryan Esposito MD        ondansetron [facility]) injection 4 mg  4 mg Intravenous Q6H PRN Ryan Esposito MD        aspirin chewable tablet 81 mg  81 mg Oral Daily Ryan Esposito MD   81 mg at 04/07/19 0926    ipratropium-albuterol (DUONEB) nebulizer solution 1 ampule  1 ampule Inhalation Q4H WA Ryan Esposito MD   1 ampule at 04/07/19 1530    sodium chloride flush 0.9 % injection 10 mL  10 mL Intravenous 2 times per day Juan Granda APRN - CNP   10 mL at 04/06/19 2143    sodium chloride flush 0.9 % injection 10 mL  10 mL Intravenous PRN Juan Granda APRN - CNP        acetaminophen (TYLENOL) tablet 650 mg  650 mg Oral Q4H PRN Jordyn Saliva, APRN - CNP   650 mg at 03/28/19 2151    insulin lispro (HUMALOG) injection vial 0-12 Units  0-12 Units Subcutaneous TID WC Jordyn Saliva, APRN - CNP   2 Units at 04/06/19 1724    insulin lispro (HUMALOG) injection vial 0-6 Units  0-6 Units Subcutaneous Nightly Jordyn Saliva, APRN - CNP   1 Units at 04/06/19 2141    insulin glargine (LANTUS) injection vial 5 Units  5 Units Subcutaneous Nightly Jordyn Saliva, APRN - CNP   5 Units at 04/06/19 2141         Allergies  No Known Allergies    REVIEW OF SYSTEMS     Within above limitations. 14 point review of systems reviewed. Pertinent positive or negative as per HPI or otherwise negative per 14 point systems review.      Reviewed 4/7/2019 at 4:52 PM    PHYSICAL EXAM       Blood pressure (!) 127/58, pulse 55, temperature 97.9 °F (36.6 °C), temperature source Rectal, resp. rate 12, height 5' 10\" (1.778 m), weight 158 lb 14.4 oz (72.1 kg), SpO2 100 %. General - Able to respond   Psych - Appropriate affect/speech. No agitation  Eyes - Eye lids intact. No scleral icterus  ENT - Lips wnl. External ear clear/dry/intact. No thyromegaly on inspection  Neuro -  Heart - Sinus. RRR. S1 and S2 present. No elevated JVD appreciated  Lung - good air entry, coarse sounds on vent. No wheezes appreciated. GI -  Soft. No guarding/rigidity. No hepatosplenomegaly/ascites. BS+   - No CVA/suprapubic tenderness or palpable bladder distension  Skin - Intact. No rash/petechiae/ecchymosis. Warm extremities  MSK - Joints with normal ROM. No joint swellings        LABS AND IMAGING   CBC  [unfilled]    Last 3 Hemoglobin  Lab Results   Component Value Date    HGB 8.1 04/07/2019    HGB 7.6 04/06/2019    HGB 7.4 04/06/2019     Last 3 WBC/ANC  Lab Results   Component Value Date    WBC 7.5 04/07/2019    WBC 6.9 04/06/2019    WBC 8.7 04/06/2019     No components found for: GRNLOCTYABS  Last 3 Platelets  No results found for: PLATELET  Chemistry  [unfilled]  [unfilled]  No results found for: LDH  Coagulation Studies  Lab Results   Component Value Date    INR 1.04 03/31/2019     Liver Function Studies  Lab Results   Component Value Date    ALT <5 04/04/2019    AST 13 04/04/2019    ALKPHOS 102 04/04/2019       Recent Imaging    Jennifer Enriquez #[id number] ([id number]) (80 y.o.  M) (Adm: 03/28/19)    [facility] [id number]         Imaging Results (last 7 days)          Procedure Component Value Ref Range Date/Time     XR CHEST PORTABLE [[id number]] Collected: 04/07/19 0714     Order Status: Completed Updated: 04/07/19 0859     Narrative:       EXAMINATION:  SINGLE XRAY VIEW OF THE CHEST    4/7/2019 6:32 am    COMPARISON:  04/06/2019    HISTORY:  ORDERING SYSTEM PROVIDED HISTORY: hypoxia on vent  TECHNOLOGIST PROVIDED HISTORY:  Reason for exam:->hypoxia on vent  Ordering Physician Provided Reason for Exam: hypoxia on vent  Acuity: Unknown  Type of Exam: Unknown    FINDINGS:  Endotracheal tube terminates 5 cm above the cyndi. Enteric tube courses  below the diaphragm.  Right PICC with tip at the inferior SVC.  No  pneumothorax.  Patchy areas of consolidation at the right perihilar lung and  right lung base with small layering right pleural effusion, similar to the  prior exam. Heart size is normal.     Impression:       1.  Satisfactory position of support devices. 2.  No significant change from prior exam.     XR CHEST PORTABLE [[id number]] Collected: 04/06/19 8358     Order Status: Completed Updated: 04/06/19 1725     Narrative:       EXAMINATION:  SINGLE XRAY VIEW OF THE CHEST    4/6/2019 6:27 am    COMPARISON:  04/05/2019 and prior    HISTORY:  ORDERING SYSTEM PROVIDED HISTORY: tube placement  TECHNOLOGIST PROVIDED HISTORY:  Reason for exam:->tube placement  Ordering Physician Provided Reason for Exam: tube placement  Acuity: Unknown  Type of Exam: Unknown    FINDINGS:  Study limited by overlying support and monitoring apparatus. Endotracheal tube projects approximately 5 cm above the cyndi, unchanged. Nasogastric tube is seen overlying the esophagus.  The tip is below the field  of view and below the diaphragm. No definite new lines or tubes are noted. Heart size is within normal limits.  Pulmonary vascularity appears within  normal limits. Linear densities overlying the right side of the chest compatible with skin  folds.  Increased hazy opacities noted on the right similar to the prior  study.  No significant change in left basilar pleural and parenchymal  disease. Rosmery Sara is evidence of old granulomatous disease.  Osseous structures  demonstrate no acute abnormality.     Impression:       Lines and tubes are stable.     Persistent bilateral pleural and parenchymal changes greater on the right  which accounting for differences in technique do not appear to be  significantly changed from the comparison.     XR CHEST PORTABLE [[id number]]      Order Status: Canceled      XR CHEST PORTABLE [[id number]]      Order Status: Canceled      XR CHEST PORTABLE [[id number]] Collected: 04/05/19 0749     Order Status: Completed Updated: 04/05/19 0753     Narrative:       EXAMINATION:  SINGLE XRAY VIEW OF THE CHEST    4/5/2019 6:01 am    COMPARISON:  April 4, 2019    HISTORY:  ORDERING SYSTEM PROVIDED HISTORY: tube placement  TECHNOLOGIST PROVIDED HISTORY:  Reason for exam:->tube placement  Ordering Physician Provided Reason for Exam: tube placement  Acuity: Unknown  Type of Exam: Subsequent/Follow-up  Additional signs and symptoms: tube placement  Relevant Medical/Surgical History: tube placement    FINDINGS:  Endotracheal tube with tip 5.6 cm from the cyndi.  NG tube with tip in the  upper stomach.  No pneumothorax.  Small bilateral pleural effusions, greater  on the right as well as hazy bibasilar opacities.  No acute bony abnormality.     Impression:       Worsening right pleural effusion and bibasilar opacities.     XR ABDOMEN FOR NG/OG/NE TUBE PLACEMENT [[id number]] Collected: 04/04/19 1529     Order Status: Completed Updated: 04/04/19 1533     Narrative:       EXAMINATION:  SINGLE SUPINE XRAY VIEW(S) OF THE ABDOMEN    4/4/2019 2:08 pm    COMPARISON:  None. HISTORY:  ORDERING SYSTEM PROVIDED HISTORY: placement of NG tube  TECHNOLOGIST PROVIDED HISTORY:  Reason for exam:->placement of NG tube  Portable? ->Yes  Ordering Physician Provided Reason for Exam: placement of NG tube  Acuity: Unknown  Type of Exam: Subsequent/Follow-up    FINDINGS:  An enteric tube is seen coursing below the diaphragm.  The tip projects in  the region of the gastric body.  The side port projects in the region the  gastric cardia.  Air-filled loops of bowel are seen within the upper abdomen.     Impression:       Enteric tube coursing below the diaphragm.  The side port projects in the  region of the gastric cardia.     XR ABDOMEN (KUB) (SINGLE AP VIEW) [[id number]] Updated: 04/04/19 1446     Order Status: Canceled      XR CHEST PORTABLE [[id number]] Collected: 04/04/19 0642     Order Status: Completed Updated: 04/04/19 0840     Narrative:       EXAMINATION:  SINGLE XRAY VIEW OF THE CHEST    4/4/2019 5:12 am    COMPARISON:  04/03/2019    HISTORY:  ORDERING SYSTEM PROVIDED HISTORY: tube placement  TECHNOLOGIST PROVIDED HISTORY:  Reason for exam:->tube placement  Ordering Physician Provided Reason for Exam: tube placement  Acuity: Acute  Type of Exam: Subsequent/Follow-up    FINDINGS:  Endotracheal tube and NG tube are in place.  The heart and mediastinal  structures are stable.  Small pleural effusions with bibasilar atelectasis  persist.  Arthritic changes of the shoulders are noted.     Impression:       Persistent small pleural effusions with bibasilar atelectasis.     XR CHEST PORTABLE [[id number]] Collected: 04/03/19 0709     Order Status: Completed Updated: 04/03/19 0714     Narrative:       EXAMINATION:  SINGLE XRAY VIEW OF THE CHEST    4/3/2019 5:30 am    COMPARISON:  April 2, 2019    HISTORY:  ORDERING SYSTEM PROVIDED HISTORY: tube placement  TECHNOLOGIST PROVIDED HISTORY:  Reason for exam:->tube placement  Ordering Physician Provided Reason for Exam: tube placement  Acuity: Acute  Type of Exam: Subsequent/Follow-up    FINDINGS:  The ETT is 3.8 cm above the cyndi.  The feeding tube is inserted with its  tip below the diaphragm and beyond the field of view.     The cardiomediastinal silhouette is stable.  There is airspace opacity at the  right lung base, may be related to layering pleural effusion, atelectasis or  pneumonia, stable.  There is no pneumothorax. Murali Maxwell is no acute osseous  abnormality.     Impression:       Airspace opacity at the right lung base, may be related to layering pleural  effusion, atelectasis or pneumonia, stable.     XR CHEST PORTABLE [[id number]] Collected: 04/02/19 2131     Order Status: Completed Updated: 04/02/19 been little change.     Impression:       1. Stable chest x-ray with bilateral hazy opacities likely represent  combination of pleural effusions and infiltrates. 2. Endotracheal tube with its tip at the level of the cyndi.  I would  suggest withdrawing the tube at least 2 cm.     XR CHEST 1 VW [[id number]] Collected: 03/31/19 0535     Order Status: Completed Updated: 03/31/19 0541     Narrative:       EXAMINATION:  SINGLE XRAY VIEW OF THE CHEST    3/31/2019 5:22 am    COMPARISON:  2 days prior    HISTORY:  ORDERING SYSTEM PROVIDED HISTORY: intubation and post cardiac arrest, check  ETT  TECHNOLOGIST PROVIDED HISTORY:  Reason for exam:->intubation and post cardiac arrest, check ETT  Ordering Physician Provided Reason for Exam: intubation and post cardiac  arrest, check ETT  Acuity: Unknown  Type of Exam: Unknown    FINDINGS:  Endotracheal tube is been placed, tip approximately 4 cm above the cyndi in  appropriate position.  Enteric tube tip and side port below diaphragm and  stomach.  There are diffuse hazy opacities over right greater than left lung,  combination increasing effusions, atelectatic changes, underlying  consolidation not excluded.  Pulmonary vasculature is indistinct. Cardiomediastinal silhouette is stable.     Impression:       Increasing pleuroparenchymal opacities in both hemithoraces, right greater  than left. Endotracheal and enteric tubes appear in appropriate position.     CTA Head W Contrast [[id number]] Collected: 03/31/19 0510     Order Status: Completed Updated: 03/31/19 0522     Narrative:       EXAMINATION:  CTA OF THE NECK; CTA OF THE HEAD WITH CONTRAST; CT OF THE HEAD WITHOUT  CONTRAST 3/31/2019 4:57 am; 3/31/2019 4:55 am:    TECHNIQUE:  CTA of the neck was performed with the administration of intravenous  contrast. Multiplanar reformatted images are provided for review.  MIP images  are provided for review. Stenosis of the internal carotid arteries measured  using NASCET criteria. Dose modulation, iterative reconstruction, and/or  weight based adjustment of the mA/kV was utilized to reduce the radiation  dose to as low as reasonably achievable.; CTA of the head/brain was performed  with the administration of intravenous contrast. Multiplanar reformatted  images are provided for review.  MIP images are provided for review. Dose  modulation, iterative reconstruction, and/or weight based adjustment of the  mA/kV was utilized to reduce the radiation dose to as low as reasonably  achievable.; CT of the head was performed without the administration of  intravenous contrast. Dose modulation, iterative reconstruction, and/or  weight based adjustment of the mA/kV was utilized to reduce the radiation  dose to as low as reasonably achievable. Noncontrast CT of the head with  reconstructed 2-D images are also provided for review. COMPARISON:  None. HISTORY:  ORDERING SYSTEM PROVIDED HISTORY: r/o CVA  TECHNOLOGIST PROVIDED HISTORY:  Has a \"code stroke\" or \"stroke alert\" been called? ->Yes  Ordering Physician Provided Reason for Exam: a\ms; ORDERING SYSTEM PROVIDED  HISTORY: r/o CVA  TECHNOLOGIST PROVIDED HISTORY:  R/o CVA  Has a \"code stroke\" or \"stroke alert\" been called? ->No  Ordering Physician Provided Reason for Exam: ams; ORDERING SYSTEM PROVIDED  HISTORY: unresponsive  TECHNOLOGIST PROVIDED HISTORY:  R/o hemorrhagic change  Has a \"code stroke\" or \"stroke alert\" been called? ->No  Ordering Physician Provided Reason for Exam: ams    FINDINGS:    CT HEAD:    BRAIN/VENTRICLES:  No acute intracranial hemorrhage or extraaxial fluid  collection. Grey-white differentiation is maintained.  No evidence of mass,  mass effect or midline shift.  No evidence of hydrocephalus. Shiva Bearded is  prominence of the ventricles and sulci due to global parenchymal volume loss. ORBITS: The visualized portion of the orbits demonstrate no acute abnormality.     SINUSES:  The visualized paranasal sinuses and mastoid air cells demonstrate  no acute abnormality. SOFT TISSUES/SKULL: No acute abnormality of the visualized skull or soft  tissues. CTA NECK:    AORTIC ARCH/ARCH VESSELS: There is a normal branch pattern of the aortic  arch. No significant stenosis is seen of the innominate artery or subclavian  arteries. CAROTID ARTERIES: The common carotid arteries are normal in appearance  without evidence of a flow limiting stenosis. The internal carotid arteries  are normal in appearance without evidence of a flow limiting stenosis by  NASCET criteria. No dissection or arterial injury is seen. VERTEBRAL ARTERIES: Right vertebral artery is diminutive in caliber  throughout.  Left vertebral artery is dominant supplying basilar artery. SOFT TISSUES: The lung apices are clear.  No cervical or superior mediastinal  lymphadenopathy.  There is endotracheal intubation.  The parotid,  submandibular and thyroid glands demonstrate no acute abnormality. BONES: The visualized osseous structures appear unremarkable. Large pleural effusions. CTA HEAD:    ANTERIOR CIRCULATION: The internal carotid arteries are normal in course and  caliber without focal stenosis. The anterior cerebral and middle cerebral  arteries demonstrate no focal stenosis. POSTERIOR CIRCULATION: The posterior cerebral arteries demonstrate no focal  stenosis. The vertebral and basilar arteries appear otherwise unremarkable.     Impression:       No CT evidence of acute territorial infarct. No significant stenosis visualized in the major intracranial arterial  vasculature. Cervical carotid vasculature is patent.     Diminutive right vertebral artery and widely patent left vertebral artery.     CT Head WO Contrast [[id number]] Collected: 03/31/19 0510     Order Status: Completed Updated: 03/31/19 0522     Narrative:       EXAMINATION:  CTA OF THE NECK; CTA OF THE HEAD WITH CONTRAST; CT OF THE HEAD WITHOUT  CONTRAST 3/31/2019 4:57 am; 3/31/2019 4:55 am:    TECHNIQUE:  CTA of the neck was performed with the administration of intravenous  contrast. Multiplanar reformatted images are provided for review.  MIP images  are provided for review. Stenosis of the internal carotid arteries measured  using NASCET criteria. Dose modulation, iterative reconstruction, and/or  weight based adjustment of the mA/kV was utilized to reduce the radiation  dose to as low as reasonably achievable.; CTA of the head/brain was performed  with the administration of intravenous contrast. Multiplanar reformatted  images are provided for review.  MIP images are provided for review. Dose  modulation, iterative reconstruction, and/or weight based adjustment of the  mA/kV was utilized to reduce the radiation dose to as low as reasonably  achievable.; CT of the head was performed without the administration of  intravenous contrast. Dose modulation, iterative reconstruction, and/or  weight based adjustment of the mA/kV was utilized to reduce the radiation  dose to as low as reasonably achievable. Noncontrast CT of the head with  reconstructed 2-D images are also provided for review. COMPARISON:  None. HISTORY:  ORDERING SYSTEM PROVIDED HISTORY: r/o CVA  TECHNOLOGIST PROVIDED HISTORY:  Has a \"code stroke\" or \"stroke alert\" been called? ->Yes  Ordering Physician Provided Reason for Exam: a\ms; ORDERING SYSTEM PROVIDED  HISTORY: r/o CVA  TECHNOLOGIST PROVIDED HISTORY:  R/o CVA  Has a \"code stroke\" or \"stroke alert\" been called? ->No  Ordering Physician Provided Reason for Exam: ams; ORDERING SYSTEM PROVIDED  HISTORY: unresponsive  TECHNOLOGIST PROVIDED HISTORY:  R/o hemorrhagic change  Has a \"code stroke\" or \"stroke alert\" been called? ->No  Ordering Physician Provided Reason for Exam: ams    FINDINGS:    CT HEAD:    BRAIN/VENTRICLES:  No acute intracranial hemorrhage or extraaxial fluid  collection. Grey-white differentiation is maintained.  No evidence of mass,  mass effect or midline shift.  No evidence of hydrocephalus. [place] is  prominence of the ventricles and sulci due to global parenchymal volume loss. ORBITS: The visualized portion of the orbits demonstrate no acute abnormality. SINUSES:  The visualized paranasal sinuses and mastoid air cells demonstrate  no acute abnormality. SOFT TISSUES/SKULL: No acute abnormality of the visualized skull or soft  tissues. CTA NECK:    AORTIC ARCH/ARCH VESSELS: There is a normal branch pattern of the aortic  arch. No significant stenosis is seen of the innominate artery or subclavian  arteries. CAROTID ARTERIES: The common carotid arteries are normal in appearance  without evidence of a flow limiting stenosis. The internal carotid arteries  are normal in appearance without evidence of a flow limiting stenosis by  NASCET criteria. No dissection or arterial injury is seen. VERTEBRAL ARTERIES: Right vertebral artery is diminutive in caliber  throughout.  Left vertebral artery is dominant supplying basilar artery. SOFT TISSUES: The lung apices are clear.  No cervical or superior mediastinal  lymphadenopathy.  There is endotracheal intubation.  The parotid,  submandibular and thyroid glands demonstrate no acute abnormality. BONES: The visualized osseous structures appear unremarkable. Large pleural effusions. CTA HEAD:    ANTERIOR CIRCULATION: The internal carotid arteries are normal in course and  caliber without focal stenosis. The anterior cerebral and middle cerebral  arteries demonstrate no focal stenosis. POSTERIOR CIRCULATION: The posterior cerebral arteries demonstrate no focal  stenosis. The vertebral and basilar arteries appear otherwise unremarkable.     Impression:       No CT evidence of acute territorial infarct. No significant stenosis visualized in the major intracranial arterial  vasculature. Cervical carotid vasculature is patent.     Diminutive right vertebral artery and widely patent left vertebral artery.     CTA Neck W Contrast [[id number]] Collected: 03/31/19 0510     Order Status: Completed Updated: 03/31/19 0522     Narrative:       EXAMINATION:  CTA OF THE NECK; CTA OF THE HEAD WITH CONTRAST; CT OF THE HEAD WITHOUT  CONTRAST 3/31/2019 4:57 am; 3/31/2019 4:55 am:    TECHNIQUE:  CTA of the neck was performed with the administration of intravenous  contrast. Multiplanar reformatted images are provided for review.  MIP images  are provided for review. Stenosis of the internal carotid arteries measured  using NASCET criteria. Dose modulation, iterative reconstruction, and/or  weight based adjustment of the mA/kV was utilized to reduce the radiation  dose to as low as reasonably achievable.; CTA of the head/brain was performed  with the administration of intravenous contrast. Multiplanar reformatted  images are provided for review.  MIP images are provided for review. Dose  modulation, iterative reconstruction, and/or weight based adjustment of the  mA/kV was utilized to reduce the radiation dose to as low as reasonably  achievable.; CT of the head was performed without the administration of  intravenous contrast. Dose modulation, iterative reconstruction, and/or  weight based adjustment of the mA/kV was utilized to reduce the radiation  dose to as low as reasonably achievable. Noncontrast CT of the head with  reconstructed 2-D images are also provided for review. COMPARISON:  None. HISTORY:  ORDERING SYSTEM PROVIDED HISTORY: r/o CVA  TECHNOLOGIST PROVIDED HISTORY:  Has a \"code stroke\" or \"stroke alert\" been called? ->Yes  Ordering Physician Provided Reason for Exam: a\ms; ORDERING SYSTEM PROVIDED  HISTORY: r/o CVA  TECHNOLOGIST PROVIDED HISTORY:  R/o CVA  Has a \"code stroke\" or \"stroke alert\" been called? ->No  Ordering Physician Provided Reason for Exam: ams; ORDERING SYSTEM PROVIDED  HISTORY: unresponsive  TECHNOLOGIST PROVIDED HISTORY:  R/o hemorrhagic change  Has a \"code stroke\" or \"stroke alert\" been called? ->No  Ordering Physician Provided Reason for Exam: ams    FINDINGS:    CT HEAD:    BRAIN/VENTRICLES:  No acute intracranial hemorrhage or extraaxial fluid  collection. Grey-white differentiation is maintained.  No evidence of mass,  mass effect or midline shift.  No evidence of hydrocephalus. [place] Isabel is  prominence of the ventricles and sulci due to global parenchymal volume loss. ORBITS: The visualized portion of the orbits demonstrate no acute abnormality. SINUSES:  The visualized paranasal sinuses and mastoid air cells demonstrate  no acute abnormality. SOFT TISSUES/SKULL: No acute abnormality of the visualized skull or soft  tissues. CTA NECK:    AORTIC ARCH/ARCH VESSELS: There is a normal branch pattern of the aortic  arch. No significant stenosis is seen of the innominate artery or subclavian  arteries. CAROTID ARTERIES: The common carotid arteries are normal in appearance  without evidence of a flow limiting stenosis. The internal carotid arteries  are normal in appearance without evidence of a flow limiting stenosis by  NASCET criteria. No dissection or arterial injury is seen. VERTEBRAL ARTERIES: Right vertebral artery is diminutive in caliber  throughout.  Left vertebral artery is dominant supplying basilar artery. SOFT TISSUES: The lung apices are clear.  No cervical or superior mediastinal  lymphadenopathy.  There is endotracheal intubation.  The parotid,  submandibular and thyroid glands demonstrate no acute abnormality. BONES: The visualized osseous structures appear unremarkable. Large pleural effusions. CTA HEAD:    ANTERIOR CIRCULATION: The internal carotid arteries are normal in course and  caliber without focal stenosis. The anterior cerebral and middle cerebral  arteries demonstrate no focal stenosis. POSTERIOR CIRCULATION: The posterior cerebral arteries demonstrate no focal  stenosis. The vertebral and basilar arteries appear otherwise unremarkable.   Impression:       No CT evidence of acute territorial infarct. No significant stenosis visualized in the major intracranial arterial  vasculature. Cervical carotid vasculature is patent. Diminutive right vertebral artery and widely patent left vertebral artery.     CT HEAD WO CONTRAST [[id number]]      Order Status: Canceled      CTA NECK W CONTRAST [[id number]]      Order Status: Canceled      CT CHEST WO CONTRAST [[id number]] Collected: 03/29/19 1344     Order Status: Completed Updated: 03/29/19 8655     Narrative:       EXAMINATION:  CT OF THE CHEST WITHOUT CONTRAST 3/29/2019 12:52 pm    TECHNIQUE:  CT of the chest was performed without the administration of intravenous  contrast. Multiplanar reformatted images are provided for review. Dose  modulation, iterative reconstruction, and/or weight based adjustment of the  mA/kV was utilized to reduce the radiation dose to as low as reasonably  achievable. COMPARISON:  Chest radiographs 03/28/2019    HISTORY:  ORDERING SYSTEM PROVIDED HISTORY: nicanor ll pneumonia,r/o pleural effusion  TECHNOLOGIST PROVIDED HISTORY:  Ordering Physician Provided Reason for Exam: nicanor ll pneumonia,r/o pleural  effusion  Acuity: Acute  Additional signs and symptoms: patient unable to follow commands  Relevant Medical/Surgical History: unknown, poor historian    FINDINGS:  Mediastinum: Limited evaluation for lymphadenopathy without intravenous  contrast.  Normal caliber thoracic aorta with mild atherosclerotic  calcifications.  Mildly enlarged main pulmonary artery measuring 3.5 cm in  diameter.  Normal heart size.  No pericardial effusion.  Segmental gaseous  distention of the esophagus. Lungs/pleura:  Moderate right and small left pleural effusions with partial  lower lobe atelectasis.  Additional patchy tree-in-bud/clustered nodularity  in the left upper and lower lobes and middle lobe.  Mild dependent secretions  within the trachea and possibly the proximal left bronchial pneumonia. Chronic appearing coarse interstitial densities predominate parahilar regions  and lung bases, typical of sequela from smoking or other previous  infectious/inflammatory process. Calcific atherosclerotic disease aorta.     XR CHEST PORTABLE [[id number]] Collected: 03/28/19 1434     Order Status: Completed Updated: 03/28/19 1438     Narrative:       EXAMINATION:  SINGLE XRAY VIEW OF THE CHEST    3/28/2019 2:01 pm    COMPARISON:  03/08/2019    HISTORY:  ORDERING SYSTEM PROVIDED HISTORY: cough/fatigue  TECHNOLOGIST PROVIDED HISTORY:  Reason for exam:->cough/fatigue  Ordering Physician Provided Reason for Exam: cough/fatigue  Acuity: Unknown  Type of Exam: Unknown  Additional signs and symptoms: discharged 3/23/19    FINDINGS:  Normal heart size.  Somewhat prominent pulmonary vasculature.  Haziness at  the lung bases may represent atelectasis or layering pleural fluid.  No  pneumothorax.     Impression:       Haziness at the lung bases may represent atelectasis or layering pleural  fluid.  Recommend obtaining PA and lateral chest radiographs for confirmation.           Relevant labs and imaging reviewed    ASSESSMENT AND PLAN     Acute Metabolic Encephalopathy  - 1/[birth year]50 - Acute Metabolic Encephalopathy: unresponsive. S/P ACLS, given 1 epinephrine. Could be from hypotension, CO2 narcosis.  Neurology evaluated initially and had normal CT, CTA head/neck and had signed off.   - now serial observation      Acute Hypoxic Hypercapnic Respiratory failure suspect 2/2 PNA  Septic shock  Difficulty weaning off vent - could be mental status/encephalopathy related vs. Neuromuscular deconditioning with recent influenza A and PNA 3/2019  - intubated 3/31  - on IV zosyn - to complete antibiotics today  - difficulty weaning off vent  - may attempt provigil if mental status is felt to be issue      NSTEMI  TTE LVEF 60-65%, severely dilated right atrium  - on DAPT, medical management  - card considering [facility] vs. Stress when

## 2019-04-07 NOTE — PROGRESS NOTES
Patient was placed on Cpap mode for a few minutes but failed. He triggered the Apnea alarm twice. I placed pt back on AC VC 12 450 .30 peep of 5. Will continue to monitor patient closely.

## 2019-04-07 NOTE — PROGRESS NOTES
Pulmonary and Critical Care  Progress Note      VITALS:  BP (!) 114/50   Pulse 57   Temp 97.5 °F (36.4 °C) (Rectal)   Resp 12   Ht 5' 10\" (1.778 m)   Wt 158 lb 14.4 oz (72.1 kg)   SpO2 100%   BMI 22.80 kg/m²     Subjective:   CHIEF COMPLAINT :SOB     HPI:                The patient is a 80 y.o. male with significant past medical history of HTL, DM II, hypercapnea and Pulmonary HTN  presents with complaints of Gen weakness. He was recently discharged from the hospital after pneumonia. He was at rehab unit. He was intubated for the cardiac arrest,. He was extubated a few days later but he yhad to be reintubated of the stridor. He is on the vent. At this time he is opening his eyes. He is following commands. He is weaning well on the vent. He is not in acute resp distress. Objective:   PHYSICAL EXAM:    LUNGS:Occasional basal crackles  Abd-soft, BS+,NT  Ext- no pedal edema  CVS-s1s2, no murmurs      DATA:    CBC:  Recent Labs     04/06/19  0430 04/06/19  1500 04/07/19  0510   WBC 8.7 6.9 7.5   RBC 2.56* 2.65* 2.83*   HGB 7.4* 7.6* 8.1*   HCT 25.0* 25.2* 26.8*    141 154   MCV 97.7 95.1 94.7   MCH 28.9 28.7 28.6   MCHC 29.6* 30.2* 30.2*   RDW 15.4* 16.0* 16.4*      BMP:  Recent Labs     04/06/19  0430 04/06/19  1500 04/07/19  0510    141 142   K 3.4* 4.0 3.7   CL 99 99 99   CO2 39* 37* 36*   BUN 17 22 20   CREATININE 1.0 1.0 1.0   CALCIUM 8.3 7.8* 8.2*   GLUCOSE 150* 161* 168*      ABG:  Recent Labs     04/05/19  0600 04/06/19  0600 04/07/19  0600   PH 7.46* 7.49* 7.48*   PO2ART 99 98 99   JDY8CAT 58.0* 55.0* 52.0*   O2SAT 96.0 95.8* 94.7*     BNP  No results found for: BNP   D-Dimer:  No results found for: DDIMER   1.  Radiology: Reviewed      Assessment/Plan     Patient Active Problem List    Diagnosis Date Noted    Severe malnutrition ([facility] [address] 04/05/2019    NSTEMI (non-ST elevated myocardial infarction) ([facility] [address] 03/28/2019    Fatigue     Pneumonia 03/12/2019    Essential hypertension 03/12/2019  Type 2 diabetes mellitus with diabetic polyneuropathy, without long-term current use of insulin ([facility] [address] 03/12/2019    KERA (acute kidney injury) ([facility] [address] 03/12/2019    Oropharyngeal dysphagia 03/12/2019    Community acquired pneumonia due to influenza A virus 03/09/2019   Anemia   Acute on chronic hypoxic hypercaoneic resp failure  Chronic metabolic alkalosis  VDRF  ? Pneumonia  Bibasilar atelectasis  Severe Pulmonary HTN           1. SAT and SBT now   2. Keep NPO for extubation  3. CBC, BMP in am  4. C/w nebs  5. DVT and GI prophylaxis  6. PT/OT  7. Possible extubation today  8.  C/w present management                                                              Discussed with the nursing and the patient and family and more than 30 minutes of CC time spent on the patient    Electronically signed by Jorge L Dsouza MD on 4/7/2019 at 1:25 PM

## 2019-04-08 NOTE — PROGRESS NOTES
pulmonary      SUBJECTIVE: intubated on vent     OBJECTIVE    VITALS:  BP (!) 124/48   Pulse 81   Temp 97.5 °F (36.4 °C) (Rectal)   Resp 11   Ht 5' 10\" (1.778 m)   Wt 163 lb 6.4 oz (74.1 kg)   SpO2 100%   BMI 23.45 kg/m²   HEAD AND FACE EXAM:  No throat injection, no active exudate,no thrush  NECK EXAM;No JVD, no masses, symmetrical  CHEST EXAM; Expansion equal and symmetrical, no masses  LUNG EXAM; Good breath sounds bilaterally. There are expiratory wheezes both lungs, there are crackles at both lung bases  CARDIOVASCULAR EXAM: Positive S1 and S2, no S3 or S4, no clicks ,no murmurs  RIGHT AND LEFT LOWER EXTRIMITY EXAM: No edema, no swelling, no inflamation            LABS   Lab Results   Component Value Date    WBC 8.7 04/08/2019    HGB 8.3 (L) 04/08/2019    HCT 27.1 (L) 04/08/2019    MCV 95.1 04/08/2019     04/08/2019     Lab Results   Component Value Date    CREATININE 1.0 04/08/2019    BUN 19 04/08/2019     04/08/2019    K 3.9 04/08/2019     04/08/2019    CO2 33 (H) 04/08/2019     Lab Results   Component Value Date    INR 1.04 03/31/2019    PROTIME 12.1 03/31/2019        No results found for: PHOS     Recent Labs     04/06/19  0600 04/07/19  0600 04/08/19  0600   PH 7.49* 7.48* 7.47*   PO2ART 98 99 108*   IEP9FWW 55.0* 52.0* 52.0*   O2SAT 95.8* 94.7* 95.4*         Wt Readings from Last 3 Encounters:   04/08/19 163 lb 6.4 oz (74.1 kg)   03/23/19 154 lb 9.6 oz (70.1 kg)   03/12/19 154 lb 12.8 oz (70.2 kg)               ASSESMENT  Ac resp failure  Pneumonia  Ac bronchospasm  nicanor pl effusion        PLAN  1.  Will continue to wean to cpap and ps if tolerated,hopefully extubation tomorrow  2. cpm    4/8/2019  Scott Meade M.D.

## 2019-04-08 NOTE — CONSULTS
Neurology Service Consult Note  [facility]  Patient Name: Fredi Cantor  : 7/15/1933        Subjective:   Reason for consult: can not extubate  80 y.o. right-handed male with PMH of DM, and HTN presenting to [facility] with acute SOB and fatigue, intubated for ARDs, found to have pneumonia and influenza, was to be extubated yesterday and when they went to wean the vent he became apneic and could not complete we are consulted to rule out any encephalopathy or central etiologies. Today on exam he has taken a turn for the upmost better is is completely oriented x4 and follows all commands, there is no unilateral arm or leg weakness, no loss of sensation, no cranial nerve deficits and he has full reflexes. There has been no change in mentation, seizure activity or loss of consciousness. Family at the bedside, case discussed with IM and nursing, IM wanted to make sure we did not need to use any stimulants to arouse the patient, we deny the need.        Past Medical History:   Diagnosis Date    Diabetes mellitus ([facility] [address]     Hypertension     :   Past Surgical History:   Procedure Laterality Date    UPPER GASTROINTESTINAL ENDOSCOPY N/A 2019    EGD DIAGNOSTIC ONLY performed by Jamshid Saab MD at [facility] ENDOSCOPY     Medications:  Scheduled Meds:   midodrine  10 mg Oral TID WC    sodium chloride flush  10 mL Intravenous 2 times per day    pantoprazole  40 mg Intravenous BID    chlorhexidine  15 mL Mouth/Throat BID    piperacillin-tazobactam  3.375 g Intravenous Q8H    clopidogrel  75 mg Oral Daily    enoxaparin  40 mg Subcutaneous Daily    gabapentin  400 mg Oral BID    latanoprost  1 drop Both Eyes Nightly    simvastatin  40 mg Oral Nightly    sodium chloride flush  10 mL Intravenous 2 times per day    aspirin  81 mg Oral Daily    ipratropium-albuterol  1 ampule Inhalation Q4H WA    sodium chloride flush  10 mL Intravenous 2 times per day    insulin lispro  0-12 Units Subcutaneous TID WC    insulin lispro  0-6 Units Subcutaneous Nightly    insulin glargine  5 Units Subcutaneous Nightly     Continuous Infusions:   norepinephrine Stopped (04/08/19 0932)    dexmedetomidine (PRECEDEX) IV infusion 0.2 mcg/kg/hr (04/08/19 0004)    propofol Stopped (04/07/19 0520)    dextrose       PRN Meds:.potassium chloride, sodium chloride flush, racepinephrine HCl, sodium chloride nebulizer, glucose, dextrose, glucagon (rDNA), dextrose, sodium chloride flush, magnesium hydroxide, ondansetron, sodium chloride flush, acetaminophen    No Known Allergies  Social History     Socioeconomic History    Marital status:      Spouse name: Not on file    Number of children: Not on file    Years of education: Not on file    Highest education level: Not on file   Occupational History    Not on file   Social Needs    Financial resource strain: Not on file    Food insecurity:     Worry: Not on file     Inability: Not on file    Transportation needs:     Medical: Not on file     Non-medical: Not on file   Tobacco Use    Smoking status: Never Smoker    Smokeless tobacco: Never Used   Substance and Sexual Activity    Alcohol use: Never     Frequency: Never    Drug use: Never    Sexual activity: Not Currently   Lifestyle    Physical activity:     Days per week: Not on file     Minutes per session: Not on file    Stress: Not on file   Relationships    Social connections:     Talks on phone: Not on file     Gets together: Not on file     Attends Hoahaoism service: Not on file     Active member of club or organization: Not on file     Attends meetings of clubs or organizations: Not on file     Relationship status: Not on file    Intimate partner violence:     Fear of current or ex partner: Not on file     Emotionally abused: Not on file     Physically abused: Not on file     Forced sexual activity: Not on file   Other Topics Concern    Not on file   Social History Narrative    Not on file      History reviewed.  No pertinent family intubation is exam is completely non-focal, he is fully alert and oriented x4. We believe he will have a successful wean today and recommend starting, do not feel there are any other underlying aliments to be concerned about such as GBS, central bacterial/viral etiologies or a prolonged myopathy or encephalopathy. CT head from 3/31 non acute and do not need to repeat. We will sign off, thank you. Thank you for allowing us to participate in the care of your patient. If there are any questions regarding evaluation please feel free to contact us. CARL Sotomayor - CNP, 4/8/2019     Attending Note:  I have rounded on this patient with Pat Hudson CNP. I have reviewed the chart and we have discussed this case in detail. The patient was seen and examined by myself. Pertinent labs and imaging have been personally reviewed. Our findings and impressions were discussed with the patient. I concur with the Nurse Practioner's assessment and plan.     Scott Delaney DO 4/8/2019 6:48 PM

## 2019-04-08 NOTE — PROGRESS NOTES
04/08/19 1448   Vent Information   Vent Type 980   Vent Mode CPAP   Vt Ordered 450 mL   Rate Set 9 bmp   Peak Flow 50 L/min   Pressure Support 15 cmH20   FiO2  24 %   Sensitivity 3   PEEP/CPAP 5   I Time/ I Time % 0 s   Humidification Source Heated wire   Humidification Temp 37   Humidification Temp Measured 36   Circuit Condensation Drained   Vent Patient Data   High Peep/I Pressure 0   Peak Inspiratory Pressure 24 cmH2O   Mean Airway Pressure 7.6 cmH20   Rate Measured 9 br/min   Vt Exhaled 466 mL   Minute Volume 4.18 Liters   I:E Ratio 1:5.80   Cough/Sputum   Sputum How Obtained Endotracheal;Suctioned   $Obtained Sample $Induced Sputum   Spontaneous Breathing Trial (SBT) RT Doc   Pulse 73   SpO2 100 %   Breath Sounds   Right Upper Lobe Diminished;Rhonchi   Right Middle Lobe Diminished;Rales   Right Lower Lobe Diminished;Rhonchi   Left Upper Lobe Diminished;Rhonchi   Left Lower Lobe Diminished;Rhonchi   Additional Respiratory  Assessments   Resp 10   Position Semi-Dubose's   Alarm Settings   High Pressure Alarm 40 cmH2O   Delay Alarm 20 sec(s)   Low Minute Volume Alarm 2.5 L/min   Apnea (secs) 20 secs   High Respiratory Rate 35 br/min   Resp Rate Low Alarm 13   Low Exhaled Vt  250 mL

## 2019-04-08 NOTE — PROGRESS NOTES
Progress Note (Hospitalist, Internal Medicine)  IDENTIFYING INFORMATION   PATIENT:  Cirilo Johnston  MRN:  [id number]  ADMIT DATE: 3/28/2019  TIME OF EVALUATION: 4/8/2019 3:44 PM      HISTORY OF PRESENT ILLNESS   \"Tai Mills is a 80 y.o.  male, with past medical history significant for hyperlipidemia, diabetes, who presented to the ED from home due generalized body weakness. He was recently discharged from rehab unit. He was admitted on March 8 due to pneumonia and influenza. The finished a course of antibiotic and antiviral.  Since discharge, he noted worsening generalized body weakness. He wasn't able to get out of bed. She denied lateralizing weakness or numbness. Still with cough. Denied shortness of breath. No fever. Denied lower extremity swelling or orthopnea. He has no chest pain. He was brought to the ED and was subsequently admitted\"    SUBJECTIVE     Apneic yesterday but today appear fair - undergoing weaning. Mental status appears better today. MEDICATIONS   Medications Prior to Admission  Medications Prior to Admission: simvastatin (ZOCOR) 80 MG tablet, Take 40 mg by mouth nightly  omeprazole (PRILOSEC) 20 MG delayed release capsule, Take 20 mg by mouth Daily with supper   metFORMIN (GLUCOPHAGE) 500 MG tablet, Take 250 mg by mouth 2 times daily (with meals)   gabapentin (NEURONTIN) 400 MG capsule, Take 400 mg by mouth 2 times daily.   latanoprost (XALATAN) 0.005 % ophthalmic solution, Place 1 drop into both eyes nightly    Current Medications  Current Facility-Administered Medications   Medication Dose Route Frequency Provider Last Rate Last Dose    midodrine (PROAMATINE) tablet 10 mg  10 mg Oral TID  Reed Villaseñor MD   10 mg at 04/08/19 1132    potassium chloride 10 mEq/100 mL IVPB (Peripheral Line)  10 mEq Intravenous PRN Amita Vargas  mL/hr at 04/06/19 0950 10 mEq at 04/06/19 0950    sodium chloride flush 0.9 % injection 10 mL  10 mL Intravenous 2 times per day Amita Vargas MD 10 mL at 04/08/19 0901    sodium chloride flush 0.9 % injection 10 mL  10 mL Intravenous PRN Therese Cruz MD        norepinephrine (LEVOPHED) 16 mg in dextrose 5 % 250 mL infusion  2 mcg/min Intravenous Continuous Yaz Oh MD 0.9 mL/hr at 04/08/19 1451 1 mcg/min at 04/08/19 1451    racepinephrine HCl (VAPONEFPRIN) 2.25 % nebulizer solution NEBU 11.25 mg  11.25 mg Nebulization Q20 Min PRN Reginaldo Valenzuela MD        sodium chloride nebulizer 0.9 % solution 3 mL  3 mL Nebulization Q4H PRN Reginaldo Valenzuela MD        pantoprazole (PROTONIX) injection 40 mg  40 mg Intravenous BID Maria Esther Low MD   40 mg at 04/08/19 0900    dexmedetomidine (PRECEDEX) 400 mcg in sodium chloride 0.9 % 100 mL infusion  0.2 mcg/kg/hr Intravenous Continuous Reginaldo Valenzuela MD 3.6 mL/hr at 04/08/19 0004 0.2 mcg/kg/hr at 04/08/19 0004    chlorhexidine (PERIDEX) 0.12 % solution 15 mL  15 mL Mouth/Throat BID Nate Gomez MD   15 mL at 04/08/19 0901    propofol 1000 MG/100ML injection  10 mcg/kg/min Intravenous Titrated Reginaldo Valenzuela MD   Stopped at 04/07/19 0520    piperacillin-tazobactam (ZOSYN) 3.375 g in dextrose 50 mL IVPB extended infusion (premix)  3.375 g Intravenous Q8H Maria Esther Low MD 12.5 mL/hr at 04/08/19 1335 3.375 g at 04/08/19 1335    clopidogrel (PLAVIX) tablet 75 mg  75 mg Oral Daily Jesus Lira MD   75 mg at 04/08/19 0901    enoxaparin (LOVENOX) injection 40 mg  40 mg Subcutaneous Daily Jesus Lira MD   40 mg at 04/08/19 0900    gabapentin (NEURONTIN) capsule 400 mg  400 mg Oral BID Maria Esther Low MD   400 mg at 04/08/19 0901    latanoprost (XALATAN) 0.005 % ophthalmic solution 1 drop  1 drop Both Eyes Nightly Maria Esther Low MD   1 drop at 04/07/19 2041    simvastatin (ZOCOR) tablet 40 mg  40 mg Oral Nightly Maria Esther Low MD   40 mg at 04/07/19 2037    glucose (GLUTOSE) 40 % oral gel 15 g  15 g Oral PRN Maria Esther Low MD        dextrose 50 % solution 12.5 g  12.5 g Intravenous PRN Argenis Braga MD        glucagon (rDNA) injection 1 mg  1 mg Intramuscular PRN Argenis Braga MD        dextrose 5 % solution  100 mL/hr Intravenous PRN Argenis Braga MD        sodium chloride flush 0.9 % injection 10 mL  10 mL Intravenous 2 times per day Argenis Braga MD   10 mL at 04/08/19 0902    sodium chloride flush 0.9 % injection 10 mL  10 mL Intravenous PRN Argenis Braga MD   10 mL at 04/02/19 0125    magnesium hydroxide (MILK OF MAGNESIA) 400 MG/5ML suspension 30 mL  30 mL Oral Daily PRN Argenis Braga MD        ondansetron [facility]) injection 4 mg  4 mg Intravenous Q6H PRN Argenis Braga MD        aspirin chewable tablet 81 mg  81 mg Oral Daily Argenis Braga MD   81 mg at 04/08/19 0901    ipratropium-albuterol (DUONEB) nebulizer solution 1 ampule  1 ampule Inhalation Q4H WA Argenis Braga MD   1 ampule at 04/08/19 1448    sodium chloride flush 0.9 % injection 10 mL  10 mL Intravenous 2 times per day Julianion Esme, APRN - CNP   10 mL at 04/08/19 0902    sodium chloride flush 0.9 % injection 10 mL  10 mL Intravenous PRN Julianion Raffaelety APRN - CNP        acetaminophen (TYLENOL) tablet 650 mg  650 mg Oral Q4H PRN Keene Quest, APRN - CNP   650 mg at 03/28/19 2151    insulin lispro (HUMALOG) injection vial 0-12 Units  0-12 Units Subcutaneous TID WC Keene Quest, APRN - CNP   2 Units at 04/06/19 1724    insulin lispro (HUMALOG) injection vial 0-6 Units  0-6 Units Subcutaneous Nightly Keene Quest, APRN - CNP   1 Units at 04/06/19 2141    insulin glargine (LANTUS) injection vial 5 Units  5 Units Subcutaneous Nightly Keene Quest, APRN - CNP   5 Units at 04/06/19 2141         Allergies  No Known Allergies    REVIEW OF SYSTEMS     Within above limitations. 14 point review of systems reviewed. Pertinent positive or negative as per HPI or otherwise negative per 14 point systems review.      Reviewed 4/8/2019 at 3:44 PM    PHYSICAL EXAM       Blood pressure (!) 112/50, pulse 76, temperature 97.5 °F (36.4 °C), temperature source Rectal, resp. rate 8, height 5' 10\" (1.778 m), weight 163 lb 6.4 oz (74.1 kg), SpO2 100 %. General - Able to respond   Psych - Appropriate affect/speech. No agitation  Eyes - Eye lids intact. No scleral icterus  ENT - Lips wnl. External ear clear/dry/intact. No thyromegaly on inspection  Neuro - moving all extremities on command  Heart - Sinus. RRR. S1 and S2 present. No elevated JVD appreciated  Lung - good air entry, coarse sounds on vent. No wheezes appreciated. GI -  Soft. No guarding/rigidity. No hepatosplenomegaly/ascites. BS+   - No CVA/suprapubic tenderness or palpable bladder distension  Skin - Intact. No rash/petechiae/ecchymosis. Warm extremities      LABS AND IMAGING   CBC  [unfilled]    Last 3 Hemoglobin  Lab Results   Component Value Date    HGB 8.3 04/08/2019    HGB 8.1 04/07/2019    HGB 7.6 04/06/2019     Last 3 WBC/ANC  Lab Results   Component Value Date    WBC 8.7 04/08/2019    WBC 7.5 04/07/2019    WBC 6.9 04/06/2019     No components found for: GRNLOCTYABS  Last 3 Platelets  No results found for: PLATELET  Chemistry  [unfilled]  [unfilled]  No results found for: LDH  Coagulation Studies  Lab Results   Component Value Date    INR 1.04 03/31/2019     Liver Function Studies  Lab Results   Component Value Date    ALT <5 04/04/2019    AST 13 04/04/2019    ALKPHOS 102 04/04/2019       Recent Imaging    Milton Kathleen #[id number] ([id number]) (80 y.o.  M) (Adm: 03/28/19)    [address] [id number]         Imaging Results (last 7 days)          Procedure Component Value Ref Range Date/Time     XR CHEST PORTABLE [[id number]] Collected: 04/07/19 0714     Order Status: Completed Updated: 04/07/19 0859     Narrative:       EXAMINATION:  SINGLE XRAY VIEW OF THE CHEST    4/7/2019 6:32 am    COMPARISON:  04/06/2019    HISTORY:  ORDERING SYSTEM PROVIDED HISTORY: hypoxia on vent  TECHNOLOGIST PROVIDED HISTORY:  Reason for exam:->hypoxia on vent  Ordering Physician Provided Reason for Exam: hypoxia on vent  Acuity: Unknown  Type of Exam: Unknown    FINDINGS:  Endotracheal tube terminates 5 cm above the cyndi. Enteric tube courses  below the diaphragm.  Right PICC with tip at the inferior SVC.  No  pneumothorax.  Patchy areas of consolidation at the right perihilar lung and  right lung base with small layering right pleural effusion, similar to the  prior exam. Heart size is normal.     Impression:       1.  Satisfactory position of support devices. 2.  No significant change from prior exam.     XR CHEST PORTABLE [[id number]] Collected: 04/06/19 6876     Order Status: Completed Updated: 04/06/19 1725     Narrative:       EXAMINATION:  SINGLE XRAY VIEW OF THE CHEST    4/6/2019 6:27 am    COMPARISON:  04/05/2019 and prior    HISTORY:  ORDERING SYSTEM PROVIDED HISTORY: tube placement  TECHNOLOGIST PROVIDED HISTORY:  Reason for exam:->tube placement  Ordering Physician Provided Reason for Exam: tube placement  Acuity: Unknown  Type of Exam: Unknown    FINDINGS:  Study limited by overlying support and monitoring apparatus. Endotracheal tube projects approximately 5 cm above the cyndi, unchanged. Nasogastric tube is seen overlying the esophagus.  The tip is below the field  of view and below the diaphragm. No definite new lines or tubes are noted. Heart size is within normal limits.  Pulmonary vascularity appears within  normal limits. Linear densities overlying the right side of the chest compatible with skin  folds.  Increased hazy opacities noted on the right similar to the prior  study.  No significant change in left basilar pleural and parenchymal  disease. Dung Osier is evidence of old granulomatous disease.  Osseous structures  demonstrate no acute abnormality.     Impression:       Lines and tubes are stable.     Persistent bilateral pleural and parenchymal changes greater on the right  which accounting for differences in technique do not of the gastric cardia.     XR ABDOMEN (KUB) (SINGLE AP VIEW) [[id number]] Updated: 04/04/19 1446     Order Status: Canceled      XR CHEST PORTABLE [[id number]] Collected: 04/04/19 0642     Order Status: Completed Updated: 04/04/19 0840     Narrative:       EXAMINATION:  SINGLE XRAY VIEW OF THE CHEST    4/4/2019 5:12 am    COMPARISON:  04/03/2019    HISTORY:  ORDERING SYSTEM PROVIDED HISTORY: tube placement  TECHNOLOGIST PROVIDED HISTORY:  Reason for exam:->tube placement  Ordering Physician Provided Reason for Exam: tube placement  Acuity: Acute  Type of Exam: Subsequent/Follow-up    FINDINGS:  Endotracheal tube and NG tube are in place.  The heart and mediastinal  structures are stable.  Small pleural effusions with bibasilar atelectasis  persist.  Arthritic changes of the shoulders are noted.     Impression:       Persistent small pleural effusions with bibasilar atelectasis.     XR CHEST PORTABLE [[id number]] Collected: 04/03/19 0709     Order Status: Completed Updated: 04/03/19 0714     Narrative:       EXAMINATION:  SINGLE XRAY VIEW OF THE CHEST    4/3/2019 5:30 am    COMPARISON:  April 2, 2019    HISTORY:  ORDERING SYSTEM PROVIDED HISTORY: tube placement  TECHNOLOGIST PROVIDED HISTORY:  Reason for exam:->tube placement  Ordering Physician Provided Reason for Exam: tube placement  Acuity: Acute  Type of Exam: Subsequent/Follow-up    FINDINGS:  The ETT is 3.8 cm above the cyndi.  The feeding tube is inserted with its  tip below the diaphragm and beyond the field of view.     The cardiomediastinal silhouette is stable.  There is airspace opacity at the  right lung base, may be related to layering pleural effusion, atelectasis or  pneumonia, stable.  There is no pneumothorax. David Racer is no acute osseous  abnormality.     Impression:       Airspace opacity at the right lung base, may be related to layering pleural  effusion, atelectasis or pneumonia, stable.     XR CHEST PORTABLE [[id number]] Collected: 04/02/19 2136   Order Status: Completed Updated: 04/02/19 2136     Narrative:       EXAMINATION:  SINGLE XRAY VIEW OF THE CHEST    4/2/2019 9:25 pm    COMPARISON:  Chest x-ray 15 arch prior    HISTORY:  ORDERING SYSTEM PROVIDED HISTORY: post intubation/ ETT placement  TECHNOLOGIST PROVIDED HISTORY:  Reason for exam:->post intubation/ ETT placement  Ordering Physician Provided Reason for Exam: POST INTUBATION AND NG TUBE  PLACEMENT    FINDINGS:  Endotracheal tube tip projects 3.5 cm from the cyndi.  Enteric tube  identified with side port at the level of the GE junction.  Tip projects over  the expected location of the stomach.  Recommend advancement.  Opacities  project over the bilateral lung bases, right greater than left which is  suspicious for layering pleural effusions.  No pneumothorax identified. Osseous structures appear stable.     Impression:       1. Endotracheal tube tip projects approximately 3.5 cm from the cyndi. 2. Enteric tube side port at the level of the GE junction.  Recommend  advancement. 3. Findings suggesting layering effusions, right greater than left.     XR CHEST PORTABLE [[id number]] Collected: 04/02/19 0724     Order Status: Completed Updated: 04/02/19 0729     Narrative:       EXAMINATION:  SINGLE XRAY VIEW OF THE CHEST    4/2/2019 5:17 am    COMPARISON:  April 1, 2019    HISTORY:  ORDERING SYSTEM PROVIDED HISTORY: tube placement  TECHNOLOGIST PROVIDED HISTORY:  Reason for exam:->tube placement  Ordering Physician Provided Reason for Exam: tube placement  Acuity: Acute  Type of Exam: Subsequent/Follow-up    FINDINGS:  ETT tip is 6 cm above the cyndi.  Enteric catheter extends beyond the  inferior margin of the image.  Cardiac silhouette is within normal range. Small right pleural effusion.  No focal consolidation, left pleural effusion,  or pneumothorax.  Severe left glenohumeral degenerative changes with  suspected chronic left rotator cuff tear.     Impression:       1.  Small right pleural effusion.     XR CHEST PORTABLE [[id number]] Collected: 04/01/19 1262     Order Status: Completed Updated: 04/01/19 0907     Narrative:       EXAMINATION:  SINGLE XRAY VIEW OF THE CHEST    4/1/2019 8:46 am    COMPARISON:  04/01/2019. HISTORY:  ORDERING SYSTEM PROVIDED HISTORY: ETT placement  TECHNOLOGIST PROVIDED HISTORY:  Reason for exam:->ETT placement  Ordering Physician Provided Reason for Exam: ETT placement  Acuity: Acute  Type of Exam: Initial  Relevant Medical/Surgical History: diabetes mellitus; hypertension    FINDINGS:  The endotracheal tube has been repositioned with tip is now above the cyndi. A nasogastric tube courses below the diaphragm.  The heart size and pulmonary  vasculature stable.  Again noted are hazy density seen throughout the lungs  bilaterally.  No pneumothoraces are seen.     Impression:       1. Interval reposition of endotracheal tube with its tip now above the cyndi  and in satisfactory position. 2. Otherwise, stable chest x-ray with findings likely reflecting pleural  effusions and infiltrates.     XR CHEST PORTABLE [[id number]] Collected: 04/01/19 0731     Order Status: Completed Updated: 04/01/19 0735     Narrative:       EXAMINATION:  SINGLE XRAY VIEW OF THE CHEST    4/1/2019 5:37 am    COMPARISON:  03/31/2019. HISTORY:  ORDERING SYSTEM PROVIDED HISTORY: tube placement  TECHNOLOGIST PROVIDED HISTORY:  Reason for exam:->tube placement  Ordering Physician Provided Reason for Exam: tube placement  Acuity: Unknown  Type of Exam: Subsequent/Follow-up  Additional signs and symptoms: tube placement  Relevant Medical/Surgical History: tube placement    FINDINGS:  The endotracheal tube is seen with its tip at the level of the cyndi.  There  is a nasogastric tube which courses below the diaphragm.  The heart size and  pulmonary vasculature are stable.  There are hazy densities seen involving  the lungs bilaterally.  No new airspace disease is seen.  No pneumothoraces  are noted. carotid arteries measured  using NASCET criteria. Dose modulation, iterative reconstruction, and/or  weight based adjustment of the mA/kV was utilized to reduce the radiation  dose to as low as reasonably achievable.; CTA of the head/brain was performed  with the administration of intravenous contrast. Multiplanar reformatted  images are provided for review.  MIP images are provided for review. Dose  modulation, iterative reconstruction, and/or weight based adjustment of the  mA/kV was utilized to reduce the radiation dose to as low as reasonably  achievable.; CT of the head was performed without the administration of  intravenous contrast. Dose modulation, iterative reconstruction, and/or  weight based adjustment of the mA/kV was utilized to reduce the radiation  dose to as low as reasonably achievable. Noncontrast CT of the head with  reconstructed 2-D images are also provided for review. COMPARISON:  None. HISTORY:  ORDERING SYSTEM PROVIDED HISTORY: r/o CVA  TECHNOLOGIST PROVIDED HISTORY:  Has a \"code stroke\" or \"stroke alert\" been called? ->Yes  Ordering Physician Provided Reason for Exam: a\ms; ORDERING SYSTEM PROVIDED  HISTORY: r/o CVA  TECHNOLOGIST PROVIDED HISTORY:  R/o CVA  Has a \"code stroke\" or \"stroke alert\" been called? ->No  Ordering Physician Provided Reason for Exam: ams; ORDERING SYSTEM PROVIDED  HISTORY: unresponsive  TECHNOLOGIST PROVIDED HISTORY:  R/o hemorrhagic change  Has a \"code stroke\" or \"stroke alert\" been called? ->No  Ordering Physician Provided Reason for Exam: ams    FINDINGS:    CT HEAD:    BRAIN/VENTRICLES:  No acute intracranial hemorrhage or extraaxial fluid  collection. Grey-white differentiation is maintained.  No evidence of mass,  mass effect or midline shift.  No evidence of hydrocephalus. Vinicio Plump is  prominence of the ventricles and sulci due to global parenchymal volume loss. ORBITS: The visualized portion of the orbits demonstrate no acute abnormality.     SINUSES:  The visualized paranasal sinuses and mastoid air cells demonstrate  no acute abnormality. SOFT TISSUES/SKULL: No acute abnormality of the visualized skull or soft  tissues. CTA NECK:    AORTIC ARCH/ARCH VESSELS: There is a normal branch pattern of the aortic  arch. No significant stenosis is seen of the innominate artery or subclavian  arteries. CAROTID ARTERIES: The common carotid arteries are normal in appearance  without evidence of a flow limiting stenosis. The internal carotid arteries  are normal in appearance without evidence of a flow limiting stenosis by  NASCET criteria. No dissection or arterial injury is seen. VERTEBRAL ARTERIES: Right vertebral artery is diminutive in caliber  throughout.  Left vertebral artery is dominant supplying basilar artery. SOFT TISSUES: The lung apices are clear.  No cervical or superior mediastinal  lymphadenopathy.  There is endotracheal intubation.  The parotid,  submandibular and thyroid glands demonstrate no acute abnormality. BONES: The visualized osseous structures appear unremarkable. Large pleural effusions. CTA HEAD:    ANTERIOR CIRCULATION: The internal carotid arteries are normal in course and  caliber without focal stenosis. The anterior cerebral and middle cerebral  arteries demonstrate no focal stenosis. POSTERIOR CIRCULATION: The posterior cerebral arteries demonstrate no focal  stenosis. The vertebral and basilar arteries appear otherwise unremarkable.     Impression:       No CT evidence of acute territorial infarct. No significant stenosis visualized in the major intracranial arterial  vasculature. Cervical carotid vasculature is patent.     Diminutive right vertebral artery and widely patent left vertebral artery.     CT Head WO Contrast [[id number]] Collected: 03/31/19 0510     Order Status: Completed Updated: 03/31/19 0522     Narrative:       EXAMINATION:  CTA OF THE NECK; CTA OF THE HEAD WITH CONTRAST; CT OF THE HEAD WITHOUT  CONTRAST 3/31/2019 4:57 am; 3/31/2019 4:55 am:    TECHNIQUE:  CTA of the neck was performed with the administration of intravenous  contrast. Multiplanar reformatted images are provided for review.  MIP images  are provided for review. Stenosis of the internal carotid arteries measured  using NASCET criteria. Dose modulation, iterative reconstruction, and/or  weight based adjustment of the mA/kV was utilized to reduce the radiation  dose to as low as reasonably achievable.; CTA of the head/brain was performed  with the administration of intravenous contrast. Multiplanar reformatted  images are provided for review.  MIP images are provided for review. Dose  modulation, iterative reconstruction, and/or weight based adjustment of the  mA/kV was utilized to reduce the radiation dose to as low as reasonably  achievable.; CT of the head was performed without the administration of  intravenous contrast. Dose modulation, iterative reconstruction, and/or  weight based adjustment of the mA/kV was utilized to reduce the radiation  dose to as low as reasonably achievable. Noncontrast CT of the head with  reconstructed 2-D images are also provided for review. COMPARISON:  None. HISTORY:  ORDERING SYSTEM PROVIDED HISTORY: r/o CVA  TECHNOLOGIST PROVIDED HISTORY:  Has a \"code stroke\" or \"stroke alert\" been called? ->Yes  Ordering Physician Provided Reason for Exam: a\ms; ORDERING SYSTEM PROVIDED  HISTORY: r/o CVA  TECHNOLOGIST PROVIDED HISTORY:  R/o CVA  Has a \"code stroke\" or \"stroke alert\" been called? ->No  Ordering Physician Provided Reason for Exam: ams; ORDERING SYSTEM PROVIDED  HISTORY: unresponsive  TECHNOLOGIST PROVIDED HISTORY:  R/o hemorrhagic change  Has a \"code stroke\" or \"stroke alert\" been called? ->No  Ordering Physician Provided Reason for Exam: ams    FINDINGS:    CT HEAD:    BRAIN/VENTRICLES:  No acute intracranial hemorrhage or extraaxial fluid  collection. Grey-white differentiation is maintained. vertebral artery and widely patent left vertebral artery.     CTA Neck W Contrast [[id number]] Collected: 03/31/19 0510     Order Status: Completed Updated: 03/31/19 0522     Narrative:       EXAMINATION:  CTA OF THE NECK; CTA OF THE HEAD WITH CONTRAST; CT OF THE HEAD WITHOUT  CONTRAST 3/31/2019 4:57 am; 3/31/2019 4:55 am:    TECHNIQUE:  CTA of the neck was performed with the administration of intravenous  contrast. Multiplanar reformatted images are provided for review.  MIP images  are provided for review. Stenosis of the internal carotid arteries measured  using NASCET criteria. Dose modulation, iterative reconstruction, and/or  weight based adjustment of the mA/kV was utilized to reduce the radiation  dose to as low as reasonably achievable.; CTA of the head/brain was performed  with the administration of intravenous contrast. Multiplanar reformatted  images are provided for review.  MIP images are provided for review. Dose  modulation, iterative reconstruction, and/or weight based adjustment of the  mA/kV was utilized to reduce the radiation dose to as low as reasonably  achievable.; CT of the head was performed without the administration of  intravenous contrast. Dose modulation, iterative reconstruction, and/or  weight based adjustment of the mA/kV was utilized to reduce the radiation  dose to as low as reasonably achievable. Noncontrast CT of the head with  reconstructed 2-D images are also provided for review. COMPARISON:  None. HISTORY:  ORDERING SYSTEM PROVIDED HISTORY: r/o CVA  TECHNOLOGIST PROVIDED HISTORY:  Has a \"code stroke\" or \"stroke alert\" been called? ->Yes  Ordering Physician Provided Reason for Exam: a\ms; ORDERING SYSTEM PROVIDED  HISTORY: r/o CVA  TECHNOLOGIST PROVIDED HISTORY:  R/o CVA  Has a \"code stroke\" or \"stroke alert\" been called? ->No  Ordering Physician Provided Reason for Exam: ams; ORDERING SYSTEM PROVIDED  HISTORY: unresponsive  TECHNOLOGIST PROVIDED HISTORY:  R/o hemorrhagic change  Has a \"code stroke\" or \"stroke alert\" been called? ->No  Ordering Physician Provided Reason for Exam: ams    FINDINGS:    CT HEAD:    BRAIN/VENTRICLES:  No acute intracranial hemorrhage or extraaxial fluid  collection. Grey-white differentiation is maintained.  No evidence of mass,  mass effect or midline shift.  No evidence of hydrocephalus. Gradie Bran is  prominence of the ventricles and sulci due to global parenchymal volume loss. ORBITS: The visualized portion of the orbits demonstrate no acute abnormality. SINUSES:  The visualized paranasal sinuses and mastoid air cells demonstrate  no acute abnormality. SOFT TISSUES/SKULL: No acute abnormality of the visualized skull or soft  tissues. CTA NECK:    AORTIC ARCH/ARCH VESSELS: There is a normal branch pattern of the aortic  arch. No significant stenosis is seen of the innominate artery or subclavian  arteries. CAROTID ARTERIES: The common carotid arteries are normal in appearance  without evidence of a flow limiting stenosis. The internal carotid arteries  are normal in appearance without evidence of a flow limiting stenosis by  NASCET criteria. No dissection or arterial injury is seen. VERTEBRAL ARTERIES: Right vertebral artery is diminutive in caliber  throughout.  Left vertebral artery is dominant supplying basilar artery. SOFT TISSUES: The lung apices are clear.  No cervical or superior mediastinal  lymphadenopathy.  There is endotracheal intubation.  The parotid,  submandibular and thyroid glands demonstrate no acute abnormality. BONES: The visualized osseous structures appear unremarkable. Large pleural effusions. CTA HEAD:    ANTERIOR CIRCULATION: The internal carotid arteries are normal in course and  caliber without focal stenosis. The anterior cerebral and middle cerebral  arteries demonstrate no focal stenosis. POSTERIOR CIRCULATION: The posterior cerebral arteries demonstrate no focal  stenosis.  The vertebral and basilar arteries appear otherwise unremarkable.     Impression:       No CT evidence of acute territorial infarct. No significant stenosis visualized in the major intracranial arterial  vasculature. Cervical carotid vasculature is patent. Diminutive right vertebral artery and widely patent left vertebral artery.     CT HEAD WO CONTRAST [[id number]]      Order Status: Canceled      CTA NECK W CONTRAST [[id number]]      Order Status: Canceled      CT CHEST WO CONTRAST [[id number]] Collected: 03/29/19 1344     Order Status: Completed Updated: 03/29/19 1807     Narrative:       EXAMINATION:  CT OF THE CHEST WITHOUT CONTRAST 3/29/2019 12:52 pm    TECHNIQUE:  CT of the chest was performed without the administration of intravenous  contrast. Multiplanar reformatted images are provided for review. Dose  modulation, iterative reconstruction, and/or weight based adjustment of the  mA/kV was utilized to reduce the radiation dose to as low as reasonably  achievable. COMPARISON:  Chest radiographs 03/28/2019    HISTORY:  ORDERING SYSTEM PROVIDED HISTORY: nicanor ll pneumonia,r/o pleural effusion  TECHNOLOGIST PROVIDED HISTORY:  Ordering Physician Provided Reason for Exam: nicanor ll pneumonia,r/o pleural  effusion  Acuity: Acute  Additional signs and symptoms: patient unable to follow commands  Relevant Medical/Surgical History: unknown, poor historian    FINDINGS:  Mediastinum: Limited evaluation for lymphadenopathy without intravenous  contrast.  Normal caliber thoracic aorta with mild atherosclerotic  calcifications.  Mildly enlarged main pulmonary artery measuring 3.5 cm in  diameter.  Normal heart size.  No pericardial effusion.  Segmental gaseous  distention of the esophagus. Lungs/pleura:  Moderate right and small left pleural effusions with partial  lower lobe atelectasis.  Additional patchy tree-in-bud/clustered nodularity  in the left upper and lower lobes and middle lobe.  Mild dependent secretions  within the trachea and possibly the proximal left bronchial tree.  No  pneumothorax. Upper Abdomen: Oral contrast within the colon.  Scattered calcified  granulomas within the liver and spleen reflect remote granulomatous disease. Soft Tissues/Bones: No enlarged axillary or supraclavicular lymph nodes. Normal thyroid.  Glenohumeral osteoarthrosis.  Thoracic spondylosis.     Impression:       Moderate right and small left pleural effusions with associated atelectasis. Multifocal clustered/tree-in-bud nodularity in the left upper lobe, left  lower lobe and middle lobe most likely represents an infectious or  inflammatory bronchiolitis.  Consider aspiration and etiology given  tracheobronchial secretions and esophageal features suggesting dysmotility  and reflux.     XR CHEST STANDARD (2 VW) [[id number]] Collected: 03/28/19 2252     Order Status: Completed Specimen: Chest Updated: 03/28/19 2256     Narrative:       EXAMINATION:  TWO VIEWS OF THE CHEST    3/28/2019 10:47 pm    COMPARISON:  Chest 03/20/2019 2:29 p.m. HISTORY:  ORDERING SYSTEM PROVIDED HISTORY: Pleural Effusion  TECHNOLOGIST PROVIDED HISTORY:  Reason for exam:->Pleural Effusion  Ordering Physician Provided Reason for Exam: Pleural Effusion  Acuity: Unknown  Type of Exam: Unknown  Additional signs and symptoms: cough  Relevant Medical/Surgical History: htn    FINDINGS:  Calcifications involving the aorta reflect atherosclerosis. The  cardiomediastinal and hilar silhouettes appear otherwise unremarkable. Bibasilar consolidation and bilateral pleural effusion, right greater than  left confirmed compared with prior study.  Chronic appearing coarse  interstitial densities predominate parahilar regions and lung bases, typical  of sequela from smoking or other previous infectious/inflammatory process. No pneumothorax is seen.  No acute osseous abnormality is identified.     Impression:       Confirmation of bibasilar consolidation and bilateral pleural DAPT, medical management  - card considering LHC vs. Stress when stable    KERA   Hypokalemia  - resolved     DMII  - lantus, ISS     GI bleed / Gastritis  - received 1 U pRBC  - improved on conservative management  - IV PPI BID    Nutrition  - on TF    Lovenox ppx    [address] [facility], Internal Medicine  4/8/2019 at 3:44 PM

## 2019-04-08 NOTE — PROGRESS NOTES
04/07/19 2025   Vent Information   Vent Type 980   Vent Mode AC/VC   Vt Ordered 450 mL   Rate Set 12 bmp   Peak Flow 45 L/min   Pressure Support 0 cmH20   FiO2  30 %   Sensitivity 3   PEEP/CPAP 5   Humidification Source Heated wire   Humidification Temp 36.9   Circuit Condensation Drained   Vent Patient Data   High Peep/I Pressure 0   Peak Inspiratory Pressure 20 cmH2O   Plateau Pressure 15 [id number]   Static Compliance 45 mL/cmH2O   Dynamic Compliance 25 mL/cmH2O   Cough/Sputum   Sputum How Obtained Endotracheal;Suctioned   Cough Productive   Sputum Amount Moderate   Sputum Color Cloudy;Creamy   Tenacity Thick   Spontaneous Breathing Trial (SBT) RT Doc   SpO2 100 %   Breath Sounds   Right Upper Lobe Clear;Diminished   Right Middle Lobe Clear;Diminished   Right Lower Lobe Clear;Diminished   Left Upper Lobe Clear;Diminished   Left Lower Lobe Clear;Diminished   Additional Respiratory  Assessments   Resp 12   Position Semi-Dubose's   Oral Care Mouth swabbed;Mouth moisturizer;Mouth suctioned   Alarm Settings   Low Minute Volume Alarm 2.5 L/min   Apnea (secs) 20 secs   High Respiratory Rate 35 br/min   Low Exhaled Vt  250 mL   ETT (adult)   Placement Date/Time: 03/31/19 (c) 4883   Preoxygenation: Yes  Mask Ventilation: Ventilated by mask with oral airway (2)  Technique: Direct laryngoscopy  Tube Size: 8 mm  Laryngoscope: Landaverde  Blade Size: 2  Grade View: Full view of the glottis  Insert. ..    Secured at 24 cm   Measured From [address] [place] [address],[address] By [employer] tube odell   Site Condition Cool;Dry

## 2019-04-08 NOTE — PROGRESS NOTES
04/08/19 0500   Vent Information   Vent Type 980   Vent Mode SIMV/VC   Vt Ordered 450 mL   Rate Set 8 bmp   Peak Flow 50 L/min   Pressure Support 10 cmH20   FiO2  30 %   Sensitivity 3   PEEP/CPAP 5   I Time/ I Time % 0 s   Vent Patient Data   High Peep/I Pressure 0   Peak Inspiratory Pressure 18 cmH2O   Mean Airway Pressure 7.1 cmH20   Rate Measured 8 br/min   Vt Exhaled 474 mL   Minute Volume 3.92 Liters   I:E Ratio 1:6.70   Spontaneous Breathing Trial (SBT) RT Doc   Pulse 54   SpO2 100 %   Patient fail SBT?   (pt. having frequent apnea.)   Additional Respiratory  Assessments   Resp 10   Alarm Settings   High Pressure Alarm 40 cmH2O

## 2019-04-08 NOTE — PROGRESS NOTES
04/08/19 0437   Vent Information   Vent Type 980   Vent Mode AC/VC   Vt Ordered 450 mL   Rate Set 4 bmp   Peak Flow 50 L/min   Pressure Support 10 cmH20   FiO2  30 %   Sensitivity 3   PEEP/CPAP 5   I Time/ I Time % 0 s   Humidification Source Heated wire   Humidification Temp 36.8   Circuit Condensation Drained   Vent Patient Data   High Peep/I Pressure 0   Peak Inspiratory Pressure 15 cmH2O   Mean Airway Pressure 7.1 cmH20   Rate Measured 8 br/min   Vt Exhaled 446 mL   Minute Volume 3.53 Liters   I:E Ratio 1:2.80   Spontaneous Breathing Trial (SBT) RT Doc   Pulse 55   SpO2 100 %   Additional Respiratory  Assessments   Resp 9   Alarm Settings   High Pressure Alarm 40 cmH2O   Low Minute Volume Alarm 2.5 L/min   Apnea (secs) 20 secs   High Respiratory Rate 35 br/min   Low Exhaled Vt  250 mL   ETT (adult)   Placement Date/Time: 03/31/19 (c) 3293   Preoxygenation: Yes  Mask Ventilation: Ventilated by mask with oral airway (2)  Technique: Direct laryngoscopy  Tube Size: 8 mm  Laryngoscope: Landaverde  Blade Size: 2  Grade View: Full view of the glottis  Insert. ..    Secured at 24 cm   Measured From [address] [place] [address],[address] By [employer] tube odell   Site Condition Dry   tolerating slow wean

## 2019-04-09 NOTE — PROGRESS NOTES
Progress Note (Hospitalist, Internal Medicine)  IDENTIFYING INFORMATION   PATIENT:  Maki Long  MRN:  [id number]  ADMIT DATE: 3/28/2019  TIME OF EVALUATION: 4/9/2019 1:57 PM      HISTORY OF PRESENT ILLNESS   \"Tai Villafuerte is a 80 y.o.  male, with past medical history significant for hyperlipidemia, diabetes, who presented to the ED from home due generalized body weakness. He was recently discharged from rehab unit. He was admitted on March 8 due to pneumonia and influenza. The finished a course of antibiotic and antiviral.  Since discharge, he noted worsening generalized body weakness. He wasn't able to get out of bed. She denied lateralizing weakness or numbness. Still with cough. Denied shortness of breath. No fever. Denied lower extremity swelling or orthopnea. He has no chest pain. He was brought to the ED and was subsequently admitted\"    SUBJECTIVE     Seen in the morning on vent     But failed extubation again. Had to be re intubated    MEDICATIONS   Medications Prior to Admission  Medications Prior to Admission: simvastatin (ZOCOR) 80 MG tablet, Take 40 mg by mouth nightly  omeprazole (PRILOSEC) 20 MG delayed release capsule, Take 20 mg by mouth Daily with supper   metFORMIN (GLUCOPHAGE) 500 MG tablet, Take 250 mg by mouth 2 times daily (with meals)   gabapentin (NEURONTIN) 400 MG capsule, Take 400 mg by mouth 2 times daily.   latanoprost (XALATAN) 0.005 % ophthalmic solution, Place 1 drop into both eyes nightly    Current Medications  Current Facility-Administered Medications   Medication Dose Route Frequency Provider Last Rate Last Dose    chlorhexidine (PERIDEX) 0.12 % solution 15 mL  15 mL Mouth/Throat BID Chris Coleman MD   15 mL at 04/09/19 1300    fentanyl (SUBLIMAZE) 1,000 mcg in sodium chloride 0.9% 100 mL infusion  25 mcg/hr Intravenous Continuous Chris Coleman MD        propofol 1000 MG/100ML injection  10 mcg/kg/min Intravenous Continuous Chris Coleman MD 4.4 mL/hr at 04/09/19 1256 10 mcg/kg/min at 04/09/19 1256    pantoprazole (PROTONIX) injection 40 mg  40 mg Intravenous Daily Alicia Stiles MD        And    sodium chloride (PF) 0.9 % injection 10 mL  10 mL Intravenous Daily Alicia Stiles MD   10 mL at 04/09/19 1302    propofol 1000 MG/100ML injection             norepinephrine (LEVOPHED) 16 mg in dextrose 5 % 250 mL infusion  2 mcg/min Intravenous Continuous Tyree Fournier MD        0.9 % sodium chloride infusion   Intravenous Continuous Tyree Fournier MD 75 mL/hr at 04/09/19 0816      midodrine (PROAMATINE) tablet 10 mg  10 mg Oral TID WC Tyree Fournier MD   10 mg at 04/09/19 1300    potassium chloride 10 mEq/100 mL IVPB (Peripheral Line)  10 mEq Intravenous PRN Toño Glaser  mL/hr at 04/06/19 0950 10 mEq at 04/06/19 0950    sodium chloride flush 0.9 % injection 10 mL  10 mL Intravenous 2 times per day Toño Glaser MD   10 mL at 04/09/19 0818    sodium chloride flush 0.9 % injection 10 mL  10 mL Intravenous PRN Toño Glaser MD        racepinephrine HCl (VAPONEFPRIN) 2.25 % nebulizer solution NEBU 11.25 mg  11.25 mg Nebulization Q20 Min PRN Alicia Stiles MD        sodium chloride nebulizer 0.9 % solution 3 mL  3 mL Nebulization Q4H PRN Alicia Stiles MD        clopidogrel (PLAVIX) tablet 75 mg  75 mg Oral Daily Evan Selby MD   75 mg at 04/09/19 0817    enoxaparin (LOVENOX) injection 40 mg  40 mg Subcutaneous Daily Evan Selby MD   40 mg at 04/09/19 0816    gabapentin (NEURONTIN) capsule 400 mg  400 mg Oral BID Sea Buitrago MD   400 mg at 04/09/19 0817    latanoprost (XALATAN) 0.005 % ophthalmic solution 1 drop  1 drop Both Eyes Nightly Sea Buitrago MD   1 drop at 04/08/19 2152    simvastatin (ZOCOR) tablet 40 mg  40 mg Oral Nightly Sea Buitrago MD   40 mg at 04/08/19 2145    glucose (GLUTOSE) 40 % oral gel 15 g  15 g Oral PRN Sea Buitrago MD        dextrose 50 % solution 12.5 g  12.5 g Intravenous PRN MD Dorian Moe glucagon (rDNA) injection 1 mg  1 mg Intramuscular PRN Dakotah An MD        dextrose 5 % solution  100 mL/hr Intravenous PRN Dakotah An MD        sodium chloride flush 0.9 % injection 10 mL  10 mL Intravenous 2 times per day Dakotah An MD   10 mL at 04/09/19 0818    sodium chloride flush 0.9 % injection 10 mL  10 mL Intravenous PRN Dakotah An MD   10 mL at 04/02/19 0125    magnesium hydroxide (MILK OF MAGNESIA) 400 MG/5ML suspension 30 mL  30 mL Oral Daily PRN Dakotah An MD        ondansetron [facility]) injection 4 mg  4 mg Intravenous Q6H PRN Dakotah An MD        aspirin chewable tablet 81 mg  81 mg Oral Daily Dakotah An MD   81 mg at 04/09/19 0817    ipratropium-albuterol (DUONEB) nebulizer solution 1 ampule  1 ampule Inhalation Q4H WA Dakotah An MD   1 ampule at 04/09/19 1100    sodium chloride flush 0.9 % injection 10 mL  10 mL Intravenous 2 times per day CARL Hinds CNP   10 mL at 04/09/19 0818    sodium chloride flush 0.9 % injection 10 mL  10 mL Intravenous PRN CARL Hinds CNP        acetaminophen (TYLENOL) tablet 650 mg  650 mg Oral Q4H PRN Renella RITA FrostN - CNP   650 mg at 03/28/19 2151    insulin lispro (HUMALOG) injection vial 0-12 Units  0-12 Units Subcutaneous TID WC Rylanella Margot APRN - CNP   2 Units at 04/06/19 1724    insulin lispro (HUMALOG) injection vial 0-6 Units  0-6 Units Subcutaneous Nightly Renella Bonds, APRN - CNP   1 Units at 04/06/19 2141    insulin glargine (LANTUS) injection vial 5 Units  5 Units Subcutaneous Nightly Renella Bonds, APRN - CNP   5 Units at 04/06/19 2141         Allergies  No Known Allergies    REVIEW OF SYSTEMS     Within above limitations. 14 point review of systems reviewed. Pertinent positive or negative as per HPI or otherwise negative per 14 point systems review.      Reviewed 4/9/2019 at 1:57 PM    PHYSICAL EXAM       Blood pressure (!) 89/48, pulse 105, temperature 99 °F (37.2 °C), temperature source Rectal, resp. rate 17, height 5' 10\" (1.778 m), weight 163 lb 6.4 oz (74.1 kg), SpO2 95 %. General - Able to respond , able to move all extremities  Psych - Appropriate affect/speech. No agitation  Eyes - Eye lids intact. No scleral icterus  ENT - Lips wnl. External ear clear/dry/intact. No thyromegaly on inspection  Neuro - moving all extremities on command  Heart - Sinus. RRR. S1 and S2 present. No elevated JVD appreciated  Lung - good air entry, coarse sounds on vent. No wheezes appreciated. GI -  Soft. No guarding/rigidity. No hepatosplenomegaly/ascites. BS+   - No CVA/suprapubic tenderness or palpable bladder distension      LABS AND IMAGING   CBC  [unfilled]    Last 3 Hemoglobin  Lab Results   Component Value Date    HGB 7.6 04/09/2019    HGB  04/09/2019     6.8  HGB CALLED TO SUKHDEV HIDALGO RN ON 4/9/19 AT 0643 BY KLAUDIA MOSELEY [facility]  RESULTS READ BACK      HGB 8.3 04/08/2019     Last 3 WBC/ANC  Lab Results   Component Value Date    WBC 9.4 04/09/2019    WBC 8.7 04/08/2019    WBC 7.5 04/07/2019     No components found for: GRNLOCTYABS  Last 3 Platelets  No results found for: PLATELET  Chemistry  [unfilled]  [unfilled]  No results found for: LDH  Coagulation Studies  Lab Results   Component Value Date    INR 1.04 03/31/2019     Liver Function Studies  Lab Results   Component Value Date    ALT <5 04/04/2019    AST 13 04/04/2019    ALKPHOS 102 04/04/2019       Recent Imaging    [employer] #[id number] ([id number]) (80 y.o.  M) (Adm: 03/28/19)    [facility] [id number]         Imaging Results (last 7 days)          Procedure Component Value Ref Range Date/Time     XR CHEST PORTABLE [[id number]] Collected: 04/07/19 0714     Order Status: Completed Updated: 04/07/19 0859     Narrative:       EXAMINATION:  SINGLE XRAY VIEW OF THE CHEST    4/7/2019 6:32 am    COMPARISON:  04/06/2019    HISTORY:  ORDERING SYSTEM PROVIDED HISTORY: hypoxia on vent  TECHNOLOGIST PROVIDED HISTORY:  Reason for technique do not appear to be  significantly changed from the comparison.     XR CHEST PORTABLE [[id number]]      Order Status: Canceled      XR CHEST PORTABLE [[id number]]      Order Status: Canceled      XR CHEST PORTABLE [[id number]] Collected: 04/05/19 0749     Order Status: Completed Updated: 04/05/19 0753     Narrative:       EXAMINATION:  SINGLE XRAY VIEW OF THE CHEST    4/5/2019 6:01 am    COMPARISON:  April 4, 2019    HISTORY:  ORDERING SYSTEM PROVIDED HISTORY: tube placement  TECHNOLOGIST PROVIDED HISTORY:  Reason for exam:->tube placement  Ordering Physician Provided Reason for Exam: tube placement  Acuity: Unknown  Type of Exam: Subsequent/Follow-up  Additional signs and symptoms: tube placement  Relevant Medical/Surgical History: tube placement    FINDINGS:  Endotracheal tube with tip 5.6 cm from the cyndi.  NG tube with tip in the  upper stomach.  No pneumothorax.  Small bilateral pleural effusions, greater  on the right as well as hazy bibasilar opacities.  No acute bony abnormality.     Impression:       Worsening right pleural effusion and bibasilar opacities.     XR ABDOMEN FOR NG/OG/NE TUBE PLACEMENT [[id number]] Collected: 04/04/19 1529     Order Status: Completed Updated: 04/04/19 1533     Narrative:       EXAMINATION:  SINGLE SUPINE XRAY VIEW(S) OF THE ABDOMEN    4/4/2019 2:08 pm    COMPARISON:  None. HISTORY:  ORDERING SYSTEM PROVIDED HISTORY: placement of NG tube  TECHNOLOGIST PROVIDED HISTORY:  Reason for exam:->placement of NG tube  Portable? ->Yes  Ordering Physician Provided Reason for Exam: placement of NG tube  Acuity: Unknown  Type of Exam: Subsequent/Follow-up    FINDINGS:  An enteric tube is seen coursing below the diaphragm.  The tip projects in  the region of the gastric body.  The side port projects in the region the  gastric cardia.  Air-filled loops of bowel are seen within the upper abdomen.     Impression:       Enteric tube coursing below the diaphragm.  The side port projects in the  region of the gastric cardia.     XR ABDOMEN (KUB) (SINGLE AP VIEW) [[id number]] Updated: 04/04/19 1446     Order Status: Canceled      XR CHEST PORTABLE [[id number]] Collected: 04/04/19 0642     Order Status: Completed Updated: 04/04/19 0840     Narrative:       EXAMINATION:  SINGLE XRAY VIEW OF THE CHEST    4/4/2019 5:12 am    COMPARISON:  04/03/2019    HISTORY:  ORDERING SYSTEM PROVIDED HISTORY: tube placement  TECHNOLOGIST PROVIDED HISTORY:  Reason for exam:->tube placement  Ordering Physician Provided Reason for Exam: tube placement  Acuity: Acute  Type of Exam: Subsequent/Follow-up    FINDINGS:  Endotracheal tube and NG tube are in place.  The heart and mediastinal  structures are stable.  Small pleural effusions with bibasilar atelectasis  persist.  Arthritic changes of the shoulders are noted.     Impression:       Persistent small pleural effusions with bibasilar atelectasis.     XR CHEST PORTABLE [[id number]] Collected: 04/03/19 0709     Order Status: Completed Updated: 04/03/19 0714     Narrative:       EXAMINATION:  SINGLE XRAY VIEW OF THE CHEST    4/3/2019 5:30 am    COMPARISON:  April 2, 2019    HISTORY:  ORDERING SYSTEM PROVIDED HISTORY: tube placement  TECHNOLOGIST PROVIDED HISTORY:  Reason for exam:->tube placement  Ordering Physician Provided Reason for Exam: tube placement  Acuity: Acute  Type of Exam: Subsequent/Follow-up    FINDINGS:  The ETT is 3.8 cm above the cyndi.  The feeding tube is inserted with its  tip below the diaphragm and beyond the field of view.     The cardiomediastinal silhouette is stable.  There is airspace opacity at the  right lung base, may be related to layering pleural effusion, atelectasis or  pneumonia, stable.  There is no pneumothorax. Rupa Edi is no acute osseous  abnormality.     Impression:       Airspace opacity at the right lung base, may be related to layering pleural  effusion, atelectasis or pneumonia, stable.     XR CHEST PORTABLE [[id number]] Collected: 04/02/19 2131     Order Status: Completed Updated: 04/02/19 2136     Narrative:       EXAMINATION:  SINGLE XRAY VIEW OF THE CHEST    4/2/2019 9:25 pm    COMPARISON:  Chest x-ray 15 arch prior    HISTORY:  ORDERING SYSTEM PROVIDED HISTORY: post intubation/ ETT placement  TECHNOLOGIST PROVIDED HISTORY:  Reason for exam:->post intubation/ ETT placement  Ordering Physician Provided Reason for Exam: POST INTUBATION AND NG TUBE  PLACEMENT    FINDINGS:  Endotracheal tube tip projects 3.5 cm from the cyndi.  Enteric tube  identified with side port at the level of the GE junction.  Tip projects over  the expected location of the stomach.  Recommend advancement.  Opacities  project over the bilateral lung bases, right greater than left which is  suspicious for layering pleural effusions.  No pneumothorax identified. Osseous structures appear stable.     Impression:       1. Endotracheal tube tip projects approximately 3.5 cm from the cyndi. 2. Enteric tube side port at the level of the GE junction.  Recommend  advancement. 3. Findings suggesting layering effusions, right greater than left.     XR CHEST PORTABLE [[id number]] Collected: 04/02/19 0724     Order Status: Completed Updated: 04/02/19 0729     Narrative:       EXAMINATION:  SINGLE XRAY VIEW OF THE CHEST    4/2/2019 5:17 am    COMPARISON:  April 1, 2019    HISTORY:  ORDERING SYSTEM PROVIDED HISTORY: tube placement  TECHNOLOGIST PROVIDED HISTORY:  Reason for exam:->tube placement  Ordering Physician Provided Reason for Exam: tube placement  Acuity: Acute  Type of Exam: Subsequent/Follow-up    FINDINGS:  ETT tip is 6 cm above the cyndi.  Enteric catheter extends beyond the  inferior margin of the image.  Cardiac silhouette is within normal range. Small right pleural effusion.  No focal consolidation, left pleural effusion,  or pneumothorax.  Severe left glenohumeral degenerative changes with  suspected chronic left rotator cuff tear.     Impression:       1.  Small right pleural effusion.     XR CHEST PORTABLE [[id number]] Collected: 04/01/19 6495     Order Status: Completed Updated: 04/01/19 0907     Narrative:       EXAMINATION:  SINGLE XRAY VIEW OF THE CHEST    4/1/2019 8:46 am    COMPARISON:  04/01/2019. HISTORY:  ORDERING SYSTEM PROVIDED HISTORY: ETT placement  TECHNOLOGIST PROVIDED HISTORY:  Reason for exam:->ETT placement  Ordering Physician Provided Reason for Exam: ETT placement  Acuity: Acute  Type of Exam: Initial  Relevant Medical/Surgical History: diabetes mellitus; hypertension    FINDINGS:  The endotracheal tube has been repositioned with tip is now above the cyndi. A nasogastric tube courses below the diaphragm.  The heart size and pulmonary  vasculature stable.  Again noted are hazy density seen throughout the lungs  bilaterally.  No pneumothoraces are seen.     Impression:       1. Interval reposition of endotracheal tube with its tip now above the cyndi  and in satisfactory position. 2. Otherwise, stable chest x-ray with findings likely reflecting pleural  effusions and infiltrates.     XR CHEST PORTABLE [[id number]] Collected: 04/01/19 0731     Order Status: Completed Updated: 04/01/19 0735     Narrative:       EXAMINATION:  SINGLE XRAY VIEW OF THE CHEST    4/1/2019 5:37 am    COMPARISON:  03/31/2019.     HISTORY:  ORDERING SYSTEM PROVIDED HISTORY: tube placement  TECHNOLOGIST PROVIDED HISTORY:  Reason for exam:->tube placement  Ordering Physician Provided Reason for Exam: tube placement  Acuity: Unknown  Type of Exam: Subsequent/Follow-up  Additional signs and symptoms: tube placement  Relevant Medical/Surgical History: tube placement    FINDINGS:  The endotracheal tube is seen with its tip at the level of the cyndi.  There  is a nasogastric tube which courses below the diaphragm.  The heart size and  pulmonary vasculature are stable.  There are hazy densities seen involving  the lungs bilaterally.  No new airspace disease is seen.  No pneumothoraces  are noted.  Overall, compared to the prior exam there has been little change.     Impression:       1. Stable chest x-ray with bilateral hazy opacities likely represent  combination of pleural effusions and infiltrates. 2. Endotracheal tube with its tip at the level of the cyndi.  I would  suggest withdrawing the tube at least 2 cm.     XR CHEST 1 VW [[id number]] Collected: 03/31/19 0535     Order Status: Completed Updated: 03/31/19 0541     Narrative:       EXAMINATION:  SINGLE XRAY VIEW OF THE CHEST    3/31/2019 5:22 am    COMPARISON:  2 days prior    HISTORY:  ORDERING SYSTEM PROVIDED HISTORY: intubation and post cardiac arrest, check  ETT  TECHNOLOGIST PROVIDED HISTORY:  Reason for exam:->intubation and post cardiac arrest, check ETT  Ordering Physician Provided Reason for Exam: intubation and post cardiac  arrest, check ETT  Acuity: Unknown  Type of Exam: Unknown    FINDINGS:  Endotracheal tube is been placed, tip approximately 4 cm above the cyndi in  appropriate position.  Enteric tube tip and side port below diaphragm and  stomach.  There are diffuse hazy opacities over right greater than left lung,  combination increasing effusions, atelectatic changes, underlying  consolidation not excluded.  Pulmonary vasculature is indistinct. Cardiomediastinal silhouette is stable.     Impression:       Increasing pleuroparenchymal opacities in both hemithoraces, right greater  than left. Endotracheal and enteric tubes appear in appropriate position.     CTA Head W Contrast [[id number]] Collected: 03/31/19 0510     Order Status: Completed Updated: 03/31/19 0522     Narrative:       EXAMINATION:  CTA OF THE NECK; CTA OF THE HEAD WITH CONTRAST; CT OF THE HEAD WITHOUT  CONTRAST 3/31/2019 4:57 am; 3/31/2019 4:55 am:    TECHNIQUE:  CTA of the neck was performed with the administration of intravenous  contrast. Multiplanar reformatted images are provided for review.  MIP images  are provided for review. abnormality. SINUSES:  The visualized paranasal sinuses and mastoid air cells demonstrate  no acute abnormality. SOFT TISSUES/SKULL: No acute abnormality of the visualized skull or soft  tissues. CTA NECK:    AORTIC ARCH/ARCH VESSELS: There is a normal branch pattern of the aortic  arch. No significant stenosis is seen of the innominate artery or subclavian  arteries. CAROTID ARTERIES: The common carotid arteries are normal in appearance  without evidence of a flow limiting stenosis. The internal carotid arteries  are normal in appearance without evidence of a flow limiting stenosis by  NASCET criteria. No dissection or arterial injury is seen. VERTEBRAL ARTERIES: Right vertebral artery is diminutive in caliber  throughout.  Left vertebral artery is dominant supplying basilar artery. SOFT TISSUES: The lung apices are clear.  No cervical or superior mediastinal  lymphadenopathy.  There is endotracheal intubation.  The parotid,  submandibular and thyroid glands demonstrate no acute abnormality. BONES: The visualized osseous structures appear unremarkable. Large pleural effusions. CTA HEAD:    ANTERIOR CIRCULATION: The internal carotid arteries are normal in course and  caliber without focal stenosis. The anterior cerebral and middle cerebral  arteries demonstrate no focal stenosis. POSTERIOR CIRCULATION: The posterior cerebral arteries demonstrate no focal  stenosis. The vertebral and basilar arteries appear otherwise unremarkable.     Impression:       No CT evidence of acute territorial infarct. No significant stenosis visualized in the major intracranial arterial  vasculature. Cervical carotid vasculature is patent.     Diminutive right vertebral artery and widely patent left vertebral artery.     CT Head WO Contrast [[id number]] Collected: 03/31/19 0510     Order Status: Completed Updated: 03/31/19 0522     Narrative:       EXAMINATION:  CTA OF THE NECK; CTA OF THE HEAD WITH CONTRAST; CT OF THE HEAD WITHOUT  CONTRAST 3/31/2019 4:57 am; 3/31/2019 4:55 am:    TECHNIQUE:  CTA of the neck was performed with the administration of intravenous  contrast. Multiplanar reformatted images are provided for review.  MIP images  are provided for review. Stenosis of the internal carotid arteries measured  using NASCET criteria. Dose modulation, iterative reconstruction, and/or  weight based adjustment of the mA/kV was utilized to reduce the radiation  dose to as low as reasonably achievable.; CTA of the head/brain was performed  with the administration of intravenous contrast. Multiplanar reformatted  images are provided for review.  MIP images are provided for review. Dose  modulation, iterative reconstruction, and/or weight based adjustment of the  mA/kV was utilized to reduce the radiation dose to as low as reasonably  achievable.; CT of the head was performed without the administration of  intravenous contrast. Dose modulation, iterative reconstruction, and/or  weight based adjustment of the mA/kV was utilized to reduce the radiation  dose to as low as reasonably achievable. Noncontrast CT of the head with  reconstructed 2-D images are also provided for review. COMPARISON:  None. HISTORY:  ORDERING SYSTEM PROVIDED HISTORY: r/o CVA  TECHNOLOGIST PROVIDED HISTORY:  Has a \"code stroke\" or \"stroke alert\" been called? ->Yes  Ordering Physician Provided Reason for Exam: a\ms; ORDERING SYSTEM PROVIDED  HISTORY: r/o CVA  TECHNOLOGIST PROVIDED HISTORY:  R/o CVA  Has a \"code stroke\" or \"stroke alert\" been called? ->No  Ordering Physician Provided Reason for Exam: ams; ORDERING SYSTEM PROVIDED  HISTORY: unresponsive  TECHNOLOGIST PROVIDED HISTORY:  R/o hemorrhagic change  Has a \"code stroke\" or \"stroke alert\" been called? ->No  Ordering Physician Provided Reason for Exam: ams    FINDINGS:    CT HEAD:    BRAIN/VENTRICLES:  No acute intracranial hemorrhage or extraaxial fluid  collection.  Grey-white differentiation is maintained.  No evidence of mass,  mass effect or midline shift.  No evidence of hydrocephalus. Cherene Lolita is  prominence of the ventricles and sulci due to global parenchymal volume loss. ORBITS: The visualized portion of the orbits demonstrate no acute abnormality. SINUSES:  The visualized paranasal sinuses and mastoid air cells demonstrate  no acute abnormality. SOFT TISSUES/SKULL: No acute abnormality of the visualized skull or soft  tissues. CTA NECK:    AORTIC ARCH/ARCH VESSELS: There is a normal branch pattern of the aortic  arch. No significant stenosis is seen of the innominate artery or subclavian  arteries. CAROTID ARTERIES: The common carotid arteries are normal in appearance  without evidence of a flow limiting stenosis. The internal carotid arteries  are normal in appearance without evidence of a flow limiting stenosis by  NASCET criteria. No dissection or arterial injury is seen. VERTEBRAL ARTERIES: Right vertebral artery is diminutive in caliber  throughout.  Left vertebral artery is dominant supplying basilar artery. SOFT TISSUES: The lung apices are clear.  No cervical or superior mediastinal  lymphadenopathy.  There is endotracheal intubation.  The parotid,  submandibular and thyroid glands demonstrate no acute abnormality. BONES: The visualized osseous structures appear unremarkable. Large pleural effusions. CTA HEAD:    ANTERIOR CIRCULATION: The internal carotid arteries are normal in course and  caliber without focal stenosis. The anterior cerebral and middle cerebral  arteries demonstrate no focal stenosis. POSTERIOR CIRCULATION: The posterior cerebral arteries demonstrate no focal  stenosis. The vertebral and basilar arteries appear otherwise unremarkable.     Impression:       No CT evidence of acute territorial infarct. No significant stenosis visualized in the major intracranial arterial  vasculature.     Cervical carotid vasculature is patent. Diminutive right vertebral artery and widely patent left vertebral artery.     CTA Neck W Contrast [[id number]] Collected: 03/31/19 0510     Order Status: Completed Updated: 03/31/19 0522     Narrative:       EXAMINATION:  CTA OF THE NECK; CTA OF THE HEAD WITH CONTRAST; CT OF THE HEAD WITHOUT  CONTRAST 3/31/2019 4:57 am; 3/31/2019 4:55 am:    TECHNIQUE:  CTA of the neck was performed with the administration of intravenous  contrast. Multiplanar reformatted images are provided for review.  MIP images  are provided for review. Stenosis of the internal carotid arteries measured  using NASCET criteria. Dose modulation, iterative reconstruction, and/or  weight based adjustment of the mA/kV was utilized to reduce the radiation  dose to as low as reasonably achievable.; CTA of the head/brain was performed  with the administration of intravenous contrast. Multiplanar reformatted  images are provided for review.  MIP images are provided for review. Dose  modulation, iterative reconstruction, and/or weight based adjustment of the  mA/kV was utilized to reduce the radiation dose to as low as reasonably  achievable.; CT of the head was performed without the administration of  intravenous contrast. Dose modulation, iterative reconstruction, and/or  weight based adjustment of the mA/kV was utilized to reduce the radiation  dose to as low as reasonably achievable. Noncontrast CT of the head with  reconstructed 2-D images are also provided for review. COMPARISON:  None. HISTORY:  ORDERING SYSTEM PROVIDED HISTORY: r/o CVA  TECHNOLOGIST PROVIDED HISTORY:  Has a \"code stroke\" or \"stroke alert\" been called? ->Yes  Ordering Physician Provided Reason for Exam: a\ms; ORDERING SYSTEM PROVIDED  HISTORY: r/o CVA  TECHNOLOGIST PROVIDED HISTORY:  R/o CVA  Has a \"code stroke\" or \"stroke alert\" been called? ->No  Ordering Physician Provided Reason for Exam: ams; ORDERING SYSTEM PROVIDED  HISTORY: unresponsive  TECHNOLOGIST PROVIDED HISTORY:  R/o hemorrhagic change  Has a \"code stroke\" or \"stroke alert\" been called? ->No  Ordering Physician Provided Reason for Exam: ams    FINDINGS:    CT HEAD:    BRAIN/VENTRICLES:  No acute intracranial hemorrhage or extraaxial fluid  collection. Grey-white differentiation is maintained.  No evidence of mass,  mass effect or midline shift.  No evidence of hydrocephalus. Dung Osier is  prominence of the ventricles and sulci due to global parenchymal volume loss. ORBITS: The visualized portion of the orbits demonstrate no acute abnormality. SINUSES:  The visualized paranasal sinuses and mastoid air cells demonstrate  no acute abnormality. SOFT TISSUES/SKULL: No acute abnormality of the visualized skull or soft  tissues. CTA NECK:    AORTIC ARCH/ARCH VESSELS: There is a normal branch pattern of the aortic  arch. No significant stenosis is seen of the innominate artery or subclavian  arteries. CAROTID ARTERIES: The common carotid arteries are normal in appearance  without evidence of a flow limiting stenosis. The internal carotid arteries  are normal in appearance without evidence of a flow limiting stenosis by  NASCET criteria. No dissection or arterial injury is seen. VERTEBRAL ARTERIES: Right vertebral artery is diminutive in caliber  throughout.  Left vertebral artery is dominant supplying basilar artery. SOFT TISSUES: The lung apices are clear.  No cervical or superior mediastinal  lymphadenopathy.  There is endotracheal intubation.  The parotid,  submandibular and thyroid glands demonstrate no acute abnormality. BONES: The visualized osseous structures appear unremarkable. Large pleural effusions. CTA HEAD:    ANTERIOR CIRCULATION: The internal carotid arteries are normal in course and  caliber without focal stenosis. The anterior cerebral and middle cerebral  arteries demonstrate no focal stenosis.     POSTERIOR CIRCULATION: The posterior cerebral arteries demonstrate no dependent secretions  within the trachea and possibly the proximal left bronchial tree.  No  pneumothorax. Upper Abdomen: Oral contrast within the colon.  Scattered calcified  granulomas within the liver and spleen reflect remote granulomatous disease. Soft Tissues/Bones: No enlarged axillary or supraclavicular lymph nodes. Normal thyroid.  Glenohumeral osteoarthrosis.  Thoracic spondylosis.     Impression:       Moderate right and small left pleural effusions with associated atelectasis. Multifocal clustered/tree-in-bud nodularity in the left upper lobe, left  lower lobe and middle lobe most likely represents an infectious or  inflammatory bronchiolitis.  Consider aspiration and etiology given  tracheobronchial secretions and esophageal features suggesting dysmotility  and reflux.     XR CHEST STANDARD (2 VW) [[id number]] Collected: 03/28/19 2252     Order Status: Completed Specimen: Chest Updated: 03/28/19 2256     Narrative:       EXAMINATION:  TWO VIEWS OF THE CHEST    3/28/2019 10:47 pm    COMPARISON:  Chest 03/20/2019 2:29 p.m. HISTORY:  ORDERING SYSTEM PROVIDED HISTORY: Pleural Effusion  TECHNOLOGIST PROVIDED HISTORY:  Reason for exam:->Pleural Effusion  Ordering Physician Provided Reason for Exam: Pleural Effusion  Acuity: Unknown  Type of Exam: Unknown  Additional signs and symptoms: cough  Relevant Medical/Surgical History: htn    FINDINGS:  Calcifications involving the aorta reflect atherosclerosis. The  cardiomediastinal and hilar silhouettes appear otherwise unremarkable. Bibasilar consolidation and bilateral pleural effusion, right greater than  left confirmed compared with prior study.  Chronic appearing coarse  interstitial densities predominate parahilar regions and lung bases, typical  of sequela from smoking or other previous infectious/inflammatory process. No pneumothorax is seen.  No acute osseous abnormality is identified.     Impression:       Confirmation of bibasilar consolidation and bilateral pleural effusion, right  greater than left, concerning for pneumonia. Chronic appearing coarse interstitial densities predominate parahilar regions  and lung bases, typical of sequela from smoking or other previous  infectious/inflammatory process. Calcific atherosclerotic disease aorta.     XR CHEST PORTABLE [[id number]] Collected: 03/28/19 1434     Order Status: Completed Updated: 03/28/19 1438     Narrative:       EXAMINATION:  SINGLE XRAY VIEW OF THE CHEST    3/28/2019 2:01 pm    COMPARISON:  03/08/2019    HISTORY:  ORDERING SYSTEM PROVIDED HISTORY: cough/fatigue  TECHNOLOGIST PROVIDED HISTORY:  Reason for exam:->cough/fatigue  Ordering Physician Provided Reason for Exam: cough/fatigue  Acuity: Unknown  Type of Exam: Unknown  Additional signs and symptoms: discharged 3/23/19    FINDINGS:  Normal heart size.  Somewhat prominent pulmonary vasculature.  Haziness at  the lung bases may represent atelectasis or layering pleural fluid.  No  pneumothorax.     Impression:       Haziness at the lung bases may represent atelectasis or layering pleural  fluid.  Recommend obtaining PA and lateral chest radiographs for confirmation.           Relevant labs and imaging reviewed    ASSESSMENT AND PLAN     Acute Metabolic Encephalopathy  - 7/1/02 - Acute Metabolic Encephalopathy: unresponsive. S/P ACLS, given 1 epinephrine. Could be from hypotension, CO2 narcosis. Neurology evaluated initially and had normal CT, CTA head/neck and had signed off. Neuro re-evaluated 4/8   - to investigate central causes of difficulty off vent, d/w neuro 4/9, plan for MRI w/wo contrast     Acute Hypoxic Hypercapnic Respiratory failure suspect 2/2 PNA  Septic shock  Difficulty weaning off vent - could be mental status/encephalopathy related vs. Neuromuscular deconditioning with recent influenza A and PNA 3/2019  - intubated 3/31  - on IV zosyn - completed antibiotics 4/8  - difficulty weaning off vent.  Failed recurrent extubation. Had to be re-intubated again 4/9/19 due to hypercapnia. Discussed with surgery for PEG/Trach who will follow in the periphery and will only see patient when family reaches a decision on.  They wish to have some time in considering PEG/Trach and LTAC placement        NSTEMI  TTE LVEF 60-65%, severely dilated right atrium  - on DAPT, medical management  - card considering LHC vs. Stress when stable    KERA   Hypokalemia  - resolved     DMII  - lantus, ISS     GI bleed / Gastritis  - received 1 U pRBC  - improved on conservative management  - IV PPI BID    Nutrition  - on TF    Lovenox ppx    Consult palliative care    [facility], Internal Medicine  4/9/2019 at 1:57 PM

## 2019-04-09 NOTE — CONSULTS
GENERAL SURGERY PROGRESS NOTE    I did not see the patient, I was asked to wait 1-2 days for the family to decide IF they want trach, and PEG. Will follow closely. ___________________________________________    Kerry Talbert MD, FACS, FICS  4/9/2019  3:31 PM    Patient was seen with total face to face time of 25 minutes. More than 50% of this visit was counseling and education as above in my assessment and plan section of my note.

## 2019-04-09 NOTE — PLAN OF CARE
Problem: Falls - Risk of:  Goal: Will remain free from falls  Description  Will remain free from falls  4/9/2019 0932 by Yovana Nelson RN  Outcome: Ongoing  4/9/2019 0619 by Kaylie Lockhart RN  Outcome: Ongoing  Goal: Absence of physical injury  Description  Absence of physical injury  4/9/2019 0932 by Yovana Nelson RN  Outcome: Ongoing  4/9/2019 0619 by Kaylie Lockhart RN  Outcome: Ongoing     Problem: Nutrition  Goal: Optimal nutrition therapy  4/9/2019 0932 by Yovana Nelson RN  Outcome: Ongoing  4/9/2019 0619 by Kaylie Lockhart RN  Outcome: Ongoing     Problem: Risk for Impaired Skin Integrity  Goal: Tissue integrity - skin and mucous membranes  Description  Structural intactness and normal physiological function of skin and  mucous membranes.   4/9/2019 0932 by Yovana Nelson RN  Outcome: Ongoing  4/9/2019 0619 by Kaylie Lockhart RN  Outcome: Ongoing     Problem: Safety:  Goal: Free from accidental physical injury  Description  Free from accidental physical injury  Outcome: Ongoing  Goal: Free from intentional harm  Description  Free from intentional harm  Outcome: Ongoing     Problem: Daily Care:  Goal: Daily care needs are met  Description  Daily care needs are met  Outcome: Ongoing     Problem: Pain:  Goal: Patient's pain/discomfort is manageable  Description  Patient's pain/discomfort is manageable  Outcome: Ongoing     Problem: Discharge Planning:  Goal: Patients continuum of care needs are met  Description  Patients continuum of care needs are met  Outcome: Ongoing

## 2019-04-09 NOTE — PROGRESS NOTES
pulmonary      SUBJECTIVE: just extubated,he is tachypneic and tachycardic     OBJECTIVE    VITALS:  /63   Pulse 101   Temp 99 °F (37.2 °C) (Rectal)   Resp 24   Ht 5' 10\" (1.778 m)   Wt 163 lb 6.4 oz (74.1 kg)   SpO2 100%   BMI 23.45 kg/m²   HEAD AND FACE EXAM:  No throat injection, no active exudate,no thrush  NECK EXAM;No JVD, no masses, symmetrical  CHEST EXAM; Expansion equal and symmetrical, no masses  LUNG EXAM; Good breath sounds bilaterally. There are expiratory wheezes both lungs, there are crackles at both lung bases  CARDIOVASCULAR EXAM: Positive S1 and S2, no S3 or S4, no clicks ,no murmurs  RIGHT AND LEFT LOWER EXTRIMITY EXAM: No edema, no swelling, no inflamation            LABS   Lab Results   Component Value Date    WBC 9.4 04/09/2019    HGB 7.6 (L) 04/09/2019    HCT 25.4 (L) 04/09/2019    MCV 97.9 04/09/2019     04/09/2019     Lab Results   Component Value Date    CREATININE 0.9 04/09/2019    BUN 16 04/09/2019     04/09/2019    K 3.4 (L) 04/09/2019     04/09/2019    CO2 29 04/09/2019     Lab Results   Component Value Date    INR 1.04 03/31/2019    PROTIME 12.1 03/31/2019        No results found for: PHOS     Recent Labs     04/07/19  0600 04/08/19  0600 04/09/19  0600   PH 7.48* 7.47* 7.47*   PO2ART 99 108* 57*   ISE2QWZ 52.0* 52.0* 47.0*   O2SAT 94.7* 95.4* 90.5*         Wt Readings from Last 3 Encounters:   04/08/19 163 lb 6.4 oz (74.1 kg)   03/23/19 154 lb 9.6 oz (70.1 kg)   03/12/19 154 lb 12.8 oz (70.2 kg)               ASSESMENT  Ac resp failure  Pneumonia  nicanor pl effusion  Ac bronchospasm        PLAN  1. cpm  2. Start bipap  3.  If he gets reintubated then will need trach/peg    4/9/2019  Tracy Goldman M.D.

## 2019-04-09 NOTE — PROGRESS NOTES
Attempted to turn pressure support down to 5 and patient went into apnea ventilation.  Placed back on 10cm

## 2019-04-09 NOTE — PROGRESS NOTES
NIF -50, RSBI 63  Extubated per order of Dr Aric Nayak in accordance with extubation protocol. No acute respiratory distress to report. Pt sounds congested  And has been encouraged to cough. Placed on O2 @ 3 lpm via nasal cannula.

## 2019-04-09 NOTE — ANESTHESIA PROCEDURE NOTES
Airway  Date/Time: 4/9/2019 12:30 PM  Urgency: emergent    Airway not difficult    General Information and Staff    Patient location during procedure: ICU  Anesthesiologist: Elise Vidal DO    Consent for Airway (if performed for an anesthetic, see related documentation for consents)  Patient identity confirmed: per hospital policy  Consent: The procedure was performed in an emergent situation. Verbal consent not obtained. Written consent not obtained.   Risks and benefits: risks, benefits and alternatives were not discussed      Code status verified:yes  Indications and Patient Condition  Indications for airway management: respiratory failure  Spontaneous ventilation: present  Sedation level: no sedation  Preoxygenated: yes  Patient position: sniffing  MILS not maintained throughout  Mask difficulty assessment: not attempted    Final Airway Details  Final airway type: endotracheal airway      Successful airway: ETT  Cuffed: yes   Successful intubation technique: direct laryngoscopy  Facilitating devices/methods: intubating stylet  Endotracheal tube insertion site: oral  Blade: Bakari  Blade size: #3  ETT size (mm): 8.0  Cormack-Lehane Classification: grade IIb - view of arytenoids or posterior of glottis only  Placement verified by: chest auscultation and capnometry   Measured from: lips  ETT to lips (cm): 23  Number of attempts at approach: 1    no

## 2019-04-09 NOTE — CONSULTS
Nutrition Assessment    Type and Reason for Visit: Reassess, Consult    Nutrition Recommendations:   · EN Order: low calorie, high protein at 60 mL/hr to provide the pt with 1440 kcal 126 g of protein per day    Nutrition Assessment: Pt was extubated and reintubated. Pt current EN order will be increased to 60 mL/hr to match pt current needs    Malnutrition Assessment:  · Malnutrition Status: Meets the criteria for severe malnutrition  · Context: Chronic illness  · Findings of the 6 clinical characteristics of malnutrition (Minimum of 2 out of 6 clinical characteristics is required to make the diagnosis of moderate or severe Protein Calorie Malnutrition based on AND/ASPEN Guidelines):  1. Energy Intake-Less than or equal to 75% of estimated energy requirement, Greater than or equal to 7 days    2. Weight Loss-2% loss or greater, in 1 week  3. Fat Loss-Severe subcutaneous fat loss, Orbital  4. Muscle Loss-Severe muscle mass loss, Clavicles (pectoralis and deltoids)  5. Fluid Accumulation-No significant fluid accumulation, Extremities  6.   Strength-Not measured    Nutrition Risk Level: High    Nutrient Needs:  · Estimated Daily Total Kcal: 1584 based on [place] 2003b  · Estimated Daily Protein (g): 113-150 based on 1.5-2 g/kg/IBW  · Estimated Daily Total Fluid (ml/day): 1392 based on 1 mL/kcal    Nutrition Diagnosis:   · Problem: Severe malnutrition, In context of chronic illness  · Etiology: related to Insufficient energy/nutrient consumption     Signs and symptoms:  as evidenced by Severe loss of subcutaneous fat, Severe muscle loss, Weight loss greater than or equal to 2% in 1 week    Objective Information:  · Wound Type: None  · Current Nutrition Therapies:  · Oral Diet Orders: NPO   · Anthropometric Measures:  · Ht: 5' 10\" (177.8 cm)   · Current Body Wt: 163 lb (73.9 kg)  · Admission Body Wt: 149 lb (67.6 kg)  · Usual Body Wt: 154 lb (69.9 kg)  · % Weight Change: -1.9% in one week  · Ideal Body Wt: 166 lb (75.3 kg), % Ideal Body 98%  · BMI Classification: BMI 18.5 - 24.9 Normal Weight    Nutrition Interventions:   Modify current Tube Feeding  Continued Inpatient Monitoring, Education Not Indicated, Coordination of Care    Nutrition Evaluation:   · Evaluation: Progressing toward goals   · Goals: pt will consume greater than 75% of his meals and supplements provided    · Monitoring: Weight, Pertinent Labs, Monitor Hemodynamic Status, Nutrition Progression, TF Intake, TF Tolerance      Electronically signed by Nathaneil Fothergill, RD, LD on 9/2/58 at 1:36 PM    Contact Number: [phone]

## 2019-04-10 PROBLEM — J96.20 ACUTE ON CHRONIC RESPIRATORY FAILURE (HCC): Status: ACTIVE | Noted: 2019-01-01

## 2019-04-10 NOTE — PROGRESS NOTES
Neurology Service Progress Note  [facility]  Patient Name: Dawit Allen  : 7/15/1933        Subjective:   Patient failed his extubation again yesterday  Asked to re-evaluate for central etiologies        :     Medications:  Scheduled Meds:   chlorhexidine  15 mL Mouth/Throat BID    pantoprazole  40 mg Intravenous Daily    And    sodium chloride (PF)  10 mL Intravenous Daily    midodrine  10 mg Oral TID WC    sodium chloride flush  10 mL Intravenous 2 times per day    clopidogrel  75 mg Oral Daily    enoxaparin  40 mg Subcutaneous Daily    gabapentin  400 mg Oral BID    latanoprost  1 drop Both Eyes Nightly    simvastatin  40 mg Oral Nightly    sodium chloride flush  10 mL Intravenous 2 times per day    aspirin  81 mg Oral Daily    ipratropium-albuterol  1 ampule Inhalation Q4H WA    sodium chloride flush  10 mL Intravenous 2 times per day    insulin lispro  0-12 Units Subcutaneous TID WC    insulin lispro  0-6 Units Subcutaneous Nightly    insulin glargine  5 Units Subcutaneous Nightly     Continuous Infusions:   fentanyl 25 mcg/hr (04/10/19 1146)    propofol 25 mcg/kg/min (04/10/19 0455)    norepinephrine 2 mcg/min (04/10/19 1232)    sodium chloride 75 mL/hr at 04/10/19 1146    dextrose       PRN Meds:.potassium chloride, sodium chloride flush, racepinephrine HCl, sodium chloride nebulizer, glucose, dextrose, glucagon (rDNA), dextrose, sodium chloride flush, magnesium hydroxide, ondansetron, sodium chloride flush, acetaminophen    No Known Allergies     History reviewed. No pertinent family history. Review of Symptoms:    10-point system review completed. All of which are negative except as mentioned above. Physical Exam:      Gen: A&O x 4, NAD, cooperative, intubated but no sedation, no distress  HEENT: NC/AT, EOMI, PERRL, mmm, no carotid bruits, neck supple, no meningeal signs;    Heart: Regular  Lungs: no distress  Ext: no edema, no calf tenderness b/l  Psych: normal mood and affect  Skin: no rashes or lesions    NEUROLOGIC EXAM:    Mental Status: A&O to self, location, month and year, NAD, mouths appropriate words, follows commands appropriately    Cranial Nerve Exam:   CN II-XII: , PERRL, VFF, no nystagmus, no gaze paresis, sensation V1-V3 intact b/l, muscles of facial expression symmetric; hearing intact to conversational tone, palate elevates symmetrically, shoulder elevation symmetric and tongue protrudes midline with movement side to side. Motor Exam:       Antigravity x4    Deep Tendon Reflexes: 2/4 biceps, triceps, brachioradialis, patellar, and achilles b/l; flexor plantar responses b/l    Sensation: Intact light touch UE's/LE's b/l    Coordination/Cerebellum:       Tremors--none      Rapidly alternating movements: no dysdiadochokinesia b/l                Gait and stance:      Gait: deferred      LABS:     Recent Labs     04/08/19  0455 04/09/19  0605 04/10/19  0550   WBC 8.7 9.4 7.2    142 137   K 3.9 3.4* 3.1*    104 95*   CO2 33* 29 36*   BUN 19 16 23   CREATININE 1.0 0.9 0.9   GLUCOSE 115* 79 224*         IMAGING:    CT Head  No CT evidence of acute territorial infarct.       No significant stenosis visualized in the major intracranial arterial   vasculature.       Cervical carotid vasculature is patent.       Diminutive right vertebral artery and widely patent left vertebral artery. MRI Brain w/wo       ASSESSMENT/PLAN:     80year old male with acute AMS and ARDs secondary to TME superimposed on influenza, pneumonia and acute hypercarbia now fully resolved. However he cannot be extubated-we will rule out medulla infarction v. MG, patient could be suffering from a central sleep apnea. Will continue to follow and more recommendations pending MRI completion. Thank you for allowing us to participate in the care of your patient. If there are any questions regarding evaluation please feel free to contact us.      CARL Sotomayor - MILEY, 4/10/2019 Nayely Cantu, CARL - CNP 4/10/2019 12:56 PM

## 2019-04-10 NOTE — PROGRESS NOTES
04/10/19 0510   Vent Information   Vent Type 980   Vent Mode AC/VC   Vt Ordered 500 mL   Rate Set 14 bmp   Peak Flow 60 L/min   Pressure Support 0 cmH20   FiO2  50 %   Sensitivity 3   PEEP/CPAP 5   I Time/ I Time % 0 s   Humidification Source Heated wire   Humidification Temp 37   Humidification Temp Measured 37   Circuit Condensation Drained   Vent Patient Data   High Peep/I Pressure 0   Peak Inspiratory Pressure 22 cmH2O   Mean Airway Pressure 9.1 cmH20   Rate Measured 14 br/min   Vt Exhaled 493 mL   Minute Volume 6.92 Liters   I:E Ratio 1:3.80   Spontaneous Breathing Trial (SBT) RT Doc   Pulse 64   SpO2 100 %   Additional Respiratory  Assessments   Resp 13   Alarm Settings   High Pressure Alarm 70 cmH2O   Delay Alarm 20 sec(s)   Low Minute Volume Alarm 2.5 L/min   Apnea (secs) 20 secs   High Respiratory Rate 40 br/min   Low Exhaled Vt  250 mL   ETT (adult)   Placement Date/Time: 04/09/19 (c) 1225   Preoxygenation: Yes  Mask Ventilation: Ventilated by mask (1)  Technique: Direct laryngoscopy  Type: Cuffed  Tube Size: 8 mm  Laryngoscope: Mac  Blade Size: 3  Location: Oral  Placement Verified By[[email or web address]] Auscultati. ..    Measured From Lips   ET Placement Right   Secured By Commercial tube odell   Site Condition Dry

## 2019-04-10 NOTE — PROGRESS NOTES
Pt on full support, continue full support until decision is made by family to determine whether or not the patient will be trach/peg or withdraw of care or similar route of care. NO SAT/SBT weaning protocol. RT to continue to follow as ordered.

## 2019-04-10 NOTE — PROGRESS NOTES
Hospitalist Progress Note      Name:  Carl Bailey /Age/Sex: 7/15/1933  (80 y.o. male)   MRN & CSN:  [id number] & [id number] Admission Date/Time: 3/28/2019  1:13 PM   Location:  -A PCP: No primary care provider on file. Hospital Day: 14    Interval History:  Carl Bailey is a 80 y.o.  male  who is on admission for NSTEMI (non-ST elevated myocardial infarction) ([facility] [address]    Assessment and Plan: Active Issues:      1. Acute on chronic respiratory failure  - still on the Vent; failed extubation yesterday  - pending family decision for Tracheostomy placement    2. Pneumonia with bilateral pleural effusion  - ongoing; completed course of IV Zosyn  - interval worsening on CXR ascribed to edema  - bilateral effusions present    3. NSTEMI  - for ischemic evaluation when stable  - still on vasopressor support        Review of System:     Ten point ROS reviewed negative, unless as noted above    Objective:        Intake/Output Summary (Last 24 hours) at 4/10/2019 1105  Last data filed at 4/10/2019 1004  Gross per 24 hour   Intake 3151.79 ml   Output 3890 ml   Net -738.21 ml      Vitals:   Vitals:    04/10/19 0733   BP: (!) 118/57   Pulse: 61   Resp: 14   Temp:    SpO2: 100%     Physical Exam:   General appearance: appears stated age, fatigued and mild distress  Neck: no carotid bruit, no JVD and supple, symmetrical, trachea midline  Lungs: diminished breath sounds bilaterally  Extremities: extremities normal, atraumatic, no cyanosis or edema  Pulses: 2+ and symmetric  Skin: Skin color, texture, turgor normal. No rashes or lesions  Neurologic: Mental status: alertness: lethargic, orientation: person    Medications:   Medications:    chlorhexidine  15 mL Mouth/Throat BID    pantoprazole  40 mg Intravenous Daily    And    sodium chloride (PF)  10 mL Intravenous Daily    midodrine  10 mg Oral TID WC    sodium chloride flush  10 mL Intravenous 2 times per day    clopidogrel  75 mg Oral Daily    enoxaparin  40 mg Subcutaneous Daily    gabapentin  400 mg Oral BID    latanoprost  1 drop Both Eyes Nightly    simvastatin  40 mg Oral Nightly    sodium chloride flush  10 mL Intravenous 2 times per day    aspirin  81 mg Oral Daily    ipratropium-albuterol  1 ampule Inhalation Q4H WA    sodium chloride flush  10 mL Intravenous 2 times per day    insulin lispro  0-12 Units Subcutaneous TID WC    insulin lispro  0-6 Units Subcutaneous Nightly    insulin glargine  5 Units Subcutaneous Nightly      Infusions:    fentanyl 25 mcg/hr (04/10/19 0156)    propofol 25 mcg/kg/min (04/10/19 0455)    norepinephrine 4 mcg/min (04/10/19 1002)    sodium chloride 1 mL (04/09/19 2226)    dextrose       PRN Meds:   potassium chloride 10 mEq PRN   sodium chloride flush 10 mL PRN   racepinephrine HCl 11.25 mg Q20 Min PRN   sodium chloride nebulizer 3 mL Q4H PRN   glucose 15 g PRN   dextrose 12.5 g PRN   glucagon (rDNA) 1 mg PRN   dextrose 100 mL/hr PRN   sodium chloride flush 10 mL PRN   magnesium hydroxide 30 mL Daily PRN   ondansetron 4 mg Q6H PRN   sodium chloride flush 10 mL PRN   acetaminophen 650 mg Q4H PRN         Electronically signed by Nupur Hook MD on 4/10/2019 at 11:05 AM    [facility] Hospitalist

## 2019-04-10 NOTE — PROGRESS NOTES
pulmonary      SUBJECTIVE: he was extubated and then reintubated yesterday     OBJECTIVE    VITALS:  BP (!) 118/57   Pulse 61   Temp 97.2 °F (36.2 °C) (Bladder)   Resp 14   Ht 5' 10\" (1.778 m)   Wt 163 lb 6.4 oz (74.1 kg)   SpO2 100%   BMI 23.45 kg/m²   HEAD AND FACE EXAM:  No throat injection, no active exudate,no thrush  NECK EXAM;No JVD, no masses, symmetrical  CHEST EXAM; Expansion equal and symmetrical, no masses  LUNG EXAM; Good breath sounds bilaterally. There are expiratory wheezes both lungs, there are crackles at both lung bases  CARDIOVASCULAR EXAM: Positive S1 and S2, no S3 or S4, no clicks ,no murmurs  RIGHT AND LEFT LOWER EXTRIMITY EXAM: No edema, no swelling, no inflamation            LABS   Lab Results   Component Value Date    WBC 7.2 04/10/2019    HGB 8.7 (L) 04/10/2019    HCT 29.1 (L) 04/10/2019    MCV 95.7 04/10/2019     04/10/2019     Lab Results   Component Value Date    CREATININE 0.9 04/10/2019    BUN 23 04/10/2019     04/10/2019    K 3.1 (L) 04/10/2019    CL 95 (L) 04/10/2019    CO2 36 (H) 04/10/2019     Lab Results   Component Value Date    INR 1.04 03/31/2019    PROTIME 12.1 03/31/2019        No results found for: PHOS     Recent Labs     04/09/19  1130 04/09/19  1645 04/10/19  0600   PH 7.10* 7.44 7.50*   PO2ART 111* 93 150*   JOV0NUB 110.0* 44.0 46.0*   O2SAT 95.0* 96.0 96.5         Wt Readings from Last 3 Encounters:   04/08/19 163 lb 6.4 oz (74.1 kg)   03/23/19 154 lb 9.6 oz (70.1 kg)   03/12/19 154 lb 12.8 oz (70.2 kg)               ASSESMENT  Ac resp failure  Pneumonia  nicanor pl effusion  Ac bronchospasm        PLAN  1. cpm  2. Cont full vent support  3.  If he is full code then trach/peg and ltac    4/10/2019  William Cruz M.D.

## 2019-04-11 NOTE — CONSULTS
2232    pantoprazole (PROTONIX) injection 40 mg  40 mg Intravenous Daily Reginaldo Valenzuela MD   40 mg at 04/11/19 0955    And    sodium chloride (PF) 0.9 % injection 10 mL  10 mL Intravenous Daily Reginaldo Valenzuela MD   10 mL at 04/11/19 0949    norepinephrine (LEVOPHED) 16 mg in dextrose 5 % 250 mL infusion  2 mcg/min Intravenous Continuous Nate Gomez MD 1.9 mL/hr at 04/11/19 0018 2 mcg/min at 04/11/19 0018    0.9 % sodium chloride infusion   Intravenous Continuous Nate Gomez MD 75 mL/hr at 04/11/19 0158      midodrine (PROAMATINE) tablet 10 mg  10 mg Oral TID WC Nate Gomez MD   10 mg at 04/11/19 0948    potassium chloride 10 mEq/100 mL IVPB (Peripheral Line)  10 mEq Intravenous PRN Therese Cruz  mL/hr at 04/10/19 1632 10 mEq at 04/10/19 1632    sodium chloride flush 0.9 % injection 10 mL  10 mL Intravenous 2 times per day Therese Cruz MD   10 mL at 04/11/19 0949    sodium chloride flush 0.9 % injection 10 mL  10 mL Intravenous PRN Therese Cruz MD   10 mL at 04/10/19 1851    racepinephrine HCl (VAPONEFPRIN) 2.25 % nebulizer solution NEBU 11.25 mg  11.25 mg Nebulization Q20 Min PRN Reginaldo Valenzuela MD        sodium chloride nebulizer 0.9 % solution 3 mL  3 mL Nebulization Q4H PRN Reginaldo Valenzuela MD        clopidogrel (PLAVIX) tablet 75 mg  75 mg Oral Daily Jesus Lira MD   Stopped at 04/11/19 1011    enoxaparin (LOVENOX) injection 40 mg  40 mg Subcutaneous Daily Jesus Lira MD   40 mg at 04/11/19 0949    gabapentin (NEURONTIN) capsule 400 mg  400 mg Oral BID Maria Esther Low MD   400 mg at 04/11/19 0948    latanoprost (XALATAN) 0.005 % ophthalmic solution 1 drop  1 drop Both Eyes Nightly Maria Esther Low MD   1 drop at 04/10/19 2235    simvastatin (ZOCOR) tablet 40 mg  40 mg Oral Nightly Maria Esther Low MD   40 mg at 04/10/19 2236    glucose (GLUTOSE) 40 % oral gel 15 g  15 g Oral PRN Maria Esther Low MD        dextrose 50 % solution 12.5 g  12.5 g Intravenous PRN [place] Charu Wallace MD        glucagon (rDNA) injection 1 mg  1 mg Intramuscular PRN Papo Ureña MD        dextrose 5 % solution  100 mL/hr Intravenous PRN Papo Ureña MD        sodium chloride flush 0.9 % injection 10 mL  10 mL Intravenous 2 times per day Papo Ureña MD   10 mL at 04/11/19 1009    sodium chloride flush 0.9 % injection 10 mL  10 mL Intravenous PRN Papo Ureña MD   10 mL at 04/02/19 0125    magnesium hydroxide (MILK OF MAGNESIA) 400 MG/5ML suspension 30 mL  30 mL Oral Daily PRN Papo Ureña MD        ondansetron [facility]) injection 4 mg  4 mg Intravenous Q6H PRN Papo Ureña MD        aspirin chewable tablet 81 mg  81 mg Oral Daily Papo Ureña MD   81 mg at 04/11/19 0948    ipratropium-albuterol (DUONEB) nebulizer solution 1 ampule  1 ampule Inhalation Q4H WA Papo Ureña MD   1 ampule at 04/11/19 0744    sodium chloride flush 0.9 % injection 10 mL  10 mL Intravenous 2 times per day CARL Prieto CNP   10 mL at 04/11/19 1010    sodium chloride flush 0.9 % injection 10 mL  10 mL Intravenous PRN CARL Prieto CNP        acetaminophen (TYLENOL) tablet 650 mg  650 mg Oral Q4H PRN Steph Montanez APRN - CNP   650 mg at 03/28/19 2151    insulin lispro (HUMALOG) injection vial 0-12 Units  0-12 Units Subcutaneous TID WC Steph Montanez APRN - CNP   4 Units at 04/11/19 0954    insulin lispro (HUMALOG) injection vial 0-6 Units  0-6 Units Subcutaneous Nightly Steph Montanez APRN - CNP   2 Units at 04/10/19 2235    insulin glargine (LANTUS) injection vial 5 Units  5 Units Subcutaneous Nightly CARL Carpio - CNP   5 Units at 04/10/19 2235      fentanyl 25 mcg/hr (04/11/19 0720)    propofol 20 mcg/kg/min (04/10/19 2232)    norepinephrine 2 mcg/min (04/11/19 0018)    sodium chloride 75 mL/hr at 04/11/19 0158    dextrose         Social History / Family History:     Social History     Socioeconomic History    Marital status:      Spouse name: None    Number of children: None    Years of education: None    Highest education level: None   Occupational History    None   Social Needs    Financial resource strain: None    Food insecurity:     Worry: None     Inability: None    Transportation needs:     Medical: None     Non-medical: None   Tobacco Use    Smoking status: Never Smoker    Smokeless tobacco: Never Used   Substance and Sexual Activity    Alcohol use: Never     Frequency: Never    Drug use: Never    Sexual activity: Not Currently   Lifestyle    Physical activity:     Days per week: None     Minutes per session: None    Stress: None   Relationships    Social connections:     Talks on phone: None     Gets together: None     Attends Buddhism service: None     Active member of club or organization: None     Attends meetings of clubs or organizations: None     Relationship status: None    Intimate partner violence:     Fear of current or ex partner: None     Emotionally abused: None     Physically abused: None     Forced sexual activity: None   Other Topics Concern    None   Social History Narrative    None    History reviewed. No pertinent family history. otherwise irrelevant to this surgical issue. Review of Systems:  Pt intubated, sedated on vent support. Physical Exam:  Vital Signs:   Vitals:    04/11/19 1048   BP: (!) 111/56   Pulse: 75   Resp: 14   Temp:    SpO2: 98%     Body mass index is 23.45 kg/m². General appearance: Pt sedated intubated, on vent support, in no apparent acute distress. HEENT:  SCOTT, Conjunctiva clear. EOMI, No JVDs, Bruits, Megalies: neither thyroid nor lymph nodes. Lungs: Clear to auscultation bilaterally. Heart: Regular rate and rhythm, S1, S2 normal, no murmur, rub or gallop. Abdomen: Non tender. Non distended. Positive bowel sounds. No hernias noted, no masses palpable. Extremities: Warm, well perfused, no cyanosis or edema.   Skin: Skin color, texture, turgor normal.  Neurologic: Grossly normal, Cranial nerves from II to XII intact, Deep tendon reflexes normal.  Lymph nodes: No palpable LNs, Cervical, groins, abdominal.  Musculoskeletal: Bilateral Upper and lower extremities ROM within normal limits. Radiologic / Imaging / TESTING  No results found.      Labs:    Recent Results (from the past 24 hour(s))   POCT Glucose    Collection Time: 04/10/19 12:13 PM   Result Value Ref Range    POC Glucose 175 (H) 70 - 99 MG/DL   POCT Glucose    Collection Time: 04/10/19  4:52 PM   Result Value Ref Range    POC Glucose 208 (H) 70 - 99 MG/DL   Potassium    Collection Time: 04/10/19  7:00 PM   Result Value Ref Range    Potassium 3.6 3.5 - 5.1 MMOL/L   POCT Glucose    Collection Time: 04/10/19 10:31 PM   Result Value Ref Range    POC Glucose 202 (H) 70 - 99 MG/DL   Basic metabolic panel    Collection Time: 04/11/19  5:45 AM   Result Value Ref Range    Sodium 139 135 - 145 MMOL/L    Potassium 3.6 3.5 - 5.1 MMOL/L    Chloride 100 99 - 110 mMol/L    CO2 33 (H) 21 - 32 MMOL/L    Anion Gap 6 4 - 16    BUN 23 6 - 23 MG/DL    CREATININE 0.9 0.9 - 1.3 MG/DL    Glucose 204 (H) 70 - 99 MG/DL    Calcium 7.6 (L) 8.3 - 10.6 MG/DL    GFR Non-African American >60 >60 mL/min/1.73m2    GFR African American >60 >60 mL/min/1.73m2   CBC    Collection Time: 04/11/19  5:45 AM   Result Value Ref Range    WBC 7.7 4.0 - 10.5 K/CU MM    RBC 2.66 (L) 4.6 - 6.2 M/CU MM    Hemoglobin 7.7 (L) 13.5 - 18.0 GM/DL    Hematocrit 25.8 (L) 42 - 52 %    MCV 97.0 78 - 100 FL    MCH 28.9 27 - 31 PG    MCHC 29.8 (L) 32.0 - 36.0 %    RDW 16.1 (H) 11.7 - 14.9 %    Platelets 237 292 - 793 K/CU MM    MPV 10.7 7.5 - 11.1 FL   Blood gas, arterial    Collection Time: 04/11/19  6:00 AM   Result Value Ref Range    pH, Bld 7.47 (H) 7.34 - 7.45    pCO2, Arterial 45.0 32 - 45 MMHG    pO2, Arterial 48 (L) 75 - 100 MMHG    Base Exc, Mixed 8  PLUS   (H) 0 - 1.2    HCO3, Arterial 32.8 (H) 18 - 23 MMOL/L    CO2 Content 34.2 (H) 19 - 24 MMOL/L    O2 Sat 84.6 (L) 96 - 97 %    Carbon Monoxide, Blood 1.9 0 - 5 %    Methemoglobin, Arterial 1.1 <1.5 %    Comment  AC14 500 .30 P5    POCT Glucose    Collection Time: 04/11/19  9:53 AM   Result Value Ref Range    POC Glucose 229 (H) 70 - 99 MG/DL       Diagnosis:  Patient Active Problem List   Diagnosis    Community acquired pneumonia due to influenza A virus    Pneumonia    Essential hypertension    Type 2 diabetes mellitus with diabetic polyneuropathy, without long-term current use of insulin (HCC)    K[facility]A [facility] injury) ([facility] [address]    Oropharyngeal dysphagia    NSTEMI (non-ST elevated [facility] infarction) [facility]    Fatigue    Severe malnutrition ([facility])    Acute on chronic respiratory failure ([facility])       Assessment & plan:  Michael Joyner is a very pleasant 80 y.o. male presenting with respiratory failure, and failed extubation twice, will plan PEG, and Trach in am    Thank you doctor Yakelin Lopez MD very much for your consultation and for the opportunity to take care of Mr Michael Joyner with you, I'll follow along with you and I'll update you on any new events in his care.  ____________________________________________    Kendal Alexis MD, FACS, FICS    4/11/2019  10:58 AM      Patient was seen with total face to face time of 45 minutes. More than 50% of this visit was counseling and education as above in my assessment and plan section of my note.

## 2019-04-11 NOTE — PROGRESS NOTES
pulmonary      SUBJECTIVE:  Intubated on vent     OBJECTIVE    VITALS:  /66   Pulse 91   Temp 98.8 °F (37.1 °C) (Core)   Resp 16   Ht 5' 10\" (1.778 m)   Wt 163 lb 6.4 oz (74.1 kg)   SpO2 96%   BMI 23.45 kg/m²   HEAD AND FACE EXAM:  No throat injection, no active exudate,no thrush  NECK EXAM;No JVD, no masses, symmetrical  CHEST EXAM; Expansion equal and symmetrical, no masses  LUNG EXAM; Good breath sounds bilaterally. There are expiratory wheezes both lungs, there are crackles at both lung bases  CARDIOVASCULAR EXAM: Positive S1 and S2, no S3 or S4, no clicks ,no murmurs  RIGHT AND LEFT LOWER EXTRIMITY EXAM: No edema, no swelling, no inflamation  . LABS   Lab Results   Component Value Date    WBC 7.7 04/11/2019    HGB 7.7 (L) 04/11/2019    HCT 25.8 (L) 04/11/2019    MCV 97.0 04/11/2019     04/11/2019     Lab Results   Component Value Date    CREATININE 0.9 04/11/2019    BUN 23 04/11/2019     04/11/2019    K 3.6 04/11/2019     04/11/2019    CO2 33 (H) 04/11/2019     Lab Results   Component Value Date    INR 1.04 03/31/2019    PROTIME 12.1 03/31/2019        No results found for: PHOS     Recent Labs     04/09/19  1645 04/10/19  0600 04/11/19  0600   PH 7.44 7.50* 7.47*   PO2ART 93 150* 48*   [id number] 44.0 46.0* 45.0   O2SAT 96.0 96.5 84.6*         Wt Readings from Last 3 Encounters:   04/08/19 163 lb 6.4 oz (74.1 kg)   03/23/19 154 lb 9.6 oz (70.1 kg)   03/12/19 154 lb 12.8 oz (70.2 kg)               ASSESMENT  Ac resp failure  Pneumonia  nicanor pl effusion  Ac bronchospasm        PLAN  1. cpm  2. Dr Dylon Alfred consult  3.  He needs trach/peg as failed extubation twice    4/11/2019  J Carlos Bhatt M.D.

## 2019-04-11 NOTE — PROGRESS NOTES
Dr. Cr Serna contacted related to Blood thinners and question of Trach and peg placement. Hold PLavix at this time pending patient family decision.

## 2019-04-11 NOTE — PROGRESS NOTES
Neurology Service Progress Note  [facility]  Patient Name: Jael Lee  : 7/15/1933        Subjective:   Patient seen and examined  We are awaiting MRI        :     Medications:  Scheduled Meds:   midazolam  5 mg Intravenous Once    chlorhexidine  15 mL Mouth/Throat BID    pantoprazole  40 mg Intravenous Daily    And    sodium chloride (PF)  10 mL Intravenous Daily    midodrine  10 mg Oral TID WC    sodium chloride flush  10 mL Intravenous 2 times per day    [Held by provider] clopidogrel  75 mg Oral Daily    enoxaparin  40 mg Subcutaneous Daily    [Held by provider] gabapentin  400 mg Oral BID    latanoprost  1 drop Both Eyes Nightly    simvastatin  40 mg Oral Nightly    sodium chloride flush  10 mL Intravenous 2 times per day    aspirin  81 mg Oral Daily    ipratropium-albuterol  1 ampule Inhalation Q4H WA    insulin lispro  0-12 Units Subcutaneous TID WC    insulin lispro  0-6 Units Subcutaneous Nightly    insulin glargine  5 Units Subcutaneous Nightly     Continuous Infusions:   fentanyl Stopped (19 1219)    propofol 20 mcg/kg/min (19 1319)    norepinephrine Stopped (19 1321)    sodium chloride 75 mL/hr at 19 1202    dextrose       PRN Meds:.potassium chloride, sodium chloride flush, racepinephrine HCl, sodium chloride nebulizer, glucose, dextrose, glucagon (rDNA), dextrose, sodium chloride flush, magnesium hydroxide, ondansetron, sodium chloride flush, acetaminophen    No Known Allergies     History reviewed. No pertinent family history. Review of Symptoms:    10-point system review completed. All of which are negative except as mentioned above. Physical Exam:      Gen: A&O x 4, NAD, cooperative, intubated but no sedation, no distress  HEENT: NC/AT, EOMI, PERRL, mmm, no carotid bruits, neck supple, no meningeal signs;    Heart: Regular  Lungs: no distress  Ext: no edema, no calf tenderness b/l  Psych: normal mood and affect  Skin: no rashes or lesions    NEUROLOGIC EXAM:    Mental Status: A&O to self, location, month and year, NAD, mouths appropriate words, follows commands appropriately    Cranial Nerve Exam:   CN II-XII: , PERRL, VFF, no nystagmus, no gaze paresis, sensation V1-V3 intact b/l, muscles of facial expression symmetric; hearing intact to conversational tone, palate elevates symmetrically, shoulder elevation symmetric and tongue protrudes midline with movement side to side. Motor Exam:       Antigravity x4    Deep Tendon Reflexes: 2/4 biceps, triceps, brachioradialis, patellar, and achilles b/l; flexor plantar responses b/l    Sensation: Intact light touch UE's/LE's b/l    Coordination/Cerebellum:       Tremors--none      Rapidly alternating movements: no dysdiadochokinesia b/l                Gait and stance:      Gait: deferred      LABS:     Recent Labs     04/09/19  0605 04/10/19  0550 04/10/19  1900 04/11/19  0545   WBC 9.4 7.2  --  7.7    137  --  139   K 3.4* 3.1* 3.6 3.6    95*  --  100   CO2 29 36*  --  33*   BUN 16 23  --  23   CREATININE 0.9 0.9  --  0.9   GLUCOSE 79 224*  --  204*         IMAGING:    CT Head  No CT evidence of acute territorial infarct.       No significant stenosis visualized in the major intracranial arterial   vasculature.       Cervical carotid vasculature is patent.       Diminutive right vertebral artery and widely patent left vertebral artery. MRI Brain w/wo       ASSESSMENT/PLAN:     80year old male with acute AMS and ARDs secondary to TME superimposed on influenza, pneumonia and acute hypercarbia now fully resolved. However he cannot be extubated-we will rule out medulla infarction v. MG, patient could be suffering from a central sleep apnea. Will continue to follow and more recommendations pending MRI completion. Thank you for allowing us to participate in the care of your patient. If there are any questions regarding evaluation please feel free to contact us.      Abi LIVE

## 2019-04-11 NOTE — ANESTHESIA PRE PROCEDURE
Department of Anesthesiology  Preprocedure Note       Name:  Darrian Reyes   Age:  80 y.o.  :  7/15/1933                                          MRN:  [id number]         Date:  2019      Surgeon: Shazia Goodman):  Jessenia Wellington MD    Procedure: TRACHEOTOMY  PEG (N/A )    Medications prior to admission:   Prior to Admission medications    Medication Sig Start Date End Date Taking? Authorizing Provider   simvastatin (ZOCOR) 80 MG tablet Take 40 mg by mouth nightly    Historical Provider, MD   omeprazole (PRILOSEC) 20 MG delayed release capsule Take 20 mg by mouth Daily with supper     Historical Provider, MD   metFORMIN (GLUCOPHAGE) 500 MG tablet Take 250 mg by mouth 2 times daily (with meals)     Historical Provider, MD   gabapentin (NEURONTIN) 400 MG capsule Take 400 mg by mouth 2 times daily.     Historical Provider, MD   latanoprost (XALATAN) 0.005 % ophthalmic solution Place 1 drop into both eyes nightly    Historical Provider, MD       Current medications:    Current Facility-Administered Medications   Medication Dose Route Frequency Provider Last Rate Last Dose    chlorhexidine (PERIDEX) 0.12 % solution 15 mL  15 mL Mouth/Throat BID David Riddle MD   15 mL at 19 0949    fentanyl (SUBLIMAZE) 1,000 mcg in sodium chloride 0.9% 100 mL infusion  25 mcg/hr Intravenous Continuous David Riddle MD   Stopped at 19 1219    propofol 1000 MG/100ML injection  10 mcg/kg/min Intravenous Continuous David Riddle MD   Stopped at 19 1745    pantoprazole (PROTONIX) injection 40 mg  40 mg Intravenous Daily David Riddle MD   40 mg at 19 0955    And    sodium chloride (PF) 0.9 % injection 10 mL  10 mL Intravenous Daily David Riddle MD   10 mL at 19 0949    norepinephrine (LEVOPHED) 16 mg in dextrose 5 % 250 mL infusion  2 mcg/min Intravenous Continuous Ayan Lua MD   Stopped at 19 1321    0.9 % sodium chloride infusion   Intravenous Continuous Ayan Lua MD   Stopped at 04/11/19 1800    midodrine (PROAMATINE) tablet 10 mg  10 mg Oral TID WC Ayan Lua MD   10 mg at 04/11/19 1651    potassium chloride 10 mEq/100 mL IVPB (Peripheral Line)  10 mEq Intravenous PRN Mahsa Schroeder  mL/hr at 04/10/19 1632 10 mEq at 04/10/19 1632    sodium chloride flush 0.9 % injection 10 mL  10 mL Intravenous 2 times per day Mahsa Schroeder MD   10 mL at 04/11/19 0949    sodium chloride flush 0.9 % injection 10 mL  10 mL Intravenous PRN Mahsa Schroeder MD   10 mL at 04/10/19 1851    racepinephrine HCl (VAPONEFPRIN) 2.25 % nebulizer solution NEBU 11.25 mg  11.25 mg Nebulization Q20 Min PRN Chevy Sampson MD        sodium chloride nebulizer 0.9 % solution 3 mL  3 mL Nebulization Q4H PRN Chevy Sampson MD        [facility] clopidogrel (PLAVIX) tablet 75 mg  75 mg Oral Daily Mitra Monaco MD   Stopped at 04/11/19 1011    enoxaparin (LOVENOX) injection 40 mg  40 mg Subcutaneous Daily Mitra Monaco MD   40 mg at 04/11/19 0949    [Held by provider] gabapentin (NEURONTIN) capsule 400 mg  400 mg Oral BID Dakotah An MD   400 mg at 04/11/19 0948    latanoprost (XALATAN) 0.005 % ophthalmic solution 1 drop  1 drop Both Eyes Nightly Dakotah An MD   1 drop at 04/10/19 2235    simvastatin (ZOCOR) tablet 40 mg  40 mg Oral Nightly Dakotah An MD   40 mg at 04/10/19 2233    glucose (GLUTOSE) 40 % oral gel 15 g  15 g Oral PRN Dakotah An MD        dextrose 50 % solution 12.5 g  12.5 g Intravenous PRN Dakotah An MD        glucagon (rDNA) injection 1 mg  1 mg Intramuscular PRN Dakotah An MD        dextrose 5 % solution  100 mL/hr Intravenous PRN Dakotah An MD        sodium chloride flush 0.9 % injection 10 mL  10 mL Intravenous 2 times per day Dakotah An MD   10 mL at 04/11/19 1009    sodium chloride flush 0.9 % injection 10 mL  10 mL Intravenous PRN Dakotah MD Nataliya   10 mL at 04/02/19 0125    magnesium hydroxide (MILK OF MAGNESIA) 400 MG/5ML suspension 30 mL  30 mL Oral Daily PRN Tyrell Kwon MD        [facility]ron [facility] 4 mg  4 mg Intravenous Q6H PRN Tyrell Kwon MD        aspirin chewable tablet 81 mg  81 mg Oral Daily Tyrell Kwon MD   81 mg at 04/11/19 0948    ipratropium-albuterol (DUONEB) nebulizer solution 1 ampule  1 ampule Inhalation Q4H WA Tyrell Kwon MD   1 ampule at 04/11/19 1623    sodium chloride flush 0.9 % injection 10 mL  10 mL Intravenous PRN CARL Stroud CNP        acetaminophen (TYLENOL) tablet 650 mg  650 mg Oral Q4H PRN CARL Grijalva - CNP   650 mg at 03/28/19 2151    insulin lispro (HUMALOG) injection vial 0-12 Units  0-12 Units Subcutaneous TID WC Junior Rabago APRN - CNP   Stopped at 04/11/19 1651    insulin lispro (HUMALOG) injection vial 0-6 Units  0-6 Units Subcutaneous Nightly Junior Rabago APRN - CNP   2 Units at 04/10/19 2235    insulin glargine (LANTUS) injection vial 5 Units  5 Units Subcutaneous Nightly Jnuior Rabago APRN - CNP   5 Units at 04/10/19 2235       Allergies:  No Known Allergies    Problem List:    Patient Active Problem List   Diagnosis Code    Community acquired pneumonia due to influenza A virus J09. X1    Pneumonia J18.9    Essential hypertension I10    Type 2 diabetes mellitus with diabetic polyneuropathy, without long-term current use of insulin ([facility]) E11.42    KERA (acute kidney injury) ([facility] [address] N17.9    Oropharyngeal dysphagia R13.12    NSTEMI (non-ST elevated myocardial infarction) ([facility]) I21.4    Fatigue R53.83    Severe malnutrition ([facility]) E43    Acute on chronic respiratory failure ([facility]) J96.20    Central apnea R06.81       Past Medical History:        Diagnosis Date    Diabetes mellitus ([facility] [address]     Hypertension        Past Surgical History:        Procedure Laterality Date    THORACENTESIS Bilateral 04/11/2019    IR    UPPER GASTROINTESTINAL ENDOSCOPY N/A 4/4/2019    EGD DIAGNOSTIC ONLY performed by Jovanna Foster MD at [facility] ENDOSCOPY       Social History:    Social History     Tobacco Use    Smoking status: Never Smoker    Smokeless tobacco: Never Used   Substance Use Topics    Alcohol use: Never     Frequency: Never                                Counseling given: Not Answered      Vital Signs (Current):   Vitals:    04/11/19 1703 04/11/19 1718 04/11/19 1732 04/11/19 1733   BP: (!) 109/54 (!) 102/45 (!) 102/45 (!) 111/44   Pulse: 93 89 94 93   Resp: 15 10 11 22   Temp:       TempSrc:       SpO2: 100% 100% 100% 100%   Weight:       Height:                                                  BP Readings from Last 3 Encounters:   04/11/19 (!) 111/44   03/23/19 (!) 126/59   03/12/19 132/78       NPO Status:                                                                                 BMI:   Wt Readings from Last 3 Encounters:   04/08/19 163 lb 6.4 oz (74.1 kg)   03/23/19 154 lb 9.6 oz (70.1 kg)   03/12/19 154 lb 12.8 oz (70.2 kg)     Body mass index is 23.45 kg/m².     CBC:   Lab Results   Component Value Date    WBC 7.7 04/11/2019    RBC 2.66 04/11/2019    HGB 7.7 04/11/2019    HCT 25.8 04/11/2019    MCV 97.0 04/11/2019    RDW 16.1 04/11/2019     04/11/2019       CMP:   Lab Results   Component Value Date     04/11/2019    K 3.6 04/11/2019     04/11/2019    CO2 33 04/11/2019    BUN 23 04/11/2019    CREATININE 0.9 04/11/2019    GFRAA >60 04/11/2019    LABGLOM >60 04/11/2019    GLUCOSE 204 04/11/2019    PROT 5.1 04/04/2019    CALCIUM 7.6 04/11/2019    BILITOT 0.5 04/04/2019    ALKPHOS 102 04/04/2019    AST 13 04/04/2019    ALT <5 04/04/2019       POC Tests:   Recent Labs     04/11/19  1648   POCGLU 125*       Coags:   Lab Results   Component Value Date    PROTIME 12.1 03/31/2019    INR 1.04 03/31/2019    APTT 30.8 04/05/2019       HCG (If Applicable): No results found for: PREGTESTUR, PREGSERUM, HCG, HCGQUANT     ABGs:   Lab Results   Component Value Date    PO2ART 48 04/11/2019    ZQA3HHN 45.0 04/11/2019    TUB4GWY 32.8 04/11/2019        Type & Screen (If Applicable):  No results found for: LABABO, [address]    Anesthesia Evaluation  Patient summary reviewed  Airway:         Dental:          Pulmonary:                             ROS comment: Currently intubated   Cardiovascular:    (+) hypertension:, valvular problems/murmurs:, past MI: < 1 month, pulmonary hypertension: severe,       NYHA Classification: III                       ROS comment: 03/2019  Echo   Summary   Left ventricular systolic function is normal.   Ejection fraction is visually estimated at 60-65%.   No regional wall motion abnormalities were detected.   D-shaped septum consistent with elevated right sided pressures.   Severely dilated right atrium.   Moderately dilated right ventricle.   Increased right ventricle wall thickness.   Bicuspid aortic valve. 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   Moderate-to-severe tricuspid regurgitation.  RVSP is 71 mmHg.   Severe Pulmonary HTN.   Trivial pericardial effusion.   Pleural effusion present. Neuro/Psych:   (+) neuromuscular disease:,             GI/Hepatic/Renal:             Endo/Other:    (+) DiabetesType II DM, , .                 Abdominal:           Vascular:                                        Anesthesia Plan      general     ASA 4     ( Pre Anesthesia Assessment complete.  Chart reviewed on 4/11/2019)                            CARL Gomes - CRNA   4/11/2019

## 2019-04-11 NOTE — PROGRESS NOTES
Hospitalist Progress Note      Name:  Mariah Gates /Age/Sex: 7/15/1933  (80 y.o. male)   MRN & CSN:  [id number] & [id number] Admission Date/Time: 3/28/2019  1:13 PM   Location:  -A PCP: No primary care provider on file. Hospital Day: 15    Interval History:  Mariah Gates is a 80 y.o.  male  who is on admission for NSTEMI (non-ST elevated myocardial infarction) ([facility] [address]    Assessment and Plan: Active Issues:      1. Acute on chronic respiratory failure  - still on the Vent; failed extubation twice  - scheduled for tracheostomy placement in AM    2. Pneumonia with bilateral pleural effusion  - ongoing; completed course of IV Zosyn  - interval worsening on CXR ascribed to edema  - bilateral effusions present; will order image guided thoracentesis   - fluid balance +9L since admission  - will give 40 mg Lasix and re-evaluate    3. NSTEMI  - for ischemic evaluation when stable  - still on vasopressor support        Review of System:     Ten point ROS reviewed negative, unless as noted above    Objective:        Intake/Output Summary (Last 24 hours) at 2019 1121  Last data filed at 2019 0600  Gross per 24 hour   Intake 3995.01 ml   Output 1000 ml   Net 2995.01 ml      Vitals:   Vitals:    19 1048   BP: (!) 111/56   Pulse: 75   Resp: 14   Temp:    SpO2: 98%     Physical Exam:   General appearance: appears stated age, fatigued and mild distress  Neck: no carotid bruit, no JVD and supple, symmetrical, trachea midline  Lungs: diminished breath sounds bilaterally  Extremities: extremities normal, atraumatic, no cyanosis or edema  Pulses: 2+ and symmetric  Skin: Skin color, texture, turgor normal. No rashes or lesions  Neurologic: Mental status: alertness: lethargic, orientation: person    Medications:   Medications:    chlorhexidine  15 mL Mouth/Throat BID    pantoprazole  40 mg Intravenous Daily    And    sodium chloride (PF)  10 mL Intravenous Daily    midodrine  10 mg Oral TID WC  sodium chloride flush  10 mL Intravenous 2 times per day    [Held by provider] clopidogrel  75 mg Oral Daily    enoxaparin  40 mg Subcutaneous Daily    [Held by provider] gabapentin  400 mg Oral BID    latanoprost  1 drop Both Eyes Nightly    simvastatin  40 mg Oral Nightly    sodium chloride flush  10 mL Intravenous 2 times per day    aspirin  81 mg Oral Daily    ipratropium-albuterol  1 ampule Inhalation Q4H WA    insulin lispro  0-12 Units Subcutaneous TID WC    insulin lispro  0-6 Units Subcutaneous Nightly    insulin glargine  5 Units Subcutaneous Nightly      Infusions:    fentanyl 25 mcg/hr (04/11/19 0720)    propofol 20 mcg/kg/min (04/10/19 2232)    norepinephrine 2 mcg/min (04/11/19 0018)    sodium chloride 75 mL/hr at 04/11/19 0158    dextrose       PRN Meds:     potassium chloride 10 mEq PRN   sodium chloride flush 10 mL PRN   racepinephrine HCl 11.25 mg Q20 Min PRN   sodium chloride nebulizer 3 mL Q4H PRN   glucose 15 g PRN   dextrose 12.5 g PRN   glucagon (rDNA) 1 mg PRN   dextrose 100 mL/hr PRN   sodium chloride flush 10 mL PRN   magnesium hydroxide 30 mL Daily PRN   ondansetron 4 mg Q6H PRN   sodium chloride flush 10 mL PRN   acetaminophen 650 mg Q4H PRN         Electronically signed by Judy Tejeda MD on 4/11/2019 at 11:21 AM    [facility]

## 2019-04-11 NOTE — PLAN OF CARE
Problem: Falls - Risk of:  Goal: Will remain free from falls  Description  Will remain free from falls  Outcome: Ongoing  Goal: Absence of physical injury  Description  Absence of physical injury  Outcome: Ongoing     Problem: Risk for Impaired Skin Integrity  Goal: Tissue integrity - skin and mucous membranes  Description  Structural intactness and normal physiological function of skin and  mucous membranes.   Outcome: Ongoing     Problem: Safety:  Goal: Free from accidental physical injury  Description  Free from accidental physical injury  Outcome: Ongoing  Goal: Free from intentional harm  Description  Free from intentional harm  Outcome: Ongoing     Problem: Daily Care:  Goal: Daily care needs are met  Description  Daily care needs are met  Outcome: Ongoing     Problem: Discharge Planning:  Goal: Patients continuum of care needs are met  Description  Patients continuum of care needs are met  Outcome: Ongoing

## 2019-04-12 NOTE — PROGRESS NOTES
Bronchoscopy used today with Dr. Cody Tomas placement  scope #[id number]    Please see  MD,CRNA, for more detail

## 2019-04-12 NOTE — PROGRESS NOTES
Neurology Service Progress Note  [facility]  Patient Name: Elliot Ahumada  : 7/15/1933        Subjective:   Patient seen and examined  He received a trach today  [facility] is going to [facility]  He moves all 4 extremities against gravity, cranial nerves intact however patient is still sedated thus delayed exam.        :     Medications:  Scheduled Meds:   chlorhexidine  15 mL Mouth/Throat BID    pantoprazole  40 mg Intravenous Daily    And    sodium chloride (PF)  10 mL Intravenous Daily    midodrine  10 mg Oral TID WC    sodium chloride flush  10 mL Intravenous 2 times per day    [Held by provider] clopidogrel  75 mg Oral Daily    enoxaparin  40 mg Subcutaneous Daily    [Held by provider] gabapentin  400 mg Oral BID    latanoprost  1 drop Both Eyes Nightly    simvastatin  40 mg Oral Nightly    sodium chloride flush  10 mL Intravenous 2 times per day    aspirin  81 mg Oral Daily    ipratropium-albuterol  1 ampule Inhalation Q4H WA    insulin lispro  0-12 Units Subcutaneous TID WC    insulin lispro  0-6 Units Subcutaneous Nightly    insulin glargine  5 Units Subcutaneous Nightly     Continuous Infusions:   propofol 10 mcg/kg/min (19 1215)    sodium chloride 75 mL/hr at 19 0709    dextrose       PRN Meds:.potassium chloride, sodium chloride flush, racepinephrine HCl, sodium chloride nebulizer, glucose, dextrose, glucagon (rDNA), dextrose, sodium chloride flush, magnesium hydroxide, ondansetron, sodium chloride flush, acetaminophen    No Known Allergies     History reviewed. No pertinent family history. Review of Symptoms:    10-point system review completed. All of which are negative except as mentioned above. Physical Exam:      Gen: A&O x 4, NAD, cooperative, intubated but no sedation, no distress  HEENT: NC/AT, EOMI, PERRL, mmm, no carotid bruits, neck supple, no meningeal signs;    Heart: Regular  Lungs: no distress  Ext: no edema, no calf tenderness b/l  Psych: normal mood and affect  Skin: no

## 2019-04-12 NOTE — CARE COORDINATION
Follow up visit with pt and spouse. Pt had trach and peg placed today. Pt's wife and Cm have spoken about anticipated need for [facility], [facility] and wife prefers [facility] over [facility]. Wife given brochure for [facility]. Referral called to [address] [place] [address] in admissions at [address]

## 2019-04-12 NOTE — ANESTHESIA POSTPROCEDURE EVALUATION
Department of Anesthesiology  Postprocedure Note    Patient: Jael Lee  MRN: [id number]  YOB: 1933  Date of evaluation: 4/12/2019  Time:  10:28 AM     Procedure Summary     Date:  04/12/19 Room / Location:  [place] [address] / [facility]    Anesthesia Start:  0740 Anesthesia Stop:  9736    Procedure:  PERCUTANEOUS TRACHEOTOMY WITH PLACEMENT CONFIRMED BY BRONCHOSCOPE, PEG TUBE PLACEMENT (N/A ) Diagnosis:  (RESPIRATORY FAILURE)    Surgeon:  Maryjane Singh MD Responsible Provider:  Cristina Adams MD    Anesthesia Type:  general ASA Status:  4          Anesthesia Type: general    [place] Phase I:  8    [place] Phase II:  8    Last vitals: Reviewed and per EMR flowsheets.        Anesthesia Post Evaluation    Patient location during evaluation: ICU  Patient participation: complete - patient cannot participate  Level of consciousness: sedated and ventilated  Pain score: 0  Airway patency: patent  Nausea & Vomiting: no nausea and no vomiting  Complications: no  Cardiovascular status: hemodynamically stable and blood pressure returned to baseline  Respiratory status: ventilator, acceptable and airway suctioned  Hydration status: stable

## 2019-04-12 NOTE — PROGRESS NOTES
[facility] Hospitalist Daily Progress Note  Hospitalist: Bettina Bhagat M.D. Hospitalist Progress Note Date: 2019    Patient Name: Tray Gutierrez : 7/15/1933 Med Rec # [id number]    Disposition: continue inpatient care    Cough [R05]  General weakness [R53.1]  Serum creatinine raised [R79.89]  NSTEMI (non-ST elevated myocardial infarction) ([facility] [address] [I21.4]  Troponin level elevated [R74.8]  Elevated brain natriuretic peptide (BNP) level [R79.89]  Acute electrocardiogram changes [R94.31]  Fatigue, unspecified type [R53.83]  Congestive heart failure, unspecified HF chronicity, unspecified heart failure type ([facility] [address] [I50.9]      Assessment/ Plan:  1. Acute on chronic respiratory failure 2/2 Bacterial Pneumonia  2. Septic Shock 2/2 Bacterial PNA - off pressors  - failed extubation twice, to OR this AM for trach and PEG  - Appreciate Dr. Autumn Simons input  - Completed IV antibiotics (was on zosyn) on  for pneumonia  3. Acute on chronic normocytic anemia  - Monitor closely, transfuse for Hgb <7  - No evidence of active bleeding  4. NSTEMI  - For ischemic evaluation (stress test v. Cath) once clinically stabilizes  - No longer on pressors  5. DVT ppx - lovenox    Resolved medical issues during this admission:  NSTEMI  TTE LVEF 60-65%, severely dilated right atrium  - on DAPT, medical management  - card considering LHC vs. Stress when stable     KERA   Hypokalemia  - resolved     DMII  - lantus, ISS     GI bleed / Gastritis  - received 1 U pRBC  - improved on conservative management  - IV PPI BID     Nutrition  - on TF    Subjective  Pt. seen and examined. The patient is intubated and sedated when visited at bedside and is unable to answer to questioning. Patient to OR for trach peg this AM.  Discussed with nursing at bedside, no acute events noted overnight.      Objective  Vitals:    19 1033   BP: 108/60   Pulse: 72   Resp: 12   Temp:    SpO2: 98%         Intake/Output Summary (Last 24 hours) at 2019 1156  Last data filed at 4/12/2019 0856  Gross per 24 hour   Intake 2186.57 ml   Output 3030 ml   Net -843.43 ml       Exam:  GEN - intubated and sedated, afebrile, not diaphoretic, NAD  HEAD - normocephalic, atraumatic  EENT - PEERLA, EOMI, MMM, no erythema or exudates in oropharynx, no lymphadenopathy  CVS - RRR, no m/g/r, S1 and S2 heard, no thrills  PULM - lungs CTAB, no w/r/r, normal inspiratory effort on vent  ABD - soft, NT, ND, +BS, no guarding, no rebound  NEURO - non focal, CN2-12 grossly intact  EXT - extremities warm, distal pulses 2+ and symmetric bilaterally, no clubbing, cyanosis or edema  SKIN - warm, no rashes visualized      Lab Results:  CBC   Recent Labs     04/10/19  0550 04/11/19  0545 04/12/19  0450   WBC 7.2 7.7 7.0   HGB 8.7* 7.7* 7.0*   HCT 29.1* 25.8* 23.9*    183 168      RENAL  Recent Labs     04/10/19  0550 04/10/19  1900 04/11/19  0545 04/12/19  0450     --  139 146*   K 3.1* 3.6 3.6 3.8   CL 95*  --  100 103   CO2 36*  --  33* 35*   BUN 23  --  23 19   CREATININE 0.9  --  0.9 0.8*     LFT'S  No results for input(s): AST, ALT, ALB, BILIDIR, BILITOT, ALKPHOS in the last 72 hours. COAG  Recent Labs     04/12/19  0450   INR 1.09     CARDIAC ENZYMES  No results for input(s): CKTOTAL, CKMB, CKMBINDEX, TROPONINI in the last 72 hours. U/A:    Lab Results   Component Value Date    COLORU RED 03/31/2019    WBCUA NONE SEEN 03/31/2019    RBCUA 6,176 03/31/2019    MUCUS RARE 03/31/2019    BACTERIA NEGATIVE 03/31/2019    CLARITYU HAZY 03/31/2019    SPECGRAV 1.029 03/31/2019    LEUKOCYTESUR TRACE 03/31/2019    BLOODU LARGE 03/31/2019       ABG    Lab Results   Component Value Date    EAB9YLU 36.6 04/12/2019    UXW9HRI 48.0 04/12/2019    PO2ART 79 04/12/2019       Imaging Studies: Xr Chest Standard (2 Vw)    Result Date: 3/28/2019  EXAMINATION: TWO VIEWS OF THE CHEST 3/28/2019 10:47 pm COMPARISON: Chest 03/20/2019 2:29 p.m.  HISTORY: ORDERING SYSTEM PROVIDED HISTORY: Pleural Effusion TECHNOLOGIST PROVIDED HISTORY: Reason for exam:->Pleural Effusion Ordering Physician Provided Reason for Exam: Pleural Effusion Acuity: Unknown Type of Exam: Unknown Additional signs and symptoms: cough Relevant Medical/Surgical History: htn FINDINGS: Calcifications involving the aorta reflect atherosclerosis. The cardiomediastinal and hilar silhouettes appear otherwise unremarkable. Bibasilar consolidation and bilateral pleural effusion, right greater than left confirmed compared with prior study. Chronic appearing coarse interstitial densities predominate parahilar regions and lung bases, typical of sequela from smoking or other previous infectious/inflammatory process. No pneumothorax is seen. No acute osseous abnormality is identified. Confirmation of bibasilar consolidation and bilateral pleural effusion, right greater than left, concerning for pneumonia. Chronic appearing coarse interstitial densities predominate parahilar regions and lung bases, typical of sequela from smoking or other previous infectious/inflammatory process. Calcific atherosclerotic disease aorta. Ct Head Wo Contrast    Result Date: 3/31/2019  EXAMINATION: CTA OF THE NECK; CTA OF THE HEAD WITH CONTRAST; CT OF THE HEAD WITHOUT CONTRAST 3/31/2019 4:57 am; 3/31/2019 4:55 am: TECHNIQUE: CTA of the neck was performed with the administration of intravenous contrast. Multiplanar reformatted images are provided for review. MIP images are provided for review. Stenosis of the internal carotid arteries measured using NASCET criteria. Dose modulation, iterative reconstruction, and/or weight based adjustment of the mA/kV was utilized to reduce the radiation dose to as low as reasonably achievable.; CTA of the head/brain was performed with the administration of intravenous contrast. Multiplanar reformatted images are provided for review. MIP images are provided for review.  Dose modulation, iterative reconstruction, and/or weight based adjustment of the mA/kV was utilized to reduce the radiation dose to as low as reasonably achievable.; CT of the head was performed without the administration of intravenous contrast. Dose modulation, iterative reconstruction, and/or weight based adjustment of the mA/kV was utilized to reduce the radiation dose to as low as reasonably achievable. Noncontrast CT of the head with reconstructed 2-D images are also provided for review. COMPARISON: None. HISTORY: ORDERING SYSTEM PROVIDED HISTORY: r/o CVA TECHNOLOGIST PROVIDED HISTORY: Has a \"code stroke\" or \"stroke alert\" been called? ->Yes Ordering Physician Provided Reason for Exam: a\ms; ORDERING SYSTEM PROVIDED HISTORY: r/o CVA TECHNOLOGIST PROVIDED HISTORY: R/o CVA Has a \"code stroke\" or \"stroke alert\" been called? ->No Ordering Physician Provided Reason for Exam: ams; ORDERING SYSTEM PROVIDED HISTORY: unresponsive TECHNOLOGIST PROVIDED HISTORY: R/o hemorrhagic change Has a \"code stroke\" or \"stroke alert\" been called? ->No Ordering Physician Provided Reason for Exam: ams FINDINGS: CT HEAD: BRAIN/VENTRICLES:  No acute intracranial hemorrhage or extraaxial fluid collection. Grey-white differentiation is maintained. No evidence of mass, mass effect or midline shift. No evidence of hydrocephalus. There is prominence of the ventricles and sulci due to global parenchymal volume loss. ORBITS: The visualized portion of the orbits demonstrate no acute abnormality. SINUSES:  The visualized paranasal sinuses and mastoid air cells demonstrate no acute abnormality. SOFT TISSUES/SKULL: No acute abnormality of the visualized skull or soft tissues. CTA NECK: AORTIC ARCH/ARCH VESSELS: There is a normal branch pattern of the aortic arch. No significant stenosis is seen of the innominate artery or subclavian arteries. CAROTID ARTERIES: The common carotid arteries are normal in appearance without evidence of a flow limiting stenosis.  The internal carotid arteries are normal in appearance without evidence of a flow limiting stenosis by NASCET criteria. No dissection or arterial injury is seen. VERTEBRAL ARTERIES: Right vertebral artery is diminutive in caliber throughout. Left vertebral artery is dominant supplying basilar artery. SOFT TISSUES: The lung apices are clear. No cervical or superior mediastinal lymphadenopathy. There is endotracheal intubation. The parotid, submandibular and thyroid glands demonstrate no acute abnormality. BONES: The visualized osseous structures appear unremarkable. Large pleural effusions. CTA HEAD: ANTERIOR CIRCULATION: The internal carotid arteries are normal in course and caliber without focal stenosis. The anterior cerebral and middle cerebral arteries demonstrate no focal stenosis. POSTERIOR CIRCULATION: The posterior cerebral arteries demonstrate no focal stenosis. The vertebral and basilar arteries appear otherwise unremarkable. No CT evidence of acute territorial infarct. No significant stenosis visualized in the major intracranial arterial vasculature. Cervical carotid vasculature is patent. Diminutive right vertebral artery and widely patent left vertebral artery. Ct Chest Wo Contrast    Result Date: 3/29/2019  EXAMINATION: CT OF THE CHEST WITHOUT CONTRAST 3/29/2019 12:52 pm TECHNIQUE: CT of the chest was performed without the administration of intravenous contrast. Multiplanar reformatted images are provided for review. Dose modulation, iterative reconstruction, and/or weight based adjustment of the mA/kV was utilized to reduce the radiation dose to as low as reasonably achievable.  COMPARISON: Chest radiographs 03/28/2019 HISTORY: ORDERING SYSTEM PROVIDED HISTORY: nicanor ll pneumonia,r/o pleural effusion TECHNOLOGIST PROVIDED HISTORY: Ordering Physician Provided Reason for Exam: nicanor ll pneumonia,r/o pleural effusion Acuity: Acute Additional signs and symptoms: patient unable to follow commands Relevant Medical/Surgical History: unknown, structures are unchanged. No acute abnormality. Xr Chest Portable    Result Date: 4/11/2019  EXAMINATION: SINGLE XRAY VIEW OF THE CHEST 4/11/2019 3:36 pm COMPARISON: April 11, 2019 HISTORY: ORDERING SYSTEM PROVIDED HISTORY: post bilateral thoracentesis TECHNOLOGIST PROVIDED HISTORY: Reason for exam:->post bilateral thoracentesis Ordering Physician Provided Reason for Exam: post bilateral thora FINDINGS: Endotracheal tube is approximately 4.3 cm above the cyndi. Nasogastric tube is noted with its distal tip coursing below the diaphragm. Right-sided PICC line with its distal tip in the SVC. Stable cardiomediastinal silhouette. Interval resolution of bilateral pleural effusions since prior examination. There is no definite focal consolidation or pneumothorax. The osseous structures are stable. Interval resolution of previously noted bilateral pleural effusions. Xr Chest Portable    Result Date: 4/11/2019  EXAMINATION: SINGLE XRAY VIEW OF THE CHEST 4/11/2019 11:21 am COMPARISON: 04/10/2019 HISTORY: ORDERING SYSTEM PROVIDED HISTORY: ET tube placement TECHNOLOGIST PROVIDED HISTORY: Reason for exam:->ET tube placement Ordering Physician Provided Reason for Exam: ET tube placement - c Acuity: Acute Type of Exam: Initial Relevant Medical/Surgical History: diabetes mellitus; hypertension FINDINGS: No change in the lines and tubes. There is improved aeration of the bilateral lungs with residual bilateral airspace disease, likely due to resolving pulmonary edema. There are likely small bilateral pleural effusions. The cardiac silhouette is within normal limits. There is no pneumothorax. 1. Improved aeration of the bilateral lungs.      Xr Chest Portable    Result Date: 4/10/2019  EXAMINATION: SINGLE XRAY VIEW OF THE CHEST 4/9/2019 1:08 pm COMPARISON: 4/9/2019 HISTORY: ORDERING SYSTEM PROVIDED HISTORY: intubation TECHNOLOGIST PROVIDED HISTORY: Reason for exam:->intubation Ordering Physician Provided Osseous structures demonstrate no acute abnormality. Lines and tubes are stable. Persistent bilateral pleural and parenchymal changes greater on the right which accounting for differences in technique do not appear to be significantly changed from the comparison. Xr Chest Portable    Result Date: 4/5/2019  EXAMINATION: SINGLE XRAY VIEW OF THE CHEST 4/5/2019 6:01 am COMPARISON: April 4, 2019 HISTORY: ORDERING SYSTEM PROVIDED HISTORY: tube placement TECHNOLOGIST PROVIDED HISTORY: Reason for exam:->tube placement Ordering Physician Provided Reason for Exam: tube placement Acuity: Unknown Type of Exam: Subsequent/Follow-up Additional signs and symptoms: tube placement Relevant Medical/Surgical History: tube placement FINDINGS: Endotracheal tube with tip 5.6 cm from the cyndi. NG tube with tip in the upper stomach. No pneumothorax. Small bilateral pleural effusions, greater on the right as well as hazy bibasilar opacities. No acute bony abnormality. Worsening right pleural effusion and bibasilar opacities. Xr Chest Portable    Result Date: 4/4/2019  EXAMINATION: SINGLE XRAY VIEW OF THE CHEST 4/4/2019 5:12 am COMPARISON: 04/03/2019 HISTORY: ORDERING SYSTEM PROVIDED HISTORY: tube placement TECHNOLOGIST PROVIDED HISTORY: Reason for exam:->tube placement Ordering Physician Provided Reason for Exam: tube placement Acuity: Acute Type of Exam: Subsequent/Follow-up FINDINGS: Endotracheal tube and NG tube are in place. The heart and mediastinal structures are stable. Small pleural effusions with bibasilar atelectasis persist.  Arthritic changes of the shoulders are noted. Persistent small pleural effusions with bibasilar atelectasis.      Xr Chest Portable    Result Date: 4/3/2019  EXAMINATION: SINGLE XRAY VIEW OF THE CHEST 4/3/2019 5:30 am COMPARISON: April 2, 2019 HISTORY: ORDERING SYSTEM PROVIDED HISTORY: tube placement TECHNOLOGIST PROVIDED HISTORY: Reason for exam:->tube placement Ordering Physician Exam: Subsequent/Follow-up FINDINGS: ETT tip is 6 cm above the cyndi. Enteric catheter extends beyond the inferior margin of the image. Cardiac silhouette is within normal range. Small right pleural effusion. No focal consolidation, left pleural effusion, or pneumothorax. Severe left glenohumeral degenerative changes with suspected chronic left rotator cuff tear. 1. Small right pleural effusion. Xr Chest Portable    Result Date: 4/1/2019  EXAMINATION: SINGLE XRAY VIEW OF THE CHEST 4/1/2019 8:46 am COMPARISON: 04/01/2019. HISTORY: ORDERING SYSTEM PROVIDED HISTORY: ETT placement TECHNOLOGIST PROVIDED HISTORY: Reason for exam:->ETT placement Ordering Physician Provided Reason for Exam: ETT placement Acuity: Acute Type of Exam: Initial Relevant Medical/Surgical History: diabetes mellitus; hypertension FINDINGS: The endotracheal tube has been repositioned with tip is now above the cyndi. A nasogastric tube courses below the diaphragm. The heart size and pulmonary vasculature stable. Again noted are hazy density seen throughout the lungs bilaterally. No pneumothoraces are seen. 1. Interval reposition of endotracheal tube with its tip now above the cyndi and in satisfactory position. 2. Otherwise, stable chest x-ray with findings likely reflecting pleural effusions and infiltrates. Xr Chest Portable    Result Date: 4/1/2019  EXAMINATION: SINGLE XRAY VIEW OF THE CHEST 4/1/2019 5:37 am COMPARISON: 03/31/2019. HISTORY: ORDERING SYSTEM PROVIDED HISTORY: tube placement TECHNOLOGIST PROVIDED HISTORY: Reason for exam:->tube placement Ordering Physician Provided Reason for Exam: tube placement Acuity: Unknown Type of Exam: Subsequent/Follow-up Additional signs and symptoms: tube placement Relevant Medical/Surgical History: tube placement FINDINGS: The endotracheal tube is seen with its tip at the level of the cyndi. There is a nasogastric tube which courses below the diaphragm.   The heart size and pulmonary vasculature are stable. There are hazy densities seen involving the lungs bilaterally. No new airspace disease is seen. No pneumothoraces are noted. Overall, compared to the prior exam there has been little change. 1. Stable chest x-ray with bilateral hazy opacities likely represent combination of pleural effusions and infiltrates. 2. Endotracheal tube with its tip at the level of the cyndi. I would suggest withdrawing the tube at least 2 cm. Xr Chest Portable    Result Date: 3/28/2019  EXAMINATION: SINGLE XRAY VIEW OF THE CHEST 3/28/2019 2:01 pm COMPARISON: 03/08/2019 HISTORY: ORDERING SYSTEM PROVIDED HISTORY: cough/fatigue TECHNOLOGIST PROVIDED HISTORY: Reason for exam:->cough/fatigue Ordering Physician Provided Reason for Exam: cough/fatigue Acuity: Unknown Type of Exam: Unknown Additional signs and symptoms: discharged 3/23/19 FINDINGS: Normal heart size. Somewhat prominent pulmonary vasculature. Haziness at the lung bases may represent atelectasis or layering pleural fluid. No pneumothorax. Haziness at the lung bases may represent atelectasis or layering pleural fluid. Recommend obtaining PA and lateral chest radiographs for confirmation. Xr Chest 1 Vw    Result Date: 3/31/2019  EXAMINATION: SINGLE XRAY VIEW OF THE CHEST 3/31/2019 5:22 am COMPARISON: 2 days prior HISTORY: ORDERING SYSTEM PROVIDED HISTORY: intubation and post cardiac arrest, check ETT TECHNOLOGIST PROVIDED HISTORY: Reason for exam:->intubation and post cardiac arrest, check ETT Ordering Physician Provided Reason for Exam: intubation and post cardiac arrest, check ETT Acuity: Unknown Type of Exam: Unknown FINDINGS: Endotracheal tube is been placed, tip approximately 4 cm above the cyndi in appropriate position. Enteric tube tip and side port below diaphragm and stomach.   There are diffuse hazy opacities over right greater than left lung, combination increasing effusions, atelectatic changes, underlying consolidation not excluded. Pulmonary vasculature is indistinct. Cardiomediastinal silhouette is stable. Increasing pleuroparenchymal opacities in both hemithoraces, right greater than left. Endotracheal and enteric tubes appear in appropriate position. Cta Neck W Contrast    Result Date: 3/31/2019  EXAMINATION: CTA OF THE NECK; CTA OF THE HEAD WITH CONTRAST; CT OF THE HEAD WITHOUT CONTRAST 3/31/2019 4:57 am; 3/31/2019 4:55 am: TECHNIQUE: CTA of the neck was performed with the administration of intravenous contrast. Multiplanar reformatted images are provided for review. MIP images are provided for review. Stenosis of the internal carotid arteries measured using NASCET criteria. Dose modulation, iterative reconstruction, and/or weight based adjustment of the mA/kV was utilized to reduce the radiation dose to as low as reasonably achievable.; CTA of the head/brain was performed with the administration of intravenous contrast. Multiplanar reformatted images are provided for review. MIP images are provided for review. Dose modulation, iterative reconstruction, and/or weight based adjustment of the mA/kV was utilized to reduce the radiation dose to as low as reasonably achievable.; CT of the head was performed without the administration of intravenous contrast. Dose modulation, iterative reconstruction, and/or weight based adjustment of the mA/kV was utilized to reduce the radiation dose to as low as reasonably achievable. Noncontrast CT of the head with reconstructed 2-D images are also provided for review. COMPARISON: None. HISTORY: ORDERING SYSTEM PROVIDED HISTORY: r/o CVA TECHNOLOGIST PROVIDED HISTORY: Has a \"code stroke\" or \"stroke alert\" been called? ->Yes Ordering Physician Provided Reason for Exam: a\ms; ORDERING SYSTEM PROVIDED HISTORY: r/o CVA TECHNOLOGIST PROVIDED HISTORY: R/o CVA Has a \"code stroke\" or \"stroke alert\" been called? ->No Ordering Physician Provided Reason for Exam: ams; [address] [facility] vertebral and basilar arteries appear otherwise unremarkable. No CT evidence of acute territorial infarct. No significant stenosis visualized in the major intracranial arterial vasculature. Cervical carotid vasculature is patent. Diminutive right vertebral artery and widely patent left vertebral artery. Xr Abdomen For Ng/og/ne Tube Placement    Result Date: 4/4/2019  EXAMINATION: SINGLE SUPINE XRAY VIEW(S) OF THE ABDOMEN 4/4/2019 2:08 pm COMPARISON: None. HISTORY: ORDERING SYSTEM PROVIDED HISTORY: placement of NG tube TECHNOLOGIST PROVIDED HISTORY: Reason for exam:->placement of NG tube Portable? ->Yes Ordering Physician Provided Reason for Exam: placement of NG tube Acuity: Unknown Type of Exam: Subsequent/Follow-up FINDINGS: An enteric tube is seen coursing below the diaphragm. The tip projects in the region of the gastric body. The side port projects in the region the gastric cardia. Air-filled loops of bowel are seen within the upper abdomen. Enteric tube coursing below the diaphragm. The side port projects in the region of the gastric cardia. Cta Head W Contrast    Result Date: 3/31/2019  EXAMINATION: CTA OF THE NECK; CTA OF THE HEAD WITH CONTRAST; CT OF THE HEAD WITHOUT CONTRAST 3/31/2019 4:57 am; 3/31/2019 4:55 am: TECHNIQUE: CTA of the neck was performed with the administration of intravenous contrast. Multiplanar reformatted images are provided for review. MIP images are provided for review. Stenosis of the internal carotid arteries measured using NASCET criteria. Dose modulation, iterative reconstruction, and/or weight based adjustment of the mA/kV was utilized to reduce the radiation dose to as low as reasonably achievable.; CTA of the head/brain was performed with the administration of intravenous contrast. Multiplanar reformatted images are provided for review. MIP images are provided for review.  Dose modulation, iterative reconstruction, and/or weight based adjustment of the mA/kV was utilized to reduce the radiation dose to as low as reasonably achievable.; CT of the head was performed without the administration of intravenous contrast. Dose modulation, iterative reconstruction, and/or weight based adjustment of the mA/kV was utilized to reduce the radiation dose to as low as reasonably achievable. Noncontrast CT of the head with reconstructed 2-D images are also provided for review. COMPARISON: None. HISTORY: ORDERING SYSTEM PROVIDED HISTORY: r/o CVA TECHNOLOGIST PROVIDED HISTORY: Has a \"code stroke\" or \"stroke alert\" been called? ->Yes Ordering Physician Provided Reason for Exam: a\ms; ORDERING SYSTEM PROVIDED HISTORY: r/o CVA TECHNOLOGIST PROVIDED HISTORY: R/o CVA Has a \"code stroke\" or \"stroke alert\" been called? ->No Ordering Physician Provided Reason for Exam: ams; ORDERING SYSTEM PROVIDED HISTORY: unresponsive TECHNOLOGIST PROVIDED HISTORY: R/o hemorrhagic change Has a \"code stroke\" or \"stroke alert\" been called? ->No Ordering Physician Provided Reason for Exam: ams FINDINGS: CT HEAD: BRAIN/VENTRICLES:  No acute intracranial hemorrhage or extraaxial fluid collection. Grey-white differentiation is maintained. No evidence of mass, mass effect or midline shift. No evidence of hydrocephalus. There is prominence of the ventricles and sulci due to global parenchymal volume loss. ORBITS: The visualized portion of the orbits demonstrate no acute abnormality. SINUSES:  The visualized paranasal sinuses and mastoid air cells demonstrate no acute abnormality. SOFT TISSUES/SKULL: No acute abnormality of the visualized skull or soft tissues. CTA NECK: AORTIC ARCH/ARCH VESSELS: There is a normal branch pattern of the aortic arch. No significant stenosis is seen of the innominate artery or subclavian arteries. CAROTID ARTERIES: The common carotid arteries are normal in appearance without evidence of a flow limiting stenosis.  The internal carotid arteries are normal in appearance without evidence of a flow limiting stenosis by NASCET criteria. No dissection or arterial injury is seen. VERTEBRAL ARTERIES: Right vertebral artery is diminutive in caliber throughout. Left vertebral artery is dominant supplying basilar artery. SOFT TISSUES: The lung apices are clear. No cervical or superior mediastinal lymphadenopathy. There is endotracheal intubation. The parotid, submandibular and thyroid glands demonstrate no acute abnormality. BONES: The visualized osseous structures appear unremarkable. Large pleural effusions. CTA HEAD: ANTERIOR CIRCULATION: The internal carotid arteries are normal in course and caliber without focal stenosis. The anterior cerebral and middle cerebral arteries demonstrate no focal stenosis. POSTERIOR CIRCULATION: The posterior cerebral arteries demonstrate no focal stenosis. The vertebral and basilar arteries appear otherwise unremarkable. No CT evidence of acute territorial infarct. No significant stenosis visualized in the major intracranial arterial vasculature. Cervical carotid vasculature is patent. Diminutive right vertebral artery and widely patent left vertebral artery. Mri Brain W Wo Contrast    Result Date: 4/11/2019  EXAMINATION: MRI OF THE BRAIN WITHOUT AND WITH CONTRAST  4/11/2019 5:57 pm TECHNIQUE: Multiplanar multisequence MRI of the head/brain was performed without and with the administration of intravenous contrast. COMPARISON: None. HISTORY: ORDERING SYSTEM PROVIDED HISTORY: failure to wean off ventilator TECHNOLOGIST PROVIDED HISTORY: Ordering Physician Provided Reason for Exam: Pt. on Vent with ams and confusion; nkt, no sx to roiper family conscent. GFR>60  4/11/19. 15ML Prohance        JG Acuity: Unknown Type of Exam: Unknown FINDINGS: INTRACRANIAL STRUCTURES/VENTRICLES:  There is diffuse parenchymal volume loss. No parenchymal signal abnormality is seen. There is no acute infarct or mass. No mass effect or midline shift.  No evidence of an acute intracranial hemorrhage. The ventricles and sulci are normal in size and configuration. The sellar/suprasellar regions appear unremarkable. The normal signal voids within the major intracranial vessels appear maintained. No abnormal focus of enhancement is seen within the brain. ORBITS: The visualized portion of the orbits demonstrate no acute abnormality. SINUSES: The visualized paranasal sinuses and mastoid air cells are well aerated. BONES/SOFT TISSUES: The bone marrow signal intensity appears normal. The soft tissues demonstrate no acute abnormality. 1. No acute intracranial abnormality. 2. Diffuse parenchymal volume loss.        Medications Reviewed    Electronically signed by: Romina Rodriguez MD 4/12/2019 11:56 AM

## 2019-04-12 NOTE — OP NOTE
OPERATIVE REPORT    Bobbi Clark, 7/15/1933, 80 y.o.,  male, CSN: [id number]  April 12, 2019    Indications: Respiratory Failure/ Malnutrition     Pre-operative Diagnosis: Respiratory Failure/Failure to thrive, BMI of Body mass index is 23.45 kg/m². Post-operative Diagnosis: same  Procedure:   Tracheostomy (#8 Shiley cuffed Tracheostomy tube) / PEG placement (20 Fr Ponsky® PEG with Soft Silicone Retention Dome) assisted with Bronchoscopy and Qyweijip-xhakxn-svrqpgiagoja. Surgeon: Sherryle Squires, MD, TANK March    Assistant Surgeon: None. Estimated Blood Loss:  less than 2 ml for both procedures combined. Complications:  NONE. Disposition: Back to ICU, on vent support through the tracheostomy tube / G-tube to gravity drainage collection bag. Condition: Stable      Procedure Details: The patient was positioned supine and the neck was hyperextended and secured in place with silk tape. The neck and anterior chest and abdomen were prepped and draped in the usual sterile fashion. The Bronchoscope was advanced through the ETT after it was lubricated, in a sterile fashion, after the patient has been ventilated with 100% oxygen for few minutes. Bronchial suctioning was performed. The thyroid and the cricothyroid cartilage were palpated then the skin and the subcutaneous tissue at the level of the 2nd/3rd tracheal rings were anesthetized with 1% lidocaine with epinephrine. A vertical midline incision was made. Dissection was carried down bluntly with a hemostat down to the tracheal rings. Transillumination was performed, then the endotracheal tube as well as the bronchoscope as a unit were retracted to a point just proximal to the transilluminated junction between the 2nd and 3rd tracheal rings.      The angio-cath was inserted into the trachea between the tracheal rings under direct bronchoscopic visualization, followed by passing the guide wire (.052 inch diameter wire guide with positioning marks), followed by the dilator (14 Fr Dilator, 4.5 cm), followed by the Rhino dilator over the guiding catheter (Ciaglia Blue Rhino G2 Hydrophilic-Coated Percutaneous Tracheostomy Dilator over the 8 Fr Ciaglia Tracheostomy Guiding Catheter). Adequate dilation up to 30 Fr was reached, then the #8 Shiley cuffed Tracheostomy tube was inserted into the trachea ALL under direct bronchoscopic visualization, the ventilator was connected to the inner canula of the tracheostomy tube after a bronchoscopy was performed. The ETT was removed and discarded. The Olympus EGD scope was then placed into the mouth, and advanced under direct visualization, into the esophagus, the stomach, through the pylorus and all the way to the 3rd portion of the duodenum. Transillumination was possible without difficulty at the LUQ, antrum of the stomach. The PEG tube was placed in the following manner. The stomach was insufflated with air and the endoscope positioned  in the midportion and directed towards the anterior abdominal wall. With the room darkened and intensity turned up on the endoscope, a clear transillumination was noted on the skin of the abdominal wall  in the left upper quadrant. Finger pressure was applied and  indentation on the gastric anterior wall was noted on endoscopy. A polypectomy snare was passed into the stomach, opened fully, and positioned so that the loop encircled the point of demonstrated finger indentation. The overlying skin was anesthetized with lidocaine with epinephrine and a 1 cm incision was made at the chosen site. The introducer needle with overlying  catheter was passed through this incision and into the stomach under visualization with the gastroscope. The needle and catheter were gently captured by the endoscopic snare. The guidewire was passed and snared then the endoscope, snare, and guidewire were then withdrawn and pulled back out of the mouth.      The gastrostomy tube (20 Fr Ponsky® PEG with Soft Silicone Retention Dome) was attached to the loop of the guidewire and the whole unit was pulled back through the mouth into the stomach (after securing the snare around the mushroom side of the feeding tube, to facilitate the re-intubation of the esophagus, until the 4cm arsh of the gastrostomy tube was noted at the skin level. The gastroscope confirmed adequate placement of the gastrostomy tube and a documentary picture was taken. The gastrostomy tube was secured to the skin x3 using 2-0 Prolene, then the securer disc was also tied around the tube to prevent its sliding using 0-Prolene, the the feeding tube was connected to a gravity drainage collecting bag.     The patient tolerated Both  Procedures very well and was taken to the postanesthesia care unit in stable condition.    ---------------------------------------------------------------------------------------------------------------    Keira Marrufo MD, FACS, FICS  4/12/2019  9:20 AM

## 2019-04-12 NOTE — PROGRESS NOTES
Nutrition Assessment    Type and Reason for Visit: Reassess    Nutrition Recommendations:  · EN Order: standard with fiber at 55 mL/hr to provide the pt with 1584 kcal and 73 g of protein per day    Nutrition Assessment: Pt now has a trach and PEG placed. TF was stopped for surgery. Will change EN order to standard with fiber for long term use. Malnutrition Assessment:  · Malnutrition Status: Meets the criteria for severe malnutrition  · Context: Chronic illness  · Findings of the 6 clinical characteristics of malnutrition (Minimum of 2 out of 6 clinical characteristics is required to make the diagnosis of moderate or severe Protein Calorie Malnutrition based on AND/ASPEN Guidelines):  1. Energy Intake-Less than or equal to 75% of estimated energy requirement, Greater than or equal to 7 days    2. Weight Loss-2% loss or greater, in 1 week  3. Fat Loss-Severe subcutaneous fat loss, Orbital  4. Muscle Loss-Severe muscle mass loss, Clavicles (pectoralis and deltoids)  5. Fluid Accumulation-No significant fluid accumulation, Extremities  6.   Strength-Not measured    Nutrition Risk Level: High    Nutrient Needs:  · Estimated Daily Total Kcal: 8057-3211 based on [address] Jeor  · Estimated Daily Protein (g): 75-90 based on 1-1.2 g/kg/IBW  · Estimated Daily Total Fluid (ml/day): 9997-9846 based on 1 mL/kcal    Nutrition Diagnosis:   · Problem: Severe malnutrition, In context of chronic illness  · Etiology: related to Insufficient energy/nutrient consumption     Signs and symptoms:  as evidenced by Severe loss of subcutaneous fat, Severe muscle loss, Weight loss greater than or equal to 2% in 1 week    Objective Information:  · Wound Type: None  · Current Nutrition Therapies:  · Oral Diet Orders: NPO   · Anthropometric Measures:  · Ht: 5' 10\" (177.8 cm)   · Current Body Wt: 163 lb (73.9 kg)  · Admission Body Wt: 149 lb (67.6 kg)  · Usual Body Wt: 154 lb (69.9 kg)  · % Weight Change: -1.9% in one week  · Ideal Body Wt: 166 lb (75.3 kg), % Ideal Body 98%  · BMI Classification: BMI 18.5 - 24.9 Normal Weight    Nutrition Interventions:   Modify current Tube Feeding  Continued Inpatient Monitoring, Education Not Indicated, Coordination of Care    Nutrition Evaluation:   · Evaluation: Progressing toward goals   · Goals: pt will consume greater than 75% of his meals and supplements provided    · Monitoring: Weight, Pertinent Labs, Monitor Hemodynamic Status, Nutrition Progression, TF Intake, TF Tolerance      Electronically signed by Dulcie Rinne, RD, LD on 8/57/23 at 9:47 AM    Contact Number: [phone]

## 2019-04-12 NOTE — PROGRESS NOTES
AM Hemoglobin 7.0. Dr. Nadine Waddell notified as pt is to have Trach/PEG placement today. Per Dr. Nadine Waddell, no need for blood to be ordered at this time.

## 2019-04-12 NOTE — PLAN OF CARE
Problem: Falls - Risk of:  Goal: Will remain free from falls  Description  Will remain free from falls  Outcome: Ongoing  Goal: Absence of physical injury  Description  Absence of physical injury  Outcome: Ongoing     Problem: Nutrition  Goal: Optimal nutrition therapy  Outcome: Ongoing     Problem: Risk for Impaired Skin Integrity  Goal: Tissue integrity - skin and mucous membranes  Description  Structural intactness and normal physiological function of skin and  mucous membranes.   Outcome: Ongoing     Problem: Safety:  Goal: Free from accidental physical injury  Description  Free from accidental physical injury  Outcome: Ongoing  Goal: Free from intentional harm  Description  Free from intentional harm  Outcome: Ongoing     Problem: Daily Care:  Goal: Daily care needs are met  Description  Daily care needs are met  Outcome: Ongoing     Problem: Discharge Planning:  Goal: Patients continuum of care needs are met  Description  Patients continuum of care needs are met  Outcome: Ongoing

## 2019-04-12 NOTE — PROGRESS NOTES
Pulmonary and Critical Care  Progress Note    Subjective: The patient is sedated on vent. Shortness of breath none  Chest pain none  Addressing respiratory complaints Patient is negative for  hemoptysis and cyanosis  CONSTITUTIONAL:  negative for fevers and chills      Past Medical History:     has a past medical history of Diabetes mellitus ([address] and Hypertension. has a past surgical history that includes Upper gastrointestinal endoscopy (N/A, 4/4/2019) and thoracentesis (Bilateral, 04/11/2019). reports that he has never smoked. He has never used smokeless tobacco. He reports that he does not drink alcohol or use drugs. Family history:  family history is not on file. No Known Allergies  Social History:    Reviewed; no changes    Objective:   PHYSICAL EXAM:        VITALS:  /60   Pulse 72   Temp 96.1 °F (35.6 °C) (Core)   Resp 12   Ht 5' 10\" (1.778 m)   Wt 163 lb 6.4 oz (74.1 kg)   SpO2 98%   BMI 23.45 kg/m²     24HR INTAKE/OUTPUT:      Intake/Output Summary (Last 24 hours) at 4/12/2019 1110  Last data filed at 4/12/2019 0856  Gross per 24 hour   Intake 2186.57 ml   Output 3305 ml   Net -1118.43 ml       CONSTITUTIONAL:  somnolent  LUNGS:  decreased breath sounds, occ basilar crackles. CARDIOVASCULAR:  normal S1 and S2 and negative JVD  ABD:Abdomen soft, non-tender.  BS normal. No masses,  No organomegaly  DATA:    CBC:  Recent Labs     04/10/19  0550 04/11/19  0545 04/12/19  0450   WBC 7.2 7.7 7.0   RBC 3.04* 2.66* 2.46*   HGB 8.7* 7.7* 7.0*   HCT 29.1* 25.8* 23.9*    183 168   MCV 95.7 97.0 97.2   MCH 28.6 28.9 28.5   MCHC 29.9* 29.8* 29.3*   RDW 15.6* 16.1* 16.8*      BMP:  Recent Labs     04/10/19  0550 04/10/19  1900 04/11/19  0545 04/12/19  0450     --  139 146*   K 3.1* 3.6 3.6 3.8   CL 95*  --  100 103   CO2 36*  --  33* 35*   BUN 23  --  23 19   CREATININE 0.9  --  0.9 0.8*   CALCIUM 7.8*  --  7.6* 8.1*   GLUCOSE 224*  --  204* 116*      ABG:  Recent Labs 04/10/19  0600 04/11/19  0600 04/12/19  0600   PH 7.50* 7.47* 7.49*   PO2ART 150* 48* 79   TLD0CAA 46.0* 45.0 48.0*   O2SAT 96.5 84.6* 94.8*     Lab Results   Component Value Date    PROBNP 25,822 (H) 03/28/2019    PROBNP 1,095 (H) 03/08/2019     No results found for: [facility]    Radiology Review:  Pertinent images / reports were reviewed as a part of this visit. Assessment:     Patient Active Problem List   Diagnosis    Community acquired pneumonia due to influenza A virus    Pneumonia    Essential hypertension    Type 2 diabetes mellitus with diabetic polyneuropathy, without long-term current use of insulin ([address]    KERA (acute kidney injury) ([address]    Oropharyngeal dysphagia    NSTEMI (non-ST elevated myocardial infarction) ([address]    Fatigue    Severe malnutrition (HCC)    Acute on chronic respiratory failure ([address]    Central apnea       Plan:   1. Overall the patient is better. 2. Reduce A/C to 12.       Martín Moraes MD  4/12/2019  11:10 AM

## 2019-04-13 NOTE — PROGRESS NOTES
[facility] Hospitalist Daily Progress Note  Hospitalist: Lynne Arroyo M.D. Hospitalist Progress Note Date: 2019    Patient Name: Maren Cuenca : 7/15/1933 Med Rec # [id number]    Disposition: continue inpatient care    Cough [R05]  General weakness [R53.1]  Serum creatinine raised [R79.89]  NSTEMI (non-ST elevated myocardial infarction) ([facility] [address] [I21.4]  Troponin level elevated [R74.8]  Elevated brain natriuretic peptide (BNP) level [R79.89]  Acute electrocardiogram changes [R94.31]  Fatigue, unspecified type [R53.83]  Congestive heart failure, unspecified HF chronicity, unspecified heart failure type ([facility] [address] [I50.9]      Assessment/ Plan:  1. Acute on chronic respiratory failure 2/2 Bacterial Pneumonia  2. Septic Shock 2/2 Bacterial PNA - off pressors  - failed extubation twice, POD 1 s/p trach and PEG  - Appreciate surgical input  - Completed IV antibiotics (was on zosyn) on  for pneumonia  3. Acute on chronic normocytic anemia  - Monitor closely, transfuse for Hgb <7  - No evidence of active bleeding  4. NSTEMI  - For ischemic evaluation (stress test v. Cath) once clinically stabilizes  - No longer on pressors  5. DVT ppx - lovenox    Resolved medical issues during this admission:  NSTEMI  TTE LVEF 60-65%, severely dilated right atrium  - on DAPT, medical management  - card considering LHC vs. Stress when stable     KERA   Hypokalemia  - resolved     DMII  - lantus, ISS     GI bleed / Gastritis  - received 1 U pRBC  - improved on conservative management  - IV PPI BID     Nutrition  - on TF    Subjective  Pt. seen and examined. The patient is no longer sedated. Resting comfortably. Patient is complaining of abdominal pain and tenderness. No nausea, vomiting or worsening shortness of breath.      Objective  Vitals:    19 0857   BP: (!) 117/58   Pulse: 97   Resp: 20   Temp: 99.5 °F (37.5 °C)   SpO2:          Intake/Output Summary (Last 24 hours) at 2019 1035  Last data filed at 2019 0830  Gross per 24 hour   Intake 2790.34 ml   Output 1500 ml   Net 1290.34 ml       Exam:  GEN - alert, afebrile, not diaphoretic, NAD  HEAD - normocephalic, atraumatic  EENT - PEERLA, EOMI, MMM, no erythema or exudates in oropharynx, no lymphadenopathy, trach in place  CVS - RRR, no m/g/r, S1 and S2 heard, no thrills  PULM - lungs CTAB, no w/r/r, normal inspiratory effort on vent  ABD - soft, diffuse TTP, ND, +BS, no guarding, no rebound  NEURO - non focal, CN2-12 grossly intact  EXT - extremities warm, distal pulses 2+ and symmetric bilaterally, no clubbing, cyanosis or edema  SKIN - warm, no rashes visualized      Lab Results:  CBC   Recent Labs     04/11/19  0545 04/12/19  0450 04/13/19  0545   WBC 7.7 7.0 8.5   HGB 7.7* 7.0* 6.4  HGB CALLED TO RAMAN KLINE RN ON 4/13/19 AT 0619 BY KLAUDIA MOSELEY [facility]  RESULTS READ BACK  *   HCT 25.8* 23.9* 21.9*    168 192      RENAL  Recent Labs     04/11/19  0545 04/12/19  0450 04/13/19  0545    146* 144   K 3.6 3.8 3.9    103 105   CO2 33* 35* 31   BUN 23 19 20   CREATININE 0.9 0.8* 0.9     LFT'S  No results for input(s): AST, ALT, ALB, BILIDIR, BILITOT, ALKPHOS in the last 72 hours. COAG  Recent Labs     04/12/19  0450   INR 1.09     CARDIAC ENZYMES  No results for input(s): CKTOTAL, CKMB, CKMBINDEX, TROPONINI in the last 72 hours. U/A:    Lab Results   Component Value Date    COLORU RED 03/31/2019    WBCUA NONE SEEN 03/31/2019    RBCUA 6,176 03/31/2019    MUCUS RARE 03/31/2019    BACTERIA NEGATIVE 03/31/2019    CLARITYU HAZY 03/31/2019    SPECGRAV 1.029 03/31/2019    LEUKOCYTESUR TRACE 03/31/2019    BLOODU LARGE 03/31/2019       ABG    Lab Results   Component Value Date    IWL0CVO 34.3 04/13/2019    WVW5SRH 46.0 04/13/2019    PO2ART 68 04/13/2019       Imaging Studies: Xr Chest Standard (2 Vw)    Result Date: 3/28/2019  EXAMINATION: TWO VIEWS OF THE CHEST 3/28/2019 10:47 pm COMPARISON: Chest 03/20/2019 2:29 p.m.  HISTORY: ORDERING SYSTEM PROVIDED HISTORY: Pleural Effusion TECHNOLOGIST PROVIDED HISTORY: Reason for exam:->Pleural Effusion Ordering Physician Provided Reason for Exam: Pleural Effusion Acuity: Unknown Type of Exam: Unknown Additional signs and symptoms: cough Relevant Medical/Surgical History: htn FINDINGS: Calcifications involving the aorta reflect atherosclerosis. The cardiomediastinal and hilar silhouettes appear otherwise unremarkable. Bibasilar consolidation and bilateral pleural effusion, right greater than left confirmed compared with prior study. Chronic appearing coarse interstitial densities predominate parahilar regions and lung bases, typical of sequela from smoking or other previous infectious/inflammatory process. No pneumothorax is seen. No acute osseous abnormality is identified. Confirmation of bibasilar consolidation and bilateral pleural effusion, right greater than left, concerning for pneumonia. Chronic appearing coarse interstitial densities predominate parahilar regions and lung bases, typical of sequela from smoking or other previous infectious/inflammatory process. Calcific atherosclerotic disease aorta. Ct Head Wo Contrast    Result Date: 3/31/2019  EXAMINATION: CTA OF THE NECK; CTA OF THE HEAD WITH CONTRAST; CT OF THE HEAD WITHOUT CONTRAST 3/31/2019 4:57 am; 3/31/2019 4:55 am: TECHNIQUE: CTA of the neck was performed with the administration of intravenous contrast. Multiplanar reformatted images are provided for review. MIP images are provided for review. Stenosis of the internal carotid arteries measured using NASCET criteria. Dose modulation, iterative reconstruction, and/or weight based adjustment of the mA/kV was utilized to reduce the radiation dose to as low as reasonably achievable.; CTA of the head/brain was performed with the administration of intravenous contrast. Multiplanar reformatted images are provided for review. MIP images are provided for review.  Dose modulation, iterative reconstruction, and/or normal in appearance without evidence of a flow limiting stenosis by NASCET criteria. No dissection or arterial injury is seen. VERTEBRAL ARTERIES: Right vertebral artery is diminutive in caliber throughout. Left vertebral artery is dominant supplying basilar artery. SOFT TISSUES: The lung apices are clear. No cervical or superior mediastinal lymphadenopathy. There is endotracheal intubation. The parotid, submandibular and thyroid glands demonstrate no acute abnormality. BONES: The visualized osseous structures appear unremarkable. Large pleural effusions. CTA HEAD: ANTERIOR CIRCULATION: The internal carotid arteries are normal in course and caliber without focal stenosis. The anterior cerebral and middle cerebral arteries demonstrate no focal stenosis. POSTERIOR CIRCULATION: The posterior cerebral arteries demonstrate no focal stenosis. The vertebral and basilar arteries appear otherwise unremarkable. No CT evidence of acute territorial infarct. No significant stenosis visualized in the major intracranial arterial vasculature. Cervical carotid vasculature is patent. Diminutive right vertebral artery and widely patent left vertebral artery. Ct Chest Wo Contrast    Result Date: 3/29/2019  EXAMINATION: CT OF THE CHEST WITHOUT CONTRAST 3/29/2019 12:52 pm TECHNIQUE: CT of the chest was performed without the administration of intravenous contrast. Multiplanar reformatted images are provided for review. Dose modulation, iterative reconstruction, and/or weight based adjustment of the mA/kV was utilized to reduce the radiation dose to as low as reasonably achievable.  COMPARISON: Chest radiographs 03/28/2019 HISTORY: ORDERING SYSTEM PROVIDED HISTORY: nicanor ll pneumonia,r/o pleural effusion TECHNOLOGIST PROVIDED HISTORY: Ordering Physician Provided Reason for Exam: nicanor ll pneumonia,r/o pleural effusion Acuity: Acute Additional signs and symptoms: patient unable to follow commands Relevant Medical/Surgical History: unknown, poor historian FINDINGS: Mediastinum: Limited evaluation for lymphadenopathy without intravenous contrast.  Normal caliber thoracic aorta with mild atherosclerotic calcifications. Mildly enlarged main pulmonary artery measuring 3.5 cm in diameter. Normal heart size. No pericardial effusion. Segmental gaseous distention of the esophagus. Lungs/pleura: Moderate right and small left pleural effusions with partial lower lobe atelectasis. Additional patchy tree-in-bud/clustered nodularity in the left upper and lower lobes and middle lobe. Mild dependent secretions within the trachea and possibly the proximal left bronchial tree. No pneumothorax. Upper Abdomen: Oral contrast within the colon. Scattered calcified granulomas within the liver and spleen reflect remote granulomatous disease. Soft Tissues/Bones: No enlarged axillary or supraclavicular lymph nodes. Normal thyroid. Glenohumeral osteoarthrosis. Thoracic spondylosis. Moderate right and small left pleural effusions with associated atelectasis. Multifocal clustered/tree-in-bud nodularity in the left upper lobe, left lower lobe and middle lobe most likely represents an infectious or inflammatory bronchiolitis. Consider aspiration and etiology given tracheobronchial secretions and esophageal features suggesting dysmotility and reflux. Xr Chest Portable    Result Date: 4/12/2019  EXAMINATION: SINGLE XRAY VIEW OF THE CHEST 4/12/2019 5:39 am COMPARISON: Chest x-ray 04/11/2019 HISTORY: ORDERING SYSTEM PROVIDED HISTORY: chest tube placement TECHNOLOGIST PROVIDED HISTORY: Reason for exam:->chest tube placement Ordering Physician Provided Reason for Exam: chest tube placement Acuity: Acute Type of Exam: Subsequent/Follow-up FINDINGS: Right-sided PICC tip projects at the lower SVC. Endotracheal tube tip remains above the cyndi. NG tube extends beyond the field of view. The lungs are clear. Costophrenic angles are clear.   Cardiac and mediastinal structures are unchanged. No acute abnormality. Xr Chest Portable    Result Date: 4/11/2019  EXAMINATION: SINGLE XRAY VIEW OF THE CHEST 4/11/2019 3:36 pm COMPARISON: April 11, 2019 HISTORY: ORDERING SYSTEM PROVIDED HISTORY: post bilateral thoracentesis TECHNOLOGIST PROVIDED HISTORY: Reason for exam:->post bilateral thoracentesis Ordering Physician Provided Reason for Exam: post bilateral thora FINDINGS: Endotracheal tube is approximately 4.3 cm above the cyndi. Nasogastric tube is noted with its distal tip coursing below the diaphragm. Right-sided PICC line with its distal tip in the SVC. Stable cardiomediastinal silhouette. Interval resolution of bilateral pleural effusions since prior examination. There is no definite focal consolidation or pneumothorax. The osseous structures are stable. Interval resolution of previously noted bilateral pleural effusions. Xr Chest Portable    Result Date: 4/11/2019  EXAMINATION: SINGLE XRAY VIEW OF THE CHEST 4/11/2019 11:21 am COMPARISON: 04/10/2019 HISTORY: ORDERING SYSTEM PROVIDED HISTORY: ET tube placement TECHNOLOGIST PROVIDED HISTORY: Reason for exam:->ET tube placement Ordering Physician Provided Reason for Exam: ET tube placement - c Acuity: Acute Type of Exam: Initial Relevant Medical/Surgical History: diabetes mellitus; hypertension FINDINGS: No change in the lines and tubes. There is improved aeration of the bilateral lungs with residual bilateral airspace disease, likely due to resolving pulmonary edema. There are likely small bilateral pleural effusions. The cardiac silhouette is within normal limits. There is no pneumothorax. 1. Improved aeration of the bilateral lungs.      Xr Chest Portable    Result Date: 4/10/2019  EXAMINATION: SINGLE XRAY VIEW OF THE CHEST 4/9/2019 1:08 pm COMPARISON: 4/9/2019 HISTORY: ORDERING SYSTEM PROVIDED HISTORY: intubation TECHNOLOGIST PROVIDED HISTORY: Reason for exam:->intubation Ordering Physician Provided Reason for Exam: intubation Acuity: Acute Type of Exam: Initial Relevant Medical/Surgical History: htn FINDINGS: Endotracheal tube tip terminates 3.3 cm above the cyndi. Nasogastric tube terminates off the inferior margin of the radiograph. Right upper extremity PICC is also in stable position. Stable cardiomediastinal silhouette. The lungs demonstrate interval worsening of perihilar opacities. More focal opacities are seen in the bilateral bases with bilateral effusions. No pneumothorax. No acute osseous abnormality. 1. Lines and tubes as described. 2. Interval worsening of perihilar opacities, likely representing edema. 3. Bibasilar opacities with bilateral effusions are unchanged. Xr Chest Portable    Result Date: 4/10/2019  EXAMINATION: SINGLE XRAY VIEW OF THE CHEST 4/10/2019 5:28 am COMPARISON: 04/09/2019 HISTORY: ORDERING SYSTEM PROVIDED HISTORY: reintubated TECHNOLOGIST PROVIDED HISTORY: Reason for exam:->reintubated Ordering Physician Provided Reason for Exam: reintubated Acuity: Acute Type of Exam: Subsequent/Follow-up FINDINGS: Endotracheal tube tip is 3-4 cm above the cyndi. Enteric tube and right arm PICC remain in place. There has been slight interval improvement in patchy bilateral airspace disease. There is no discernible pneumothorax. 1. Endotracheal tube tip is 3-4 cm above the cyndi. 2. Slight interval improvement in bilateral airspace disease. Xr Chest Portable    Result Date: 4/9/2019  EXAMINATION: SINGLE XRAY VIEW OF THE CHEST 4/9/2019 5:34 am COMPARISON: 04/07/2019 541 hours HISTORY: ORDERING SYSTEM PROVIDED HISTORY: ventilator TECHNOLOGIST PROVIDED HISTORY: Reason for exam:->ventilator Ordering Physician Provided Reason for Exam: vent mgmt Acuity: Acute Type of Exam: Subsequent/Follow-up FINDINGS: Monitor wires project over the thorax. ETT, enteric catheter, and right PICC remain unchanged. Right perihilar opacity again seen.   Layering right disease. Osseous structures demonstrate no acute abnormality. Lines and tubes are stable. Persistent bilateral pleural and parenchymal changes greater on the right which accounting for differences in technique do not appear to be significantly changed from the comparison. Xr Chest Portable    Result Date: 4/5/2019  EXAMINATION: SINGLE XRAY VIEW OF THE CHEST 4/5/2019 6:01 am COMPARISON: April 4, 2019 HISTORY: ORDERING SYSTEM PROVIDED HISTORY: tube placement TECHNOLOGIST PROVIDED HISTORY: Reason for exam:->tube placement Ordering Physician Provided Reason for Exam: tube placement Acuity: Unknown Type of Exam: Subsequent/Follow-up Additional signs and symptoms: tube placement Relevant Medical/Surgical History: tube placement FINDINGS: Endotracheal tube with tip 5.6 cm from the cyndi. NG tube with tip in the upper stomach. No pneumothorax. Small bilateral pleural effusions, greater on the right as well as hazy bibasilar opacities. No acute bony abnormality. Worsening right pleural effusion and bibasilar opacities. Xr Chest Portable    Result Date: 4/4/2019  EXAMINATION: SINGLE XRAY VIEW OF THE CHEST 4/4/2019 5:12 am COMPARISON: 04/03/2019 HISTORY: ORDERING SYSTEM PROVIDED HISTORY: tube placement TECHNOLOGIST PROVIDED HISTORY: Reason for exam:->tube placement Ordering Physician Provided Reason for Exam: tube placement Acuity: Acute Type of Exam: Subsequent/Follow-up FINDINGS: Endotracheal tube and NG tube are in place. The heart and mediastinal structures are stable. Small pleural effusions with bibasilar atelectasis persist.  Arthritic changes of the shoulders are noted. Persistent small pleural effusions with bibasilar atelectasis.      Xr Chest Portable    Result Date: 4/3/2019  EXAMINATION: SINGLE XRAY VIEW OF THE CHEST 4/3/2019 5:30 am COMPARISON: April 2, 2019 HISTORY: ORDERING SYSTEM PROVIDED HISTORY: tube placement TECHNOLOGIST PROVIDED HISTORY: Reason for exam:->tube placement Ordering Physician Provided Reason for Exam: tube placement Acuity: Acute Type of Exam: Subsequent/Follow-up FINDINGS: The ETT is 3.8 cm above the cyndi. The feeding tube is inserted with its tip below the diaphragm and beyond the field of view. The cardiomediastinal silhouette is stable. There is airspace opacity at the right lung base, may be related to layering pleural effusion, atelectasis or pneumonia, stable. There is no pneumothorax. There is no acute osseous abnormality. Airspace opacity at the right lung base, may be related to layering pleural effusion, atelectasis or pneumonia, stable. Xr Chest Portable    Result Date: 4/2/2019  EXAMINATION: SINGLE XRAY VIEW OF THE CHEST 4/2/2019 9:25 pm COMPARISON: Chest x-ray 15 arch prior HISTORY: ORDERING SYSTEM PROVIDED HISTORY: post intubation/ ETT placement TECHNOLOGIST PROVIDED HISTORY: Reason for exam:->post intubation/ ETT placement Ordering Physician Provided Reason for Exam: POST INTUBATION AND NG TUBE PLACEMENT FINDINGS: Endotracheal tube tip projects 3.5 cm from the cyndi. Enteric tube identified with side port at the level of the GE junction. Tip projects over the expected location of the stomach. Recommend advancement. Opacities project over the bilateral lung bases, right greater than left which is suspicious for layering pleural effusions. No pneumothorax identified. Osseous structures appear stable. 1. Endotracheal tube tip projects approximately 3.5 cm from the cyndi. 2. Enteric tube side port at the level of the GE junction. Recommend advancement. 3. Findings suggesting layering effusions, right greater than left.      Xr Chest Portable    Result Date: 4/2/2019  EXAMINATION: SINGLE XRAY VIEW OF THE CHEST 4/2/2019 5:17 am COMPARISON: April 1, 2019 HISTORY: ORDERING SYSTEM PROVIDED HISTORY: tube placement TECHNOLOGIST PROVIDED HISTORY: Reason for exam:->tube placement Ordering Physician Provided Reason for Exam: tube placement Acuity: Acute Type of Exam: Subsequent/Follow-up FINDINGS: ETT tip is 6 cm above the cyndi. Enteric catheter extends beyond the inferior margin of the image. Cardiac silhouette is within normal range. Small right pleural effusion. No focal consolidation, left pleural effusion, or pneumothorax. Severe left glenohumeral degenerative changes with suspected chronic left rotator cuff tear. 1. Small right pleural effusion. Xr Chest Portable    Result Date: 4/1/2019  EXAMINATION: SINGLE XRAY VIEW OF THE CHEST 4/1/2019 8:46 am COMPARISON: 04/01/2019. HISTORY: ORDERING SYSTEM PROVIDED HISTORY: ETT placement TECHNOLOGIST PROVIDED HISTORY: Reason for exam:->ETT placement Ordering Physician Provided Reason for Exam: ETT placement Acuity: Acute Type of Exam: Initial Relevant Medical/Surgical History: diabetes mellitus; hypertension FINDINGS: The endotracheal tube has been repositioned with tip is now above the cyndi. A nasogastric tube courses below the diaphragm. The heart size and pulmonary vasculature stable. Again noted are hazy density seen throughout the lungs bilaterally. No pneumothoraces are seen. 1. Interval reposition of endotracheal tube with its tip now above the cyndi and in satisfactory position. 2. Otherwise, stable chest x-ray with findings likely reflecting pleural effusions and infiltrates. Xr Chest Portable    Result Date: 4/1/2019  EXAMINATION: SINGLE XRAY VIEW OF THE CHEST 4/1/2019 5:37 am COMPARISON: 03/31/2019. HISTORY: ORDERING SYSTEM PROVIDED HISTORY: tube placement TECHNOLOGIST PROVIDED HISTORY: Reason for exam:->tube placement Ordering Physician Provided Reason for Exam: tube placement Acuity: Unknown Type of Exam: Subsequent/Follow-up Additional signs and symptoms: tube placement Relevant Medical/Surgical History: tube placement FINDINGS: The endotracheal tube is seen with its tip at the level of the cyndi. There is a nasogastric tube which courses below the diaphragm.   The heart size and pulmonary vasculature are stable. There are hazy densities seen involving the lungs bilaterally. No new airspace disease is seen. No pneumothoraces are noted. Overall, compared to the prior exam there has been little change. 1. Stable chest x-ray with bilateral hazy opacities likely represent combination of pleural effusions and infiltrates. 2. Endotracheal tube with its tip at the level of the cyndi. I would suggest withdrawing the tube at least 2 cm. Xr Chest Portable    Result Date: 3/28/2019  EXAMINATION: SINGLE XRAY VIEW OF THE CHEST 3/28/2019 2:01 pm COMPARISON: 03/08/2019 HISTORY: ORDERING SYSTEM PROVIDED HISTORY: cough/fatigue TECHNOLOGIST PROVIDED HISTORY: Reason for exam:->cough/fatigue Ordering Physician Provided Reason for Exam: cough/fatigue Acuity: Unknown Type of Exam: Unknown Additional signs and symptoms: discharged 3/23/19 FINDINGS: Normal heart size. Somewhat prominent pulmonary vasculature. Haziness at the lung bases may represent atelectasis or layering pleural fluid. No pneumothorax. Haziness at the lung bases may represent atelectasis or layering pleural fluid. Recommend obtaining PA and lateral chest radiographs for confirmation. Xr Chest 1 Vw    Result Date: 3/31/2019  EXAMINATION: SINGLE XRAY VIEW OF THE CHEST 3/31/2019 5:22 am COMPARISON: 2 days prior HISTORY: ORDERING SYSTEM PROVIDED HISTORY: intubation and post cardiac arrest, check ETT TECHNOLOGIST PROVIDED HISTORY: Reason for exam:->intubation and post cardiac arrest, check ETT Ordering Physician Provided Reason for Exam: intubation and post cardiac arrest, check ETT Acuity: Unknown Type of Exam: Unknown FINDINGS: Endotracheal tube is been placed, tip approximately 4 cm above the cyndi in appropriate position. Enteric tube tip and side port below diaphragm and stomach.   There are diffuse hazy opacities over right greater than left lung, combination increasing effusions, atelectatic changes, underlying consolidation not excluded. Pulmonary vasculature is indistinct. Cardiomediastinal silhouette is stable. Increasing pleuroparenchymal opacities in both hemithoraces, right greater than left. Endotracheal and enteric tubes appear in appropriate position. Cta Neck W Contrast    Result Date: 3/31/2019  EXAMINATION: CTA OF THE NECK; CTA OF THE HEAD WITH CONTRAST; CT OF THE HEAD WITHOUT CONTRAST 3/31/2019 4:57 am; 3/31/2019 4:55 am: TECHNIQUE: CTA of the neck was performed with the administration of intravenous contrast. Multiplanar reformatted images are provided for review. MIP images are provided for review. Stenosis of the internal carotid arteries measured using NASCET criteria. Dose modulation, iterative reconstruction, and/or weight based adjustment of the mA/kV was utilized to reduce the radiation dose to as low as reasonably achievable.; CTA of the head/brain was performed with the administration of intravenous contrast. Multiplanar reformatted images are provided for review. MIP images are provided for review. Dose modulation, iterative reconstruction, and/or weight based adjustment of the mA/kV was utilized to reduce the radiation dose to as low as reasonably achievable.; CT of the head was performed without the administration of intravenous contrast. Dose modulation, iterative reconstruction, and/or weight based adjustment of the mA/kV was utilized to reduce the radiation dose to as low as reasonably achievable. Noncontrast CT of the head with reconstructed 2-D images are also provided for review. COMPARISON: None. HISTORY: ORDERING SYSTEM PROVIDED HISTORY: r/o CVA TECHNOLOGIST PROVIDED HISTORY: Has a \"code stroke\" or \"stroke alert\" been called? ->Yes Ordering Physician Provided Reason for Exam: a\ms; ORDERING SYSTEM PROVIDED HISTORY: r/o CVA TECHNOLOGIST PROVIDED HISTORY: R/o CVA Has a \"code stroke\" or \"stroke alert\" been called? ->No Ordering Physician Provided Reason for Exam: ams; stenosis. The vertebral and basilar arteries appear otherwise unremarkable. No CT evidence of acute territorial infarct. No significant stenosis visualized in the major intracranial arterial vasculature. Cervical carotid vasculature is patent. Diminutive right vertebral artery and widely patent left vertebral artery. Xr Abdomen For Ng/og/ne Tube Placement    Result Date: 4/4/2019  EXAMINATION: SINGLE SUPINE XRAY VIEW(S) OF THE ABDOMEN 4/4/2019 2:08 pm COMPARISON: None. HISTORY: ORDERING SYSTEM PROVIDED HISTORY: placement of NG tube TECHNOLOGIST PROVIDED HISTORY: Reason for exam:->placement of NG tube Portable? ->Yes Ordering Physician Provided Reason for Exam: placement of NG tube Acuity: Unknown Type of Exam: Subsequent/Follow-up FINDINGS: An enteric tube is seen coursing below the diaphragm. The tip projects in the region of the gastric body. The side port projects in the region the gastric cardia. Air-filled loops of bowel are seen within the upper abdomen. Enteric tube coursing below the diaphragm. The side port projects in the region of the gastric cardia. Cta Head W Contrast    Result Date: 3/31/2019  EXAMINATION: CTA OF THE NECK; CTA OF THE HEAD WITH CONTRAST; CT OF THE HEAD WITHOUT CONTRAST 3/31/2019 4:57 am; 3/31/2019 4:55 am: TECHNIQUE: CTA of the neck was performed with the administration of intravenous contrast. Multiplanar reformatted images are provided for review. MIP images are provided for review. Stenosis of the internal carotid arteries measured using NASCET criteria. Dose modulation, iterative reconstruction, and/or weight based adjustment of the mA/kV was utilized to reduce the radiation dose to as low as reasonably achievable.; CTA of the head/brain was performed with the administration of intravenous contrast. Multiplanar reformatted images are provided for review. MIP images are provided for review.  Dose modulation, iterative reconstruction, and/or weight based adjustment evidence of a flow limiting stenosis by NASCET criteria. No dissection or arterial injury is seen. VERTEBRAL ARTERIES: Right vertebral artery is diminutive in caliber throughout. Left vertebral artery is dominant supplying basilar artery. SOFT TISSUES: The lung apices are clear. No cervical or superior mediastinal lymphadenopathy. There is endotracheal intubation. The parotid, submandibular and thyroid glands demonstrate no acute abnormality. BONES: The visualized osseous structures appear unremarkable. Large pleural effusions. CTA HEAD: ANTERIOR CIRCULATION: The internal carotid arteries are normal in course and caliber without focal stenosis. The anterior cerebral and middle cerebral arteries demonstrate no focal stenosis. POSTERIOR CIRCULATION: The posterior cerebral arteries demonstrate no focal stenosis. The vertebral and basilar arteries appear otherwise unremarkable. No CT evidence of acute territorial infarct. No significant stenosis visualized in the major intracranial arterial vasculature. Cervical carotid vasculature is patent. Diminutive right vertebral artery and widely patent left vertebral artery. Mri Brain W Wo Contrast    Result Date: 4/11/2019  EXAMINATION: MRI OF THE BRAIN WITHOUT AND WITH CONTRAST  4/11/2019 5:57 pm TECHNIQUE: Multiplanar multisequence MRI of the head/brain was performed without and with the administration of intravenous contrast. COMPARISON: None. HISTORY: ORDERING SYSTEM PROVIDED HISTORY: failure to wean off ventilator TECHNOLOGIST PROVIDED HISTORY: Ordering Physician Provided Reason for Exam: Pt. on Vent with ams and confusion; nkt, no sx to roiper family conscent. GFR>60  4/11/19. 15ML Prohance        JG Acuity: Unknown Type of Exam: Unknown FINDINGS: INTRACRANIAL STRUCTURES/VENTRICLES:  There is diffuse parenchymal volume loss. No parenchymal signal abnormality is seen. There is no acute infarct or mass. No mass effect or midline shift.  No evidence of an acute intracranial hemorrhage. The ventricles and sulci are normal in size and configuration. The sellar/suprasellar regions appear unremarkable. The normal signal voids within the major intracranial vessels appear maintained. No abnormal focus of enhancement is seen within the brain. ORBITS: The visualized portion of the orbits demonstrate no acute abnormality. SINUSES: The visualized paranasal sinuses and mastoid air cells are well aerated. BONES/SOFT TISSUES: The bone marrow signal intensity appears normal. The soft tissues demonstrate no acute abnormality. 1. No acute intracranial abnormality. 2. Diffuse parenchymal volume loss.        Medications Reviewed    Electronically signed by: Wanda Lagos MD 4/13/2019 10:35 AM

## 2019-04-13 NOTE — PROGRESS NOTES
Pulmonary and Critical Care  Progress Note    Subjective: The patient is awake and responsive. Shortness of breath none. Chest pain none  Addressing respiratory complaints Patient is negative for  hemoptysis and cyanosis  CONSTITUTIONAL:  negative for fevers and chills      Past Medical History:     has a past medical history of Diabetes mellitus ([address] and Hypertension. has a past surgical history that includes Upper gastrointestinal endoscopy (N/A, 4/4/2019); thoracentesis (Bilateral, 04/11/2019); and Tracheotomy (N/A, 4/12/2019). reports that he has never smoked. He has never used smokeless tobacco. He reports that he does not drink alcohol or use drugs. Family history:  family history is not on file. No Known Allergies  Social History:    Reviewed; no changes    Objective:   PHYSICAL EXAM:        VITALS:  /64   Pulse 89   Temp 99.3 °F (37.4 °C) (Core)   Resp 23   Ht 5' 10\" (1.778 m)   Wt 163 lb 1.6 oz (74 kg)   SpO2 100%   BMI 23.40 kg/m²     24HR INTAKE/OUTPUT:      Intake/Output Summary (Last 24 hours) at 4/13/2019 1139  Last data filed at 4/13/2019 1118  Gross per 24 hour   Intake 2790.34 ml   Output 1500 ml   Net 1290.34 ml       CONSTITUTIONAL: Awake. LUNGS:  decreased breath sounds, occ basilar crackles. CARDIOVASCULAR:  normal S1 and S2 and negative JVD  ABD:Abdomen soft, non-tender.  BS normal. No masses,  No organomegaly  DATA:    CBC:  Recent Labs     04/11/19  0545 04/12/19  0450 04/13/19  0545   WBC 7.7 7.0 8.5   RBC 2.66* 2.46* 2.23*   HGB 7.7* 7.0* 6.4  HGB CALLED TO RAMAN KLINE RN ON 4/13/19 AT 0619 BY KLAUDIA MOSELEY [facility]  RESULTS READ BACK  *   HCT 25.8* 23.9* 21.9*    168 192   MCV 97.0 97.2 98.2   MCH 28.9 28.5 28.7   MCHC 29.8* 29.3* 29.2*   RDW 16.1* 16.8* 16.6*      BMP:  Recent Labs     04/11/19  0545 04/12/19  0450 04/13/19  0545    146* 144   K 3.6 3.8 3.9    103 105   CO2 33* 35* 31   BUN 23 19 20   CREATININE 0.9 0.8* 0.9   CALCIUM 7.6* 8.1* 8.0* GLUCOSE 204* 116* 128*      ABG:  Recent Labs     04/11/19  0600 04/12/19  0600 04/13/19  0600   PH 7.47* 7.49* 7.48*   PO2ART 48* 79 68*   ZUA7LEJ 45.0 48.0* 46.0*   O2SAT 84.6* 94.8* 96.1     Lab Results   Component Value Date    PROBNP 25,822 (H) 03/28/2019    PROBNP 1,095 (H) 03/08/2019     No results found for: [facility]    Radiology Review:  Pertinent images / reports were reviewed as a part of this visit. Assessment:     Patient Active Problem List   Diagnosis    Community acquired pneumonia due to influenza A virus    Pneumonia    Essential hypertension    Type 2 diabetes mellitus with diabetic polyneuropathy, without long-term current use of insulin ([address]    KERA (acute kidney injury) ([address]    Oropharyngeal dysphagia    [facility]I [facility]non-ST elevated [facility] infarction) [facility] [facility]    Fatigue    Severe malnutrition (HCC)    Acute on chronic respiratory failure (HCC)    Central apnea    Congestive heart failure (HCC)    Elevated brain natriuretic peptide (BNP) level    Serum creatinine raised    Troponin level elevated    Metabolic encephalopathy       Plan:   1. Overall the patient is better. 2. Start slow weaning.       Kiran Sutherland MD  4/13/2019  11:39 AM

## 2019-04-13 NOTE — PROGRESS NOTES
04/13/19 0501   Vent Information   Vent Type 980   Vent Mode AC/VC   Vt Ordered 450 mL   Rate Set 12 bmp   Peak Flow 60 L/min   Pressure Support 0 cmH20   FiO2  30 %   Sensitivity 3   PEEP/CPAP 5   I Time/ I Time % 0 s   Humidification Source Heated wire   Humidification Temp 37   Humidification Temp Measured 37   Circuit Condensation Drained   Vent Patient Data   High Peep/I Pressure 0   Peak Inspiratory Pressure 21 cmH2O   Mean Airway Pressure 9.5 cmH20   Rate Measured 15 br/min   Vt Exhaled 438 mL   Minute Volume 10.19 Liters   I:E Ratio 1:5.10   Cough/Sputum   Sputum How Obtained Suctioned;Tracheal   $Obtained Sample $Induced Sputum   Cough Productive   Frequency Infrequent   Sputum Amount Small   Sputum Color Klaudia ; [place]   Tenacity Thick   Spontaneous Breathing Trial (SBT) RT Doc   Pulse 96   SpO2 100 %   Additional Respiratory  Assessments   Resp 16   Alarm Settings   High Pressure Alarm 45 cmH2O   Delay Alarm 20 sec(s)   Low Minute Volume Alarm 3 L/min   Apnea (secs) 20 secs   High Respiratory Rate 35 br/min   Low Exhaled Vt  250 mL

## 2019-04-13 NOTE — PROGRESS NOTES
[facility]    PATIENT: Darion Hernandez, 80 y.o., male, MRN: [id number]    Hospital Day:  LOS: 16 days     Darion Hernandez is a 80 y.o. male being treated for respiratory failure and sepsis    19 tracheostomy and PEG tube placement . Subjective:  Chief Complaint: (on vent)  Pain:  /10  BM:    Diet: DIET TUBE FEED CONTINUOUS/CYCLIC NPO; STANDARD WITH FIBER (Jevity 1.5); Gastrostomy; Continuous; 55; 55; 24  Activity: bedrest    RN reports he did well overnight, no issues. ~300ml output from PEG to gravity drainage.     Objective:    Vitals: BP (!) 117/58   Pulse 97   Temp 99.5 °F (37.5 °C)   Resp 20   Ht 5' 10\" (1.778 m)   Wt 163 lb 1.6 oz (74 kg)   SpO2 100%   BMI 23.40 kg/m²   Vital Signs (Last 24 Hours)  Temp  Av.9 °F (37.2 °C)  Min: 96.1 °F (35.6 °C)  Max: 100.2 °F (37.9 °C)  Pulse  Av.9  Min: 71  Max: 104  BP  Min: 103/57  Max: 148/76  Resp  Av.6  Min: 12  Max: 25  SpO2  Av.5 %  Min: 95 %  Max: 100 %  Wt Readings from Last 3 Encounters:   19 163 lb 1.6 oz (74 kg)   19 154 lb 9.6 oz (70.1 kg)   19 154 lb 12.8 oz (70.2 kg)       I/O:  0701 -  0700  In: 2790.3 [I.V.:2730.3]  Out: 1430 [Urine:1200]    IV Fluids: propofol Last Rate: Stopped (19 1334)    sodium chloride Last Rate: 75 mL/hr at 19 0411    dextrose    Scheduled Meds:   sodium chloride, 250 mL, Intravenous, Once    chlorhexidine, 15 mL, Mouth/Throat, BID    pantoprazole, 40 mg, Intravenous, Daily **AND** sodium chloride (PF), 10 mL, Intravenous, Daily    midodrine, 10 mg, Oral, TID WC    sodium chloride flush, 10 mL, Intravenous, 2 times per day    [Held by provider] clopidogrel, 75 mg, Oral, Daily    enoxaparin, 40 mg, Subcutaneous, Daily    [Held by provider] gabapentin, 400 mg, Oral, BID    latanoprost, 1 drop, Both Eyes, Nightly    simvastatin, 40 mg, Oral, Nightly    sodium chloride flush, 10 mL, Intravenous, 2 times per day    aspirin, 81 mg, Oral, Daily    ipratropium-albuterol, 1 ampule, Inhalation, Q4H WA    insulin lispro, 0-12 Units, Subcutaneous, TID WC    insulin lispro, 0-6 Units, Subcutaneous, Nightly    insulin glargine, 5 Units, Subcutaneous, Nightly    Physical Exam:  General Appearance:   Alert, cooperative, no distress    Head:   Normocephalic, atraumatic    Lungs:    Equal chest rise, respirations unlabored  Trach site C/D/I    Heart:   Regular rate and rhythm    Abdomen:    Soft, non-tender, no rebound or guarding. PEG C/D/I with bilious drainage in bag.    Extremities:  No cyanosis or edema    Neurologic:  Nonfocal, grossly intact        Labs/Imaging Results:   Recent Results (from the past 24 hour(s))   POCT Glucose    Collection Time: 04/12/19 12:17 PM   Result Value Ref Range    POC Glucose 130 (H) 70 - 99 MG/DL   POCT Glucose    Collection Time: 04/12/19  5:12 PM   Result Value Ref Range    POC Glucose 211 (H) 70 - 99 MG/DL   POCT Glucose    Collection Time: 04/12/19  8:14 PM   Result Value Ref Range    POC Glucose 163 (H) 70 - 99 MG/DL   Basic metabolic panel    Collection Time: 04/13/19  5:45 AM   Result Value Ref Range    Sodium 144 135 - 145 MMOL/L    Potassium 3.9 3.5 - 5.1 MMOL/L    Chloride 105 99 - 110 mMol/L    CO2 31 21 - 32 MMOL/L    Anion Gap 8 4 - 16    BUN 20 6 - 23 MG/DL    CREATININE 0.9 0.9 - 1.3 MG/DL    Glucose 128 (H) 70 - 99 MG/DL    Calcium 8.0 (L) 8.3 - 10.6 MG/DL    GFR Non-African American >60 >60 mL/min/1.73m2    GFR African American >60 >60 mL/min/1.73m2   CBC    Collection Time: 04/13/19  5:45 AM   Result Value Ref Range    WBC 8.5 4.0 - 10.5 K/CU MM    RBC 2.23 (L) 4.6 - 6.2 M/CU MM    Hemoglobin (LL) 13.5 - 18.0 GM/DL     6.4  HGB CALLED TO RAMAN KLINE RN ON 4/13/19 AT 0619 BY B LORELEI C[facility]  RESULTS READ BACK      Hematocrit 21.9 (L) 42 - 52 %    MCV 98.2 78 - 100 FL    MCH 28.7 27 - 31 PG    MCHC 29.2 (L) 32.0 - 36.0 %    RDW 16.6 (H) 11.7 - 14.9 % Platelets 691 220 - 070 K/CU MM    MPV 10.6 7.5 - 11.1 FL   Blood gas, arterial    Collection Time: 04/13/19  6:00 AM   Result Value Ref Range    pH, Bld 7.48 (H) 7.34 - 7.45    pCO2, Arterial 46.0 (H) 32 - 45 MMHG    pO2, Arterial 68 (L) 75 - 100 MMHG    Base Exc, Mixed 9.4  PLUS   (H) 0 - 1.2    HCO3, Arterial 34.3 (H) 18 - 23 MMOL/L    CO2 Content 35.7 (H) 19 - 24 MMOL/L    O2 Sat 96.1 96 - 97 %    Carbon Monoxide, Blood 4.1 0 - 5 %    Methemoglobin, Arterial 0.4 <1.5 %    Comment AC 12  FIO2 30% +5    TYPE AND SCREEN    Collection Time: 04/13/19  6:35 AM   Result Value Ref Range    ABO/Rh O POSITIVE     Antibody Screen NEGATIVE     Comment ECHO FUZZY  PEG SCREEN RUN AND REPORTED     Unit Number [id number]     Component LEUKO-POOR RED CELLS     Unit Divison 00     Status ISSUED     Transfusion Status OK TO TRANSFUSE     Crossmatch Result COMPATIBLE     Unit Number [id number]     Component LEUKO-POOR RED CELLS     Unit Divison 00     Status ALLOCATED     Transfusion Status OK TO TRANSFUSE     Crossmatch Result COMPATIBLE    POCT Glucose    Collection Time: 04/13/19  8:50 AM   Result Value Ref Range    POC Glucose 121 (H) 70 - 99 MG/DL         Assessment:  Paulina Castillo is a 80 y.o. male with respiratory failure and sepsis, now 1 Day Post-Op tracheostomy and PEG tube placement     Principal Problem:    NSTEMI (non-ST elevated myocardial infarction) [address]  Active Problems:    Fatigue    Severe malnutrition (HCC)    Acute on chronic respiratory failure (HCC)    Central apnea    Congestive heart failure (HCC)    Elevated brain natriuretic peptide (BNP) level    Serum creatinine raised    Troponin level elevated    Metabolic encephalopathy  Resolved Problems:    * No resolved hospital problems.  *      Plan:  · Start tube feeds today at 10am  · Routine trach and PEG care    Electronically signed: Rachel Hook MD 4/13/2019 9:29 AM

## 2019-04-14 NOTE — PROGRESS NOTES
GENERAL SURGERY INPATIENT PROGRESS NOTE  [facility]) [facility]icians    PATIENT: Zari Haque, 80 y.o., male, MRN: [id number]    Hospital Day:  LOS: 16 days     Zari Haque is a 80 y.o. male being treated for respiratory failure and sepsis    19 tracheostomy and PEG tube placement . Subjective:  Chief Complaint: (on vent)  Pain:  /10  BM:    Diet: DIET TUBE FEED CONTINUOUS/CYCLIC NPO; STANDARD WITH FIBER (Jevity 1.5); Gastrostomy; Continuous; 20; 20; 24  Activity: bedrest    RN reports he did well overnight, no issues. Tube feeds started yesterday, but had some abdominal distention and discomfort (tube feeds were up to 55ml/h). Decreased rate to 20ml/hour which he has tolerated.      Objective:    Vitals: BP (!) 123/57   Pulse 100   Temp 100 °F (37.8 °C) (Core)   Resp 20   Ht 5' 10\" (1.778 m)   Wt 159 lb 4.8 oz (72.3 kg)   SpO2 90%   BMI 22.86 kg/m²   Vital Signs (Last 24 Hours)  Temp  Av.5 °F (37.5 °C)  Min: 98.8 °F (37.1 °C)  Max: 100 °F (37.8 °C)  Pulse  Av.6  Min: 72  Max: 100  BP  Min: 95/49  Max: 145/75  Resp  Av.9  Min: 8  Max: 28  SpO2  Av %  Min: 89 %  Max: 100 %  Wt Readings from Last 3 Encounters:   19 159 lb 4.8 oz (72.3 kg)   19 154 lb 9.6 oz (70.1 kg)   19 154 lb 12.8 oz (70.2 kg)       I/O:  0701 -  0700  In: 2920.8 [I.V.:1821.8]  Out: 725 [Urine:725]    IV Fluids: propofol Last Rate: Stopped (19 1334)    sodium chloride Last Rate: 75 mL/hr at 19 0745    dextrose    Scheduled Meds:   chlorhexidine, 15 mL, Mouth/Throat, BID    pantoprazole, 40 mg, Intravenous, Daily **AND** sodium chloride (PF), 10 mL, Intravenous, Daily    midodrine, 10 mg, Oral, TID WC    sodium chloride flush, 10 mL, Intravenous, 2 times per day    [Held by provider] clopidogrel, 75 mg, Oral, Daily    enoxaparin, 40 mg, Subcutaneous, Daily    [Held by provider] gabapentin, 400 mg, Oral, BID    latanoprost, 1 drop, Both Eyes, Nightly   GM/DL    Hematocrit 25.8 (L) 42 - 52 %    MCV 98.5 78 - 100 FL    MCH 28.6 27 - 31 PG    MCHC 29.1 (L) 32.0 - 36.0 %    RDW 17.5 (H) 11.7 - 14.9 %    Platelets 790 419 - 538 K/CU MM    MPV 10.6 7.5 - 11.1 FL   Blood gas, arterial    Collection Time: 04/14/19  8:00 AM   Result Value Ref Range    pH, Bld 7.42 7.34 - 7.45    pCO2, Arterial 50.0 (H) 32 - 45 MMHG    pO2, Arterial 36 (L) 75 - 100 MMHG    Base Exc, Mixed 6.6  PLUS   (H) 0 - 1.2    HCO3, Arterial 32.4 (H) 18 - 23 MMOL/L    CO2 Content 33.9 (H) 19 - 24 MMOL/L    O2 Sat 74.4 (L) 96 - 97 %    Carbon Monoxide, Blood 4.0 0 - 5 %    Methemoglobin, Arterial 0.8 <1.5 %   POCT Glucose    Collection Time: 04/14/19  8:31 AM   Result Value Ref Range    POC Glucose 85 70 - 99 MG/DL         Assessment:  Kayla Long is a 80 y.o. male with respiratory failure and sepsis, now 2 Days Post-Op tracheostomy and PEG tube placement     Principal Problem:    NSTEMI (non-ST elevated myocardial infarction) ([facility] [address]  Active Problems:    Fatigue    Severe malnutrition (HCC)    Acute on chronic respiratory failure (HCC)    Central apnea    Congestive heart failure (HCC)    Elevated brain natriuretic peptide (BNP) level    Serum creatinine raised    Troponin level elevated    Metabolic encephalopathy  Resolved Problems:    * No resolved hospital problems. *      Plan:  · Continue tube feeds, advance slowly to goal as tolerated.   · Routine trach and PEG care    Electronically signed: Inna England MD 4/14/2019 10:56 AM

## 2019-04-14 NOTE — PROGRESS NOTES
Pulmonary and Critical Care  Progress Note    Subjective: The patient is better. Shortness of breath none. Chest pain none  Addressing respiratory complaints Patient is negative for  hemoptysis and cyanosis  CONSTITUTIONAL:  negative for fevers and chills      Past Medical History:     has a past medical history of Diabetes mellitus ([address] and Hypertension. has a past surgical history that includes Upper gastrointestinal endoscopy (N/A, 4/4/2019); thoracentesis (Bilateral, 04/11/2019); and Tracheotomy (N/A, 4/12/2019). reports that he has never smoked. He has never used smokeless tobacco. He reports that he does not drink alcohol or use drugs. Family history:  family history is not on file. No Known Allergies  Social History:    Reviewed; no changes    Objective:   PHYSICAL EXAM:        VITALS:  BP (!) 110/46   Pulse 99   Temp 100 °F (37.8 °C) (Core)   Resp 13   Ht 5' 10\" (1.778 m)   Wt 159 lb 4.8 oz (72.3 kg)   SpO2 95%   BMI 22.86 kg/m²     24HR INTAKE/OUTPUT:      Intake/Output Summary (Last 24 hours) at 4/14/2019 1352  Last data filed at 4/14/2019 1251  Gross per 24 hour   Intake 2586.75 ml   Output 800 ml   Net 1786.75 ml       CONSTITUTIONAL: Awake. LUNGS:  decreased breath sounds, occ basilar crackles. CARDIOVASCULAR:  normal S1 and S2 and negative JVD  ABD:Abdomen soft, non-tender.  BS normal. No masses,  No organomegaly  DATA:    CBC:  Recent Labs     04/12/19  0450 04/13/19  0545 04/13/19  1311 04/14/19  0615   WBC 7.0 8.5  --  10.5   RBC 2.46* 2.23*  --  2.62*   HGB 7.0* 6.4  HGB CALLED TO RAMAN KLINE RN ON 4/13/19 AT 0619 BY KLAUDIA MOSELEY [facility]  RESULTS READ BACK  * 7.8* 7.5*   HCT 23.9* 21.9* 26.1* 25.8*    192  --  206   MCV 97.2 98.2  --  98.5   MCH 28.5 28.7  --  28.6   MCHC 29.3* 29.2*  --  29.1*   RDW 16.8* 16.6*  --  17.5*      BMP:  Recent Labs     04/12/19  0450 04/13/19  0545 04/14/19  0615   * 144 146*   K 3.8 3.9 3.7    105 108   CO2 35* 31 31   BUN 19 20 21 CREATININE 0.8* 0.9 0.9   CALCIUM 8.1* 8.0* 7.9*   GLUCOSE 116* 128* 69*      ABG:  Recent Labs     04/12/19  0600 04/13/19  0600 04/14/19  0800   PH 7.49* 7.48* 7.42   PO2ART 79 68* 36*   BVI0AUC 48.0* 46.0* 50.0*   O2SAT 94.8* 96.1 74.4*     Lab Results   Component Value Date    PROBNP 25,822 (H) 03/28/2019    PROBNP 1,095 (H) 03/08/2019     No results found for: CULTRESP    Radiology Review:  Pertinent images / reports were reviewed as a part of this visit. Assessment:     Patient Active Problem List   Diagnosis    Community acquired pneumonia due to influenza A virus    Pneumonia    Essential hypertension    Type 2 diabetes mellitus with diabetic polyneuropathy, without long-term current use of insulin ([facility] [address]    KERA (acute kidney injury) ([facility] [facility]    Oropharyngeal dysphagia    NSTEMI (non-ST elevated myocardial infarction) ([facility] [address] [facility]    Fatigue    Severe malnutrition (HCC)    Acute on chronic respiratory failure (HCC)    Central apnea    Congestive heart failure (HCC)    Elevated brain natriuretic peptide (BNP) level    Serum creatinine raised    Troponin level elevated    Metabolic encephalopathy       Plan:   1. Overall the patient is better. 2. Cont slow weaning.       Veena Jefferson MD  4/14/2019  1:52 PM

## 2019-04-14 NOTE — PROGRESS NOTES
[facility] Hospitalist Daily Progress Note  Hospitalist: Lynne Arroyo M.D. Hospitalist Progress Note Date: 2019    Patient Name: Maren Cuenca : 7/15/1933 Med Rec # [id number]    Disposition: continue inpatient care, vent settings being weaned by Pulm, once settings stable transfer to ICU stepdown when OK with Pulm and Surgery    Cough [R05]  General weakness [R53.1]  Serum creatinine raised [R79.89]  NSTEMI (non-ST elevated myocardial infarction) ([address] [I21.4]  Troponin level elevated [R74.8]  Elevated brain natriuretic peptide (BNP) level [R79.89]  Acute electrocardiogram changes [R94.31]  Fatigue, unspecified type [R53.83]  Congestive heart failure, unspecified HF chronicity, unspecified heart failure type ([address] [I50.9]      Assessment/ Plan:  1. Acute on chronic respiratory failure 2/2 Bacterial Pneumonia  2. Septic Shock 2/2 Bacterial PNA - off pressors  - failed extubation twice, POD 2 s/p trach and PEG  - Appreciate surgical input  - Completed IV antibiotics (was on zosyn) on  for pneumonia  - Weaning of vent settings per Pulm, once this has stabilized transfer to ICU stepdown    3. Acute on chronic normocytic anemia  - Monitor closely, transfuse for Hgb <7  - No evidence of active bleeding    4. NSTEMI  - For ischemic evaluation (stress test v. Cath) once clinically stabilizes, anticipate this will be done soon  - No longer on pressors  5. DVT ppx - lovenox    Resolved medical issues during this admission:  NSTEMI  TTE LVEF 60-65%, severely dilated right atrium  - on DAPT, medical management  - card considering LHC vs. Stress when stable     KERA   Hypokalemia  - resolved     DMII  - lantus, ISS     GI bleed / Gastritis  - received 1 U pRBC  - improved on conservative management  - IV PPI BID had been provided     Nutrition  - on TF    Subjective  Pt. seen and examined. The patient is resting comfortably. No acute issues noted overnight.  Patient denies fever, chills, nausea, vomiting, chest pain or abdominal pain currently. ROS: 8 systems reviewed and negative except as stated above    Objective  Vitals:    04/14/19 0846   BP:    Pulse:    Resp: 20   Temp:    SpO2: 90%         Intake/Output Summary (Last 24 hours) at 4/14/2019 1031  Last data filed at 4/14/2019 0834  Gross per 24 hour   Intake 2959.75 ml   Output 825 ml   Net 2134.75 ml       Exam:  GEN - alert, afebrile, not diaphoretic, NAD  HEAD - normocephalic, atraumatic  EENT - PEERLA, EOMI, MMM, no erythema or exudates in oropharynx, no lymphadenopathy, trach in place  CVS - RRR, no m/g/r, S1 and S2 heard, no thrills  PULM - lungs CTAB, no w/r/r, normal inspiratory effort on vent  ABD - soft, no TTP today, ND, +BS, no guarding, no rebound  NEURO - non focal, CN2-12 grossly intact  EXT - extremities warm, distal pulses 2+ and symmetric bilaterally, no clubbing, cyanosis or edema  SKIN - warm, no rashes visualized      Lab Results:  CBC   Recent Labs     04/12/19  0450 04/13/19  0545 04/13/19  1311 04/14/19  0615   WBC 7.0 8.5  --  10.5   HGB 7.0* 6.4  HGB CALLED TO RAMAN KLINE RN ON 4/13/19 AT 0619 BY KLAUDIA MOSELEY [facility]  RESULTS READ BACK  * 7.8* 7.5*   HCT 23.9* 21.9* 26.1* 25.8*    192  --  206      RENAL  Recent Labs     04/12/19  0450 04/13/19  0545 04/14/19  0615   * 144 146*   K 3.8 3.9 3.7    105 108   CO2 35* 31 31   BUN 19 20 21   CREATININE 0.8* 0.9 0.9     LFT'S  No results for input(s): AST, ALT, ALB, BILIDIR, BILITOT, ALKPHOS in the last 72 hours. COAG  Recent Labs     04/12/19  0450   INR 1.09     CARDIAC ENZYMES  No results for input(s): CKTOTAL, CKMB, CKMBINDEX, TROPONINI in the last 72 hours.     U/A:    Lab Results   Component Value Date    COLORU RED 03/31/2019    WBCUA NONE SEEN 03/31/2019    RBCUA 6,176 03/31/2019    MUCUS RARE 03/31/2019    BACTERIA NEGATIVE 03/31/2019    CLARITYU HAZY 03/31/2019    SPECGRAV 1.029 03/31/2019    LEUKOCYTESUR TRACE 03/31/2019    BLOODU LARGE 03/31/2019       ABG    Lab the mA/kV was utilized to reduce the radiation dose to as low as reasonably achievable.; CTA of the head/brain was performed with the administration of intravenous contrast. Multiplanar reformatted images are provided for review. MIP images are provided for review. Dose modulation, iterative reconstruction, and/or weight based adjustment of the mA/kV was utilized to reduce the radiation dose to as low as reasonably achievable.; CT of the head was performed without the administration of intravenous contrast. Dose modulation, iterative reconstruction, and/or weight based adjustment of the mA/kV was utilized to reduce the radiation dose to as low as reasonably achievable. Noncontrast CT of the head with reconstructed 2-D images are also provided for review. COMPARISON: None. HISTORY: ORDERING SYSTEM PROVIDED HISTORY: r/o CVA TECHNOLOGIST PROVIDED HISTORY: Has a \"code stroke\" or \"stroke alert\" been called? ->Yes Ordering Physician Provided Reason for Exam: a\ms; ORDERING SYSTEM PROVIDED HISTORY: r/o CVA TECHNOLOGIST PROVIDED HISTORY: R/o CVA Has a \"code stroke\" or \"stroke alert\" been called? ->No Ordering Physician Provided Reason for Exam: ams; ORDERING SYSTEM PROVIDED HISTORY: unresponsive TECHNOLOGIST PROVIDED HISTORY: R/o hemorrhagic change Has a \"code stroke\" or \"stroke alert\" been called? ->No Ordering Physician Provided Reason for Exam: ams FINDINGS: CT HEAD: BRAIN/VENTRICLES:  No acute intracranial hemorrhage or extraaxial fluid collection. Grey-white differentiation is maintained. No evidence of mass, mass effect or midline shift. No evidence of hydrocephalus. There is prominence of the ventricles and sulci due to global parenchymal volume loss. ORBITS: The visualized portion of the orbits demonstrate no acute abnormality. SINUSES:  The visualized paranasal sinuses and mastoid air cells demonstrate no acute abnormality. SOFT TISSUES/SKULL: No acute abnormality of the visualized skull or soft tissues.  CTA NECK: HISTORY: ORDERING SYSTEM PROVIDED HISTORY: nicanor ll pneumonia,r/o pleural effusion TECHNOLOGIST PROVIDED HISTORY: Ordering Physician Provided Reason for Exam: nicanor ll pneumonia,r/o pleural effusion Acuity: Acute Additional signs and symptoms: patient unable to follow commands Relevant Medical/Surgical History: unknown, poor historian FINDINGS: Mediastinum: Limited evaluation for lymphadenopathy without intravenous contrast.  Normal caliber thoracic aorta with mild atherosclerotic calcifications. Mildly enlarged main pulmonary artery measuring 3.5 cm in diameter. Normal heart size. No pericardial effusion. Segmental gaseous distention of the esophagus. Lungs/pleura: Moderate right and small left pleural effusions with partial lower lobe atelectasis. Additional patchy tree-in-bud/clustered nodularity in the left upper and lower lobes and middle lobe. Mild dependent secretions within the trachea and possibly the proximal left bronchial tree. No pneumothorax. Upper Abdomen: Oral contrast within the colon. Scattered calcified granulomas within the liver and spleen reflect remote granulomatous disease. Soft Tissues/Bones: No enlarged axillary or supraclavicular lymph nodes. Normal thyroid. Glenohumeral osteoarthrosis. Thoracic spondylosis. Moderate right and small left pleural effusions with associated atelectasis. Multifocal clustered/tree-in-bud nodularity in the left upper lobe, left lower lobe and middle lobe most likely represents an infectious or inflammatory bronchiolitis. Consider aspiration and etiology given tracheobronchial secretions and esophageal features suggesting dysmotility and reflux.      Xr Chest Portable    Result Date: 4/12/2019  EXAMINATION: SINGLE XRAY VIEW OF THE CHEST 4/12/2019 5:39 am COMPARISON: Chest x-ray 04/11/2019 HISTORY: ORDERING SYSTEM PROVIDED HISTORY: chest tube placement TECHNOLOGIST PROVIDED HISTORY: Reason for exam:->chest tube placement Ordering Physician Provided Reason for Exam: chest tube placement Acuity: Acute Type of Exam: Subsequent/Follow-up FINDINGS: Right-sided PICC tip projects at the lower SVC. Endotracheal tube tip remains above the cyndi. NG tube extends beyond the field of view. The lungs are clear. Costophrenic angles are clear. Cardiac and mediastinal structures are unchanged. No acute abnormality. Xr Chest Portable    Result Date: 4/11/2019  EXAMINATION: SINGLE XRAY VIEW OF THE CHEST 4/11/2019 3:36 pm COMPARISON: April 11, 2019 HISTORY: ORDERING SYSTEM PROVIDED HISTORY: post bilateral thoracentesis TECHNOLOGIST PROVIDED HISTORY: Reason for exam:->post bilateral thoracentesis Ordering Physician Provided Reason for Exam: post bilateral thora FINDINGS: Endotracheal tube is approximately 4.3 cm above the cyndi. Nasogastric tube is noted with its distal tip coursing below the diaphragm. Right-sided PICC line with its distal tip in the SVC. Stable cardiomediastinal silhouette. Interval resolution of bilateral pleural effusions since prior examination. There is no definite focal consolidation or pneumothorax. The osseous structures are stable. Interval resolution of previously noted bilateral pleural effusions. Xr Chest Portable    Result Date: 4/11/2019  EXAMINATION: SINGLE XRAY VIEW OF THE CHEST 4/11/2019 11:21 am COMPARISON: 04/10/2019 HISTORY: ORDERING SYSTEM PROVIDED HISTORY: ET tube placement TECHNOLOGIST PROVIDED HISTORY: Reason for exam:->ET tube placement Ordering Physician Provided Reason for Exam: ET tube placement - c Acuity: Acute Type of Exam: Initial Relevant Medical/Surgical History: diabetes mellitus; hypertension FINDINGS: No change in the lines and tubes. There is improved aeration of the bilateral lungs with residual bilateral airspace disease, likely due to resolving pulmonary edema. There are likely small bilateral pleural effusions. The cardiac silhouette is within normal limits. There is no pneumothorax.      1. Improved aeration of the bilateral lungs. Xr Chest Portable    Result Date: 4/10/2019  EXAMINATION: SINGLE XRAY VIEW OF THE CHEST 4/9/2019 1:08 pm COMPARISON: 4/9/2019 HISTORY: ORDERING SYSTEM PROVIDED HISTORY: intubation TECHNOLOGIST PROVIDED HISTORY: Reason for exam:->intubation Ordering Physician Provided Reason for Exam: intubation Acuity: Acute Type of Exam: Initial Relevant Medical/Surgical History: htn FINDINGS: Endotracheal tube tip terminates 3.3 cm above the cyndi. Nasogastric tube terminates off the inferior margin of the radiograph. Right upper extremity PICC is also in stable position. Stable cardiomediastinal silhouette. The lungs demonstrate interval worsening of perihilar opacities. More focal opacities are seen in the bilateral bases with bilateral effusions. No pneumothorax. No acute osseous abnormality. 1. Lines and tubes as described. 2. Interval worsening of perihilar opacities, likely representing edema. 3. Bibasilar opacities with bilateral effusions are unchanged. Xr Chest Portable    Result Date: 4/10/2019  EXAMINATION: SINGLE XRAY VIEW OF THE CHEST 4/10/2019 5:28 am COMPARISON: 04/09/2019 HISTORY: ORDERING SYSTEM PROVIDED HISTORY: reintubated TECHNOLOGIST PROVIDED HISTORY: Reason for exam:->reintubated Ordering Physician Provided Reason for Exam: reintubated Acuity: Acute Type of Exam: Subsequent/Follow-up FINDINGS: Endotracheal tube tip is 3-4 cm above the cyndi. Enteric tube and right arm PICC remain in place. There has been slight interval improvement in patchy bilateral airspace disease. There is no discernible pneumothorax. 1. Endotracheal tube tip is 3-4 cm above the cyndi. 2. Slight interval improvement in bilateral airspace disease.      Xr Chest Portable    Result Date: 4/9/2019  EXAMINATION: SINGLE XRAY VIEW OF THE CHEST 4/9/2019 5:34 am COMPARISON: 04/07/2019 541 hours HISTORY: ORDERING SYSTEM PROVIDED HISTORY: ventilator TECHNOLOGIST PROVIDED HISTORY: Reason for exam:->ventilator Ordering Physician Provided Reason for Exam: vent mgmt Acuity: Acute Type of Exam: Subsequent/Follow-up FINDINGS: Monitor wires project over the thorax. ETT, enteric catheter, and right PICC remain unchanged. Right perihilar opacity again seen. Layering right pleural effusion present. Heart size is normal.  No pneumothorax. Stable chest with right perihilar opacity and layering right pleural effusion. Xr Chest Portable    Result Date: 4/8/2019  EXAMINATION: SINGLE XRAY VIEW OF THE CHEST 4/7/2019 6:32 am COMPARISON: 04/06/2019 HISTORY: ORDERING SYSTEM PROVIDED HISTORY: hypoxia on vent TECHNOLOGIST PROVIDED HISTORY: Reason for exam:->hypoxia on vent Ordering Physician Provided Reason for Exam: hypoxia on vent Acuity: Unknown Type of Exam: Unknown FINDINGS: Endotracheal tube terminates 5 cm above the cyndi. Enteric tube courses below the diaphragm. Right PICC with tip at the inferior SVC. No pneumothorax. Patchy areas of consolidation at the right perihilar lung and right lung base with small layering right pleural effusion, similar to the prior exam. Heart size is normal.     1.  Satisfactory position of support devices. 2.  No significant change from prior exam.     Xr Chest Portable    Result Date: 4/6/2019  EXAMINATION: SINGLE XRAY VIEW OF THE CHEST 4/6/2019 6:27 am COMPARISON: 04/05/2019 and prior HISTORY: ORDERING SYSTEM PROVIDED HISTORY: tube placement TECHNOLOGIST PROVIDED HISTORY: Reason for exam:->tube placement Ordering Physician Provided Reason for Exam: tube placement Acuity: Unknown Type of Exam: Unknown FINDINGS: Study limited by overlying support and monitoring apparatus. Endotracheal tube projects approximately 5 cm above the cyndi, unchanged. Nasogastric tube is seen overlying the esophagus. The tip is below the field of view and below the diaphragm. No definite new lines or tubes are noted. Heart size is within normal limits.   Pulmonary vascularity appears within normal limits. Linear densities overlying the right side of the chest compatible with skin folds. Increased hazy opacities noted on the right similar to the prior study. No significant change in left basilar pleural and parenchymal disease. There is evidence of old granulomatous disease. Osseous structures demonstrate no acute abnormality. Lines and tubes are stable. Persistent bilateral pleural and parenchymal changes greater on the right which accounting for differences in technique do not appear to be significantly changed from the comparison. Xr Chest Portable    Result Date: 4/5/2019  EXAMINATION: SINGLE XRAY VIEW OF THE CHEST 4/5/2019 6:01 am COMPARISON: April 4, 2019 HISTORY: ORDERING SYSTEM PROVIDED HISTORY: tube placement TECHNOLOGIST PROVIDED HISTORY: Reason for exam:->tube placement Ordering Physician Provided Reason for Exam: tube placement Acuity: Unknown Type of Exam: Subsequent/Follow-up Additional signs and symptoms: tube placement Relevant Medical/Surgical History: tube placement FINDINGS: Endotracheal tube with tip 5.6 cm from the cyndi. NG tube with tip in the upper stomach. No pneumothorax. Small bilateral pleural effusions, greater on the right as well as hazy bibasilar opacities. No acute bony abnormality. Worsening right pleural effusion and bibasilar opacities. Xr Chest Portable    Result Date: 4/4/2019  EXAMINATION: SINGLE XRAY VIEW OF THE CHEST 4/4/2019 5:12 am COMPARISON: 04/03/2019 HISTORY: ORDERING SYSTEM PROVIDED HISTORY: tube placement TECHNOLOGIST PROVIDED HISTORY: Reason for exam:->tube placement Ordering Physician Provided Reason for Exam: tube placement Acuity: Acute Type of Exam: Subsequent/Follow-up FINDINGS: Endotracheal tube and NG tube are in place. The heart and mediastinal structures are stable. Small pleural effusions with bibasilar atelectasis persist.  Arthritic changes of the shoulders are noted.      Persistent small pleural effusions with bibasilar atelectasis. Xr Chest Portable    Result Date: 4/3/2019  EXAMINATION: SINGLE XRAY VIEW OF THE CHEST 4/3/2019 5:30 am COMPARISON: April 2, 2019 HISTORY: ORDERING SYSTEM PROVIDED HISTORY: tube placement TECHNOLOGIST PROVIDED HISTORY: Reason for exam:->tube placement Ordering Physician Provided Reason for Exam: tube placement Acuity: Acute Type of Exam: Subsequent/Follow-up FINDINGS: The ETT is 3.8 cm above the cyndi. The feeding tube is inserted with its tip below the diaphragm and beyond the field of view. The cardiomediastinal silhouette is stable. There is airspace opacity at the right lung base, may be related to layering pleural effusion, atelectasis or pneumonia, stable. There is no pneumothorax. There is no acute osseous abnormality. Airspace opacity at the right lung base, may be related to layering pleural effusion, atelectasis or pneumonia, stable. Xr Chest Portable    Result Date: 4/2/2019  EXAMINATION: SINGLE XRAY VIEW OF THE CHEST 4/2/2019 9:25 pm COMPARISON: Chest x-ray 15 arch prior HISTORY: ORDERING SYSTEM PROVIDED HISTORY: post intubation/ ETT placement TECHNOLOGIST PROVIDED HISTORY: Reason for exam:->post intubation/ ETT placement Ordering Physician Provided Reason for Exam: POST INTUBATION AND NG TUBE PLACEMENT FINDINGS: Endotracheal tube tip projects 3.5 cm from the cyndi. Enteric tube identified with side port at the level of the GE junction. Tip projects over the expected location of the stomach. Recommend advancement. Opacities project over the bilateral lung bases, right greater than left which is suspicious for layering pleural effusions. No pneumothorax identified. Osseous structures appear stable. 1. Endotracheal tube tip projects approximately 3.5 cm from the cyndi. 2. Enteric tube side port at the level of the GE junction. Recommend advancement. 3. Findings suggesting layering effusions, right greater than left.      Xr Chest Portable    Result Date: 4/2/2019  EXAMINATION: SINGLE XRAY VIEW OF THE CHEST 4/2/2019 5:17 am COMPARISON: April 1, 2019 HISTORY: ORDERING SYSTEM PROVIDED HISTORY: tube placement TECHNOLOGIST PROVIDED HISTORY: Reason for exam:->tube placement Ordering Physician Provided Reason for Exam: tube placement Acuity: Acute Type of Exam: Subsequent/Follow-up FINDINGS: ETT tip is 6 cm above the cyndi. Enteric catheter extends beyond the inferior margin of the image. Cardiac silhouette is within normal range. Small right pleural effusion. No focal consolidation, left pleural effusion, or pneumothorax. Severe left glenohumeral degenerative changes with suspected chronic left rotator cuff tear. 1. Small right pleural effusion. Xr Chest Portable    Result Date: 4/1/2019  EXAMINATION: SINGLE XRAY VIEW OF THE CHEST 4/1/2019 8:46 am COMPARISON: 04/01/2019. HISTORY: ORDERING SYSTEM PROVIDED HISTORY: ETT placement TECHNOLOGIST PROVIDED HISTORY: Reason for exam:->ETT placement Ordering Physician Provided Reason for Exam: ETT placement Acuity: Acute Type of Exam: Initial Relevant Medical/Surgical History: diabetes mellitus; hypertension FINDINGS: The endotracheal tube has been repositioned with tip is now above the cyndi. A nasogastric tube courses below the diaphragm. The heart size and pulmonary vasculature stable. Again noted are hazy density seen throughout the lungs bilaterally. No pneumothoraces are seen. 1. Interval reposition of endotracheal tube with its tip now above the cyndi and in satisfactory position. 2. Otherwise, stable chest x-ray with findings likely reflecting pleural effusions and infiltrates. Xr Chest Portable    Result Date: 4/1/2019  EXAMINATION: SINGLE XRAY VIEW OF THE CHEST 4/1/2019 5:37 am COMPARISON: 03/31/2019.  HISTORY: ORDERING SYSTEM PROVIDED HISTORY: tube placement TECHNOLOGIST PROVIDED HISTORY: Reason for exam:->tube placement Ordering Physician Provided Reason for Exam: tube Exam: Unknown FINDINGS: Endotracheal tube is been placed, tip approximately 4 cm above the cyndi in appropriate position. Enteric tube tip and side port below diaphragm and stomach. There are diffuse hazy opacities over right greater than left lung, combination increasing effusions, atelectatic changes, underlying consolidation not excluded. Pulmonary vasculature is indistinct. Cardiomediastinal silhouette is stable. Increasing pleuroparenchymal opacities in both hemithoraces, right greater than left. Endotracheal and enteric tubes appear in appropriate position. Cta Neck W Contrast    Result Date: 3/31/2019  EXAMINATION: CTA OF THE NECK; CTA OF THE HEAD WITH CONTRAST; CT OF THE HEAD WITHOUT CONTRAST 3/31/2019 4:57 am; 3/31/2019 4:55 am: TECHNIQUE: CTA of the neck was performed with the administration of intravenous contrast. Multiplanar reformatted images are provided for review. MIP images are provided for review. Stenosis of the internal carotid arteries measured using NASCET criteria. Dose modulation, iterative reconstruction, and/or weight based adjustment of the mA/kV was utilized to reduce the radiation dose to as low as reasonably achievable.; CTA of the head/brain was performed with the administration of intravenous contrast. Multiplanar reformatted images are provided for review. MIP images are provided for review. Dose modulation, iterative reconstruction, and/or weight based adjustment of the mA/kV was utilized to reduce the radiation dose to as low as reasonably achievable.; CT of the head was performed without the administration of intravenous contrast. Dose modulation, iterative reconstruction, and/or weight based adjustment of the mA/kV was utilized to reduce the radiation dose to as low as reasonably achievable. Noncontrast CT of the head with reconstructed 2-D images are also provided for review. COMPARISON: None.  HISTORY: ORDERING SYSTEM PROVIDED HISTORY: r/o CVA TECHNOLOGIST PROVIDED HISTORY: Has a \"code stroke\" or \"stroke alert\" been called? ->Yes Ordering Physician Provided Reason for Exam: a\ms; ORDERING SYSTEM PROVIDED HISTORY: r/o CVA TECHNOLOGIST PROVIDED HISTORY: R/o CVA Has a \"code stroke\" or \"stroke alert\" been called? ->No Ordering Physician Provided Reason for Exam: ams; ORDERING SYSTEM PROVIDED HISTORY: unresponsive TECHNOLOGIST PROVIDED HISTORY: R/o hemorrhagic change Has a \"code stroke\" or \"stroke alert\" been called? ->No Ordering Physician Provided Reason for Exam: ams FINDINGS: CT HEAD: BRAIN/VENTRICLES:  No acute intracranial hemorrhage or extraaxial fluid collection. Grey-white differentiation is maintained. No evidence of mass, mass effect or midline shift. No evidence of hydrocephalus. There is prominence of the ventricles and sulci due to global parenchymal volume loss. ORBITS: The visualized portion of the orbits demonstrate no acute abnormality. SINUSES:  The visualized paranasal sinuses and mastoid air cells demonstrate no acute abnormality. SOFT TISSUES/SKULL: No acute abnormality of the visualized skull or soft tissues. CTA NECK: AORTIC ARCH/ARCH VESSELS: There is a normal branch pattern of the aortic arch. No significant stenosis is seen of the innominate artery or subclavian arteries. CAROTID ARTERIES: The common carotid arteries are normal in appearance without evidence of a flow limiting stenosis. The internal carotid arteries are normal in appearance without evidence of a flow limiting stenosis by NASCET criteria. No dissection or arterial injury is seen. VERTEBRAL ARTERIES: Right vertebral artery is diminutive in caliber throughout. Left vertebral artery is dominant supplying basilar artery. SOFT TISSUES: The lung apices are clear. No cervical or superior mediastinal lymphadenopathy. There is endotracheal intubation. The parotid, submandibular and thyroid glands demonstrate no acute abnormality.  BONES: The visualized osseous structures appear unremarkable. Large pleural effusions. CTA HEAD: ANTERIOR CIRCULATION: The internal carotid arteries are normal in course and caliber without focal stenosis. The anterior cerebral and middle cerebral arteries demonstrate no focal stenosis. POSTERIOR CIRCULATION: The posterior cerebral arteries demonstrate no focal stenosis. The vertebral and basilar arteries appear otherwise unremarkable. No CT evidence of acute territorial infarct. No significant stenosis visualized in the major intracranial arterial vasculature. Cervical carotid vasculature is patent. Diminutive right vertebral artery and widely patent left vertebral artery. Xr Abdomen For Ng/og/ne Tube Placement    Result Date: 4/4/2019  EXAMINATION: SINGLE SUPINE XRAY VIEW(S) OF THE ABDOMEN 4/4/2019 2:08 pm COMPARISON: None. HISTORY: ORDERING SYSTEM PROVIDED HISTORY: placement of NG tube TECHNOLOGIST PROVIDED HISTORY: Reason for exam:->placement of NG tube Portable? ->Yes Ordering Physician Provided Reason for Exam: placement of NG tube Acuity: Unknown Type of Exam: Subsequent/Follow-up FINDINGS: An enteric tube is seen coursing below the diaphragm. The tip projects in the region of the gastric body. The side port projects in the region the gastric cardia. Air-filled loops of bowel are seen within the upper abdomen. Enteric tube coursing below the diaphragm. The side port projects in the region of the gastric cardia. Cta Head W Contrast    Result Date: 3/31/2019  EXAMINATION: CTA OF THE NECK; CTA OF THE HEAD WITH CONTRAST; CT OF THE HEAD WITHOUT CONTRAST 3/31/2019 4:57 am; 3/31/2019 4:55 am: TECHNIQUE: CTA of the neck was performed with the administration of intravenous contrast. Multiplanar reformatted images are provided for review. MIP images are provided for review. Stenosis of the internal carotid arteries measured using NASCET criteria.  Dose modulation, iterative reconstruction, and/or weight based adjustment of the mA/kV was utilized to reduce the radiation dose to as low as reasonably achievable.; CTA of the head/brain was performed with the administration of intravenous contrast. Multiplanar reformatted images are provided for review. MIP images are provided for review. Dose modulation, iterative reconstruction, and/or weight based adjustment of the mA/kV was utilized to reduce the radiation dose to as low as reasonably achievable.; CT of the head was performed without the administration of intravenous contrast. Dose modulation, iterative reconstruction, and/or weight based adjustment of the mA/kV was utilized to reduce the radiation dose to as low as reasonably achievable. Noncontrast CT of the head with reconstructed 2-D images are also provided for review. COMPARISON: None. HISTORY: ORDERING SYSTEM PROVIDED HISTORY: r/o CVA TECHNOLOGIST PROVIDED HISTORY: Has a \"code stroke\" or \"stroke alert\" been called? ->Yes Ordering Physician Provided Reason for Exam: a\ms; ORDERING SYSTEM PROVIDED HISTORY: r/o CVA TECHNOLOGIST PROVIDED HISTORY: R/o CVA Has a \"code stroke\" or \"stroke alert\" been called? ->No Ordering Physician Provided Reason for Exam: ams; ORDERING SYSTEM PROVIDED HISTORY: unresponsive TECHNOLOGIST PROVIDED HISTORY: R/o hemorrhagic change Has a \"code stroke\" or \"stroke alert\" been called? ->No Ordering Physician Provided Reason for Exam: ams FINDINGS: CT HEAD: BRAIN/VENTRICLES:  No acute intracranial hemorrhage or extraaxial fluid collection. Grey-white differentiation is maintained. No evidence of mass, mass effect or midline shift. No evidence of hydrocephalus. There is prominence of the ventricles and sulci due to global parenchymal volume loss. ORBITS: The visualized portion of the orbits demonstrate no acute abnormality. SINUSES:  The visualized paranasal sinuses and mastoid air cells demonstrate no acute abnormality. SOFT TISSUES/SKULL: No acute abnormality of the visualized skull or soft tissues.  CTA NECK: AORTIC ARCH/ARCH VESSELS: There is a normal branch pattern of the aortic arch. No significant stenosis is seen of the innominate artery or subclavian arteries. CAROTID ARTERIES: The common carotid arteries are normal in appearance without evidence of a flow limiting stenosis. The internal carotid arteries are normal in appearance without evidence of a flow limiting stenosis by NASCET criteria. No dissection or arterial injury is seen. VERTEBRAL ARTERIES: Right vertebral artery is diminutive in caliber throughout. Left vertebral artery is dominant supplying basilar artery. SOFT TISSUES: The lung apices are clear. No cervical or superior mediastinal lymphadenopathy. There is endotracheal intubation. The parotid, submandibular and thyroid glands demonstrate no acute abnormality. BONES: The visualized osseous structures appear unremarkable. Large pleural effusions. CTA HEAD: ANTERIOR CIRCULATION: The internal carotid arteries are normal in course and caliber without focal stenosis. The anterior cerebral and middle cerebral arteries demonstrate no focal stenosis. POSTERIOR CIRCULATION: The posterior cerebral arteries demonstrate no focal stenosis. The vertebral and basilar arteries appear otherwise unremarkable. No CT evidence of acute territorial infarct. No significant stenosis visualized in the major intracranial arterial vasculature. Cervical carotid vasculature is patent. Diminutive right vertebral artery and widely patent left vertebral artery. Mri Brain W Wo Contrast    Result Date: 4/11/2019  EXAMINATION: MRI OF THE BRAIN WITHOUT AND WITH CONTRAST  4/11/2019 5:57 pm TECHNIQUE: Multiplanar multisequence MRI of the head/brain was performed without and with the administration of intravenous contrast. COMPARISON: None.  HISTORY: ORDERING SYSTEM PROVIDED HISTORY: failure to wean off ventilator TECHNOLOGIST PROVIDED HISTORY: Ordering Physician Provided Reason for Exam: Pt. on Vent with ams and confusion; nkt, no sx to roiper family conscent. GFR>60  4/11/19. 15ML Prohance        JG Acuity: Unknown Type of Exam: Unknown FINDINGS: INTRACRANIAL STRUCTURES/VENTRICLES:  There is diffuse parenchymal volume loss. No parenchymal signal abnormality is seen. There is no acute infarct or mass. No mass effect or midline shift. No evidence of an acute intracranial hemorrhage. The ventricles and sulci are normal in size and configuration. The sellar/suprasellar regions appear unremarkable. The normal signal voids within the major intracranial vessels appear maintained. No abnormal focus of enhancement is seen within the brain. ORBITS: The visualized portion of the orbits demonstrate no acute abnormality. SINUSES: The visualized paranasal sinuses and mastoid air cells are well aerated. BONES/SOFT TISSUES: The bone marrow signal intensity appears normal. The soft tissues demonstrate no acute abnormality. 1. No acute intracranial abnormality. 2. Diffuse parenchymal volume loss.        Medications Reviewed    Electronically signed by: Wanda Lagos MD 4/14/2019 10:31 AM

## 2019-04-15 NOTE — PROGRESS NOTES
- 440 K/CU MM    MPV 10.6 7.5 - 11.1 FL   Blood gas, arterial    Collection Time: 04/14/19  8:00 AM   Result Value Ref Range    pH, Bld 7.42 7.34 - 7.45    pCO2, Arterial 50.0 (H) 32 - 45 MMHG    pO2, Arterial 36 (L) 75 - 100 MMHG    Base Exc, Mixed 6.6  PLUS   (H) 0 - 1.2    HCO3, Arterial 32.4 (H) 18 - 23 MMOL/L    CO2 Content 33.9 (H) 19 - 24 MMOL/L    O2 Sat 74.4 (L) 96 - 97 %    Carbon Monoxide, Blood 4.0 0 - 5 %    Methemoglobin, Arterial 0.8 <1.5 %   POCT Glucose    Collection Time: 04/14/19  8:31 AM   Result Value Ref Range    POC Glucose 85 70 - 99 MG/DL   POCT Glucose    Collection Time: 04/14/19 12:57 PM   Result Value Ref Range    POC Glucose 102 (H) 70 - 99 MG/DL   POCT Glucose    Collection Time: 04/14/19  5:39 PM   Result Value Ref Range    POC Glucose 142 (H) 70 - 99 MG/DL   POCT Glucose    Collection Time: 04/14/19  9:31 PM   Result Value Ref Range    POC Glucose 140 (H) 70 - 99 MG/DL   Basic metabolic panel    Collection Time: 04/15/19  5:20 AM   Result Value Ref Range    Sodium 144 135 - 145 MMOL/L    Potassium 3.9 3.5 - 5.1 MMOL/L    Chloride 107 99 - 110 mMol/L    CO2 30 21 - 32 MMOL/L    Anion Gap 7 4 - 16    BUN 18 6 - 23 MG/DL    CREATININE 0.9 0.9 - 1.3 MG/DL    Glucose 155 (H) 70 - 99 MG/DL    Calcium 8.2 (L) 8.3 - 10.6 MG/DL    GFR Non-African American >60 >60 mL/min/1.73m2    GFR African American >60 >60 mL/min/1.73m2   CBC    Collection Time: 04/15/19  5:20 AM   Result Value Ref Range    WBC 9.4 4.0 - 10.5 K/CU MM    RBC 2.72 (L) 4.6 - 6.2 M/CU MM    Hemoglobin 7.8 (L) 13.5 - 18.0 GM/DL    Hematocrit 26.9 (L) 42 - 52 %    MCV 98.9 78 - 100 FL    MCH 28.7 27 - 31 PG    MCHC 29.0 (L) 32.0 - 36.0 %    RDW 17.1 (H) 11.7 - 14.9 %    Platelets 496 378 - 118 K/CU MM    MPV 10.1 7.5 - 11.1 FL   Blood gas, arterial    Collection Time: 04/15/19  8:00 AM   Result Value Ref Range    pH, Bld 7.43 7.34 - 7.45    pCO2, Arterial 49.0 (H) 32 - 45 MMHG    pO2, Arterial 52 (L) 75 - 100 MMHG    Base Exc, Mixed 6.9  PLUS   (H) 0 - 1.2    HCO3, Arterial 32.5 (H) 18 - 23 MMOL/L    CO2 Content 34.0 (H) 19 - 24 MMOL/L    O2 Sat 87.9 (L) 96 - 97 %    Carbon Monoxide, Blood 3.5 0 - 5 %    Methemoglobin, Arterial 0.9 <1.5 %    Comment SIMV 4 450 .40 PEEP 5    POCT Glucose    Collection Time: 04/15/19  8:38 AM   Result Value Ref Range    POC Glucose 173 (H) 70 - 99 MG/DL   POCT Glucose    Collection Time: 04/15/19 11:52 AM   Result Value Ref Range    POC Glucose 138 (H) 70 - 99 MG/DL       Scheduled Meds:   chlorhexidine  15 mL Mouth/Throat BID    pantoprazole  40 mg Intravenous Daily    And    sodium chloride (PF)  10 mL Intravenous Daily    midodrine  10 mg Oral TID WC    sodium chloride flush  10 mL Intravenous 2 times per day    [Held by provider] clopidogrel  75 mg Oral Daily    enoxaparin  40 mg Subcutaneous Daily    [Held by provider] gabapentin  400 mg Oral BID    latanoprost  1 drop Both Eyes Nightly    simvastatin  40 mg Oral Nightly    sodium chloride flush  10 mL Intravenous 2 times per day    aspirin  81 mg Oral Daily    ipratropium-albuterol  1 ampule Inhalation Q4H WA    insulin lispro  0-12 Units Subcutaneous TID WC    insulin lispro  0-6 Units Subcutaneous Nightly    insulin glargine  5 Units Subcutaneous Nightly       Continuous Infusions:   propofol Stopped (04/12/19 1334)    sodium chloride 75 mL/hr at 04/15/19 0816    dextrose         Physical Exam:  HEENT: Anicteric sclerae, Oropharyngeal mucosae moist, pink and intact. Heart:  Normal S1 and S2, RRR  Lungs: Clear to auscultation bilaterally, No audible Wheezes or Rales. Extremities: No edema. Neuro: Alert and Oriented x 3, Non focal.  Abdomen: Soft, Benign, Non tender, Non distended, Positive bowel sounds. Incision: Nicely healing: No erythema, No discharge.       Principal Problem:    NSTEMI (non-ST elevated myocardial infarction) [facility])  Active Problems:    Fatigue    Severe malnutrition (HCC)    Acute on chronic respiratory failure (HCC)    Central apnea    Congestive heart failure (HCC)    Elevated brain natriuretic peptide (BNP) level    Serum creatinine raised    Troponin level elevated    Metabolic encephalopathy  Resolved Problems:    * No resolved hospital problems. *      Assessment and Plan:  Edvin Gross is a 80 y.o. male who is POD # 3 status post:  Tracheostomy (#8 Shiley cuffed Tracheostomy tube) / PEG placement (20 Fr Ponsky® PEG with Soft Silicone Retention Dome) assisted with Bronchoscopy and [id number].       Tolerating PEG tube feeds well, advance to goal.    ___________________________________________    Dearcarina Cartwright MD, FACS, FICS  4/15/2019  12:52 PM    Patient was seen with total face to face time of 25 minutes. More than 50% of this visit was counseling and education as above in my assessment and plan section of my note.

## 2019-04-15 NOTE — CARE COORDINATION
Perfect Serve message to Dr Ruchi Reich via perfect serve that bed is available at [place] and no precert is needed. If pt is successfully weaned from vent while here at [facility] may not qualify for [facility], [facility] and will need re-evaluation.

## 2019-04-15 NOTE — CARE COORDINATION
Cm attempted to contact Anthony in admissions at [address] re; status of referral and bed availability. Voice mail message left for Anthony requesting return call re; the above.

## 2019-04-15 NOTE — CARE COORDINATION
Received return perfect serve message from Dr Emi Gipson advising that pt will not be ready for [facility] for another 48 hours as pt needs cardiac cath prior to discharge/transfer. Cm sent perfect serve message back to Dr Moses Meyer advising that if pt is weaned from the vent prior to discharge that pt may not be a candidate for LTACH at that point pending re-eval. If weaned from vent prior to being ready for discharge may need to investigate SNF for pt at discharge.

## 2019-04-15 NOTE — PROGRESS NOTES
[facility] Hospitalist Daily Progress Note  Hospitalist: Kevin Buck M.D. Hospitalist Progress Note Date: 4/15/2019    Patient Name: Carl Bailey : 7/15/1933 Med Rec # [id number]    Disposition: transfer to step down once vent setting weaning complete, pending ischemic evaluation by Cardiology    Cough [R05]  General weakness [R53.1]  Serum creatinine raised [R79.89]  NSTEMI (non-ST elevated myocardial infarction) ([facility] [address] [I21.4]  Troponin level elevated [R74.8]  Elevated brain natriuretic peptide (BNP) level [R79.89]  Acute electrocardiogram changes [R94.31]  Fatigue, unspecified type [R53.83]  Congestive heart failure, unspecified HF chronicity, unspecified heart failure type ([facility] [address] [I50.9]      Assessment/ Plan:  1. Acute on chronic respiratory failure 2/2 Bacterial Pneumonia, s/p trach placement  2. Septic Shock 2/2 Bacterial PNA - off pressors  - failed extubation twice, POD 2 s/p trach and PEG  - Appreciate surgical input  - Completed IV antibiotics (was on zosyn) on  for pneumonia  - Weaning of vent settings per Pulm, once this has stabilized transfer to ICU stepdown    3. Acute on chronic normocytic anemia  - Monitor closely, transfuse for Hgb <7  - No evidence of active bleeding    4. NSTEMI  - For ischemic evaluation (stress test v. Cath) once clinically stabilizes, anticipate this will be done soon. Will touch base with Cardiology today regarding plan    5. DVT ppx - lovenox    Resolved medical issues during this admission:     KERA   Hypokalemia  - resolved     DMII  - lantus, ISS     GI bleed / Gastritis  - received 1 U pRBC  - improved on conservative management  - IV PPI BID had been provided     Nutrition  - on TF    Subjective  Pt. seen and examined. No acute events noted overnight. Patient denies fever, chills, flank pain, nausea, vomiting, chest pain or abdominal pain currently.      ROS: 8 systems reviewed and negative except as stated above    Objective  Vitals:    04/15/19 1046   BP: Pulse:    Resp:    Temp: 99.9 °F (37.7 °C)   SpO2:          Intake/Output Summary (Last 24 hours) at 4/15/2019 1053  Last data filed at 4/15/2019 0836  Gross per 24 hour   Intake 1014 ml   Output 275 ml   Net 739 ml       Exam: grossly unchanged from yesterday  GEN - elderly male, alert, afebrile, not diaphoretic  HEAD - normocephalic, atraumatic  EENT - PEERLA, EOMI, MMM, trach in place  CVS - RRR, no m/g/r, S1 and S2 heard, no thrills  PULM - lungs CTAB, no w/r/r, normal inspiratory effort on vent  ABD - soft, no TTP today, ND, +BS, no guarding, no rebound  NEURO - non focal, CN2-12 grossly intact  EXT - extremities warm, distal pulses 2+ and symmetric bilaterally, no clubbing, cyanosis or edema  SKIN - warm, no rashes visualized      Lab Results:  CBC   Recent Labs     04/13/19  0545 04/13/19  1311 04/14/19  0615 04/15/19  0520   WBC 8.5  --  10.5 9.4   HGB 6.4  HGB CALLED TO RAMAN KLINE RN ON 4/13/19 AT 0619 BY KLAUDIA MOSELEY [facility]  RESULTS READ BACK  * 7.8* 7.5* 7.8*   HCT 21.9* 26.1* 25.8* 26.9*     --  206 219      RENAL  Recent Labs     04/13/19  0545 04/14/19  0615 04/15/19  0520    146* 144   K 3.9 3.7 3.9    108 107   CO2 31 31 30   BUN 20 21 18   CREATININE 0.9 0.9 0.9     LFT'S  No results for input(s): AST, ALT, ALB, BILIDIR, BILITOT, ALKPHOS in the last 72 hours. COAG  No results for input(s): INR in the last 72 hours. CARDIAC ENZYMES  No results for input(s): CKTOTAL, CKMB, CKMBINDEX, TROPONINI in the last 72 hours.     U/A:    Lab Results   Component Value Date    COLORU RED 03/31/2019    WBCUA NONE SEEN 03/31/2019    RBCUA 6,176 03/31/2019    MUCUS RARE 03/31/2019    BACTERIA NEGATIVE 03/31/2019    CLARITYU HAZY 03/31/2019    SPECGRAV 1.029 03/31/2019    LEUKOCYTESUR TRACE 03/31/2019    BLOODU LARGE 03/31/2019       ABG    Lab Results   Component Value Date    FLH4IRR 32.5 04/15/2019    QAL2GAZ 49.0 04/15/2019    PO2ART 52 04/15/2019       Imaging Studies: Xr Chest Standard (2 for Exam: nicanor ll pneumonia,r/o pleural effusion Acuity: Acute Additional signs and symptoms: patient unable to follow commands Relevant Medical/Surgical History: unknown, poor historian FINDINGS: Mediastinum: Limited evaluation for lymphadenopathy without intravenous contrast.  Normal caliber thoracic aorta with mild atherosclerotic calcifications. Mildly enlarged main pulmonary artery measuring 3.5 cm in diameter. Normal heart size. No pericardial effusion. Segmental gaseous distention of the esophagus. Lungs/pleura: Moderate right and small left pleural effusions with partial lower lobe atelectasis. Additional patchy tree-in-bud/clustered nodularity in the left upper and lower lobes and middle lobe. Mild dependent secretions within the trachea and possibly the proximal left bronchial tree. No pneumothorax. Upper Abdomen: Oral contrast within the colon. Scattered calcified granulomas within the liver and spleen reflect remote granulomatous disease. Soft Tissues/Bones: No enlarged axillary or supraclavicular lymph nodes. Normal thyroid. Glenohumeral osteoarthrosis. Thoracic spondylosis. Moderate right and small left pleural effusions with associated atelectasis. Multifocal clustered/tree-in-bud nodularity in the left upper lobe, left lower lobe and middle lobe most likely represents an infectious or inflammatory bronchiolitis. Consider aspiration and etiology given tracheobronchial secretions and esophageal features suggesting dysmotility and reflux. Xr Chest Portable    Result Date: 4/12/2019  EXAMINATION: SINGLE XRAY VIEW OF THE CHEST 4/12/2019 5:39 am COMPARISON: Chest x-ray 04/11/2019 HISTORY: ORDERING SYSTEM PROVIDED HISTORY: chest tube placement TECHNOLOGIST PROVIDED HISTORY: Reason for exam:->chest tube placement Ordering Physician Provided Reason for Exam: chest tube placement Acuity: Acute Type of Exam: Subsequent/Follow-up FINDINGS: Right-sided PICC tip projects at the lower SVC. 1:08 pm COMPARISON: 4/9/2019 HISTORY: ORDERING SYSTEM PROVIDED HISTORY: intubation TECHNOLOGIST PROVIDED HISTORY: Reason for exam:->intubation Ordering Physician Provided Reason for Exam: intubation Acuity: Acute Type of Exam: Initial Relevant Medical/Surgical History: htn FINDINGS: Endotracheal tube tip terminates 3.3 cm above the cyndi. Nasogastric tube terminates off the inferior margin of the radiograph. Right upper extremity PICC is also in stable position. Stable cardiomediastinal silhouette. The lungs demonstrate interval worsening of perihilar opacities. More focal opacities are seen in the bilateral bases with bilateral effusions. No pneumothorax. No acute osseous abnormality. 1. Lines and tubes as described. 2. Interval worsening of perihilar opacities, likely representing edema. 3. Bibasilar opacities with bilateral effusions are unchanged. Xr Chest Portable    Result Date: 4/10/2019  EXAMINATION: SINGLE XRAY VIEW OF THE CHEST 4/10/2019 5:28 am COMPARISON: 04/09/2019 HISTORY: ORDERING SYSTEM PROVIDED HISTORY: reintubated TECHNOLOGIST PROVIDED HISTORY: Reason for exam:->reintubated Ordering Physician Provided Reason for Exam: reintubated Acuity: Acute Type of Exam: Subsequent/Follow-up FINDINGS: Endotracheal tube tip is 3-4 cm above the cyndi. Enteric tube and right arm PICC remain in place. There has been slight interval improvement in patchy bilateral airspace disease. There is no discernible pneumothorax. 1. Endotracheal tube tip is 3-4 cm above the cyndi. 2. Slight interval improvement in bilateral airspace disease.      Xr Chest Portable    Result Date: 4/9/2019  EXAMINATION: SINGLE XRAY VIEW OF THE CHEST 4/9/2019 5:34 am COMPARISON: 04/07/2019 541 hours HISTORY: ORDERING SYSTEM PROVIDED HISTORY: ventilator TECHNOLOGIST PROVIDED HISTORY: Reason for exam:->ventilator Ordering Physician Provided Reason for Exam: vent mgmt Acuity: Acute Type of Exam: Subsequent/Follow-up FINDINGS: Monitor wires project over the thorax. ETT, enteric catheter, and right PICC remain unchanged. Right perihilar opacity again seen. Layering right pleural effusion present. Heart size is normal.  No pneumothorax. Stable chest with right perihilar opacity and layering right pleural effusion. Xr Chest Portable    Result Date: 4/8/2019  EXAMINATION: SINGLE XRAY VIEW OF THE CHEST 4/7/2019 6:32 am COMPARISON: 04/06/2019 HISTORY: ORDERING SYSTEM PROVIDED HISTORY: hypoxia on vent TECHNOLOGIST PROVIDED HISTORY: Reason for exam:->hypoxia on vent Ordering Physician Provided Reason for Exam: hypoxia on vent Acuity: Unknown Type of Exam: Unknown FINDINGS: Endotracheal tube terminates 5 cm above the cyndi. Enteric tube courses below the diaphragm. Right PICC with tip at the inferior SVC. No pneumothorax. Patchy areas of consolidation at the right perihilar lung and right lung base with small layering right pleural effusion, similar to the prior exam. Heart size is normal.     1.  Satisfactory position of support devices. 2.  No significant change from prior exam.     Xr Chest Portable    Result Date: 4/6/2019  EXAMINATION: SINGLE XRAY VIEW OF THE CHEST 4/6/2019 6:27 am COMPARISON: 04/05/2019 and prior HISTORY: ORDERING SYSTEM PROVIDED HISTORY: tube placement TECHNOLOGIST PROVIDED HISTORY: Reason for exam:->tube placement Ordering Physician Provided Reason for Exam: tube placement Acuity: Unknown Type of Exam: Unknown FINDINGS: Study limited by overlying support and monitoring apparatus. Endotracheal tube projects approximately 5 cm above the cyndi, unchanged. Nasogastric tube is seen overlying the esophagus. The tip is below the field of view and below the diaphragm. No definite new lines or tubes are noted. Heart size is within normal limits. Pulmonary vascularity appears within normal limits. Linear densities overlying the right side of the chest compatible with skin folds.   Increased hazy opacities noted on the right similar to the prior study. No significant change in left basilar pleural and parenchymal disease. There is evidence of old granulomatous disease. Osseous structures demonstrate no acute abnormality. Lines and tubes are stable. Persistent bilateral pleural and parenchymal changes greater on the right which accounting for differences in technique do not appear to be significantly changed from the comparison. Xr Chest Portable    Result Date: 4/5/2019  EXAMINATION: SINGLE XRAY VIEW OF THE CHEST 4/5/2019 6:01 am COMPARISON: April 4, 2019 HISTORY: ORDERING SYSTEM PROVIDED HISTORY: tube placement TECHNOLOGIST PROVIDED HISTORY: Reason for exam:->tube placement Ordering Physician Provided Reason for Exam: tube placement Acuity: Unknown Type of Exam: Subsequent/Follow-up Additional signs and symptoms: tube placement Relevant Medical/Surgical History: tube placement FINDINGS: Endotracheal tube with tip 5.6 cm from the cyndi. NG tube with tip in the upper stomach. No pneumothorax. Small bilateral pleural effusions, greater on the right as well as hazy bibasilar opacities. No acute bony abnormality. Worsening right pleural effusion and bibasilar opacities. Xr Chest Portable    Result Date: 4/4/2019  EXAMINATION: SINGLE XRAY VIEW OF THE CHEST 4/4/2019 5:12 am COMPARISON: 04/03/2019 HISTORY: ORDERING SYSTEM PROVIDED HISTORY: tube placement TECHNOLOGIST PROVIDED HISTORY: Reason for exam:->tube placement Ordering Physician Provided Reason for Exam: tube placement Acuity: Acute Type of Exam: Subsequent/Follow-up FINDINGS: Endotracheal tube and NG tube are in place. The heart and mediastinal structures are stable. Small pleural effusions with bibasilar atelectasis persist.  Arthritic changes of the shoulders are noted. Persistent small pleural effusions with bibasilar atelectasis.      Xr Chest Portable    Result Date: 4/3/2019  EXAMINATION: SINGLE XRAY VIEW OF THE CHEST 4/3/2019 5:30 am COMPARISON: April 2, 2019 HISTORY: ORDERING SYSTEM PROVIDED HISTORY: tube placement TECHNOLOGIST PROVIDED HISTORY: Reason for exam:->tube placement Ordering Physician Provided Reason for Exam: tube placement Acuity: Acute Type of Exam: Subsequent/Follow-up FINDINGS: The ETT is 3.8 cm above the cyndi. The feeding tube is inserted with its tip below the diaphragm and beyond the field of view. The cardiomediastinal silhouette is stable. There is airspace opacity at the right lung base, may be related to layering pleural effusion, atelectasis or pneumonia, stable. There is no pneumothorax. There is no acute osseous abnormality. Airspace opacity at the right lung base, may be related to layering pleural effusion, atelectasis or pneumonia, stable. Xr Chest Portable    Result Date: 4/2/2019  EXAMINATION: SINGLE XRAY VIEW OF THE CHEST 4/2/2019 9:25 pm COMPARISON: Chest x-ray 15 arch prior HISTORY: ORDERING SYSTEM PROVIDED HISTORY: post intubation/ ETT placement TECHNOLOGIST PROVIDED HISTORY: Reason for exam:->post intubation/ ETT placement Ordering Physician Provided Reason for Exam: POST INTUBATION AND NG TUBE PLACEMENT FINDINGS: Endotracheal tube tip projects 3.5 cm from the cyndi. Enteric tube identified with side port at the level of the GE junction. Tip projects over the expected location of the stomach. Recommend advancement. Opacities project over the bilateral lung bases, right greater than left which is suspicious for layering pleural effusions. No pneumothorax identified. Osseous structures appear stable. 1. Endotracheal tube tip projects approximately 3.5 cm from the cyndi. 2. Enteric tube side port at the level of the GE junction. Recommend advancement. 3. Findings suggesting layering effusions, right greater than left.      Xr Chest Portable    Result Date: 4/2/2019  EXAMINATION: SINGLE XRAY VIEW OF THE CHEST 4/2/2019 5:17 am COMPARISON: April 1, 2019 HISTORY: ORDERING SYSTEM PROVIDED HISTORY: tube placement TECHNOLOGIST PROVIDED HISTORY: Reason for exam:->tube placement Ordering Physician Provided Reason for Exam: tube placement Acuity: Acute Type of Exam: Subsequent/Follow-up FINDINGS: ETT tip is 6 cm above the cyndi. Enteric catheter extends beyond the inferior margin of the image. Cardiac silhouette is within normal range. Small right pleural effusion. No focal consolidation, left pleural effusion, or pneumothorax. Severe left glenohumeral degenerative changes with suspected chronic left rotator cuff tear. 1. Small right pleural effusion. Xr Chest Portable    Result Date: 4/1/2019  EXAMINATION: SINGLE XRAY VIEW OF THE CHEST 4/1/2019 8:46 am COMPARISON: 04/01/2019. HISTORY: ORDERING SYSTEM PROVIDED HISTORY: ETT placement TECHNOLOGIST PROVIDED HISTORY: Reason for exam:->ETT placement Ordering Physician Provided Reason for Exam: ETT placement Acuity: Acute Type of Exam: Initial Relevant Medical/Surgical History: diabetes mellitus; hypertension FINDINGS: The endotracheal tube has been repositioned with tip is now above the cyndi. A nasogastric tube courses below the diaphragm. The heart size and pulmonary vasculature stable. Again noted are hazy density seen throughout the lungs bilaterally. No pneumothoraces are seen. 1. Interval reposition of endotracheal tube with its tip now above the cyndi and in satisfactory position. 2. Otherwise, stable chest x-ray with findings likely reflecting pleural effusions and infiltrates. Xr Chest Portable    Result Date: 4/1/2019  EXAMINATION: SINGLE XRAY VIEW OF THE CHEST 4/1/2019 5:37 am COMPARISON: 03/31/2019.  HISTORY: ORDERING SYSTEM PROVIDED HISTORY: tube placement TECHNOLOGIST PROVIDED HISTORY: Reason for exam:->tube placement Ordering Physician Provided Reason for Exam: tube placement Acuity: Unknown Type of Exam: Subsequent/Follow-up Additional signs and symptoms: tube placement Relevant Medical/Surgical History: tube placement FINDINGS: The endotracheal tube is seen with its tip at the level of the cyndi. There is a nasogastric tube which courses below the diaphragm. The heart size and pulmonary vasculature are stable. There are hazy densities seen involving the lungs bilaterally. No new airspace disease is seen. No pneumothoraces are noted. Overall, compared to the prior exam there has been little change. 1. Stable chest x-ray with bilateral hazy opacities likely represent combination of pleural effusions and infiltrates. 2. Endotracheal tube with its tip at the level of the cyndi. I would suggest withdrawing the tube at least 2 cm. Xr Chest Portable    Result Date: 3/28/2019  EXAMINATION: SINGLE XRAY VIEW OF THE CHEST 3/28/2019 2:01 pm COMPARISON: 03/08/2019 HISTORY: ORDERING SYSTEM PROVIDED HISTORY: cough/fatigue TECHNOLOGIST PROVIDED HISTORY: Reason for exam:->cough/fatigue Ordering Physician Provided Reason for Exam: cough/fatigue Acuity: Unknown Type of Exam: Unknown Additional signs and symptoms: discharged 3/23/19 FINDINGS: Normal heart size. Somewhat prominent pulmonary vasculature. Haziness at the lung bases may represent atelectasis or layering pleural fluid. No pneumothorax. Haziness at the lung bases may represent atelectasis or layering pleural fluid. Recommend obtaining PA and lateral chest radiographs for confirmation. Xr Chest 1 Vw    Result Date: 3/31/2019  EXAMINATION: SINGLE XRAY VIEW OF THE CHEST 3/31/2019 5:22 am COMPARISON: 2 days prior HISTORY: ORDERING SYSTEM PROVIDED HISTORY: intubation and post cardiac arrest, check ETT TECHNOLOGIST PROVIDED HISTORY: Reason for exam:->intubation and post cardiac arrest, check ETT Ordering Physician Provided Reason for Exam: intubation and post cardiac arrest, check ETT Acuity: Unknown Type of Exam: Unknown FINDINGS: Endotracheal tube is been placed, tip approximately 4 cm above the cyndi in appropriate position.   Enteric tube tip and side port below diaphragm and stomach. There are diffuse hazy opacities over right greater than left lung, combination increasing effusions, atelectatic changes, underlying consolidation not excluded. Pulmonary vasculature is indistinct. Cardiomediastinal silhouette is stable. Increasing pleuroparenchymal opacities in both hemithoraces, right greater than left. Endotracheal and enteric tubes appear in appropriate position. Cta Neck W Contrast    Result Date: 3/31/2019  EXAMINATION: CTA OF THE NECK; CTA OF THE HEAD WITH CONTRAST; CT OF THE HEAD WITHOUT CONTRAST 3/31/2019 4:57 am; 3/31/2019 4:55 am: TECHNIQUE: CTA of the neck was performed with the administration of intravenous contrast. Multiplanar reformatted images are provided for review. MIP images are provided for review. Stenosis of the internal carotid arteries measured using NASCET criteria. Dose modulation, iterative reconstruction, and/or weight based adjustment of the mA/kV was utilized to reduce the radiation dose to as low as reasonably achievable.; CTA of the head/brain was performed with the administration of intravenous contrast. Multiplanar reformatted images are provided for review. MIP images are provided for review. Dose modulation, iterative reconstruction, and/or weight based adjustment of the mA/kV was utilized to reduce the radiation dose to as low as reasonably achievable.; CT of the head was performed without the administration of intravenous contrast. Dose modulation, iterative reconstruction, and/or weight based adjustment of the mA/kV was utilized to reduce the radiation dose to as low as reasonably achievable. Noncontrast CT of the head with reconstructed 2-D images are also provided for review. COMPARISON: None. HISTORY: ORDERING SYSTEM PROVIDED HISTORY: r/o CVA TECHNOLOGIST PROVIDED HISTORY: Has a \"code stroke\" or \"stroke alert\" been called? ->Yes Ordering Physician Provided Reason for Exam: a\ms; ORDERING SYSTEM PROVIDED HISTORY: r/o CVA TECHNOLOGIST PROVIDED HISTORY: R/o CVA Has a \"code stroke\" or \"stroke alert\" been called? ->No Ordering Physician Provided Reason for Exam: ams; ORDERING SYSTEM PROVIDED HISTORY: unresponsive TECHNOLOGIST PROVIDED HISTORY: R/o hemorrhagic change Has a \"code stroke\" or \"stroke alert\" been called? ->No Ordering Physician Provided Reason for Exam: ams FINDINGS: CT HEAD: BRAIN/VENTRICLES:  No acute intracranial hemorrhage or extraaxial fluid collection. Grey-white differentiation is maintained. No evidence of mass, mass effect or midline shift. No evidence of hydrocephalus. There is prominence of the ventricles and sulci due to global parenchymal volume loss. ORBITS: The visualized portion of the orbits demonstrate no acute abnormality. SINUSES:  The visualized paranasal sinuses and mastoid air cells demonstrate no acute abnormality. SOFT TISSUES/SKULL: No acute abnormality of the visualized skull or soft tissues. CTA NECK: AORTIC ARCH/ARCH VESSELS: There is a normal branch pattern of the aortic arch. No significant stenosis is seen of the innominate artery or subclavian arteries. CAROTID ARTERIES: The common carotid arteries are normal in appearance without evidence of a flow limiting stenosis. The internal carotid arteries are normal in appearance without evidence of a flow limiting stenosis by NASCET criteria. No dissection or arterial injury is seen. VERTEBRAL ARTERIES: Right vertebral artery is diminutive in caliber throughout. Left vertebral artery is dominant supplying basilar artery. SOFT TISSUES: The lung apices are clear. No cervical or superior mediastinal lymphadenopathy. There is endotracheal intubation. The parotid, submandibular and thyroid glands demonstrate no acute abnormality. BONES: The visualized osseous structures appear unremarkable. Large pleural effusions. CTA HEAD: ANTERIOR CIRCULATION: The internal carotid arteries are normal in course and caliber without focal stenosis.  The anterior reformatted images are provided for review. MIP images are provided for review. Dose modulation, iterative reconstruction, and/or weight based adjustment of the mA/kV was utilized to reduce the radiation dose to as low as reasonably achievable.; CT of the head was performed without the administration of intravenous contrast. Dose modulation, iterative reconstruction, and/or weight based adjustment of the mA/kV was utilized to reduce the radiation dose to as low as reasonably achievable. Noncontrast CT of the head with reconstructed 2-D images are also provided for review. COMPARISON: None. HISTORY: ORDERING SYSTEM PROVIDED HISTORY: r/o CVA TECHNOLOGIST PROVIDED HISTORY: Has a \"code stroke\" or \"stroke alert\" been called? ->Yes Ordering Physician Provided Reason for Exam: a\ms; ORDERING SYSTEM PROVIDED HISTORY: r/o CVA TECHNOLOGIST PROVIDED HISTORY: R/o CVA Has a \"code stroke\" or \"stroke alert\" been called? ->No Ordering Physician Provided Reason for Exam: ams; ORDERING SYSTEM PROVIDED HISTORY: unresponsive TECHNOLOGIST PROVIDED HISTORY: R/o hemorrhagic change Has a \"code stroke\" or \"stroke alert\" been called? ->No Ordering Physician Provided Reason for Exam: ams FINDINGS: CT HEAD: BRAIN/VENTRICLES:  No acute intracranial hemorrhage or extraaxial fluid collection. Grey-white differentiation is maintained. No evidence of mass, mass effect or midline shift. No evidence of hydrocephalus. There is prominence of the ventricles and sulci due to global parenchymal volume loss. ORBITS: The visualized portion of the orbits demonstrate no acute abnormality. SINUSES:  The visualized paranasal sinuses and mastoid air cells demonstrate no acute abnormality. SOFT TISSUES/SKULL: No acute abnormality of the visualized skull or soft tissues. CTA NECK: AORTIC ARCH/ARCH VESSELS: There is a normal branch pattern of the aortic arch. No significant stenosis is seen of the innominate artery or subclavian arteries.  CAROTID ARTERIES: The common carotid arteries are normal in appearance without evidence of a flow limiting stenosis. The internal carotid arteries are normal in appearance without evidence of a flow limiting stenosis by NASCET criteria. No dissection or arterial injury is seen. VERTEBRAL ARTERIES: Right vertebral artery is diminutive in caliber throughout. Left vertebral artery is dominant supplying basilar artery. SOFT TISSUES: The lung apices are clear. No cervical or superior mediastinal lymphadenopathy. There is endotracheal intubation. The parotid, submandibular and thyroid glands demonstrate no acute abnormality. BONES: The visualized osseous structures appear unremarkable. Large pleural effusions. CTA HEAD: ANTERIOR CIRCULATION: The internal carotid arteries are normal in course and caliber without focal stenosis. The anterior cerebral and middle cerebral arteries demonstrate no focal stenosis. POSTERIOR CIRCULATION: The posterior cerebral arteries demonstrate no focal stenosis. The vertebral and basilar arteries appear otherwise unremarkable. No CT evidence of acute territorial infarct. No significant stenosis visualized in the major intracranial arterial vasculature. Cervical carotid vasculature is patent. Diminutive right vertebral artery and widely patent left vertebral artery. Mri Brain W Wo Contrast    Result Date: 4/11/2019  EXAMINATION: MRI OF THE BRAIN WITHOUT AND WITH CONTRAST  4/11/2019 5:57 pm TECHNIQUE: Multiplanar multisequence MRI of the head/brain was performed without and with the administration of intravenous contrast. COMPARISON: None. HISTORY: ORDERING SYSTEM PROVIDED HISTORY: failure to wean off ventilator TECHNOLOGIST PROVIDED HISTORY: Ordering Physician Provided Reason for Exam: Pt. on Vent with ams and confusion; nkt, no sx to roiper family conscent. GFR>60  4/11/19.    15ML Prohance        JG Acuity: Unknown Type of Exam: Unknown FINDINGS: INTRACRANIAL STRUCTURES/VENTRICLES:  There is diffuse parenchymal volume loss. No parenchymal signal abnormality is seen. There is no acute infarct or mass. No mass effect or midline shift. No evidence of an acute intracranial hemorrhage. The ventricles and sulci are normal in size and configuration. The sellar/suprasellar regions appear unremarkable. The normal signal voids within the major intracranial vessels appear maintained. No abnormal focus of enhancement is seen within the brain. ORBITS: The visualized portion of the orbits demonstrate no acute abnormality. SINUSES: The visualized paranasal sinuses and mastoid air cells are well aerated. BONES/SOFT TISSUES: The bone marrow signal intensity appears normal. The soft tissues demonstrate no acute abnormality. 1. No acute intracranial abnormality. 2. Diffuse parenchymal volume loss.        Medications Reviewed    Electronically signed by: Rafal Alvarenga MD 4/15/2019 10:53 AM

## 2019-04-16 NOTE — DISCHARGE SUMMARY
Discharge Summary    Name:  Maru Brower /Age/Sex: 7/15/1933  (80 y.o. male)   MRN & CSN:  [id number] & [id number] Admission Date/Time: 3/28/2019  1:13 PM   Attending:  Sarah Clark MD Discharging Physician: Kareen Toro MD     HPI and Hospital Course:   HPI- as per H and P: Maru Brower is a 80 y.o.  male, with past medical history significant for hyperlipidemia, diabetes, who presented to the ED from home due generalized body weakness. He was recently discharged from rehab unit. He was admitted on  due to pneumonia and influenza. The finished a course of antibiotic and antiviral.  Since discharge, he noted worsening generalized body weakness. He wasn't able to get out of bed. She denied lateralizing weakness or numbness. Still with cough. Denied shortness of breath. No fever. Denied lower extremity swelling or orthopnea. He has no chest pain. He was brought to the ED and was subsequently admitted    [address] LIST:  1. Acute on chronic respiratory failure 2/2 Bacterial Pneumonia, failed extubation twice since admission on 3/28-s/p trach placement- and now on mechanical ventilation, to be discharged to [facility] for continued weaning. 2. Septic Shock 2/2 Bacterial PNA - off pressors and completed abx on    3. Acute on chronic normocytic anemia-- Monitor closely, transfuse for Hgb <7-- No evidence of active bleeding- seen by GI- on PPI  4. NSTEMI- felt due to demand ischemia, seen by cardio, echo with preserved EF and no wall motion abnormalities- medical management for now.  With hypotension requiring midodrine, cardio recommending addition of lopressor to the extent BP allows     Resolved medical issues during this admission:     KERA   Hypokalemia  - resolved     DMII  - lantus, ISS     GI bleed / Gastritis  - received 1 U pRBC  - improved on conservative management  - IV PPI BID had been provided     Nutrition  - on TF          The patient expressed appropriate understanding of and agreement with the discharge recommendations, medications, and plan. Consults this admission:  IP CONSULT TO CARDIOLOGY  IP CONSULT TO HOSPITALIST  IP CONSULT TO PULMONOLOGY  PHARMACY TO DOSE VANCOMYCIN  IP CONSULT TO NEUROLOGY  IP CONSULT TO DIETITIAN  IP CONSULT TO GI  IP CONSULT TO NEUROLOGY  IP CONSULT TO ANESTHESIOLOGY  IP CONSULT TO [address]    Discharge Instruction:   Follow up appointments:   Primary care physician:  within 2 weeks    Diet:  General/cardiac/ADA/as tolerated  Activity: {discharge activity: as tolerated  Disposition: Discharged to:   [x]Home, []C, []SNF, []Acute Rehab, []Hospice   Condition on discharge: Stable    Discharge Medications:      Sue Espinalibrahima   Home Medication Instructions D:[id number]    Printed on:04/16/19 3632   Medication Information                      aspirin 81 MG chewable tablet  Take 1 tablet by mouth daily             chlorhexidine (PERIDEX) 0.12 % solution  Take 15 mLs by mouth 2 times daily for 14 days             clopidogrel (PLAVIX) 75 MG tablet  Take 1 tablet by mouth daily             enoxaparin (LOVENOX) 40 MG/0.4ML injection  Inject 0.4 mLs into the skin daily             gabapentin (NEURONTIN) 400 MG capsule  Take 400 mg by mouth 2 times daily.              insulin glargine (LANTUS) 100 UNIT/ML injection vial  Inject 5 Units into the skin nightly             ipratropium-albuterol (DUONEB) 0.5-2.5 (3) MG/3ML SOLN nebulizer solution  Inhale 3 mLs into the lungs every 4 hours (while awake)             latanoprost (XALATAN) 0.005 % ophthalmic solution  Place 1 drop into both eyes nightly             metFORMIN (GLUCOPHAGE) 500 MG tablet  Take 250 mg by mouth 2 times daily (with meals)              midodrine (PROAMATINE) 10 MG tablet  Take 1 tablet by mouth 3 times daily (with meals)             omeprazole (PRILOSEC) 20 MG delayed release capsule  Take 20 mg by mouth Daily with supper              pantoprazole (PROTONIX) 40 MG

## 2019-04-16 NOTE — PROGRESS NOTES
Upon completing assessment patient started to struggle to breathe. He was suctioned and thick copious secretions were removed but patient oxygenation was not returning to >90% educated patient to cough and try to get the secretions out so I could suction them. He coughed and very large mucous plug was removed. But oxygen was not returning to normal. Respiratory was at bedside and breathing treatment was given vent setting were changed to SIMV instead of CPAP mode and patient started to come up with his oxygen. fio2 was increased to 50% tylenol was given for his temp and ativan was ordered for his anxiousness.     Dr. Natividad Badillo was called and informed on all of above and he was ok with everything that was done and he wanted vent setting at SIMV of 10 pressure support of 15

## 2019-04-16 NOTE — PROGRESS NOTES
04/15/19 6537   Vent Information   Vent Type 980   Vent Mode CPAP   Pressure Support 15 cmH20   FiO2  40 %   Sensitivity 3   PEEP/CPAP 5   Humidification Source Heated wire   Humidification Temp 36.9   Circuit Condensation Drained   Vent Patient Data   Peak Inspiratory Pressure 21 cmH2O   Mean Airway Pressure 9.1 cmH20   Rate Measured 28 br/min   Vt Exhaled 351 mL   Minute Volume 9.1 Liters   I:E Ratio 1:2.3   Alarm Settings   High Pressure Alarm 45 cmH2O   Press Low Alarm 4 cmH2O   Delay Alarm 20 sec(s)   Low Minute Volume Alarm 2.5 L/min   Apnea (secs) 20 secs   High Respiratory Rate 35 br/min   Low Exhaled Vt  250 mL   Surgical Airway (trach) Shiley Cuffed   Placement Date/Time: 04/12/19 0809   Timeout: Consent Confirmed;Sterile Technique using full body drape;Site prepped with chlorhexidine; Appropriate Equipment; Availability of Implant; Correct Position;Sterile drsg with biopatch; Patient;Procedure;Site/Si. ..    Status Secured   Site Assessment Clean;Dry   Ties Assessment Intact   Weaning per protocol

## 2019-04-16 NOTE — CARE COORDINATION
FRAN spoke with Dr Fidencio Russo who advised CM that if pt cleared by cardiologist and no cath needed at this time then pt will be discharged to [address] today. Cardiology note reviewed indicating pt OK to discharge from cardio standpoint. Cm contacted Dr Fidencio Russo back who states that he will get transfer orders done by noon. Cm contacted pt's wife to advise that pt will be transferred to [place] today. She will be in to see pt prior to transfer. Cm contacted Anthony at [place] re; pt able to transfer today. Cm contacted [email or web address] and they will transport pt with  at 1400. Med Trans advised of new trach as of 4/12, vent and 02 needs. Cm re-contacted pt's wife re; planned time of transfer.

## 2019-04-16 NOTE — PROGRESS NOTES
Perfect serve message sent to Dr. Jesus Godwin to clarify midodrine. Checking to see if he wanted parameters on the midodrine or to decrease the amount or discontinue. Per Dr. Jose Vasquez note start lopressor 12.5mg BID if not using midodrine.

## 2019-04-16 NOTE — PROGRESS NOTES
Called [facility] [phone]. Was told to call [phone] and ask for [facility]. Patient is going to [place]- room 2034.  at 2 pm. Called nurse to nurse report to [facility]. Answered all questions.

## 2019-04-16 NOTE — PROGRESS NOTES
Pt SpO2 began to drop below 90% and hovered in the 80s and respiratory rate climbed to 40. Nurse states pt recently developed a fever as well. Changed vent mode from CPAP to previous settings of SIMV 4, PS 15. 450, 50%, +5.

## 2019-04-16 NOTE — PROGRESS NOTES
Today's plan:  [id number] Risa Santamaria for discharge from cardiac stand point on current medications    Admit Date:  3/28/2019    Subjective: tracheostomy and peg, stable      Chief complaints on admission  Chief Complaint   Patient presents with    Fatigue         History of present illness:Tai is a 80 y. o.year old who  presents with had concerns including Fatigue. Past medical history:    has a past medical history of Diabetes mellitus ([address] and Hypertension. Past surgical history:   has a past surgical history that includes Upper gastrointestinal endoscopy (N/A, 4/4/2019); thoracentesis (Bilateral, 04/11/2019); and Tracheotomy (N/A, 4/12/2019). Social History:   reports that he has never smoked. He has never used smokeless tobacco. He reports that he does not drink alcohol or use drugs. Family history:  family history is not on file. No Known Allergies      Objective:   BP (!) 152/79   Pulse 113   Temp 99.9 °F (37.7 °C) (Core)   Resp 27   Ht 5' 10\" (1.778 m)   Wt 160 lb 0.9 oz (72.6 kg)   SpO2 100%   BMI 22.97 kg/m²       Intake/Output Summary (Last 24 hours) at 4/16/2019 0940  Last data filed at 4/16/2019 0502  Gross per 24 hour   Intake 3269 ml   Output 900 ml   Net 2369 ml       TELEMETRY:sinus    Physical Exam:  Constitutional:  Well developed, Well nourished, No acute distress, Non-toxic appearance. HENT:  Normocephalic, Atraumatic, Bilateral external ears normal, Oropharynx moist, No oral exudates, Nose normal. Neck- Normal range of motion, No tenderness, Supple, No stridor. Eyes:  PERRL, EOMI, Conjunctiva normal, No discharge. Respiratory:  Normal breath sounds, No respiratory distress, No wheezing, No chest tenderness. ,no use of accessory muscles, diaphragm movement is normal  Cardiovascular: (PMI) apex non displaced,no lifts no thrills, no s3,no s4, Normal heart rate, Normal rhythm, No murmurs, No rubs, No gallops.  Carotid arteries pulse and amplitude are normal no bruit, no abdominal bruit noted ( normal abdominal aorta ausculation), femoral arteries pulse and amplitude are normal no bruit, pedal pulses are normal  GI:  Bowel sounds normal, Soft, No tenderness, No masses, No pulsatile masses. : External genitalia appear normal, No masses or lesions. No discharge. No CVA tenderness. Musculoskeletal:  Intact distal pulses, No edema, No tenderness, No cyanosis, No clubbing. Good range of motion in all major joints. No tenderness to palpation or major deformities noted. Back- No tenderness. Integument:  Warm, Dry, No erythema, No rash. Lymphatic:  No lymphadenopathy noted.    Neurologic: trach intubated  Medications:    chlorhexidine  15 mL Mouth/Throat BID    pantoprazole  40 mg Intravenous Daily    And    sodium chloride (PF)  10 mL Intravenous Daily    midodrine  10 mg Oral TID WC    sodium chloride flush  10 mL Intravenous 2 times per day    [Held by provider] clopidogrel  75 mg Oral Daily    enoxaparin  40 mg Subcutaneous Daily    [Held by provider] gabapentin  400 mg Oral BID    latanoprost  1 drop Both Eyes Nightly    simvastatin  40 mg Oral Nightly    sodium chloride flush  10 mL Intravenous 2 times per day    aspirin  81 mg Oral Daily    ipratropium-albuterol  1 ampule Inhalation Q4H WA    insulin lispro  0-12 Units Subcutaneous TID WC    insulin lispro  0-6 Units Subcutaneous Nightly    insulin glargine  5 Units Subcutaneous Nightly      propofol Stopped (04/12/19 1334)    sodium chloride 75 mL/hr at 04/15/19 2248    dextrose       potassium chloride, sodium chloride flush, racepinephrine HCl, sodium chloride nebulizer, glucose, dextrose, glucagon (rDNA), dextrose, sodium chloride flush, magnesium hydroxide, ondansetron, sodium chloride flush, acetaminophen    Lab Data:  CBC:   Recent Labs     04/14/19  0615 04/15/19  0520 04/16/19  0542   WBC 10.5 9.4 8.9   HGB 7.5* 7.8* 7.5*   HCT 25.8* 26.9* 25.9*   MCV 98.5 98.9 100.8*    219 221     BMP:   Recent Labs 04/14/19  0615 04/15/19  0520 04/16/19  0542   * 144  --    K 3.7 3.9  --     107  --    CO2 31 30 30   BUN 21 18 16   CREATININE 0.9 0.9 0.8*     LIVER PROFILE: No results for input(s): AST, ALT, LIPASE, BILIDIR, BILITOT, ALKPHOS in the last 72 hours. Invalid input(s): AMYLASE,  ALB  PT/INR: No results for input(s): PROTIME, INR in the last 72 hours. APTT: No results for input(s): APTT in the last 72 hours. BNP:  No results for input(s): BNP in the last 72 hours. TROPONIN: @TROPONINI:3@      Assessment:  80 y. o.year old who is admitted for          Plan:  [id number] Risa Santamaria for discharge from cardiac standpoint, his elevated trop was deemed secondary to severe anemia and respiratory failure, his echo LVEF and ekg are wnl, will conservatively treat with based on present condition. Continue aspirin, statins, start lopressor 12.5mg bid if he is not using midodrine   patient remains non verbal on vent and trach. Health maintenance: exerise and diet  All labs, medications and tests reviewed, continue all other medications of all above medical condition listed as is.       Ria Samson MD 4/16/2019 9:40 AM

## 2019-04-16 NOTE — PROGRESS NOTES
Nutrition Assessment (Enteral Nutrition)    Type and Reason for Visit: Reassess    Nutrition Recommendations:   · Continue current EN as ordered    Nutrition Assessment: Pt is tolerating his EN with little residuals. He has been stating that his stomach is upset, however, he is not having vomiting or diarrhea. Pt is at moderate risk at this time    Malnutrition Assessment:  · Malnutrition Status: Meets the criteria for severe malnutrition  · Context: Chronic illness  · Findings of the 6 clinical characteristics of malnutrition (Minimum of 2 out of 6 clinical characteristics is required to make the diagnosis of moderate or severe Protein Calorie Malnutrition based on AND/ASPEN Guidelines):  1. Energy Intake-Less than or equal to 75% of estimated energy requirement, Greater than or equal to 7 days    2. Weight Loss-2% loss or greater, in 1 week  3. Fat Loss-Severe subcutaneous fat loss, Orbital  4. Muscle Loss-Severe muscle mass loss, Clavicles (pectoralis and deltoids)  5. Fluid Accumulation-No significant fluid accumulation, Extremities  6.   Strength-Not measured    Nutrition Risk Level: High    Nutrition Needs:  · Estimated Daily Total Kcal: 8794-0919 based on [address] Jeor  · Estimated Daily Protein (g): 75-90 based on 1-1.2 g/kg/IBW  · Estimated Daily Fluid (ml/day): 3729-6227 based on 1 mL/kcal    Nutrition Diagnosis:   · Problem: Severe malnutrition, In context of chronic illness  · Etiology: related to Insufficient energy/nutrient consumption     Signs and symptoms:  as evidenced by Severe loss of subcutaneous fat, Severe muscle loss, Weight loss greater than or equal to 2% in 1 week    Objective Information:  · Wound Type: None  · Current Nutrition Therapies:  · Oral Diet Orders: NPO   · Tube Feeding (TF) Orders:   · Feeding Route: Gastrostomy  · Formula: Standard w/Fiber  · Rate (ml/hr):55    · Volume (ml/day): 1320  · Duration: Continuous  · Current TF & Flush Orders Provides: 1584 kcal and 73 g of protein  · Anthropometric Measures:  · Ht: 5' 10\" (177.8 cm)   · Current Body Wt: 160 lb (72.6 kg)  · Admission Body Wt: 149 lb (67.6 kg)  · Usual Body Wt: 154 lb (69.9 kg)  · Weight Change: -1.9% in one week   · Ideal Body Wt: 166 lb (75.3 kg), % Ideal Body 96%  · BMI Classification: BMI 18.5 - 24.9 Normal Weight    Nutrition Interventions:   Continue current Tube Feeding  Continued Inpatient Monitoring, Education Not Indicated, Coordination of Care    Nutrition Evaluation:   · Evaluation: Progressing toward goals   · Goals: pt will consume greater than 75% of his meals and supplements provided   · Monitoring: Weight, Pertinent Labs, Monitor Hemodynamic Status, Nutrition Progression, TF Intake, TF Tolerance      Electronically signed by Jessica Schmidt RD, FERNANDEZ on 3/70/88 at 10:32 AM    Contact Number: [phone]

## 2019-04-16 NOTE — PLAN OF CARE
Nutrition Problem: Severe malnutrition, In context of chronic illness  Intervention: Food and/or Nutrient Delivery: Continue current Tube Feeding  Nutritional Goals: pt will consume greater than 75% of his meals and supplements provided

## 2019-07-21 NOTE — ED NOTES
Dr Tosha King advised this nurse that she would like a 20F G-tube. This nurse called [facility], and they will be bringing it up.       Jeffrey Cuenca RN  07/21/19 1011

## 2019-07-21 NOTE — ED PROVIDER NOTES
Minutes per session: Not on file    Stress: Not on file   Relationships    Social connections:     Talks on phone: Not on file     Gets together: Not on file     Attends Taoism service: Not on file     Active member of club or organization: Not on file     Attends meetings of clubs or organizations: Not on file     Relationship status: Not on file    Intimate partner violence:     Fear of current or ex partner: Not on file     Emotionally abused: Not on file     Physically abused: Not on file     Forced sexual activity: Not on file   Other Topics Concern    Not on file   Social History Narrative    Not on file     No current facility-administered medications for this encounter. Current Outpatient Medications   Medication Sig Dispense Refill    aspirin 81 MG chewable tablet Take 1 tablet by mouth daily 30 tablet 3    clopidogrel (PLAVIX) 75 MG tablet Take 1 tablet by mouth daily 30 tablet 3    enoxaparin (LOVENOX) 40 MG/0.4ML injection Inject 0.4 mLs into the skin daily 1 mL 3    insulin glargine (LANTUS) 100 UNIT/ML injection vial Inject 5 Units into the skin nightly 1 vial 3    ipratropium-albuterol (DUONEB) 0.5-2.5 (3) MG/3ML SOLN nebulizer solution Inhale 3 mLs into the lungs every 4 hours (while awake) 360 mL 0    midodrine (PROAMATINE) 10 MG tablet Take 1 tablet by mouth 3 times daily (with meals) 90 tablet 3    pantoprazole (PROTONIX) 40 MG injection Infuse 40 mg intravenously daily 30 each 0    simvastatin (ZOCOR) 80 MG tablet Take 40 mg by mouth nightly      omeprazole (PRILOSEC) 20 MG delayed release capsule Take 20 mg by mouth Daily with supper       metFORMIN (GLUCOPHAGE) 500 MG tablet Take 250 mg by mouth 2 times daily (with meals)       gabapentin (NEURONTIN) 400 MG capsule Take 400 mg by mouth 2 times daily.       latanoprost (XALATAN) 0.005 % ophthalmic solution Place 1 drop into both eyes nightly       No Known Allergies  Nursing Notes Reviewed    ROS:  At least 10 systems Impression:  1.  Malfunction of gastrostomy tube ([facility] [address]        Disposition Vitals:  [unfilled], [unfilled], [unfilled], [unfilled]    Disposition referral (if applicable):  Dr. Austen Williamson primary care physician as needed          Disposition medications (if applicable):  New Prescriptions    No medications on file         (Please note that portions of this note may have been completed with a voice recognition program. Efforts were made to edit the dictations but occasionally words are mis-transcribed.)    Valentino Knack, MD Vernadine Gaskin, MD  07/21/19 1800

## 2019-07-21 NOTE — ED NOTES
This nurse was able to push a small amount of coffee into the g-tube, but the patient began complaining of pain as soon as the air in the tube began to go thru. I then attempted pepsi, and he said it hurt too bad. It took some pressure to get anything thru the tube.       Derek Grace RN  07/21/19 0459

## 2020-01-01 ENCOUNTER — HOSPITAL ENCOUNTER (INPATIENT)
Age: 85
LOS: 4 days | Discharge: HOSPICE | DRG: 871 | End: 2020-01-13
Attending: EMERGENCY MEDICINE | Admitting: INTERNAL MEDICINE
Payer: MEDICARE

## 2020-01-01 ENCOUNTER — APPOINTMENT (OUTPATIENT)
Dept: GENERAL RADIOLOGY | Age: 85
DRG: 871 | End: 2020-01-01
Payer: MEDICARE

## 2020-01-01 ENCOUNTER — APPOINTMENT (OUTPATIENT)
Dept: GENERAL RADIOLOGY | Age: 85
DRG: 871 | End: 2020-01-01
Attending: FAMILY MEDICINE
Payer: COMMERCIAL

## 2020-01-01 ENCOUNTER — HOSPITAL ENCOUNTER (INPATIENT)
Age: 85
LOS: 2 days | DRG: 871 | End: 2020-01-15
Attending: FAMILY MEDICINE | Admitting: FAMILY MEDICINE
Payer: COMMERCIAL

## 2020-01-01 VITALS
WEIGHT: 139 LBS | OXYGEN SATURATION: 96 % | HEART RATE: 81 BPM | BODY MASS INDEX: 20.59 KG/M2 | SYSTOLIC BLOOD PRESSURE: 114 MMHG | HEIGHT: 69 IN | RESPIRATION RATE: 17 BRPM | TEMPERATURE: 98.7 F | DIASTOLIC BLOOD PRESSURE: 61 MMHG

## 2020-01-01 VITALS
TEMPERATURE: 96.6 F | HEART RATE: 74 BPM | DIASTOLIC BLOOD PRESSURE: 41 MMHG | SYSTOLIC BLOOD PRESSURE: 110 MMHG | WEIGHT: 139.77 LBS | OXYGEN SATURATION: 94 % | HEIGHT: 69 IN | BODY MASS INDEX: 20.7 KG/M2 | RESPIRATION RATE: 23 BRPM

## 2020-01-01 LAB
ALBUMIN SERPL-MCNC: 2.9 GM/DL (ref 3.4–5)
ALBUMIN SERPL-MCNC: 2.9 GM/DL (ref 3.4–5)
ALBUMIN SERPL-MCNC: 3 GM/DL (ref 3.4–5)
ALBUMIN SERPL-MCNC: 3.3 GM/DL (ref 3.4–5)
ALBUMIN SERPL-MCNC: 3.4 GM/DL (ref 3.4–5)
ALP BLD-CCNC: 109 IU/L (ref 40–129)
ALP BLD-CCNC: 111 IU/L (ref 40–129)
ALP BLD-CCNC: 81 IU/L (ref 40–129)
ALP BLD-CCNC: 90 IU/L (ref 40–129)
ALP BLD-CCNC: 97 IU/L (ref 40–129)
ALT SERPL-CCNC: 10 U/L (ref 10–40)
ALT SERPL-CCNC: 10 U/L (ref 10–40)
ALT SERPL-CCNC: 11 U/L (ref 10–40)
ALT SERPL-CCNC: 20 U/L (ref 10–40)
ALT SERPL-CCNC: 24 U/L (ref 10–40)
ANION GAP SERPL CALCULATED.3IONS-SCNC: 10 MMOL/L (ref 4–16)
ANION GAP SERPL CALCULATED.3IONS-SCNC: 10 MMOL/L (ref 4–16)
ANION GAP SERPL CALCULATED.3IONS-SCNC: 12 MMOL/L (ref 4–16)
ANION GAP SERPL CALCULATED.3IONS-SCNC: 13 MMOL/L (ref 4–16)
ANION GAP SERPL CALCULATED.3IONS-SCNC: 7 MMOL/L (ref 4–16)
ANION GAP SERPL CALCULATED.3IONS-SCNC: 9 MMOL/L (ref 4–16)
AST SERPL-CCNC: 15 IU/L (ref 15–37)
AST SERPL-CCNC: 16 IU/L (ref 15–37)
AST SERPL-CCNC: 24 IU/L (ref 15–37)
AST SERPL-CCNC: 31 IU/L (ref 15–37)
AST SERPL-CCNC: 41 IU/L (ref 15–37)
BASE EXCESS MIXED: ABNORMAL (ref 0–1.2)
BASOPHILS ABSOLUTE: 0 K/CU MM
BASOPHILS RELATIVE PERCENT: 0.1 % (ref 0–1)
BASOPHILS RELATIVE PERCENT: 0.2 % (ref 0–1)
BASOPHILS RELATIVE PERCENT: 0.3 % (ref 0–1)
BILIRUB SERPL-MCNC: 0.4 MG/DL (ref 0–1)
BILIRUB SERPL-MCNC: 0.8 MG/DL (ref 0–1)
BILIRUB SERPL-MCNC: 0.9 MG/DL (ref 0–1)
BILIRUB SERPL-MCNC: 1.4 MG/DL (ref 0–1)
BILIRUB SERPL-MCNC: 2.3 MG/DL (ref 0–1)
BILIRUBIN DIRECT: 0.2 MG/DL (ref 0–0.3)
BILIRUBIN DIRECT: 0.3 MG/DL (ref 0–0.3)
BILIRUBIN DIRECT: 0.8 MG/DL (ref 0–0.3)
BILIRUBIN DIRECT: 1.7 MG/DL (ref 0–0.3)
BILIRUBIN, INDIRECT: 0.5 MG/DL (ref 0–0.7)
BILIRUBIN, INDIRECT: 0.6 MG/DL (ref 0–0.7)
BILIRUBIN, INDIRECT: 0.6 MG/DL (ref 0–0.7)
BILIRUBIN, INDIRECT: 0.7 MG/DL (ref 0–0.7)
BUN BLDV-MCNC: 18 MG/DL (ref 6–23)
BUN BLDV-MCNC: 31 MG/DL (ref 6–23)
BUN BLDV-MCNC: 36 MG/DL (ref 6–23)
BUN BLDV-MCNC: 41 MG/DL (ref 6–23)
BUN BLDV-MCNC: 43 MG/DL (ref 6–23)
BUN BLDV-MCNC: 46 MG/DL (ref 6–23)
CALCIUM IONIZED: 4.24 MG/DL (ref 4.48–5.28)
CALCIUM IONIZED: 4.56 MG/DL (ref 4.48–5.28)
CALCIUM IONIZED: 4.68 MG/DL (ref 4.48–5.28)
CALCIUM IONIZED: 4.72 MG/DL (ref 4.48–5.28)
CALCIUM SERPL-MCNC: 8.3 MG/DL (ref 8.3–10.6)
CALCIUM SERPL-MCNC: 8.4 MG/DL (ref 8.3–10.6)
CALCIUM SERPL-MCNC: 8.6 MG/DL (ref 8.3–10.6)
CALCIUM SERPL-MCNC: 8.9 MG/DL (ref 8.3–10.6)
CALCIUM SERPL-MCNC: 9.2 MG/DL (ref 8.3–10.6)
CALCIUM SERPL-MCNC: 9.7 MG/DL (ref 8.3–10.6)
CARBON MONOXIDE, BLOOD: 2.5 % (ref 0–5)
CARBON MONOXIDE, BLOOD: 2.8 % (ref 0–5)
CARBON MONOXIDE, BLOOD: 3.2 % (ref 0–5)
CARBON MONOXIDE, BLOOD: 3.6 % (ref 0–5)
CHLORIDE BLD-SCNC: 101 MMOL/L (ref 99–110)
CHLORIDE BLD-SCNC: 103 MMOL/L (ref 99–110)
CHLORIDE BLD-SCNC: 91 MMOL/L (ref 99–110)
CHLORIDE BLD-SCNC: 93 MMOL/L (ref 99–110)
CHLORIDE BLD-SCNC: 93 MMOL/L (ref 99–110)
CHLORIDE BLD-SCNC: 99 MMOL/L (ref 99–110)
CO2 CONTENT: 33.7 MMOL/L (ref 19–24)
CO2 CONTENT: 35.5 MMOL/L (ref 19–24)
CO2 CONTENT: 36.5 MMOL/L (ref 19–24)
CO2 CONTENT: 38.6 MMOL/L (ref 19–24)
CO2 CONTENT: 41.1 MMOL/L (ref 19–24)
CO2 CONTENT: 41.9 MMOL/L (ref 19–24)
CO2: 28 MMOL/L (ref 21–32)
CO2: 30 MMOL/L (ref 21–32)
CO2: 34 MMOL/L (ref 21–32)
CO2: 34 MMOL/L (ref 21–32)
CO2: 35 MMOL/L (ref 21–32)
CO2: 36 MMOL/L (ref 21–32)
COMMENT: ABNORMAL
CREAT SERPL-MCNC: 0.8 MG/DL (ref 0.9–1.3)
CREAT SERPL-MCNC: 0.8 MG/DL (ref 0.9–1.3)
CREAT SERPL-MCNC: 1 MG/DL (ref 0.9–1.3)
CREAT SERPL-MCNC: 1 MG/DL (ref 0.9–1.3)
CREAT SERPL-MCNC: 1.1 MG/DL (ref 0.9–1.3)
CREAT SERPL-MCNC: 1.1 MG/DL (ref 0.9–1.3)
CULTURE: ABNORMAL
CULTURE: NORMAL
DIFFERENTIAL TYPE: ABNORMAL
EKG ATRIAL RATE: 99 BPM
EKG DIAGNOSIS: NORMAL
EKG P AXIS: 69 DEGREES
EKG P-R INTERVAL: 168 MS
EKG Q-T INTERVAL: 344 MS
EKG QRS DURATION: 78 MS
EKG QTC CALCULATION (BAZETT): 441 MS
EKG R AXIS: 4 DEGREES
EKG T AXIS: 56 DEGREES
EKG VENTRICULAR RATE: 99 BPM
EOSINOPHILS ABSOLUTE: 0 K/CU MM
EOSINOPHILS RELATIVE PERCENT: 0 % (ref 0–3)
EOSINOPHILS RELATIVE PERCENT: 0.1 % (ref 0–3)
EOSINOPHILS RELATIVE PERCENT: 0.1 % (ref 0–3)
ESTIMATED AVERAGE GLUCOSE: 137 MG/DL
GFR AFRICAN AMERICAN: >60 ML/MIN/1.73M2
GFR NON-AFRICAN AMERICAN: >60 ML/MIN/1.73M2
GLUCOSE BLD-MCNC: 109 MG/DL (ref 70–99)
GLUCOSE BLD-MCNC: 115 MG/DL (ref 70–99)
GLUCOSE BLD-MCNC: 116 MG/DL (ref 70–99)
GLUCOSE BLD-MCNC: 119 MG/DL (ref 70–99)
GLUCOSE BLD-MCNC: 119 MG/DL (ref 70–99)
GLUCOSE BLD-MCNC: 120 MG/DL (ref 70–99)
GLUCOSE BLD-MCNC: 123 MG/DL (ref 70–99)
GLUCOSE BLD-MCNC: 132 MG/DL (ref 70–99)
GLUCOSE BLD-MCNC: 133 MG/DL (ref 70–99)
GLUCOSE BLD-MCNC: 140 MG/DL (ref 70–99)
GLUCOSE BLD-MCNC: 144 MG/DL (ref 70–99)
GLUCOSE BLD-MCNC: 148 MG/DL (ref 70–99)
GLUCOSE BLD-MCNC: 153 MG/DL (ref 70–99)
GLUCOSE BLD-MCNC: 157 MG/DL (ref 70–99)
GLUCOSE BLD-MCNC: 161 MG/DL (ref 70–99)
GLUCOSE BLD-MCNC: 162 MG/DL (ref 70–99)
GLUCOSE BLD-MCNC: 168 MG/DL (ref 70–99)
GLUCOSE BLD-MCNC: 177 MG/DL (ref 70–99)
GLUCOSE BLD-MCNC: 177 MG/DL (ref 70–99)
GLUCOSE BLD-MCNC: 190 MG/DL (ref 70–99)
GLUCOSE BLD-MCNC: 191 MG/DL (ref 70–99)
GLUCOSE BLD-MCNC: 240 MG/DL (ref 70–99)
GLUCOSE BLD-MCNC: 244 MG/DL (ref 70–99)
GLUCOSE BLD-MCNC: 250 MG/DL (ref 70–99)
GLUCOSE BLD-MCNC: 330 MG/DL (ref 70–99)
GLUCOSE BLD-MCNC: 366 MG/DL (ref 70–99)
GLUCOSE BLD-MCNC: 70 MG/DL (ref 70–99)
GLUCOSE BLD-MCNC: 84 MG/DL (ref 70–99)
GRAM SMEAR: ABNORMAL
GRAM SMEAR: NORMAL
HBA1C MFR BLD: 6.4 % (ref 4.2–6.3)
HCO3 ARTERIAL: 32.1 MMOL/L (ref 18–23)
HCO3 ARTERIAL: 33.2 MMOL/L (ref 18–23)
HCO3 ARTERIAL: 34 MMOL/L (ref 18–23)
HCO3 ARTERIAL: 37 MMOL/L (ref 18–23)
HCO3 ARTERIAL: 39.5 MMOL/L (ref 18–23)
HCO3 ARTERIAL: 40.1 MMOL/L (ref 18–23)
HCT VFR BLD CALC: 27.3 % (ref 42–52)
HCT VFR BLD CALC: 27.7 % (ref 42–52)
HCT VFR BLD CALC: 28.5 % (ref 42–52)
HCT VFR BLD CALC: 31.9 % (ref 42–52)
HCT VFR BLD CALC: 39.8 % (ref 42–52)
HEMOGLOBIN: 11.2 GM/DL (ref 13.5–18)
HEMOGLOBIN: 8 GM/DL (ref 13.5–18)
HEMOGLOBIN: 8 GM/DL (ref 13.5–18)
HEMOGLOBIN: 8.1 GM/DL (ref 13.5–18)
HEMOGLOBIN: 9.5 GM/DL (ref 13.5–18)
IMMATURE NEUTROPHIL %: 0.4 % (ref 0–0.43)
IMMATURE NEUTROPHIL %: 0.5 % (ref 0–0.43)
IMMATURE NEUTROPHIL %: 0.6 % (ref 0–0.43)
IMMATURE NEUTROPHIL %: 0.8 % (ref 0–0.43)
IMMATURE NEUTROPHIL %: 1 % (ref 0–0.43)
IONIZED CA: 1.06 MMOL/L (ref 1.12–1.32)
IONIZED CA: 1.14 MMOL/L (ref 1.12–1.32)
IONIZED CA: 1.17 MMOL/L (ref 1.12–1.32)
IONIZED CA: 1.18 MMOL/L (ref 1.12–1.32)
LACTATE: 0.9 MMOL/L (ref 0.4–2)
LACTATE: 0.9 MMOL/L (ref 0.4–2)
LACTATE: 1.1 MMOL/L (ref 0.4–2)
LACTATE: 1.5 MMOL/L (ref 0.4–2)
LACTATE: 1.5 MMOL/L (ref 0.4–2)
LACTATE: 1.6 MMOL/L (ref 0.4–2)
LACTATE: ABNORMAL MMOL/L (ref 0.4–2)
LEGIONELLA URINARY AG: NEGATIVE
LV EF: 53 %
LVEF MODALITY: NORMAL
LYMPHOCYTES ABSOLUTE: 0.4 K/CU MM
LYMPHOCYTES ABSOLUTE: 0.4 K/CU MM
LYMPHOCYTES ABSOLUTE: 0.5 K/CU MM
LYMPHOCYTES ABSOLUTE: 0.6 K/CU MM
LYMPHOCYTES ABSOLUTE: 0.6 K/CU MM
LYMPHOCYTES RELATIVE PERCENT: 2.7 % (ref 24–44)
LYMPHOCYTES RELATIVE PERCENT: 2.9 % (ref 24–44)
LYMPHOCYTES RELATIVE PERCENT: 4.6 % (ref 24–44)
LYMPHOCYTES RELATIVE PERCENT: 4.7 % (ref 24–44)
LYMPHOCYTES RELATIVE PERCENT: 6.4 % (ref 24–44)
Lab: ABNORMAL
Lab: NORMAL
MAGNESIUM: 2.3 MG/DL (ref 1.8–2.4)
MAGNESIUM: 2.5 MG/DL (ref 1.8–2.4)
MCH RBC QN AUTO: 26.6 PG (ref 27–31)
MCH RBC QN AUTO: 26.8 PG (ref 27–31)
MCH RBC QN AUTO: 26.8 PG (ref 27–31)
MCH RBC QN AUTO: 27.1 PG (ref 27–31)
MCH RBC QN AUTO: 27.3 PG (ref 27–31)
MCHC RBC AUTO-ENTMCNC: 28.1 % (ref 32–36)
MCHC RBC AUTO-ENTMCNC: 28.4 % (ref 32–36)
MCHC RBC AUTO-ENTMCNC: 28.9 % (ref 32–36)
MCHC RBC AUTO-ENTMCNC: 29.3 % (ref 32–36)
MCHC RBC AUTO-ENTMCNC: 29.8 % (ref 32–36)
MCV RBC AUTO: 91.6 FL (ref 78–100)
MCV RBC AUTO: 91.7 FL (ref 78–100)
MCV RBC AUTO: 93 FL (ref 78–100)
MCV RBC AUTO: 93.4 FL (ref 78–100)
MCV RBC AUTO: 96.1 FL (ref 78–100)
METHEMOGLOBIN ARTERIAL: 0.2 %
METHEMOGLOBIN ARTERIAL: 0.9 %
METHEMOGLOBIN ARTERIAL: 1 %
METHEMOGLOBIN ARTERIAL: 1 %
METHEMOGLOBIN ARTERIAL: 1.1 %
METHEMOGLOBIN ARTERIAL: 1.4 %
MONOCYTES ABSOLUTE: 0.2 K/CU MM
MONOCYTES ABSOLUTE: 0.9 K/CU MM
MONOCYTES ABSOLUTE: 1 K/CU MM
MONOCYTES ABSOLUTE: 1.1 K/CU MM
MONOCYTES ABSOLUTE: 1.1 K/CU MM
MONOCYTES RELATIVE PERCENT: 1.6 % (ref 0–4)
MONOCYTES RELATIVE PERCENT: 10.1 % (ref 0–4)
MONOCYTES RELATIVE PERCENT: 10.8 % (ref 0–4)
MONOCYTES RELATIVE PERCENT: 6.7 % (ref 0–4)
MONOCYTES RELATIVE PERCENT: 8.7 % (ref 0–4)
NUCLEATED RBC %: 0 %
O2 SATURATION: 92.8 % (ref 96–97)
O2 SATURATION: 95 % (ref 96–97)
O2 SATURATION: 95.1 % (ref 96–97)
O2 SATURATION: 96.1 % (ref 96–97)
O2 SATURATION: 96.4 % (ref 96–97)
O2 SATURATION: 96.5 % (ref 96–97)
PCO2 ARTERIAL: 107 MMHG (ref 32–45)
PCO2 ARTERIAL: 50 MMHG (ref 32–45)
PCO2 ARTERIAL: 52 MMHG (ref 32–45)
PCO2 ARTERIAL: 53 MMHG (ref 32–45)
PCO2 ARTERIAL: 53 MMHG (ref 32–45)
PCO2 ARTERIAL: 59 MMHG (ref 32–45)
PDW BLD-RTO: 15.6 % (ref 11.7–14.9)
PDW BLD-RTO: 15.7 % (ref 11.7–14.9)
PDW BLD-RTO: 15.8 % (ref 11.7–14.9)
PDW BLD-RTO: 15.8 % (ref 11.7–14.9)
PDW BLD-RTO: 15.9 % (ref 11.7–14.9)
PH BLOOD: 7.1 (ref 7.34–7.45)
PH BLOOD: 7.39 (ref 7.34–7.45)
PH BLOOD: 7.44 (ref 7.34–7.45)
PH BLOOD: 7.44 (ref 7.34–7.45)
PH BLOOD: 7.46 (ref 7.34–7.45)
PH BLOOD: 7.48 (ref 7.34–7.45)
PHOSPHORUS: 2.2 MG/DL (ref 2.5–4.9)
PHOSPHORUS: 2.4 MG/DL (ref 2.5–4.9)
PHOSPHORUS: 2.4 MG/DL (ref 2.5–4.9)
PHOSPHORUS: 3.3 MG/DL (ref 2.5–4.9)
PHOSPHORUS: 3.5 MG/DL (ref 2.5–4.9)
PLATELET # BLD: 177 K/CU MM (ref 140–440)
PLATELET # BLD: 189 K/CU MM (ref 140–440)
PLATELET # BLD: 209 K/CU MM (ref 140–440)
PLATELET # BLD: 264 K/CU MM (ref 140–440)
PLATELET # BLD: 272 K/CU MM (ref 140–440)
PMV BLD AUTO: 11.4 FL (ref 7.5–11.1)
PMV BLD AUTO: 11.4 FL (ref 7.5–11.1)
PMV BLD AUTO: 11.5 FL (ref 7.5–11.1)
PMV BLD AUTO: 11.8 FL (ref 7.5–11.1)
PMV BLD AUTO: 11.8 FL (ref 7.5–11.1)
PO2 ARTERIAL: 105 MMHG (ref 75–100)
PO2 ARTERIAL: 138 MMHG (ref 75–100)
PO2 ARTERIAL: 69 MMHG (ref 75–100)
PO2 ARTERIAL: 82 MMHG (ref 75–100)
PO2 ARTERIAL: 84 MMHG (ref 75–100)
PO2 ARTERIAL: 93 MMHG (ref 75–100)
POTASSIUM SERPL-SCNC: 4.1 MMOL/L (ref 3.5–5.1)
POTASSIUM SERPL-SCNC: 4.2 MMOL/L (ref 3.5–5.1)
POTASSIUM SERPL-SCNC: 4.3 MMOL/L (ref 3.5–5.1)
POTASSIUM SERPL-SCNC: 5.2 MMOL/L (ref 3.5–5.1)
POTASSIUM SERPL-SCNC: 5.2 MMOL/L (ref 3.5–5.1)
POTASSIUM SERPL-SCNC: ABNORMAL MMOL/L (ref 3.5–5.1)
PRO-BNP: 2864 PG/ML
PRO-BNP: 3069 PG/ML
PROCALCITONIN: 0.14
PROCALCITONIN: 0.34
RAPID INFLUENZA  B AGN: NEGATIVE
RAPID INFLUENZA A AGN: NEGATIVE
RBC # BLD: 2.98 M/CU MM (ref 4.6–6.2)
RBC # BLD: 2.98 M/CU MM (ref 4.6–6.2)
RBC # BLD: 3.05 M/CU MM (ref 4.6–6.2)
RBC # BLD: 3.48 M/CU MM (ref 4.6–6.2)
RBC # BLD: 4.14 M/CU MM (ref 4.6–6.2)
SEGMENTED NEUTROPHILS ABSOLUTE COUNT: 10.7 K/CU MM
SEGMENTED NEUTROPHILS ABSOLUTE COUNT: 11.6 K/CU MM
SEGMENTED NEUTROPHILS ABSOLUTE COUNT: 13.1 K/CU MM
SEGMENTED NEUTROPHILS ABSOLUTE COUNT: 7.2 K/CU MM
SEGMENTED NEUTROPHILS ABSOLUTE COUNT: 9.1 K/CU MM
SEGMENTED NEUTROPHILS RELATIVE PERCENT: 81.7 % (ref 36–66)
SEGMENTED NEUTROPHILS RELATIVE PERCENT: 84.8 % (ref 36–66)
SEGMENTED NEUTROPHILS RELATIVE PERCENT: 85.6 % (ref 36–66)
SEGMENTED NEUTROPHILS RELATIVE PERCENT: 89.3 % (ref 36–66)
SEGMENTED NEUTROPHILS RELATIVE PERCENT: 95.1 % (ref 36–66)
SODIUM BLD-SCNC: 138 MMOL/L (ref 135–145)
SODIUM BLD-SCNC: 138 MMOL/L (ref 135–145)
SODIUM BLD-SCNC: 140 MMOL/L (ref 135–145)
SODIUM BLD-SCNC: 140 MMOL/L (ref 135–145)
SODIUM BLD-SCNC: 141 MMOL/L (ref 135–145)
SODIUM BLD-SCNC: 141 MMOL/L (ref 135–145)
SPECIMEN: ABNORMAL
SPECIMEN: NORMAL
STREP PNEUMONIAE ANTIGEN: NORMAL
TOTAL CK: 24 IU/L (ref 38–174)
TOTAL CK: 28 IU/L (ref 38–174)
TOTAL CK: 30 IU/L (ref 38–174)
TOTAL CK: 40 IU/L (ref 38–174)
TOTAL IMMATURE NEUTOROPHIL: 0.04 K/CU MM
TOTAL IMMATURE NEUTOROPHIL: 0.07 K/CU MM
TOTAL IMMATURE NEUTOROPHIL: 0.08 K/CU MM
TOTAL IMMATURE NEUTOROPHIL: 0.09 K/CU MM
TOTAL IMMATURE NEUTOROPHIL: 0.1 K/CU MM
TOTAL NUCLEATED RBC: 0 K/CU MM
TOTAL PROTEIN: 6.3 GM/DL (ref 6.4–8.2)
TOTAL PROTEIN: 6.4 GM/DL (ref 6.4–8.2)
TOTAL PROTEIN: 6.5 GM/DL (ref 6.4–8.2)
TOTAL PROTEIN: 7.3 GM/DL (ref 6.4–8.2)
TOTAL PROTEIN: 8.5 GM/DL (ref 6.4–8.2)
TROPONIN T: 0.01 NG/ML
TROPONIN T: 0.02 NG/ML
TROPONIN T: 0.02 NG/ML
WBC # BLD: 10.7 K/CU MM (ref 4–10.5)
WBC # BLD: 12.5 K/CU MM (ref 4–10.5)
WBC # BLD: 12.9 K/CU MM (ref 4–10.5)
WBC # BLD: 13.8 K/CU MM (ref 4–10.5)
WBC # BLD: 8.8 K/CU MM (ref 4–10.5)

## 2020-01-01 PROCEDURE — 82040 ASSAY OF SERUM ALBUMIN: CPT

## 2020-01-01 PROCEDURE — 31720 CLEARANCE OF AIRWAYS: CPT

## 2020-01-01 PROCEDURE — 99285 EMERGENCY DEPT VISIT HI MDM: CPT

## 2020-01-01 PROCEDURE — 82803 BLOOD GASES ANY COMBINATION: CPT

## 2020-01-01 PROCEDURE — 94003 VENT MGMT INPAT SUBQ DAY: CPT

## 2020-01-01 PROCEDURE — 36600 WITHDRAWAL OF ARTERIAL BLOOD: CPT

## 2020-01-01 PROCEDURE — 82248 BILIRUBIN DIRECT: CPT

## 2020-01-01 PROCEDURE — 6360000002 HC RX W HCPCS: Performed by: INTERNAL MEDICINE

## 2020-01-01 PROCEDURE — 80053 COMPREHEN METABOLIC PANEL: CPT

## 2020-01-01 PROCEDURE — 71045 X-RAY EXAM CHEST 1 VIEW: CPT

## 2020-01-01 PROCEDURE — C9113 INJ PANTOPRAZOLE SODIUM, VIA: HCPCS | Performed by: INTERNAL MEDICINE

## 2020-01-01 PROCEDURE — 6370000000 HC RX 637 (ALT 250 FOR IP): Performed by: INTERNAL MEDICINE

## 2020-01-01 PROCEDURE — 84100 ASSAY OF PHOSPHORUS: CPT

## 2020-01-01 PROCEDURE — 2500000003 HC RX 250 WO HCPCS: Performed by: INTERNAL MEDICINE

## 2020-01-01 PROCEDURE — 85025 COMPLETE CBC W/AUTO DIFF WBC: CPT

## 2020-01-01 PROCEDURE — 2580000003 HC RX 258: Performed by: INTERNAL MEDICINE

## 2020-01-01 PROCEDURE — 2500000003 HC RX 250 WO HCPCS: Performed by: FAMILY MEDICINE

## 2020-01-01 PROCEDURE — 94640 AIRWAY INHALATION TREATMENT: CPT

## 2020-01-01 PROCEDURE — 99232 SBSQ HOSP IP/OBS MODERATE 35: CPT | Performed by: INTERNAL MEDICINE

## 2020-01-01 PROCEDURE — 2580000003 HC RX 258: Performed by: FAMILY MEDICINE

## 2020-01-01 PROCEDURE — 94761 N-INVAS EAR/PLS OXIMETRY MLT: CPT

## 2020-01-01 PROCEDURE — 2700000000 HC OXYGEN THERAPY PER DAY

## 2020-01-01 PROCEDURE — 2000000000 HC ICU R&B

## 2020-01-01 PROCEDURE — 80069 RENAL FUNCTION PANEL: CPT

## 2020-01-01 PROCEDURE — 5A1945Z RESPIRATORY VENTILATION, 24-96 CONSECUTIVE HOURS: ICD-10-PCS | Performed by: EMERGENCY MEDICINE

## 2020-01-01 PROCEDURE — 87804 INFLUENZA ASSAY W/OPTIC: CPT

## 2020-01-01 PROCEDURE — 83605 ASSAY OF LACTIC ACID: CPT

## 2020-01-01 PROCEDURE — 1250000000 HC SEMI PRIVATE HOSPICE R&B

## 2020-01-01 PROCEDURE — 84145 PROCALCITONIN (PCT): CPT

## 2020-01-01 PROCEDURE — 94750 HC PULMONARY COMPLIANCE STUDY: CPT

## 2020-01-01 PROCEDURE — 82550 ASSAY OF CK (CPK): CPT

## 2020-01-01 PROCEDURE — 84484 ASSAY OF TROPONIN QUANT: CPT

## 2020-01-01 PROCEDURE — 93005 ELECTROCARDIOGRAM TRACING: CPT | Performed by: EMERGENCY MEDICINE

## 2020-01-01 PROCEDURE — 82330 ASSAY OF CALCIUM: CPT

## 2020-01-01 PROCEDURE — 96368 THER/DIAG CONCURRENT INF: CPT

## 2020-01-01 PROCEDURE — 93010 ELECTROCARDIOGRAM REPORT: CPT | Performed by: INTERNAL MEDICINE

## 2020-01-01 PROCEDURE — 99221 1ST HOSP IP/OBS SF/LOW 40: CPT | Performed by: INTERNAL MEDICINE

## 2020-01-01 PROCEDURE — 87040 BLOOD CULTURE FOR BACTERIA: CPT

## 2020-01-01 PROCEDURE — 82962 GLUCOSE BLOOD TEST: CPT

## 2020-01-01 PROCEDURE — 02HV33Z INSERTION OF INFUSION DEVICE INTO SUPERIOR VENA CAVA, PERCUTANEOUS APPROACH: ICD-10-PCS | Performed by: EMERGENCY MEDICINE

## 2020-01-01 PROCEDURE — 89220 SPUTUM SPECIMEN COLLECTION: CPT

## 2020-01-01 PROCEDURE — 94660 CPAP INITIATION&MGMT: CPT

## 2020-01-01 PROCEDURE — 87205 SMEAR GRAM STAIN: CPT

## 2020-01-01 PROCEDURE — 83735 ASSAY OF MAGNESIUM: CPT

## 2020-01-01 PROCEDURE — 2580000003 HC RX 258: Performed by: PHYSICIAN ASSISTANT

## 2020-01-01 PROCEDURE — 6370000000 HC RX 637 (ALT 250 FOR IP): Performed by: EMERGENCY MEDICINE

## 2020-01-01 PROCEDURE — 96365 THER/PROPH/DIAG IV INF INIT: CPT

## 2020-01-01 PROCEDURE — 2580000003 HC RX 258: Performed by: EMERGENCY MEDICINE

## 2020-01-01 PROCEDURE — 99211 OFF/OP EST MAY X REQ PHY/QHP: CPT

## 2020-01-01 PROCEDURE — 6360000002 HC RX W HCPCS: Performed by: EMERGENCY MEDICINE

## 2020-01-01 PROCEDURE — 87449 NOS EACH ORGANISM AG IA: CPT

## 2020-01-01 PROCEDURE — 93306 TTE W/DOPPLER COMPLETE: CPT

## 2020-01-01 PROCEDURE — 83036 HEMOGLOBIN GLYCOSYLATED A1C: CPT

## 2020-01-01 PROCEDURE — 87081 CULTURE SCREEN ONLY: CPT

## 2020-01-01 PROCEDURE — 0BH17EZ INSERTION OF ENDOTRACHEAL AIRWAY INTO TRACHEA, VIA NATURAL OR ARTIFICIAL OPENING: ICD-10-PCS | Performed by: EMERGENCY MEDICINE

## 2020-01-01 PROCEDURE — 96375 TX/PRO/DX INJ NEW DRUG ADDON: CPT

## 2020-01-01 PROCEDURE — 83880 ASSAY OF NATRIURETIC PEPTIDE: CPT

## 2020-01-01 PROCEDURE — 2500000003 HC RX 250 WO HCPCS: Performed by: EMERGENCY MEDICINE

## 2020-01-01 PROCEDURE — 87899 AGENT NOS ASSAY W/OPTIC: CPT

## 2020-01-01 PROCEDURE — 6360000002 HC RX W HCPCS: Performed by: FAMILY MEDICINE

## 2020-01-01 PROCEDURE — 94002 VENT MGMT INPAT INIT DAY: CPT

## 2020-01-01 PROCEDURE — 87070 CULTURE OTHR SPECIMN AEROBIC: CPT

## 2020-01-01 PROCEDURE — 94664 DEMO&/EVAL PT USE INHALER: CPT

## 2020-01-01 PROCEDURE — 2500000003 HC RX 250 WO HCPCS

## 2020-01-01 RX ORDER — GLYCOPYRROLATE 1 MG/5 ML
0.2 SYRINGE (ML) INTRAVENOUS EVERY 4 HOURS PRN
Status: DISCONTINUED | OUTPATIENT
Start: 2020-01-01 | End: 2020-01-01 | Stop reason: HOSPADM

## 2020-01-01 RX ORDER — SCOLOPAMINE TRANSDERMAL SYSTEM 1 MG/1
1 PATCH, EXTENDED RELEASE TRANSDERMAL
Status: DISCONTINUED | OUTPATIENT
Start: 2020-01-16 | End: 2020-01-01 | Stop reason: HOSPADM

## 2020-01-01 RX ORDER — MORPHINE SULFATE 2 MG/ML
2 INJECTION, SOLUTION INTRAMUSCULAR; INTRAVENOUS
Status: CANCELLED | OUTPATIENT
Start: 2020-01-01

## 2020-01-01 RX ORDER — FUROSEMIDE 10 MG/ML
40 INJECTION INTRAMUSCULAR; INTRAVENOUS DAILY
Status: DISCONTINUED | OUTPATIENT
Start: 2020-01-01 | End: 2020-01-01

## 2020-01-01 RX ORDER — PANTOPRAZOLE SODIUM 40 MG/1
40 TABLET, DELAYED RELEASE ORAL
Status: DISCONTINUED | OUTPATIENT
Start: 2020-01-01 | End: 2020-01-01 | Stop reason: SDUPTHER

## 2020-01-01 RX ORDER — IPRATROPIUM BROMIDE AND ALBUTEROL SULFATE 2.5; .5 MG/3ML; MG/3ML
1 SOLUTION RESPIRATORY (INHALATION) EVERY 4 HOURS
Status: DISCONTINUED | OUTPATIENT
Start: 2020-01-01 | End: 2020-01-01 | Stop reason: HOSPADM

## 2020-01-01 RX ORDER — HEPARIN SODIUM 5000 [USP'U]/ML
5000 INJECTION, SOLUTION INTRAVENOUS; SUBCUTANEOUS EVERY 8 HOURS SCHEDULED
Status: DISCONTINUED | OUTPATIENT
Start: 2020-01-01 | End: 2020-01-01 | Stop reason: HOSPADM

## 2020-01-01 RX ORDER — DEXTROSE MONOHYDRATE 50 MG/ML
INJECTION, SOLUTION INTRAVENOUS CONTINUOUS
Status: DISCONTINUED | OUTPATIENT
Start: 2020-01-01 | End: 2020-01-01

## 2020-01-01 RX ORDER — ONDANSETRON 2 MG/ML
4 INJECTION INTRAMUSCULAR; INTRAVENOUS EVERY 6 HOURS PRN
Status: DISCONTINUED | OUTPATIENT
Start: 2020-01-01 | End: 2020-01-01 | Stop reason: HOSPADM

## 2020-01-01 RX ORDER — MAGNESIUM SULFATE 1 G/100ML
1 INJECTION INTRAVENOUS PRN
Status: DISCONTINUED | OUTPATIENT
Start: 2020-01-01 | End: 2020-01-01 | Stop reason: HOSPADM

## 2020-01-01 RX ORDER — FLUCONAZOLE 100 MG/1
150 TABLET ORAL
Status: DISCONTINUED | OUTPATIENT
Start: 2020-01-01 | End: 2020-01-01 | Stop reason: HOSPADM

## 2020-01-01 RX ORDER — ACETAMINOPHEN 325 MG/1
650 TABLET ORAL EVERY 4 HOURS PRN
COMMUNITY

## 2020-01-01 RX ORDER — ACETYLCYSTEINE 200 MG/ML
2 SOLUTION ORAL; RESPIRATORY (INHALATION) 3 TIMES DAILY
Status: DISCONTINUED | OUTPATIENT
Start: 2020-01-01 | End: 2020-01-01 | Stop reason: HOSPADM

## 2020-01-01 RX ORDER — GLYCOPYRROLATE 0.2 MG/ML
0.2 INJECTION INTRAMUSCULAR; INTRAVENOUS EVERY 4 HOURS PRN
Status: CANCELLED | OUTPATIENT
Start: 2020-01-01

## 2020-01-01 RX ORDER — BISACODYL 10 MG
10 SUPPOSITORY, RECTAL RECTAL DAILY PRN
Status: CANCELLED | OUTPATIENT
Start: 2020-01-01

## 2020-01-01 RX ORDER — ETOMIDATE 2 MG/ML
INJECTION INTRAVENOUS DAILY PRN
Status: COMPLETED | OUTPATIENT
Start: 2020-01-01 | End: 2020-01-01

## 2020-01-01 RX ORDER — ALBUTEROL SULFATE 90 UG/1
4 AEROSOL, METERED RESPIRATORY (INHALATION) EVERY 4 HOURS
Status: DISCONTINUED | OUTPATIENT
Start: 2020-01-01 | End: 2020-01-01 | Stop reason: ALTCHOICE

## 2020-01-01 RX ORDER — SODIUM CHLORIDE 9 MG/ML
10 INJECTION INTRAVENOUS DAILY
Status: DISCONTINUED | OUTPATIENT
Start: 2020-01-01 | End: 2020-01-01 | Stop reason: ALTCHOICE

## 2020-01-01 RX ORDER — IPRATROPIUM BROMIDE AND ALBUTEROL SULFATE 2.5; .5 MG/3ML; MG/3ML
1 SOLUTION RESPIRATORY (INHALATION) EVERY 4 HOURS PRN
Status: CANCELLED | OUTPATIENT
Start: 2020-01-01

## 2020-01-01 RX ORDER — LEVOFLOXACIN 5 MG/ML
750 INJECTION, SOLUTION INTRAVENOUS EVERY 24 HOURS
Status: DISCONTINUED | OUTPATIENT
Start: 2020-01-01 | End: 2020-01-01

## 2020-01-01 RX ORDER — LATANOPROST 50 UG/ML
1 SOLUTION/ DROPS OPHTHALMIC NIGHTLY
Status: DISCONTINUED | OUTPATIENT
Start: 2020-01-01 | End: 2020-01-01 | Stop reason: HOSPADM

## 2020-01-01 RX ORDER — PROPOFOL 10 MG/ML
10 INJECTION, EMULSION INTRAVENOUS
Status: DISCONTINUED | OUTPATIENT
Start: 2020-01-01 | End: 2020-01-01 | Stop reason: SDUPTHER

## 2020-01-01 RX ORDER — VANCOMYCIN HYDROCHLORIDE 1 G/200ML
1000 INJECTION, SOLUTION INTRAVENOUS EVERY 24 HOURS
Status: DISCONTINUED | OUTPATIENT
Start: 2020-01-01 | End: 2020-01-01

## 2020-01-01 RX ORDER — CHLORHEXIDINE GLUCONATE 0.12 MG/ML
15 RINSE ORAL 2 TIMES DAILY
COMMUNITY

## 2020-01-01 RX ORDER — PROPOFOL 10 MG/ML
10 INJECTION, EMULSION INTRAVENOUS CONTINUOUS
Status: DISCONTINUED | OUTPATIENT
Start: 2020-01-01 | End: 2020-01-01 | Stop reason: ALTCHOICE

## 2020-01-01 RX ORDER — MORPHINE SULFATE 4 MG/ML
4 INJECTION, SOLUTION INTRAMUSCULAR; INTRAVENOUS
Status: CANCELLED | OUTPATIENT
Start: 2020-01-01

## 2020-01-01 RX ORDER — LEVOFLOXACIN 500 MG/1
500 TABLET, FILM COATED ORAL DAILY
COMMUNITY

## 2020-01-01 RX ORDER — HYOSCYAMINE SULFATE 0.125 MG
125 TABLET ORAL 2 TIMES DAILY
COMMUNITY

## 2020-01-01 RX ORDER — ACETAMINOPHEN 650 MG/1
650 SUPPOSITORY RECTAL EVERY 4 HOURS PRN
Status: DISCONTINUED | OUTPATIENT
Start: 2020-01-01 | End: 2020-01-01 | Stop reason: HOSPADM

## 2020-01-01 RX ORDER — POTASSIUM CHLORIDE 29.8 MG/ML
20 INJECTION INTRAVENOUS PRN
Status: DISCONTINUED | OUTPATIENT
Start: 2020-01-01 | End: 2020-01-01 | Stop reason: HOSPADM

## 2020-01-01 RX ORDER — SODIUM CHLORIDE 0.9 % (FLUSH) 0.9 %
10 SYRINGE (ML) INJECTION EVERY 12 HOURS SCHEDULED
Status: DISCONTINUED | OUTPATIENT
Start: 2020-01-01 | End: 2020-01-01 | Stop reason: HOSPADM

## 2020-01-01 RX ORDER — SODIUM CHLORIDE 9 MG/ML
INJECTION, SOLUTION INTRAVENOUS CONTINUOUS
Status: DISCONTINUED | OUTPATIENT
Start: 2020-01-01 | End: 2020-01-01 | Stop reason: HOSPADM

## 2020-01-01 RX ORDER — LORAZEPAM 2 MG/ML
0.5 INJECTION INTRAMUSCULAR
Status: CANCELLED | OUTPATIENT
Start: 2020-01-01

## 2020-01-01 RX ORDER — ATORVASTATIN CALCIUM 10 MG/1
10 TABLET, FILM COATED ORAL NIGHTLY
Status: DISCONTINUED | OUTPATIENT
Start: 2020-01-01 | End: 2020-01-01 | Stop reason: HOSPADM

## 2020-01-01 RX ORDER — FUROSEMIDE 10 MG/ML
20 INJECTION INTRAMUSCULAR; INTRAVENOUS ONCE
Status: COMPLETED | OUTPATIENT
Start: 2020-01-01 | End: 2020-01-01

## 2020-01-01 RX ORDER — POLYETHYLENE GLYCOL 3350 17 G/17G
17 POWDER, FOR SOLUTION ORAL DAILY PRN
Status: DISCONTINUED | OUTPATIENT
Start: 2020-01-01 | End: 2020-01-01 | Stop reason: HOSPADM

## 2020-01-01 RX ORDER — MORPHINE SULFATE 2 MG/ML
2 INJECTION, SOLUTION INTRAMUSCULAR; INTRAVENOUS
Status: DISCONTINUED | OUTPATIENT
Start: 2020-01-01 | End: 2020-01-01 | Stop reason: HOSPADM

## 2020-01-01 RX ORDER — SODIUM CHLORIDE 0.9 % (FLUSH) 0.9 %
10 SYRINGE (ML) INJECTION PRN
Status: DISCONTINUED | OUTPATIENT
Start: 2020-01-01 | End: 2020-01-01 | Stop reason: HOSPADM

## 2020-01-01 RX ORDER — ATORVASTATIN CALCIUM 10 MG/1
10 TABLET, FILM COATED ORAL NIGHTLY
COMMUNITY

## 2020-01-01 RX ORDER — ACETAMINOPHEN 650 MG/1
650 SUPPOSITORY RECTAL EVERY 4 HOURS PRN
Status: CANCELLED | OUTPATIENT
Start: 2020-01-01

## 2020-01-01 RX ORDER — POLYETHYLENE GLYCOL 3350 17 G/17G
17 POWDER, FOR SOLUTION ORAL DAILY
COMMUNITY

## 2020-01-01 RX ORDER — LEVOFLOXACIN 5 MG/ML
750 INJECTION, SOLUTION INTRAVENOUS ONCE
Status: COMPLETED | OUTPATIENT
Start: 2020-01-01 | End: 2020-01-01

## 2020-01-01 RX ORDER — FLUCONAZOLE 150 MG/1
150 TABLET ORAL SEE ADMIN INSTRUCTIONS
COMMUNITY

## 2020-01-01 RX ORDER — PANTOPRAZOLE SODIUM 40 MG/10ML
40 INJECTION, POWDER, LYOPHILIZED, FOR SOLUTION INTRAVENOUS DAILY
Status: DISCONTINUED | OUTPATIENT
Start: 2020-01-01 | End: 2020-01-01 | Stop reason: ALTCHOICE

## 2020-01-01 RX ORDER — ACETAMINOPHEN 325 MG/1
650 TABLET ORAL EVERY 4 HOURS PRN
Status: DISCONTINUED | OUTPATIENT
Start: 2020-01-01 | End: 2020-01-01 | Stop reason: HOSPADM

## 2020-01-01 RX ORDER — ROCURONIUM BROMIDE 10 MG/ML
INJECTION, SOLUTION INTRAVENOUS DAILY PRN
Status: COMPLETED | OUTPATIENT
Start: 2020-01-01 | End: 2020-01-01

## 2020-01-01 RX ORDER — METHYLPREDNISOLONE SODIUM SUCCINATE 125 MG/2ML
125 INJECTION, POWDER, LYOPHILIZED, FOR SOLUTION INTRAMUSCULAR; INTRAVENOUS ONCE
Status: COMPLETED | OUTPATIENT
Start: 2020-01-01 | End: 2020-01-01

## 2020-01-01 RX ORDER — IPRATROPIUM BROMIDE AND ALBUTEROL SULFATE 2.5; .5 MG/3ML; MG/3ML
1 SOLUTION RESPIRATORY (INHALATION) EVERY 4 HOURS PRN
Status: DISCONTINUED | OUTPATIENT
Start: 2020-01-01 | End: 2020-01-01 | Stop reason: HOSPADM

## 2020-01-01 RX ORDER — BISACODYL 10 MG
10 SUPPOSITORY, RECTAL RECTAL DAILY PRN
Status: DISCONTINUED | OUTPATIENT
Start: 2020-01-01 | End: 2020-01-01 | Stop reason: HOSPADM

## 2020-01-01 RX ORDER — SCOLOPAMINE TRANSDERMAL SYSTEM 1 MG/1
1 PATCH, EXTENDED RELEASE TRANSDERMAL
Status: CANCELLED | OUTPATIENT
Start: 2020-01-16

## 2020-01-01 RX ORDER — SODIUM CHLORIDE 0.9 % (FLUSH) 0.9 %
10 SYRINGE (ML) INJECTION EVERY 12 HOURS SCHEDULED
Status: CANCELLED | OUTPATIENT
Start: 2020-01-01

## 2020-01-01 RX ORDER — MORPHINE SULFATE 4 MG/ML
4 INJECTION, SOLUTION INTRAMUSCULAR; INTRAVENOUS
Status: DISCONTINUED | OUTPATIENT
Start: 2020-01-01 | End: 2020-01-01 | Stop reason: HOSPADM

## 2020-01-01 RX ORDER — LEVOFLOXACIN 5 MG/ML
750 INJECTION, SOLUTION INTRAVENOUS EVERY 24 HOURS
Status: DISCONTINUED | OUTPATIENT
Start: 2020-01-01 | End: 2020-01-01 | Stop reason: DRUGHIGH

## 2020-01-01 RX ORDER — SODIUM CHLORIDE 0.9 % (FLUSH) 0.9 %
10 SYRINGE (ML) INJECTION PRN
Status: CANCELLED | OUTPATIENT
Start: 2020-01-01

## 2020-01-01 RX ORDER — IPRATROPIUM BROMIDE AND ALBUTEROL SULFATE 2.5; .5 MG/3ML; MG/3ML
2 SOLUTION RESPIRATORY (INHALATION) ONCE
Status: COMPLETED | OUTPATIENT
Start: 2020-01-01 | End: 2020-01-01

## 2020-01-01 RX ORDER — NICOTINE POLACRILEX 4 MG
15 LOZENGE BUCCAL PRN
Status: DISCONTINUED | OUTPATIENT
Start: 2020-01-01 | End: 2020-01-01 | Stop reason: HOSPADM

## 2020-01-01 RX ORDER — RANITIDINE 150 MG/1
150 TABLET ORAL 2 TIMES DAILY
COMMUNITY

## 2020-01-01 RX ORDER — ONDANSETRON 2 MG/ML
4 INJECTION INTRAMUSCULAR; INTRAVENOUS EVERY 6 HOURS PRN
Status: CANCELLED | OUTPATIENT
Start: 2020-01-01

## 2020-01-01 RX ORDER — GABAPENTIN 400 MG/1
400 CAPSULE ORAL 2 TIMES DAILY
Status: DISCONTINUED | OUTPATIENT
Start: 2020-01-01 | End: 2020-01-01 | Stop reason: HOSPADM

## 2020-01-01 RX ORDER — SCOLOPAMINE TRANSDERMAL SYSTEM 1 MG/1
1 PATCH, EXTENDED RELEASE TRANSDERMAL
Status: DISCONTINUED | OUTPATIENT
Start: 2020-01-01 | End: 2020-01-01 | Stop reason: HOSPADM

## 2020-01-01 RX ORDER — DEXTROSE MONOHYDRATE 25 G/50ML
12.5 INJECTION, SOLUTION INTRAVENOUS PRN
Status: DISCONTINUED | OUTPATIENT
Start: 2020-01-01 | End: 2020-01-01 | Stop reason: HOSPADM

## 2020-01-01 RX ORDER — ASPIRIN 81 MG/1
81 TABLET, CHEWABLE ORAL DAILY
Status: DISCONTINUED | OUTPATIENT
Start: 2020-01-01 | End: 2020-01-01 | Stop reason: HOSPADM

## 2020-01-01 RX ORDER — LORAZEPAM 2 MG/ML
0.5 INJECTION INTRAMUSCULAR
Status: DISCONTINUED | OUTPATIENT
Start: 2020-01-01 | End: 2020-01-01 | Stop reason: HOSPADM

## 2020-01-01 RX ORDER — DEXTROSE MONOHYDRATE 50 MG/ML
100 INJECTION, SOLUTION INTRAVENOUS PRN
Status: DISCONTINUED | OUTPATIENT
Start: 2020-01-01 | End: 2020-01-01 | Stop reason: HOSPADM

## 2020-01-01 RX ORDER — CHLORHEXIDINE GLUCONATE 0.12 MG/ML
15 RINSE ORAL 2 TIMES DAILY
Status: DISCONTINUED | OUTPATIENT
Start: 2020-01-01 | End: 2020-01-01 | Stop reason: ALTCHOICE

## 2020-01-01 RX ADMIN — CHLORHEXIDINE GLUCONATE 0.12% ORAL RINSE 15 ML: 1.2 LIQUID ORAL at 08:38

## 2020-01-01 RX ADMIN — SODIUM CHLORIDE, PRESERVATIVE FREE 10 ML: 5 INJECTION INTRAVENOUS at 08:39

## 2020-01-01 RX ADMIN — DEXMEDETOMIDINE 0.2 MCG/KG/HR: 100 INJECTION, SOLUTION, CONCENTRATE INTRAVENOUS at 04:24

## 2020-01-01 RX ADMIN — INSULIN LISPRO 2 UNITS: 100 INJECTION, SOLUTION INTRAVENOUS; SUBCUTANEOUS at 14:45

## 2020-01-01 RX ADMIN — Medication 125 MCG/HR: at 19:53

## 2020-01-01 RX ADMIN — FUROSEMIDE 20 MG: 10 INJECTION, SOLUTION INTRAVENOUS at 17:51

## 2020-01-01 RX ADMIN — FLUCONAZOLE 150 MG: 100 TABLET ORAL at 08:15

## 2020-01-01 RX ADMIN — GABAPENTIN 400 MG: 400 CAPSULE ORAL at 09:29

## 2020-01-01 RX ADMIN — ASPIRIN 81 MG 81 MG: 81 TABLET ORAL at 09:19

## 2020-01-01 RX ADMIN — SODIUM CHLORIDE, PRESERVATIVE FREE 10 ML: 5 INJECTION INTRAVENOUS at 08:18

## 2020-01-01 RX ADMIN — ASPIRIN 81 MG 81 MG: 81 TABLET ORAL at 07:52

## 2020-01-01 RX ADMIN — IPRATROPIUM BROMIDE 4 PUFF: 17 AEROSOL, METERED RESPIRATORY (INHALATION) at 22:08

## 2020-01-01 RX ADMIN — GABAPENTIN 400 MG: 400 CAPSULE ORAL at 17:06

## 2020-01-01 RX ADMIN — Medication 125 MCG/HR: at 05:58

## 2020-01-01 RX ADMIN — PROPOFOL 10 MCG/KG/MIN: 10 INJECTION, EMULSION INTRAVENOUS at 01:49

## 2020-01-01 RX ADMIN — DEXMEDETOMIDINE 0.2 MCG/KG/HR: 100 INJECTION, SOLUTION, CONCENTRATE INTRAVENOUS at 04:21

## 2020-01-01 RX ADMIN — IPRATROPIUM BROMIDE 4 PUFF: 17 AEROSOL, METERED RESPIRATORY (INHALATION) at 15:40

## 2020-01-01 RX ADMIN — SODIUM CHLORIDE, PRESERVATIVE FREE 10 ML: 5 INJECTION INTRAVENOUS at 19:40

## 2020-01-01 RX ADMIN — SODIUM CHLORIDE: 9 INJECTION, SOLUTION INTRAVENOUS at 13:52

## 2020-01-01 RX ADMIN — DEXTROSE MONOHYDRATE: 50 INJECTION, SOLUTION INTRAVENOUS at 03:49

## 2020-01-01 RX ADMIN — GABAPENTIN 400 MG: 400 CAPSULE ORAL at 17:26

## 2020-01-01 RX ADMIN — SODIUM CHLORIDE, PRESERVATIVE FREE 10 ML: 5 INJECTION INTRAVENOUS at 21:30

## 2020-01-01 RX ADMIN — Medication 125 MCG/HR: at 14:38

## 2020-01-01 RX ADMIN — GABAPENTIN 400 MG: 400 CAPSULE ORAL at 07:52

## 2020-01-01 RX ADMIN — CHLORHEXIDINE GLUCONATE 0.12% ORAL RINSE 15 ML: 1.2 LIQUID ORAL at 09:29

## 2020-01-01 RX ADMIN — ALBUTEROL SULFATE 4 PUFF: 90 AEROSOL, METERED RESPIRATORY (INHALATION) at 04:47

## 2020-01-01 RX ADMIN — IPRATROPIUM BROMIDE 4 PUFF: 17 AEROSOL, METERED RESPIRATORY (INHALATION) at 19:40

## 2020-01-01 RX ADMIN — LATANOPROST 1 DROP: 50 SOLUTION OPHTHALMIC at 22:04

## 2020-01-01 RX ADMIN — HEPARIN SODIUM 5000 UNITS: 5000 INJECTION INTRAVENOUS; SUBCUTANEOUS at 22:04

## 2020-01-01 RX ADMIN — HEPARIN SODIUM 5000 UNITS: 5000 INJECTION INTRAVENOUS; SUBCUTANEOUS at 06:04

## 2020-01-01 RX ADMIN — IPRATROPIUM BROMIDE 4 PUFF: 17 AEROSOL, METERED RESPIRATORY (INHALATION) at 07:10

## 2020-01-01 RX ADMIN — ALBUTEROL SULFATE 4 PUFF: 90 AEROSOL, METERED RESPIRATORY (INHALATION) at 16:01

## 2020-01-01 RX ADMIN — IPRATROPIUM BROMIDE 4 PUFF: 17 AEROSOL, METERED RESPIRATORY (INHALATION) at 11:03

## 2020-01-01 RX ADMIN — GABAPENTIN 400 MG: 400 CAPSULE ORAL at 18:03

## 2020-01-01 RX ADMIN — INSULIN LISPRO 1 UNITS: 100 INJECTION, SOLUTION INTRAVENOUS; SUBCUTANEOUS at 06:58

## 2020-01-01 RX ADMIN — ATORVASTATIN CALCIUM 10 MG: 10 TABLET, FILM COATED ORAL at 21:30

## 2020-01-01 RX ADMIN — PANTOPRAZOLE SODIUM 40 MG: 40 INJECTION, POWDER, FOR SOLUTION INTRAVENOUS at 08:38

## 2020-01-01 RX ADMIN — CEFEPIME HYDROCHLORIDE 2 G: 2 INJECTION, POWDER, FOR SOLUTION INTRAVENOUS at 07:05

## 2020-01-01 RX ADMIN — ASPIRIN 81 MG 81 MG: 81 TABLET ORAL at 08:38

## 2020-01-01 RX ADMIN — Medication 125 MCG/HR: at 20:35

## 2020-01-01 RX ADMIN — ALBUTEROL SULFATE 4 PUFF: 90 AEROSOL, METERED RESPIRATORY (INHALATION) at 08:27

## 2020-01-01 RX ADMIN — SODIUM CHLORIDE, PRESERVATIVE FREE 10 ML: 5 INJECTION INTRAVENOUS at 09:29

## 2020-01-01 RX ADMIN — PANTOPRAZOLE SODIUM 40 MG: 40 INJECTION, POWDER, FOR SOLUTION INTRAVENOUS at 07:51

## 2020-01-01 RX ADMIN — IPRATROPIUM BROMIDE 4 PUFF: 17 AEROSOL, METERED RESPIRATORY (INHALATION) at 00:33

## 2020-01-01 RX ADMIN — SODIUM CHLORIDE, PRESERVATIVE FREE 10 ML: 5 INJECTION INTRAVENOUS at 08:17

## 2020-01-01 RX ADMIN — ALBUTEROL SULFATE 4 PUFF: 90 AEROSOL, METERED RESPIRATORY (INHALATION) at 11:03

## 2020-01-01 RX ADMIN — ALBUTEROL SULFATE 4 PUFF: 90 AEROSOL, METERED RESPIRATORY (INHALATION) at 11:10

## 2020-01-01 RX ADMIN — ALBUTEROL SULFATE 4 PUFF: 90 AEROSOL, METERED RESPIRATORY (INHALATION) at 04:20

## 2020-01-01 RX ADMIN — ALBUTEROL SULFATE 4 PUFF: 90 AEROSOL, METERED RESPIRATORY (INHALATION) at 07:29

## 2020-01-01 RX ADMIN — SODIUM CHLORIDE, PRESERVATIVE FREE 10 ML: 5 INJECTION INTRAVENOUS at 08:09

## 2020-01-01 RX ADMIN — CHLORHEXIDINE GLUCONATE 0.12% ORAL RINSE 15 ML: 1.2 LIQUID ORAL at 00:33

## 2020-01-01 RX ADMIN — VANCOMYCIN HYDROCHLORIDE 1000 MG: 1 INJECTION, SOLUTION INTRAVENOUS at 01:49

## 2020-01-01 RX ADMIN — IPRATROPIUM BROMIDE 4 PUFF: 17 AEROSOL, METERED RESPIRATORY (INHALATION) at 20:23

## 2020-01-01 RX ADMIN — INSULIN LISPRO 1 UNITS: 100 INJECTION, SOLUTION INTRAVENOUS; SUBCUTANEOUS at 18:08

## 2020-01-01 RX ADMIN — PROPOFOL 10 MCG/KG/MIN: 10 INJECTION, EMULSION INTRAVENOUS at 07:51

## 2020-01-01 RX ADMIN — LATANOPROST 1 DROP: 50 SOLUTION OPHTHALMIC at 21:14

## 2020-01-01 RX ADMIN — ACETYLCYSTEINE 400 MG: 200 SOLUTION ORAL; RESPIRATORY (INHALATION) at 11:18

## 2020-01-01 RX ADMIN — GABAPENTIN 400 MG: 400 CAPSULE ORAL at 17:15

## 2020-01-01 RX ADMIN — SODIUM CHLORIDE: 9 INJECTION, SOLUTION INTRAVENOUS at 22:52

## 2020-01-01 RX ADMIN — Medication 5 MCG/KG/MIN: at 08:39

## 2020-01-01 RX ADMIN — ALBUTEROL SULFATE 4 PUFF: 90 AEROSOL, METERED RESPIRATORY (INHALATION) at 15:40

## 2020-01-01 RX ADMIN — Medication 125 MCG/HR: at 22:57

## 2020-01-01 RX ADMIN — CEFEPIME HYDROCHLORIDE 2 G: 2 INJECTION, POWDER, FOR SOLUTION INTRAVENOUS at 07:52

## 2020-01-01 RX ADMIN — HEPARIN SODIUM 5000 UNITS: 5000 INJECTION INTRAVENOUS; SUBCUTANEOUS at 14:43

## 2020-01-01 RX ADMIN — SODIUM CHLORIDE, PRESERVATIVE FREE 10 ML: 5 INJECTION INTRAVENOUS at 09:30

## 2020-01-01 RX ADMIN — IPRATROPIUM BROMIDE 4 PUFF: 17 AEROSOL, METERED RESPIRATORY (INHALATION) at 04:48

## 2020-01-01 RX ADMIN — IPRATROPIUM BROMIDE 4 PUFF: 17 AEROSOL, METERED RESPIRATORY (INHALATION) at 04:20

## 2020-01-01 RX ADMIN — Medication 125 MCG/HR: at 11:21

## 2020-01-01 RX ADMIN — HEPARIN SODIUM 5000 UNITS: 5000 INJECTION INTRAVENOUS; SUBCUTANEOUS at 13:48

## 2020-01-01 RX ADMIN — IPRATROPIUM BROMIDE AND ALBUTEROL SULFATE 2 AMPULE: .5; 3 SOLUTION RESPIRATORY (INHALATION) at 17:29

## 2020-01-01 RX ADMIN — SODIUM CHLORIDE: 9 INJECTION, SOLUTION INTRAVENOUS at 09:29

## 2020-01-01 RX ADMIN — SODIUM CHLORIDE: 9 INJECTION, SOLUTION INTRAVENOUS at 10:03

## 2020-01-01 RX ADMIN — IPRATROPIUM BROMIDE 4 PUFF: 17 AEROSOL, METERED RESPIRATORY (INHALATION) at 12:27

## 2020-01-01 RX ADMIN — ALBUTEROL SULFATE 4 PUFF: 90 AEROSOL, METERED RESPIRATORY (INHALATION) at 20:23

## 2020-01-01 RX ADMIN — ATORVASTATIN CALCIUM 10 MG: 10 TABLET, FILM COATED ORAL at 21:09

## 2020-01-01 RX ADMIN — PROPOFOL 20 MCG/KG/MIN: 10 INJECTION, EMULSION INTRAVENOUS at 18:12

## 2020-01-01 RX ADMIN — CHLORHEXIDINE GLUCONATE 0.12% ORAL RINSE 15 ML: 1.2 LIQUID ORAL at 20:49

## 2020-01-01 RX ADMIN — INSULIN LISPRO 4 UNITS: 100 INJECTION, SOLUTION INTRAVENOUS; SUBCUTANEOUS at 11:16

## 2020-01-01 RX ADMIN — METHYLPREDNISOLONE SODIUM SUCCINATE 125 MG: 125 INJECTION, POWDER, FOR SOLUTION INTRAMUSCULAR; INTRAVENOUS at 17:42

## 2020-01-01 RX ADMIN — ALBUTEROL SULFATE 4 PUFF: 90 AEROSOL, METERED RESPIRATORY (INHALATION) at 12:27

## 2020-01-01 RX ADMIN — CEFEPIME HYDROCHLORIDE 2 G: 2 INJECTION, POWDER, FOR SOLUTION INTRAVENOUS at 18:31

## 2020-01-01 RX ADMIN — HEPARIN SODIUM 5000 UNITS: 5000 INJECTION INTRAVENOUS; SUBCUTANEOUS at 22:08

## 2020-01-01 RX ADMIN — IPRATROPIUM BROMIDE 4 PUFF: 17 AEROSOL, METERED RESPIRATORY (INHALATION) at 14:37

## 2020-01-01 RX ADMIN — ALBUTEROL SULFATE 4 PUFF: 90 AEROSOL, METERED RESPIRATORY (INHALATION) at 01:11

## 2020-01-01 RX ADMIN — INSULIN LISPRO 1 UNITS: 100 INJECTION, SOLUTION INTRAVENOUS; SUBCUTANEOUS at 10:23

## 2020-01-01 RX ADMIN — SODIUM CHLORIDE, PRESERVATIVE FREE 10 ML: 5 INJECTION INTRAVENOUS at 20:50

## 2020-01-01 RX ADMIN — HEPARIN SODIUM 5000 UNITS: 5000 INJECTION INTRAVENOUS; SUBCUTANEOUS at 06:07

## 2020-01-01 RX ADMIN — INSULIN LISPRO 1 UNITS: 100 INJECTION, SOLUTION INTRAVENOUS; SUBCUTANEOUS at 11:19

## 2020-01-01 RX ADMIN — ALBUTEROL SULFATE 4 PUFF: 90 AEROSOL, METERED RESPIRATORY (INHALATION) at 19:40

## 2020-01-01 RX ADMIN — IPRATROPIUM BROMIDE 4 PUFF: 17 AEROSOL, METERED RESPIRATORY (INHALATION) at 07:28

## 2020-01-01 RX ADMIN — ALBUTEROL SULFATE 4 PUFF: 90 AEROSOL, METERED RESPIRATORY (INHALATION) at 14:37

## 2020-01-01 RX ADMIN — SODIUM CHLORIDE: 9 INJECTION, SOLUTION INTRAVENOUS at 00:40

## 2020-01-01 RX ADMIN — IPRATROPIUM BROMIDE 4 PUFF: 17 AEROSOL, METERED RESPIRATORY (INHALATION) at 07:31

## 2020-01-01 RX ADMIN — HEPARIN SODIUM 5000 UNITS: 5000 INJECTION INTRAVENOUS; SUBCUTANEOUS at 07:07

## 2020-01-01 RX ADMIN — SODIUM CHLORIDE, PRESERVATIVE FREE 10 ML: 5 INJECTION INTRAVENOUS at 09:19

## 2020-01-01 RX ADMIN — CEFEPIME HYDROCHLORIDE 2 G: 2 INJECTION, POWDER, FOR SOLUTION INTRAVENOUS at 06:46

## 2020-01-01 RX ADMIN — HEPARIN SODIUM 5000 UNITS: 5000 INJECTION INTRAVENOUS; SUBCUTANEOUS at 00:34

## 2020-01-01 RX ADMIN — CEFEPIME HYDROCHLORIDE 2 G: 2 INJECTION, POWDER, FOR SOLUTION INTRAVENOUS at 17:06

## 2020-01-01 RX ADMIN — IPRATROPIUM BROMIDE 4 PUFF: 17 AEROSOL, METERED RESPIRATORY (INHALATION) at 16:01

## 2020-01-01 RX ADMIN — ALBUTEROL SULFATE 4 PUFF: 90 AEROSOL, METERED RESPIRATORY (INHALATION) at 07:28

## 2020-01-01 RX ADMIN — IPRATROPIUM BROMIDE 4 PUFF: 17 AEROSOL, METERED RESPIRATORY (INHALATION) at 05:04

## 2020-01-01 RX ADMIN — Medication 125 MCG/HR: at 04:40

## 2020-01-01 RX ADMIN — CHLORHEXIDINE GLUCONATE 0.12% ORAL RINSE 15 ML: 1.2 LIQUID ORAL at 07:52

## 2020-01-01 RX ADMIN — IPRATROPIUM BROMIDE 4 PUFF: 17 AEROSOL, METERED RESPIRATORY (INHALATION) at 00:00

## 2020-01-01 RX ADMIN — INSULIN LISPRO 1 UNITS: 100 INJECTION, SOLUTION INTRAVENOUS; SUBCUTANEOUS at 11:06

## 2020-01-01 RX ADMIN — ALBUTEROL SULFATE 4 PUFF: 90 AEROSOL, METERED RESPIRATORY (INHALATION) at 05:03

## 2020-01-01 RX ADMIN — IPRATROPIUM BROMIDE 4 PUFF: 17 AEROSOL, METERED RESPIRATORY (INHALATION) at 01:11

## 2020-01-01 RX ADMIN — ASPIRIN 81 MG 81 MG: 81 TABLET ORAL at 09:29

## 2020-01-01 RX ADMIN — VANCOMYCIN HYDROCHLORIDE 1500 MG: 5 INJECTION, POWDER, LYOPHILIZED, FOR SOLUTION INTRAVENOUS at 00:36

## 2020-01-01 RX ADMIN — IPRATROPIUM BROMIDE AND ALBUTEROL SULFATE 1 AMPULE: .5; 3 SOLUTION RESPIRATORY (INHALATION) at 15:42

## 2020-01-01 RX ADMIN — ETOMIDATE 20 MG: 2 INJECTION, SOLUTION INTRAVENOUS at 17:55

## 2020-01-01 RX ADMIN — PANTOPRAZOLE SODIUM 40 MG: 40 INJECTION, POWDER, FOR SOLUTION INTRAVENOUS at 09:30

## 2020-01-01 RX ADMIN — ALBUTEROL SULFATE 4 PUFF: 90 AEROSOL, METERED RESPIRATORY (INHALATION) at 04:40

## 2020-01-01 RX ADMIN — ALBUTEROL SULFATE 4 PUFF: 90 AEROSOL, METERED RESPIRATORY (INHALATION) at 07:10

## 2020-01-01 RX ADMIN — INSULIN LISPRO 1 UNITS: 100 INJECTION, SOLUTION INTRAVENOUS; SUBCUTANEOUS at 03:37

## 2020-01-01 RX ADMIN — INSULIN LISPRO 1 UNITS: 100 INJECTION, SOLUTION INTRAVENOUS; SUBCUTANEOUS at 16:02

## 2020-01-01 RX ADMIN — SODIUM CHLORIDE, PRESERVATIVE FREE 10 ML: 5 INJECTION INTRAVENOUS at 00:33

## 2020-01-01 RX ADMIN — Medication 0.2 MG: at 22:47

## 2020-01-01 RX ADMIN — GABAPENTIN 400 MG: 400 CAPSULE ORAL at 08:38

## 2020-01-01 RX ADMIN — IPRATROPIUM BROMIDE 4 PUFF: 17 AEROSOL, METERED RESPIRATORY (INHALATION) at 11:10

## 2020-01-01 RX ADMIN — HEPARIN SODIUM 5000 UNITS: 5000 INJECTION INTRAVENOUS; SUBCUTANEOUS at 08:15

## 2020-01-01 RX ADMIN — CHLORHEXIDINE GLUCONATE 0.12% ORAL RINSE 15 ML: 1.2 LIQUID ORAL at 21:30

## 2020-01-01 RX ADMIN — HEPARIN SODIUM 5000 UNITS: 5000 INJECTION INTRAVENOUS; SUBCUTANEOUS at 21:31

## 2020-01-01 RX ADMIN — CEFEPIME HYDROCHLORIDE 2 G: 2 INJECTION, POWDER, FOR SOLUTION INTRAVENOUS at 18:03

## 2020-01-01 RX ADMIN — INSULIN LISPRO 1 UNITS: 100 INJECTION, SOLUTION INTRAVENOUS; SUBCUTANEOUS at 03:31

## 2020-01-01 RX ADMIN — ROCURONIUM BROMIDE 6.8 MG: 10 SOLUTION INTRAVENOUS at 17:55

## 2020-01-01 RX ADMIN — CEFEPIME HYDROCHLORIDE 2 G: 2 INJECTION, POWDER, FOR SOLUTION INTRAVENOUS at 06:48

## 2020-01-01 RX ADMIN — SODIUM CHLORIDE, PRESERVATIVE FREE 10 ML: 5 INJECTION INTRAVENOUS at 21:11

## 2020-01-01 RX ADMIN — ALBUTEROL SULFATE 4 PUFF: 90 AEROSOL, METERED RESPIRATORY (INHALATION) at 00:32

## 2020-01-01 RX ADMIN — LEVOFLOXACIN 750 MG: 5 INJECTION, SOLUTION INTRAVENOUS at 18:57

## 2020-01-01 RX ADMIN — Medication 50 MCG/HR: at 13:47

## 2020-01-01 RX ADMIN — ALBUTEROL SULFATE 4 PUFF: 90 AEROSOL, METERED RESPIRATORY (INHALATION) at 22:05

## 2020-01-01 RX ADMIN — CHLORHEXIDINE GLUCONATE 0.12% ORAL RINSE 15 ML: 1.2 LIQUID ORAL at 21:09

## 2020-01-01 RX ADMIN — Medication 5 MCG/KG/MIN: at 19:39

## 2020-01-01 RX ADMIN — IPRATROPIUM BROMIDE AND ALBUTEROL SULFATE 1 AMPULE: .5; 3 SOLUTION RESPIRATORY (INHALATION) at 11:17

## 2020-01-01 RX ADMIN — LATANOPROST 1 DROP: 50 SOLUTION OPHTHALMIC at 21:31

## 2020-01-01 RX ADMIN — ALBUTEROL SULFATE 4 PUFF: 90 AEROSOL, METERED RESPIRATORY (INHALATION) at 00:00

## 2020-01-01 RX ADMIN — CEFEPIME HYDROCHLORIDE 2 G: 2 INJECTION, POWDER, FOR SOLUTION INTRAVENOUS at 18:57

## 2020-01-01 RX ADMIN — IPRATROPIUM BROMIDE 4 PUFF: 17 AEROSOL, METERED RESPIRATORY (INHALATION) at 08:27

## 2020-01-01 RX ADMIN — IPRATROPIUM BROMIDE 4 PUFF: 17 AEROSOL, METERED RESPIRATORY (INHALATION) at 04:40

## 2020-01-01 RX ADMIN — GABAPENTIN 400 MG: 400 CAPSULE ORAL at 09:19

## 2020-01-01 RX ADMIN — SODIUM CHLORIDE: 9 INJECTION, SOLUTION INTRAVENOUS at 16:35

## 2020-01-01 RX ADMIN — DEXTROSE MONOHYDRATE: 50 INJECTION, SOLUTION INTRAVENOUS at 06:52

## 2020-01-01 RX ADMIN — PROPOFOL 10 MCG/KG/MIN: 10 INJECTION, EMULSION INTRAVENOUS at 18:17

## 2020-01-01 RX ADMIN — MORPHINE SULFATE 2 MG: 2 INJECTION, SOLUTION INTRAMUSCULAR; INTRAVENOUS at 22:47

## 2020-01-01 RX ADMIN — ATORVASTATIN CALCIUM 10 MG: 10 TABLET, FILM COATED ORAL at 20:49

## 2020-01-01 RX ADMIN — ATORVASTATIN CALCIUM 10 MG: 10 TABLET, FILM COATED ORAL at 00:33

## 2020-01-01 ASSESSMENT — PULMONARY FUNCTION TESTS
PIF_VALUE: 20
PIF_VALUE: 21
PIF_VALUE: 15
PIF_VALUE: 16
PIF_VALUE: 22
PIF_VALUE: 19
PIF_VALUE: 22
PIF_VALUE: 24
PIF_VALUE: 23
PIF_VALUE: 21
PIF_VALUE: 16
PIF_VALUE: 19
PIF_VALUE: 19
PIF_VALUE: 22
PIF_VALUE: 21
PIF_VALUE: 30
PIF_VALUE: 21
PIF_VALUE: 19
PIF_VALUE: 20
PIF_VALUE: 21
PIF_VALUE: 19
PIF_VALUE: 21
PIF_VALUE: 21
PIF_VALUE: 26
PIF_VALUE: 19
PIF_VALUE: 20
PIF_VALUE: 20
PIF_VALUE: 21
PIF_VALUE: 24
PIF_VALUE: 21
PIF_VALUE: 22
PIF_VALUE: 21
PIF_VALUE: 24
PIF_VALUE: 22
PIF_VALUE: 24
PIF_VALUE: 19
PIF_VALUE: 20
PIF_VALUE: 21
PIF_VALUE: 23
PIF_VALUE: 21
PIF_VALUE: 15
PIF_VALUE: 21
PIF_VALUE: 26
PIF_VALUE: 21
PIF_VALUE: 22
PIF_VALUE: 19
PIF_VALUE: 21
PIF_VALUE: 27
PIF_VALUE: 20
PIF_VALUE: 26
PIF_VALUE: 23
PIF_VALUE: 20
PIF_VALUE: 23
PIF_VALUE: 21
PIF_VALUE: 21
PIF_VALUE: 20
PIF_VALUE: 21
PIF_VALUE: 25
PIF_VALUE: 23
PIF_VALUE: 19
PIF_VALUE: 20
PIF_VALUE: 20
PIF_VALUE: 21
PIF_VALUE: 21
PIF_VALUE: 22
PIF_VALUE: 29
PIF_VALUE: 20
PIF_VALUE: 21
PIF_VALUE: 21
PIF_VALUE: 22
PIF_VALUE: 23
PIF_VALUE: 21
PIF_VALUE: 21
PIF_VALUE: 22
PIF_VALUE: 19
PIF_VALUE: 25
PIF_VALUE: 26
PIF_VALUE: 20
PIF_VALUE: 29
PIF_VALUE: 25
PIF_VALUE: 21
PIF_VALUE: 21
PIF_VALUE: 20
PIF_VALUE: 21
PIF_VALUE: 21
PIF_VALUE: 19
PIF_VALUE: 40

## 2020-01-01 ASSESSMENT — PAIN SCALES - GENERAL
PAINLEVEL_OUTOF10: 0

## 2020-01-09 PROBLEM — J96.00 ACUTE RESPIRATORY FAILURE (HCC): Status: ACTIVE | Noted: 2020-01-01

## 2020-01-09 NOTE — ED NOTES
The patients sister is concerned about the patient and would like to be called, she lives in [place] and is not able to come see the patient.  Her name is Arabella Stewart and her phone number is [phone]     Gil Lenz  01/09/20 5143

## 2020-01-09 NOTE — ED PROVIDER NOTES
Not on file     Minutes per session: Not on file    Stress: Not on file   Relationships    Social connections:     Talks on phone: Not on file     Gets together: Not on file     Attends [employer] service: Not on file     Active member of club or organization: Not on file     Attends meetings of clubs or organizations: Not on file     Relationship status: Not on file    Intimate partner violence:     Fear of current or ex partner: Not on file     Emotionally abused: Not on file     Physically abused: Not on file     Forced sexual activity: Not on file   Other Topics Concern    Not on file   Social History Narrative    Not on file     Current Facility-Administered Medications   Medication Dose Route Frequency Provider Last Rate Last Dose    propofol injection  10 mcg/kg/min Intravenous Titrated Santa Hilario MD 4.1 mL/hr at 01/09/20 1812 10 mcg/kg/min at 01/09/20 1812    cefepime (MAXIPIME) 2 g IVPB minibag  2 g Intravenous Once Santa Hilario MD        And    levofloxacin (LEVAQUIN) 750 MG/150ML infusion 750 mg  750 mg Intravenous Once Santa Hilario MD         Current Outpatient Medications   Medication Sig Dispense Refill    aspirin 81 MG chewable tablet Take 1 tablet by mouth daily 30 tablet 3    clopidogrel (PLAVIX) 75 MG tablet Take 1 tablet by mouth daily 30 tablet 3    enoxaparin (LOVENOX) 40 MG/0.4ML injection Inject 0.4 mLs into the skin daily 1 mL 3    insulin glargine (LANTUS) 100 UNIT/ML injection vial Inject 5 Units into the skin nightly 1 vial 3    ipratropium-albuterol (DUONEB) 0.5-2.5 (3) MG/3ML SOLN nebulizer solution Inhale 3 mLs into the lungs every 4 hours (while awake) 360 mL 0    midodrine (PROAMATINE) 10 MG tablet Take 1 tablet by mouth 3 times daily (with meals) 90 tablet 3    pantoprazole (PROTONIX) 40 MG injection Infuse 40 mg intravenously daily 30 each 0    simvastatin (ZOCOR) 80 MG tablet Take 40 mg by mouth nightly      omeprazole (PRILOSEC) 20 MG delayed release capsule Take 20 mg by mouth Daily with supper       metFORMIN (GLUCOPHAGE) 500 MG tablet Take 250 mg by mouth 2 times daily (with meals)       gabapentin (NEURONTIN) 400 MG capsule Take 400 mg by mouth 2 times daily.  latanoprost (XALATAN) 0.005 % ophthalmic solution Place 1 drop into both eyes nightly       No Known Allergies  Nursing Notes Reviewed    ROS:  Unable to assess secondary to patient mental status. Physical Exam:  ED Triage Vitals   Enc Vitals Group      BP 01/09/20 1741 (!) 145/67      Pulse 01/09/20 1721 105      Resp 01/09/20 1721 28      Temp 01/09/20 1721 97.7 °F (36.5 °C)      Temp Source 01/09/20 1721 Oral      SpO2 01/09/20 1732 100 %      Weight 01/09/20 1721 150 lb (68 kg)      Height 01/09/20 1721 5' 9\" (1.753 m)      Head Circumference --       Peak Flow --       Pain Score --       Pain Loc --       Pain Edu? --       Excl. in [address]? --      My pulse oximetry interpretation is which is abnormal    GENERAL APPEARANCE: Patient drowsy. Opens eyes to pain. Currently on EMS BiPAP. Audible rales. HEAD: Normocephalic. Atraumatic. EYES: EOM's grossly intact. Sclera anicteric. ENT: Mucous membranes are moist. Tolerates saliva. No trismus. NECK: Supple. No meningismus. Trachea midline. HEART: RRR. Radial pulses 2+. LUNGS: Respirations labored. Patient has rhonchi and rales throughout. ABDOMEN: Soft. Non-tender. No guarding or rebound. EXTREMITIES: No acute deformities. No pitting edema. SKIN: Warm and dry. NEUROLOGICAL: No gross facial drooping. Moves all 4 extremities spontaneously. No facial droop.   PSYCHIATRIC: Unable to assess    I have reviewed and interpreted all of the currently available lab results from this visit (if applicable):  Results for orders placed or performed during the hospital encounter of 01/09/20   CBC with Auto Diff   Result Value Ref Range    WBC 12.5 (H) 4.0 - 10.5 K/CU MM    RBC 4.14 (L) 4.6 - 6.2 M/CU MM    Hemoglobin 11.2 (L) 13.5 - 18.0 GM/DL Hematocrit 39.8 (L) 42 - 52 %    MCV 96.1 78 - 100 FL    MCH 27.1 27 - 31 PG    MCHC 28.1 (L) 32.0 - 36.0 %    RDW 15.8 (H) 11.7 - 14.9 %    Platelets 713 261 - 147 K/CU MM    MPV 11.8 (H) 7.5 - 11.1 FL    Differential Type AUTOMATED DIFFERENTIAL     Segs Relative 85.6 (H) 36 - 66 %    Lymphocytes % 4.7 (L) 24 - 44 %    Monocytes % 8.7 (H) 0 - 4 %    Eosinophils % 0.1 0 - 3 %    Basophils % 0.3 0 - 1 %    Segs Absolute 10.7 K/CU MM    Lymphocytes Absolute 0.6 K/CU MM    Monocytes Absolute 1.1 K/CU MM    Eosinophils Absolute 0.0 K/CU MM    Basophils Absolute 0.0 K/CU MM    Nucleated RBC % 0.0 %    Total Nucleated RBC 0.0 K/CU MM    Total Immature Neutrophil 0.08 K/CU MM    Immature Neutrophil % 0.6 (H) 0 - 0.43 %   CMP   Result Value Ref Range    Sodium 138 135 - 145 MMOL/L    Potassium (HH) 3.5 - 5.1 MMOL/L     5.6  K CALLED TO DR Ana Ferreira ON [id number] AT 1810 [address] MLT      Chloride 93 (L) 99 - 110 mMol/L    CO2 36 (H) 21 - 32 MMOL/L    BUN 36 (H) 6 - 23 MG/DL    CREATININE 0.8 (L) 0.9 - 1.3 MG/DL    Glucose 177 (H) 70 - 99 MG/DL    Calcium 9.7 8.3 - 10.6 MG/DL    Alb 3.4 3.4 - 5.0 GM/DL    Total Protein 8.5 (H) 6.4 - 8.2 GM/DL    Total Bilirubin 0.4 0.0 - 1.0 MG/DL    ALT 11 10 - 40 U/L    AST 24 15 - 37 IU/L    Alkaline Phosphatase 111 40 - 129 IU/L    GFR Non-African American >60 >60 mL/min/1.73m2    GFR African American >60 >60 mL/min/1.73m2    Anion Gap 9 4 - 16   Brain Natriuretic Peptide   Result Value Ref Range    Pro-BNP 2,864 (H) <300 PG/ML   Lactic Acid, Plasma   Result Value Ref Range    Lactate 1.1 0.4 - 2.0 mMOL/L   Troponin   Result Value Ref Range    Troponin T 0.022 (H) <0.01 NG/ML   EKG 12 Lead   Result Value Ref Range    Ventricular Rate 99 BPM    Atrial Rate 99 BPM    P-R Interval 168 ms    QRS Duration 78 ms    Q-T Interval 344 ms    QTc Calculation (Bazett) 441 ms    P Axis 69 degrees    R Axis 4 degrees    T Axis 56 degrees    Diagnosis       Normal sinus rhythm  Normal ECG  When compared with ECG laryngoscope blade was used for a grade 3 view and a 7.5mm endotracheal tube was attempted but patient had very distored, small cvocal cords that seemed disaplced. Decision made to downsize tube to 7 and use CMAC, able to intubate and viewed to pass through the cords on the second attempt. The tube was secured at 22cm to the lip. Tracheal position was confirmed using a colorimetric end-tidal CO2 detector, tube condensation and chest auscultation/visualization. Respiratory therapy is at the bedside and is assisting with ventilatory management. CRITICAL CARE NOTE:  There was a high probability of clinically significant life-threatening deterioration of the patient's condition requiring my urgent intervention due to ataxia. DuoNeb, Lasix, IV antibiotics, BiPAP, chart review, family discussion, re-eval, admit was performed to address this. Total critical care time is at least  35 minutes. This includes vital sign monitoring, pulse oximetry monitoring, telemetry monitoring, clinical response to the IV medications, reviewing the nursing notes, consultation time, dictation/documentation time, and interpretation of the lab work. This time excludes time spent performing procedures and separately billable procedures and family discussion time. Clinical Impression:  1. Acute on chronic respiratory failure with hypoxia and hypercapnia (HCC)    2. Acute pulmonary edema (HCC)    3. Hyperkalemia    4. Elevated troponin        Disposition Vitals:  [unfilled], [unfilled], [unfilled], [unfilled]    Disposition referral (if applicable):  No follow-up provider specified.     Disposition medications (if applicable):  New Prescriptions    No medications on file         (Please note that portions of this note may have been completed with a voice recognition program. Efforts were made to edit the dictations but occasionally words are mis-transcribed.)    MD Cong Velazquez MD  01/09/20

## 2020-01-10 PROBLEM — E43 SEVERE MALNUTRITION (HCC): Chronic | Status: ACTIVE | Noted: 2019-01-01

## 2020-01-10 NOTE — PROGRESS NOTES
Nutrition Assessment    Type and Reason for Visit: Initial, Consult    Nutrition Recommendations:   · Continue NPO at this time    Nutrition Assessment: RD consulted for EN order and manage 2/2 to the pt being vented. He has a PEG tube, however, has lost 30 lbs since March. He is showing signs of severe muscle and sub q fat loss. Pt is hemodynamically unstable and it is not indicated to feed the pt through EN at this time. Depending on goals of care with pt advanced age, TPN may be needed. Malnutrition Assessment:  · Malnutrition Status: Meets the criteria for severe malnutrition  · Context: Chronic illness  · Findings of the 6 clinical characteristics of malnutrition (Minimum of 2 out of 6 clinical characteristics is required to make the diagnosis of moderate or severe Protein Calorie Malnutrition based on AND/ASPEN Guidelines):  1. Energy Intake-Unable to assess, Unable to assess    2. Weight Loss-10% loss or greater, in 6 months  3. Fat Loss-Severe subcutaneous fat loss, Orbital  4. Muscle Loss-Severe muscle mass loss, Clavicles (pectoralis and deltoids)  5. Fluid Accumulation-No significant fluid accumulation, Extremities  6.   Strength-Not measured    Nutrition Risk Level: High    Nutrient Needs:  · Estimated Daily Total Kcal: 1494 based on delonte state 2003b  · Estimated Daily Protein (g): 110-146 based on 1.5-2 g/kg/IBW  · Estimated Daily Total Fluid (ml/day): 1494 based on 1 mL/kcal    Nutrition Diagnosis:   · Problem: Severe malnutrition, In context of chronic illness  · Etiology: related to Insufficient energy/nutrient consumption(probable)     Signs and symptoms:  as evidenced by Severe loss of subcutaneous fat, Severe muscle loss    Objective Information:  · Wound Type: None  · Current Nutrition Therapies:  · Oral Diet Orders: NPO   · Anthropometric Measures:  · Ht: (S) 5' 9\" (175.3 cm)   · Current Body Wt: 130 lb (59 kg)  · Admission Body Wt: 130 lb (59 kg)  · Usual Body Wt: 160 lb (72.6 kg)  · % Weight Change: -19% in 10 months  · Ideal Body Wt: 160 lb (72.6 kg), % Ideal Body 81%  · BMI Classification: BMI 18.5 - 24.9 Normal Weight    Nutrition Interventions:   Continue NPO  Continued Inpatient Monitoring, Coordination of Care, Education not appropriate at this time    Nutrition Evaluation:   · Evaluation: Goals set   · Goals: pt will have a source of nutrition started in the next 24-48 hours    · Monitoring: Nutrition Progression, Weight, Pertinent Labs, Monitor Bowel Function, Monitor Hemodynamic Status      Electronically signed by Jackelyn Mix RD, LD on 8/36/21 at 1:41 PM    Contact Number: [phone]

## 2020-01-10 NOTE — PROGRESS NOTES
01/10/20 0004   Vent Information   Vent Type 980   Vent Mode AC/VC   Vt Ordered 500 mL   Rate Set 12 bmp   Peak Flow 55 L/min   Pressure Support 0 cmH20   FiO2  40 %   Sensitivity 3   PEEP/CPAP 5   I Time/ I Time % 0 s   Humidification Source HME   Vent Patient Data   High Peep/I Pressure 0   Peak Inspiratory Pressure 21 cmH2O   Mean Airway Pressure 8.4 cmH20   Rate Measured 12 br/min   Vt Exhaled 512 mL   Minute Volume 6.14 Liters   I:E Ratio 1:4.00   Cough/Sputum   Sputum How Obtained   (RN suctioned prior to visit)   Spontaneous Breathing Trial (SBT) RT Doc   Pulse 80   SpO2 100 %   Alarm Settings   High Pressure Alarm 50 cmH2O   Low Minute Volume Alarm 2.5 L/min   Apnea (secs) 20 secs   High Respiratory Rate 40 br/min   Low Exhaled Vt  250 mL   [REMOVED] Non-Surgical Airway Endo Tracheal Tube   Removal Date/Time: 01/09/20 1805  Placement Date/Time: 01/09/20 1758   Timeout: Patient;Procedure;Site/Side;Appropriate Equipment; Consent Confirmed;Site prepped with chlorhexidine;Correct Position  Mask Ventilation: Ventilated by mask with oral airway. ..    Secured at 23 cm   Measured From Lips   Secured Location Left   Secured By Commercial tube odell   Site Condition Dry

## 2020-01-10 NOTE — CARE COORDINATION
Pt remains on the vent. Cm intern called and spoke with the pt's wife, Katty Padgett. Pt is from rehab at [facility] following a stay at [address] from 4/19-5/19. Per Katty Padgett, PTA pt was doing well with therapy at [facility] and was working with a therapist to be able to remove PEG tube. However, recently pt's therapist passed away and pt has had a hard time with this. Pt uses a walker. Pt and pt's wife have been very pleased with his care at [facility]. Pt's wife lives on [address] in [facility] and their goal was to bring the pt home from [facility] as soon as he was finished with therapy. Pt's wife drives, however she is currently recovering from a fall and has a bad shoulder. Katty Padgett states their plan would be for pt to return to [facility] if able after d/c. Cm intern called and left message for admissions at [facility] about possibility of pt returning there. Cm to follow.     May Veronica  Case

## 2020-01-10 NOTE — PROGRESS NOTES
[address]  consulted by Dr. Donna Sierra for monitoring and adjustment. Indication for treatment: HCAP  Goal trough: 15-20 mcg/mL  Other Antimicrobials: cefepime, levofloxacin     Pertinent Laboratory Values:   Temp Readings from Last 3 Encounters:   01/10/20 100 °F (37.8 °C) (Rectal)   07/21/19 98.1 °F (36.7 °C) (Oral)   04/16/19 99.1 °F (37.3 °C) (Core)     Recent Labs     01/09/20  1733 01/09/20  1735 01/10/20  0005   WBC  --  12.5*  --    LACTATE 1.1  --  2.5  LACT CALLED TO WILDA GREEN RN AT 0049, [facility]  RESULTS READ BACK  *     Recent Labs     01/09/20  1735 01/10/20  0005   BUN 36* 43*   CREATININE 0.8* 1.1     Estimated Creatinine Clearance: 40 mL/min (based on SCr of 1.1 mg/dL). No intake or output data in the 24 hours ending 01/10/20 0215    Pertinent Cultures:  Date    Source    Results  1/9   Blood x 2   Ordered  1/10   Respiratory   Pending  1/10   Influenza   Negative  1/10   Legionella   Pending  1/10   MRSA screen   Ordered    Vancomycin level:   TROUGH:  No results for input(s): VANCOTROUGH in the last 72 hours. RANDOM:  No results for input(s): VANCORANDOM in the last 72 hours. Assessment:  WBC and temperature: Elevated  SCr, BUN, and urine output: Scr is slightly elevated  Day(s) of therapy: # 1  Vancomycin level: To be collected    Plan: Will start vancomycin 1500 mg x 1, followed by 1000 mg IVPB q24h  Pharmacy will continue to monitor patient and adjust therapy as indicated    VANCOMYCIN TROUGH SCHEDULED FOR 01/13/2020 @0000    Thank you for the consult.   Mary Conway RPh  1/10/2020 2:15 AM

## 2020-01-10 NOTE — CONSULTS
Subjective:   CHIEF COMPLAINT / HPI:  80year old male admitted with worsening sob and this is accompanied by wheeze. he also had cough producing yellow sputum.  Because of resp failure in ED he was intubated and placed on vent      Past Medical History:  Past Medical History:   Diagnosis Date    Diabetes mellitus ([address]     Hypertension        Past Surgical History:        Procedure Laterality Date    THORACENTESIS Bilateral 04/11/2019    IR    TRACHEOTOMY N/A 4/12/2019    PERCUTANEOUS TRACHEOTOMY WITH PLACEMENT CONFIRMED BY BRONCHOSCOPE, PEG TUBE PLACEMENT performed by Jason Anne MD at [address] [place] [address] N/A 4/4/2019    EGD DIAGNOSTIC ONLY performed by Olievrio Dong MD at [facility] ENDOSCOPY       Current Medications:    Current Facility-Administered Medications: 0.9 % sodium chloride infusion, , Intravenous, Continuous  glucose (GLUTOSE) 40 % oral gel 15 g, 15 g, Oral, PRN  dextrose 50 % IV solution, 12.5 g, Intravenous, PRN  glucagon (rDNA) injection 1 mg, 1 mg, Intramuscular, PRN  dextrose 5 % solution, 100 mL/hr, Intravenous, PRN  insulin lispro (HUMALOG) injection vial 0-6 Units, 0-6 Units, Subcutaneous, Q4H  aspirin chewable tablet 81 mg, 81 mg, Per G Tube, Daily  atorvastatin (LIPITOR) tablet 10 mg, 10 mg, Per G Tube, Nightly  fluconazole (DIFLUCAN) tablet 150 mg, 150 mg, Oral, Q14 Days  gabapentin (NEURONTIN) capsule 400 mg, 400 mg, Per G Tube, BID  latanoprost (XALATAN) 0.005 % ophthalmic solution 1 drop, 1 drop, Both Eyes, Nightly  sodium chloride flush 0.9 % injection 10 mL, 10 mL, Intravenous, 2 times per day  sodium chloride flush 0.9 % injection 10 mL, 10 mL, Intravenous, PRN  ondansetron (ZOFRAN) injection 4 mg, 4 mg, Intravenous, Q6H PRN  potassium chloride 20 mEq/50 mL IVPB (Central Line), 20 mEq, Intravenous, PRN  magnesium sulfate 1 g in dextrose 5% 100 mL IVPB, 1 g, Intravenous, PRN  polyethylene glycol (GLYCOLAX) packet 17 g, 17 g, Oral, Daily PRN  heparin (porcine) injection 5,000 Units, 5,000 Units, Subcutaneous, 3 times per day  norepinephrine (LEVOPHED) 16 mg in dextrose 5% 250 mL infusion, 2 mcg/min, Intravenous, Continuous  cefepime (MAXIPIME) 2 g in dextrose 5 % 50 mL IVPB, 2 g, Intravenous, Q12H  fentanyl (SUBLIMAZE) 1,000 mcg in sodium chloride 0.9% 100 mL infusion, 25 mcg/hr, Intravenous, Continuous  propofol injection, 10 mcg/kg/min, Intravenous, Continuous  albuterol sulfate  (90 Base) MCG/ACT inhaler 4 puff, 4 puff, Inhalation, Q4H **AND** ipratropium (ATROVENT HFA) 17 MCG/ACT inhaler 4 puff, 4 puff, Inhalation, Q4H **AND** MDI Treatment, , , Q4H  chlorhexidine (PERIDEX) 0.12 % solution 15 mL, 15 mL, Mouth/Throat, BID  pantoprazole (PROTONIX) injection 40 mg, 40 mg, Intravenous, Daily **AND** sodium chloride (PF) 0.9 % injection 10 mL, 10 mL, Intravenous, Daily  [COMPLETED] vancomycin (VANCOCIN) 1,500 mg in dextrose 5 % 500 mL IVPB, 25 mg/kg, Intravenous, Once **FOLLOWED BY** [START ON 1/11/2020] vancomycin (VANCOCIN) 1000 mg in dextrose 5% 200 mL IVPB, 1,000 mg, Intravenous, Q24H    No Known Allergies    Social History:    Social History     Socioeconomic History    Marital status:      Spouse name: None    Number of children: None    Years of education: None    Highest education level: None   Occupational History    None   Social Needs    Financial resource strain: None    Food insecurity:     Worry: None     Inability: None    Transportation needs:     Medical: None     Non-medical: None   Tobacco Use    Smoking status: Never Smoker    Smokeless tobacco: Never Used   Substance and Sexual Activity    Alcohol use: Never     Frequency: Never    Drug use: Never    Sexual activity: Not Currently   Lifestyle    Physical activity:     Days per week: None     Minutes per session: None    Stress: None   Relationships    Social connections:     Talks on phone: None     Gets together: None     Attends Jewish service: None

## 2020-01-10 NOTE — PROGRESS NOTES
Received patient from ED. Patient has a suspected STAGE II on his coccyx; area is intact- but non-blanchable. Patient has two discolored areas on the outer aspect of the soles of his feet, possibly from friction & shear. Heels elevated and skin prep applied. Wound consult placed. Skin checkoff performed and confirmed with Yefri Sharma RN.

## 2020-01-10 NOTE — PROGRESS NOTES
Full consult dictated # [id number]    This was a 55 minute visit. At least 50% of this time was spent face to face with the patient counseling and coordinating care. Hayden Morin

## 2020-01-10 NOTE — ED PROVIDER NOTES
eMERGENCY dEPARTMENT eNCOUnter    ATTENDING SIGN OUT NOTE    HPI/Physical Exam/Medical Decision Making  Time: 18:40  I have received sign out of [facility]  Emergency Department care from Dr. Colletta Siemens. We discussed the history, physical exam, completed/pending test results (if obtained) and current treatment plan. Please refer to his/her chart for further details. CRITICAL CARE NOTE:  There was a high probability of clinically significant life-threatening deterioration of the patient's condition requiring my urgent intervention. Total critical care time is 30 minutes  This includes vital sign monitoring, pulse oximetry monitoring, telemetry monitoring, clinical response to the IV medications, reviewing the nursing notes, consultation time, dictation/documetation time. (This time excludes time spent performing procedures). In brief, the patient is a 80 y.o. male who presented to the ED with hypoxia and respiratory distress. The patient was initially placed on BiPAP, but was then intubated. He appears to have congestive heart failure and possible pneumonia. At the time of turnover, the patient was stable and awaiting an inpatient ICU bed. 19:30-20:00  The patient's nurse came to get me emergently stating that his blood pressure was in the 09N systolic. The patient was placed in Trendelenburg. IV normal saline was initiated. The propofol was turned off. 20 µg of pushed dose epi was administered. The patient was started on Levophed through his peripheral IV. A fentanyl drip was ordered for sedation. The patient began to wake up and was given 2 mg of IV Versed while awaiting the Fentanyl drip. A right femoral line was placed. The Levophed was then changed to be infused through the femoral line. The hospitalist was updated as to the acute events.       Diagnostics:  XR CHEST PORTABLE (Final result)   Result time 01/09/20 23:09:47   Final result by Brittany Spence MD (01/09/20 23:09:47)

## 2020-01-10 NOTE — H&P
History and Physical      Name:  Noah Dinero /Age/Sex: 7/15/1933  (80 y.o. male)   MRN & CSN:  [id number] & [id number] Admission Date/Time: 2020  5:19 PM   Location:  [place] [address] PCP: Josseline Cueva MD       Hospital Day: 1    Assessment and Plan:   Noah Dinero is a 80 y.o.  male  who presents from skilled nursing facility with respiratory distress.    -Acute hypoxic respiratory failure failed BiPAP status post intubation in the ER. Patient had difficult intubation secondary to having small vocal cords that were deviated. -Bilateral pneumonia, healthcare acquired pneumonia  -Severe pulmonary hypertension  -Elevated troponin  -Hyperkalemia, potassium 5.6 mEq/L    Plan  Consult pulmonology  Consult cardiology for elevated troponin  Antibiotic coverage for healthcare acquired pneumonia IV vancomycin IV cefepime and IV levofloxacin  Follow blood cultures  Obtain procalcitonin level  Sending endotracheal secretions for Gram stain culture        Diet No diet orders on file   DVT Prophylaxis [] Lovenox, []  Heparin, [] SCDs, [] Ambulation   GI Prophylaxis [] PPI,  [] H2 Blocker,  [] Carafate,  [] Diet/Tube Feeds   Code Status Prior   Disposition Patient requires continued admission due to    MDM [] Low, [] Moderate,[]  High  Patient's risk as above due to      History of Present Illness:     Chief Complaint: Respiratory distress  Noah Dinero is a 80 y.o.  male  who presents with respiratory distress. History was obtained from chart. Patient was intubated and sedated at the time of my evaluation. Patient presented from nursing home with respiratory distress. He was satting 60% on room air. He failed BiPAP therefore was subsequently intubated. Per his son at bedside patient had productive cough yellow sputum over the past week. Review of systems, unable to obtain due to patient's current clinical condition.     Objective:   No intake or output data in the 24 hours ending 20   Vitals: Substance and Sexual Activity    Alcohol use: Never     Frequency: Never    Drug use: Never    Sexual activity: Not Currently   Lifestyle    Physical activity:     Days per week: None     Minutes per session: None    Stress: None   Relationships    Social connections:     Talks on phone: None     Gets together: None     Attends [employer] service: None     Active member of club or organization: None     Attends meetings of clubs or organizations: None     Relationship status: None    Intimate partner violence:     Fear of current or ex partner: None     Emotionally abused: None     Physically abused: None     Forced sexual activity: None   Other Topics Concern    None   Social History Narrative    None       Medications:   Medications:    cefepime  2 g Intravenous Once    And    levofloxacin  750 mg Intravenous Once      Infusions:    propofol 10 mcg/kg/min (01/09/20 1812)     PRN Meds:        Electronically signed by Nivia Spear MD on 1/9/2020 at 7:16 PM

## 2020-01-10 NOTE — CONSULTS
HISTORY:  Could not be reviewed with the patient. Family is not  available. SOCIAL HISTORY:  As listed in the computer reveals him to be , no  history of alcohol or recent tobacco abuse. REVIEW OF SYSTEMS:  Could not be obtained. ALLERGIES:  He has no known drug allergies. MEDICATIONS:  The patient is currently being treated with Levaquin and  vancomycin antibiotics. He is on Levophed 2 mcg per minute for blood  pressure support. He is also getting cefepime 2 gm IV q.12 h. He is on  heparin subcu prophylaxis, Lipitor 10 mg daily, aspirin 81 mg daily. PHYSICAL EXAMINATION:  GENERAL:  Thin built -American male, not in acute distress. Completely sedated and intubated on vent. VITAL SIGNS:  Reveal low grade fever at 99.1, heart rate about 72 beats  per minute, regular, blood pressure 139/68. HEENT:  Reveals head to be atraumatic and normocephalic. Eye  examination reveals conjunctivae to be pink. I did not see any  jaundice. NECK:  Supple. It is difficult to assess JVD. Carotid upstrokes are  intact. I did not hear bruits. HEART:  Reveals regular rate and rhythm. There is 2/6 systolic murmur  heard at the lower left sternal border. LUNGS:  Reveals scattered rhonchi and transmitted sounds at this time. ABDOMEN:  Soft. Bowel sounds are present. RECTAL:  Deferred. GENITAL:  Deferred. EXTREMITIES:  Do not reveal cyanosis, clubbing or edema. Pedal pulses  are not palpable. NEUROLOGIC:  Neurologically, assessment is incomplete. LABORATORY DATA:  Reveals his electrolytes to be significant for serum  potassium of 1.2, which is better than 5.6 on admission. His BUN is  high at 46, creatinine normal.  His troponin levels were 0.02 and 0.01. ProBNP is elevated to 3069, hemoglobin 9.5 gm.     A 12-lead EKG reveals normal sinus rhythm at 99 beats per minute and EKG  is completely normal.    ASSESSMENT AND PLAN:  This 80year-old gentleman with no documented  history of

## 2020-01-10 NOTE — PROGRESS NOTES
Handoff report called to me by Ming Lim ED RN. She said they are finishing up putting a central line in this patient and she would bring him up after that is finished.

## 2020-01-10 NOTE — PROGRESS NOTES
RENAL DOSE ADJUSTMENT MADE PER P/T PROTOCOL    PREVIOUS ORDER:  Cefepime 2 g q8h    Estimated Creatinine Clearance: 54 mL/min (A) (based on SCr of 0.8 mg/dL (L)).   Recent Labs     01/09/20  1735   BUN 36*   CREATININE 0.8*        NEW RENALLY ADJUSTED ORDER:  Cefepime 2 g q12h    RAVI Sales [facility]  1/9/2020 11:03 PM  __________________________________________________

## 2020-01-10 NOTE — DISCHARGE INSTR - COC
J96.20    Central apnea R06.81    Congestive heart failure (HCC) I50.9    Elevated brain natriuretic peptide (BNP) level R79.89    Serum creatinine raised R79.89    Troponin level elevated L72.71    Metabolic encephalopathy R97.17    Malfunction of percutaneous endoscopic gastrostomy (PEG) tube ([facility]) K94.23    Acute respiratory failure ([facility]) J96.00       Isolation/Infection:   Isolation          No Isolation        Patient Infection Status     None to display          Nurse Assessment:  Last Vital Signs: BP (!) 110/52   Pulse 60   Temp 98.6 °F (37 °C) (Rectal)   Resp 12   Ht (S) 5' 9\" (1.753 m)   Wt 130 lb 8 oz (59.2 kg)   SpO2 100%   BMI 19.27 kg/m²     Last documented pain score (0-10 scale):    Last Weight:   Wt Readings from Last 1 Encounters:   01/10/20 130 lb 8 oz (59.2 kg)     Mental Status:  {IP PT MENTAL STATUS:90390}    IV Access:  { ORA IV ACCESS:673147555}    Nursing Mobility/ADLs:  Walking   {CHP DME KUTA:461137256}  Transfer  {CHP DME GTB}  Bathing  {CHP DME ORVN:065427367}  Dressing  {CHP DME INCI:632477598}  Toileting  {CHP DME XMLO:208099703}  Feeding  {CHP DME AQNA:970712992}  Med Admin  {CHP DME TRZH:741717302}  Med Delivery   { ORA MED Delivery:677458525}    Wound Care Documentation and Therapy:  Wound 20 Sacrum Mid redness and stg 2 clusters to sacrum (Active)   Dressing Changed Changed/New 2020  9:25 PM   Dressing/Treatment Foam;Moisture barrier;Protective barrier 1/10/2020  8:11 AM   Wound Assessment Fragile;Pink;Brown; Other (Comment) 1/10/2020  8:11 AM   Drainage Amount None 1/10/2020  8:11 AM   Odor None 1/10/2020  8:11 AM   Number of days: 0       Wound 20 Toe (Comment  which one) Left; Outer redness and pressure areas to 4th and 5th toe knuckles  (Active)   Dressing/Treatment Open to air 1/10/2020  8:11 AM   Wound Assessment Pink;Red 1/10/2020  8:11 AM   Drainage Amount None 1/10/2020  8:11 AM   Odor None 1/10/2020  8:11 AM   Number of days: 0 Wound 01/09/20 Right; Outer redness and pressure areas to 4th and 5th toe knuckles  (Active)   Dressing/Treatment Open to air 1/10/2020  8:11 AM   Wound Assessment Pink;Red 1/10/2020  8:11 AM   Drainage Amount None 1/10/2020  8:11 AM   Odor None 1/10/2020  8:11 AM   Number of days: 0        Elimination:  Continence:   · Bowel: {YES / EL:11301}  · Bladder: {YES / MV:12839}  Urinary Catheter: {Urinary Catheter:405872813}   Colostomy/Ileostomy/Ileal Conduit: {YES / TK:74797}       Date of Last BM: ***    Intake/Output Summary (Last 24 hours) at 1/10/2020 1223  Last data filed at 1/10/2020 1110  Gross per 24 hour   Intake 789.14 ml   Output 2090 ml   Net -1300.86 ml     I/O last 3 completed shifts:   In: 739.1 [I.V.:209.1; NG/GT:30; IV Piggyback:500]  Out: 0 [Urine:620; Emesis/NG output:20]    Safety Concerns:     [employer] Safety Concerns:306730360}    Impairments/Disabilities:      [employer] Impairments/Disabilities:646183494}      Patient's personal belongings (please select all that are sent with patient):  {CHP DME Belongings:039677760}    RN SIGNATURE:  {Esignature:906254762}    CASE MANAGEMENT/SOCIAL WORK SECTION    Inpatient Status Date: ***    Readmission Risk Assessment Score:  Readmission Risk              Risk of Unplanned Readmission:        28           Discharging to Facility/ Agency   · Name:   · Address:  · Phone:  · Fax:    Dialysis Facility (if applicable)   · Name:  · Address:  · Dialysis Schedule:  · Phone:  · Fax:    / signature: {Esignature:890654795}    PHYSICIAN SECTION    Nutrition Therapy:  Current Nutrition Therapy:   [employer] Diet List:008315576}    Routes of Feeding: {CHP DME Other Feedings:567026307}  Liquids: {Slp liquid thickness:06819}  Daily Fluid Restriction: {CHP DME Yes amt example:060689090}  Last Modified Barium Swallow with Video (Video Swallowing Test): {Done Not Done HUQQ:952533872}    Treatments at the Time of Hospital Discharge:   Respiratory Treatments: ***  Oxygen Therapy:  {Therapy; copd oxygen:81265}  Ventilator:    { CC Vent TAFK:453427524}    Rehab Therapies: {THERAPEUTIC INTERVENTION:5204583409}  Weight Bearing Status/Restrictions: [address] CC Weight Bearin}  Other Medical Equipment (for information only, NOT a DME order):  {EQUIPMENT:506313175}  Other Treatments: ***      Prognosis: {Prognosis:9869161046}    Condition at Discharge: [address] Patient Condition:914401014}    Rehab Potential (if transferring to Rehab): {Prognosis:5450672722}    Recommended Labs or Other Treatments After Discharge: ***    Physician Certification: I certify the above information and transfer of Leonardo Caldera  is necessary for the continuing treatment of the diagnosis listed and that he requires {Admit to Appropriate Level of Care:67797} for {GREATER/LESS:678134127} 30 days.      Update Admission H&P: {CHP DME Changes in BUAL}    PHYSICIAN SIGNATURE:  {Esignature:603292652}

## 2020-01-10 NOTE — PROGRESS NOTES
I paged Dr Henrique Shah with the consult for this patient and informed him of current HR and past troponin level

## 2020-01-10 NOTE — PROGRESS NOTES
This patient arrived to ICU room 2119, he had no belongings other than the hospital gown he had on from the nursing home. We placed him on our monitors, vitals stable. Update received from [facility] ED RN.

## 2020-01-10 NOTE — PROGRESS NOTES
Hospitalist Progress Note      Name:  Cristobal Simms /Age/Sex: 7/15/1933  (80 y.o. male)   MRN & CSN:  [id number] & [id number] Admission Date/Time: 2020  5:19 PM   Location:  -A PCP: Isamar Barrientos MD         Hospital Day: 2    Assessment and Plan:   Cristobal Simms is a 80 y.o.  male  who presents from skilled nursing facility with respiratory distress.     - Acute on chronic hypoxic and hypecarbic respiratory failure, failed BiPAP s/p intubation in the ER.    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 - Bilateral pneumonia (HAP suspected)  - Mild KERA due to diuresis (Cr up to 1.1 from 0.8)   - Mild hyperkalemia - better  - Chronic elevated troponin - at baseline.  - Hyperglycemia  Plan  - IV Vancomycin and cefepime  - f/u sputum and blood cx, urine legionella and pneumococcal ag and MRSA screen. - Insulin sliding scale  - gentle IV fluid  - Pulmonology consulted for Vent mgt. - Cardiology consulted by night team for the elevated troponin. Doubt there is anything cardiology will do about it. Await recommendation. Chronic issues  - Severe pulmonary hypertension (RVSP 71 mm hg)  - t2DM  - HLD: statin. - s/p tracheostomy in 2019, now reversed. - s/p PEG. Dietitian consulted to resume tube feeding.  - Malnutrition  - Known Bicuspic AV  - Glaucoma: Latanoprost.       Diet Diet NPO Effective Now   DVT Prophylaxis [] Lovenox, [x]  Heparin, [] SCDs, [] Ambulation   GI Prophylaxis [x] PPI,  [] H2 Blocker,  [] Carafate,  [] Diet/Tube Feeds   Code Status Full Code   Disposition Patient requires continued admission due to respiratory failure    MDM [] Low, [] Moderate,[x]  High     History of Present Illness:     He remains intubated and sedated. Vent requirements are minimal. Cr is a bit up today associated with elevated BUN with BUN/Cr of > 20:1. Ten point ROS not reviewed because of sedation. Objective:        Intake/Output Summary (Last 24 hours)

## 2020-01-11 NOTE — PROGRESS NOTES
flush, 10 mL, PRN  ondansetron, 4 mg, Q6H PRN  potassium chloride, 20 mEq, PRN  magnesium sulfate, 1 g, PRN  polyethylene glycol, 17 g, Daily PRN        CBC   Recent Labs     01/09/20  1735 01/10/20  0555 01/11/20  0535   WBC 12.5* 13.8* 10.7*   HGB 11.2* 9.5* 8.0*   HCT 39.8* 31.9* 27.3*    272 209      BMP   Recent Labs     01/10/20  0005 01/10/20  0555 01/11/20  0535    138 140   K 5.2* 5.2* 4.2   CL 93* 91* 99   CO2 35* 34* 34*   PHOS 3.3 3.5 2.4*   BUN 43* 46* 41*   CREATININE 1.1 1.1 1.0       Radiology report reviewed:   CXR  Persistent small right pleural effusion with emphysematous changes.       Electronically signed by Rox Brennan MD on 1/11/2020 at 12:16 PM

## 2020-01-11 NOTE — PROGRESS NOTES
pulmonary      SUBJECTIVE:  Intubated on vent     OBJECTIVE    VITALS:  BP (!) 105/51   Pulse 71   Temp 99.5 °F (37.5 °C) (Rectal)   Resp 12   Ht (S) 5' 9\" (1.753 m)   Wt 136 lb 7.4 oz (61.9 kg)   SpO2 100%   BMI 20.15 kg/m²   HEAD AND FACE EXAM:  No throat injection, no active exudate,no thrush  NECK EXAM;No JVD, no masses, symmetrical  CHEST EXAM; Expansion equal and symmetrical, no masses  LUNG EXAM; Good breath sounds bilaterally. There are expiratory wheezes both lungs, there are crackles at both lung bases  CARDIOVASCULAR EXAM: Positive S1 and S2, no S3 or S4, no clicks ,no murmurs  RIGHT AND LEFT LOWER EXTRIMITY EXAM: No edema, no swelling, no inflamation            LABS   Lab Results   Component Value Date    WBC 10.7 (H) 01/11/2020    HGB 8.0 (L) 01/11/2020    HCT 27.3 (L) 01/11/2020    MCV 91.6 01/11/2020     01/11/2020     Lab Results   Component Value Date    CREATININE 1.0 01/11/2020    BUN 41 (H) 01/11/2020     01/11/2020    K 4.2 01/11/2020    CL 99 01/11/2020    CO2 34 (H) 01/11/2020     Lab Results   Component Value Date    INR 1.09 04/12/2019    PROTIME 12.6 (H) 04/12/2019          Lab Results   Component Value Date    PHOS 2.4 01/11/2020    PHOS 3.5 01/10/2020    PHOS 3.3 01/10/2020        Recent Labs     01/09/20  2145 01/10/20  0600 01/11/20  0600   PH 7.48* 7.44 7.46*   PO2ART 105* 84 82   TNV7WFT 53.0* 59.0* 52.0*   O2SAT 95.1* 95.0* 96.5         Wt Readings from Last 3 Encounters:   01/11/20 136 lb 7.4 oz (61.9 kg)   07/21/19 150 lb (68 kg)   04/15/19 160 lb 0.9 oz (72.6 kg)       XRAY VIEW OF THE CHEST       1/11/2020 5:11 am       COMPARISON:   01/10/2020 5:03 a.m.       HISTORY:   ORDERING SYSTEM PROVIDED HISTORY: ventilator   TECHNOLOGIST PROVIDED HISTORY:   Reason for exam:->ventilator   Acuity: Acute   Type of Exam: Subsequent/Follow-up       FINDINGS:   Monitor wires project over the thorax.  ETT tip 6.4 cm above the cyndi. Enteric catheter is present.

## 2020-01-12 NOTE — PROGRESS NOTES
01/12/20 0444   Vent Information   Vent Type 980   Vent Mode AC/VC   Vt Ordered 500 mL   Rate Set 12 bmp   Peak Flow 55 L/min   Pressure Support 0 cmH20   FiO2  35 %   Sensitivity 3   PEEP/CPAP 5   I Time/ I Time % 0 s   Humidification Source HME   Vent Patient Data   High Peep/I Pressure 0   Peak Inspiratory Pressure 16 cmH2O   Mean Airway Pressure 12 cmH20   Rate Measured 21 br/min   Vt Exhaled 586 mL   Minute Volume 10.3 Liters   I:E Ratio 1:2.90   Cough/Sputum   Sputum How Obtained Endotracheal;Suctioned   $Obtained Sample $Nasotracheal Suction   Cough Productive   Sputum Amount Small   Sputum Color White   Tenacity Thick   Spontaneous Breathing Trial (SBT) RT Doc   Pulse 109   SpO2 100 %   Additional Respiratory  Assessments   Resp 18   Alarm Settings   High Pressure Alarm 40 cmH2O   Delay Alarm 20 sec(s)   Low Minute Volume Alarm 2.5 L/min   Apnea (secs) 20 secs   High Respiratory Rate 40 br/min   Low Exhaled Vt  250 mL   Non-Surgical Airway Endo Tracheal Tube   Placement Date/Time: 01/09/20 4561   Timeout: Patient;Procedure;Site/Side;Appropriate Equipment; Consent Confirmed;Site prepped with chlorhexidine  Mask Ventilation: Oral airway  Placed By: In ED  Inserted by: Roselynn Block  Insertion attempts: 2  Airw. ..    Secured at 26 cm   Measured From Lips   Secured Location Right   Secured By Commercial tube odell   Site Condition Dry   Cuff Pressure   (minimal leak)

## 2020-01-12 NOTE — PROGRESS NOTES
93* 91* 99   CO2 35* 34* 34*   PHOS 3.3 3.5 2.4*   BUN 43* 46* 41*   CREATININE 1.1 1.1 1.0     LIVER PROFILE:   Recent Labs     01/09/20  1735 01/10/20  0555 01/11/20  0535   AST 24 16 15   ALT 11 10 10   BILIDIR  --  0.2 0.3   BILITOT 0.4 0.9 0.8   ALKPHOS 111 97 81     PT/INR: No results for input(s): PROTIME, INR in the last 72 hours. APTT: No results for input(s): APTT in the last 72 hours. BNP:  No results for input(s): BNP in the last 72 hours. TROPONIN: @TROPONINI:3@      Assessment:  80 y. o.year old who is admitted for          Plan:  RESPIRATORY FAILURE WITH POSSIBLE INTERSTITIAL PNEUMONITIS   ANEMIA: AS PER medicine team  Hypotension: on norepinephrine drip  DM: stable  Health maintenance: exerise and diet  All labs, medications and tests reviewed, continue all other medications of all above medical condition listed as is.       Edson Portillo MD 1/11/2020 9:11 PM

## 2020-01-12 NOTE — PROGRESS NOTES
pulse and amplitude are normal no bruit, no abdominal bruit noted ( normal abdominal aorta ausculation), femoral arteries pulse and amplitude are normal no bruit, pedal pulses are normal  GI:  Bowel sounds normal, Soft, No tenderness, No masses, No pulsatile masses. : External genitalia appear normal, No masses or lesions. No discharge. No CVA tenderness. Musculoskeletal:  Intact distal pulses, No edema, No tenderness, No cyanosis, No clubbing. Good range of motion in all major joints. No tenderness to palpation or major deformities noted. Back- No tenderness. Integument:  Warm, Dry, No erythema, No rash. Lymphatic:  No lymphadenopathy noted.    Neurologic:  SEDATED     Medications:    insulin lispro  0-6 Units Subcutaneous Q4H    aspirin  81 mg Per G Tube Daily    atorvastatin  10 mg Per G Tube Nightly    fluconazole  150 mg Oral Q14 Days    gabapentin  400 mg Per G Tube BID    latanoprost  1 drop Both Eyes Nightly    sodium chloride flush  10 mL Intravenous 2 times per day    heparin (porcine)  5,000 Units Subcutaneous 3 times per day    cefepime  2 g Intravenous Q12H    albuterol sulfate HFA  4 puff Inhalation Q4H    And    ipratropium  4 puff Inhalation Q4H    chlorhexidine  15 mL Mouth/Throat BID    pantoprazole  40 mg Intravenous Daily    And    sodium chloride (PF)  10 mL Intravenous Daily      dexmedetomidine (PRECEDEX) IV infusion Stopped (01/12/20 0753)    sodium chloride 75 mL/hr at 01/12/20 0929    dextrose      norepinephrine 5 mcg/min (01/12/20 1106)    fentanyl 50 mcg/hr (01/12/20 1347)    propofol 15 mcg/kg/min (01/12/20 0801)     glucose, dextrose, glucagon (rDNA), dextrose, sodium chloride flush, ondansetron, potassium chloride, magnesium sulfate, polyethylene glycol    Lab Data:  CBC:   Recent Labs     01/10/20  0555 01/11/20  0535 01/12/20  0525   WBC 13.8* 10.7* 8.8   HGB 9.5* 8.0* 8.0*   HCT 31.9* 27.3* 27.7*   MCV 91.7 91.6 93.0    209 177     BMP:   Recent Labs     01/10/20  0555 01/11/20  0535 01/12/20  0525    140 141   K 5.2* 4.2 4.3   CL 91* 99 101   CO2 34* 34* 30   PHOS 3.5 2.4* 2.4*   BUN 46* 41* 31*   CREATININE 1.1 1.0 1.0     LIVER PROFILE:   Recent Labs     01/10/20  0555 01/11/20  0535 01/12/20  0525   AST 16 15 31   ALT 10 10 20   BILIDIR 0.2 0.3 0.8*   BILITOT 0.9 0.8 1.4*   ALKPHOS 97 81 90     PT/INR: No results for input(s): PROTIME, INR in the last 72 hours. APTT: No results for input(s): APTT in the last 72 hours. BNP:  No results for input(s): BNP in the last 72 hours. TROPONIN: @TROPONINI:3@      Assessment:  80 y. o.year old who is admitted for          Plan:  RESPIRATORY FAILURE WITH POSSIBLE INTERSTITIAL PNEUMONITIS   ANEMIA: AS PER medicine team  Hypotension: on norepinephrine drip  DM: stable  Health maintenance: exerise and diet  All labs, medications and tests reviewed, continue all other medications of all above medical condition listed as is.       Jennifer Huff MD 1/12/2020 3:28 PM

## 2020-01-12 NOTE — PROGRESS NOTES
conjunctivitis. HENT Mucous membranes are moist. Oral pharynx without exudates, no evidence of thrush. NECK Supple, no apparent thyromegaly or masses. RESP Clear to auscultation, no wheezes, rales or rhonchi. Symmetric chest movement while on room air. CARDIO/VASC S1/S2 auscultated. Regular rate without appreciable murmurs, rubs, or gallops. No JVD or carotid bruits. Peripheral pulses equal bilaterally and palpable. No peripheral edema. GI Abdomen is soft without significant tenderness, masses, or guarding. Bowel sounds are normoactive. Rectal exam deferred. MSK No gross joint deformities. SKIN Normal coloration, warm, dry. NEURO Not done  [facility] Intubated and sedated.      Medications:   Medications:    insulin lispro  0-6 Units Subcutaneous Q4H    aspirin  81 mg Per G Tube Daily    atorvastatin  10 mg Per G Tube Nightly    fluconazole  150 mg Oral Q14 Days    gabapentin  400 mg Per G Tube BID    latanoprost  1 drop Both Eyes Nightly    sodium chloride flush  10 mL Intravenous 2 times per day    heparin (porcine)  5,000 Units Subcutaneous 3 times per day    cefepime  2 g Intravenous Q12H    albuterol sulfate HFA  4 puff Inhalation Q4H    And    ipratropium  4 puff Inhalation Q4H    chlorhexidine  15 mL Mouth/Throat BID    pantoprazole  40 mg Intravenous Daily    And    sodium chloride (PF)  10 mL Intravenous Daily      Infusions:    dexmedetomidine (PRECEDEX) IV infusion Stopped (01/12/20 0753)    sodium chloride 75 mL/hr at 01/12/20 0929    dextrose      norepinephrine 5 mcg/min (01/12/20 1106)    fentanyl 50 mcg/hr (01/12/20 1003)    propofol 15 mcg/kg/min (01/12/20 0801)     PRN Meds: glucose, 15 g, PRN  dextrose, 12.5 g, PRN  glucagon (rDNA), 1 mg, PRN  dextrose, 100 mL/hr, PRN  sodium chloride flush, 10 mL, PRN  ondansetron, 4 mg, Q6H PRN  potassium chloride, 20 mEq, PRN  magnesium sulfate, 1 g, PRN  polyethylene glycol, 17 g, Daily PRN        Recent Labs     01/10/20  3418

## 2020-01-13 PROBLEM — I21.4 NSTEMI (NON-ST ELEVATED MYOCARDIAL INFARCTION) (HCC): Status: RESOLVED | Noted: 2019-01-01 | Resolved: 2020-01-01

## 2020-01-13 PROBLEM — J96.91 RESPIRATORY FAILURE WITH HYPOXIA (HCC): Status: ACTIVE | Noted: 2020-01-01

## 2020-01-13 PROBLEM — J09.X1 COMMUNITY ACQUIRED PNEUMONIA DUE TO INFLUENZA A VIRUS: Status: RESOLVED | Noted: 2019-01-01 | Resolved: 2020-01-01

## 2020-01-13 NOTE — DISCHARGE SUMMARY
Interstitial opacities in the right lung.  Left  lung is clear.  No significant pleural effusion or pneumothorax.  Heart size  and mediastinal contours are normal.     Impression:       1. ETT tip 6.4 cm above the cyndi. 2. Interstitial opacities in the right lung may represent asymmetric  pulmonary edema or interstitial pneumonitis.     XR CHEST PORTABLE [[id number]] Collected: 01/10/20 0700     Order Status: Completed Updated: 01/10/20 0922     Narrative:       EXAMINATION:  ONE XRAY VIEW OF THE CHEST    1/10/2020 5:14 am    COMPARISON:  01/09/2020    HISTORY:  ORDERING SYSTEM PROVIDED HISTORY: respiratory failure; pneumonia  TECHNOLOGIST PROVIDED HISTORY:  Reason for exam:->respiratory failure; pneumonia  Reason for Exam: ventilator  Acuity: Acute  Type of Exam: Subsequent/Follow-up    FINDINGS:  Endotracheal tube and NG tube are in place.  The heart and mediastinal  structures are stable.  Small right pleural effusion is likely present. Emphysematous changes are redemonstrated.  The lungs are stable.  Arthritic  changes of the shoulders are evident.     Impression:       Persistent small right pleural effusion with emphysematous changes.     XR CHEST PORTABLE [[id number]] Collected: 01/09/20 1830     Order Status: Completed Updated: 01/09/20 2312     Narrative:       EXAMINATION:  ONE X-RAY VIEW OF THE CHEST    1/9/2020 6:24 pm    COMPARISON:  January 9, 2020    HISTORY:  ORDERING SYSTEM PROVIDED HISTORY: Post intubation  TECHNOLOGIST PROVIDED HISTORY:  Reason for exam:->Post intubation  Reason for Exam: Intubation    FINDINGS:  Endotracheal tube placement with the tip approximately 3.8 cm above of the  level of the cyndi. Small bibasilar lung opacities.  No cardiomegaly.  Mild pulmonary edema.     Impression:       Endotracheal tube placement in appropriate location. Mild pulmonary edema.     Small pleural effusions are suspected.     XR CHEST PORTABLE [[id number]]      Order Status: Canceled      XR CHEST PORTABLE [[id number]] Collected: 01/09/20 1753     Order Status: Completed Updated: 01/09/20 1757     Narrative:       EXAMINATION:  ONE XRAY VIEW OF THE CHEST    1/9/2020 5:32 pm    COMPARISON:  April 16, 2019    HISTORY:  ORDERING SYSTEM PROVIDED HISTORY: chest pain  TECHNOLOGIST PROVIDED HISTORY:  Reason for exam:->chest pain  Reason for Exam: CP, SOB  Acuity: Chronic  Type of Exam: Initial  Additional signs and symptoms: unkown  Relevant Medical/Surgical History: htn    FINDINGS:  The cardiomediastinal silhouette is stable.  There are patchy vague airspace  opacities bilaterally, may be related to mild pulmonary edema versus  pneumonia.  There is no pleural effusion. Deena Linsey is no pneumothorax.  There  is no acute osseous abnormality.     Impression:       Patchy vague airspace opacities bilaterally, may be related to mild pulmonary  edema versus pneumonia.             Discharge Time of 35 minutes    Electronically signed by Gaby Valerio MD on 1/13/2020 at 5:22 PM

## 2020-01-13 NOTE — CONSULTS
[address] [place] [address] Consultation Note    Date: 1/13/2020  Name: Demetri Reynaga  MRN: [id number]  YOB: 1933   Patient's PCP: Yessi Colby MD at [facility]   Referring Physician: Dr. Zee Springer care admit date: 1/9/2020  Intubation and mechanical ventilation: 1/9 to 1/13/2020    Informant: Chart reviewed, discussed with the patient's nurse, hospice nurse liaison, and I met with the patient's son, Randall Silva, and daughter in law at the bedside. The patient is unable to provide any information due to his clinical status. CC:  Respiratory failure    [place]: This is a 80year old patient, with a history of pulmonary hypertension, Type 2 diabetes mellitus, severe protein calorie malnutrition with involuntary weight loss, past respiratory failure in March - April 2019 post Influenza A and pneumonitis, with subsequent tracheostomy and PEG and transfer to [address] [place] [address],[address] and then to [facility] where he has remained. He improved some per the patient's son, and visited home at [place]. The patient developed respiratory distress at the Nursing Home and was sent to the ED  on 1/9/2020 where he was intubated for acute hypoxic and hypercapneic respiratory failure with [address] Acquired pneumonia (vulture negative and viral respiratory panel negative). The patient has had broad spectrum antibiotics that have been de- escalated to Cefepime, nebulized bronchodilators and supportive care. He has been followed by Pulmonary and Cardiology. The patient was extubated on 1/13/20 and initially was talking, but fairly quickly declined with progressive hypoxia. ABG showed respiratory acidosis with 7.10/107/138/33/96% on 3.5 lpm oxygen. Dr. Xavier Cardoso discussed the situation with the patient's spouse, who has decided that the patient would not be re intubated. Comfort care was discussed, and hospice was asked to meet with the patient's family.  The the Minutes per session: Not on file    Stress: Not on file   Relationships    Social connections:     Talks on phone: Not on file     Gets together: Not on file     Attends Alevism service: Not on file     Active member of club or organization: Not on file     Attends meetings of clubs or organizations: Not on file     Relationship status: Not on file    Intimate partner violence:     Fear of current or ex partner: Not on file     Emotionally abused: Not on file     Physically abused: Not on file     Forced sexual activity: Not on file   Other Topics Concern    Not on file   Social History Narrative    Not on file       History reviewed. No pertinent family history. (the patient's son does not know family health history except + for diabetes)    No Known Allergies    Medications reviewed  Prior to Admission medications    Medication Sig Start Date End Date Taking?  Authorizing Provider   acetaminophen (TYLENOL) 325 MG tablet 650 mg by Per G Tube route every 4 hours as needed for Pain   Yes Historical Provider, MD   atorvastatin (LIPITOR) 10 MG tablet 10 mg by Per G Tube route nightly   Yes Historical Provider, MD   levofloxacin (LEVAQUIN) 500 MG tablet 500 mg by Per G Tube route daily   Yes Historical Provider, MD   hyoscyamine (LEVSIN) 125 MCG tablet 125 mcg by Per G Tube route 2 times daily   Yes Historical Provider, MD   polyethylene glycol (GLYCOLAX) powder 17 g by Per G Tube route daily   Yes Historical Provider, MD   ranitidine (ZANTAC) 150 MG tablet 150 mg by Per G Tube route 2 times daily   Yes Historical Provider, MD   fluconazole (DIFLUCAN) 150 MG tablet Take 150 mg by mouth See Admin Instructions 01/09/2020 Per facility patient receives this every 14 days   Yes Historical Provider, MD   chlorhexidine (PERIDEX) 0.12 % solution Take 15 mLs by mouth 2 times daily   Yes Historical Provider, MD   aspirin 81 MG chewable tablet Take 1 tablet by mouth daily  Patient taking differently: 81 mg by Per G Tube CREATININE 1.0 1.0 0.8*   GLUCOSE 190* 144* 132*       Physical Exam:   BP (!) 124/52   Pulse 78   Temp 96.6 °F (35.9 °C) (Rectal)   Resp 19   Ht (S) 5' 9\" (1.753 m)   Wt 139 lb 12.4 oz (63.4 kg)   SpO2 94%   BMI 20.64 kg/m²   General: obtunded, thin, chronically ill appearing  HEENT: Mucous membranes are dry, conjunctivae are pale, bitemporal wasting, mucous from mouth  Neck:carotid upstrokes are diminished without bruit, old tracheostomy scar  Chest: intercostal wasting   Heart: distant tones, RRR, S1S2, no murmurs  Lungs:  Distant, shallow breath sounds bilaterally, diminished at the bases, with basilar rales, scattered rhonchi   Abdomen: right femoral CVC, PEG in place, the abdomen is soft, bowel sounds quietly present, no apparent  tenderness, nondistended   Back: not examined but there is a coccyx pressure ulcer by report  : martinez in place  Extremities:  Feet are warm, no mottling, no edema  Neurologic: obtunded    Assessment/Plan:  1. Recurrent hypoxic and hypercapneic respiratory failure with respiratory acidosis with encephalopathy, and family declines re intubation. The patient is General Inpatient Hospice appropriate for the acute management of shortness of breath, congestion, respiratory failure. The hospice nurse liaison is meeting with the family late afternoon, and if consents can admit to [facility] (I will place admission orders). Thanks, and I will follow from the [facility] medical director standpoint. PPS 10%. 2. Sepsis and [address] Acquired pneumonia  3. Metabolic encephalopathy secondary to the above  4. Pulmonary hypertension  5. Severe protein calorie malnutrition with involuntary weight loss of 24 pounds in 4 months  6. Type 2 diabetes mellitus    Patient Active Problem List   Diagnosis Code    Community acquired pneumonia due to influenza A virus J09. X1    Pneumonia J18.9    Essential hypertension I10    Type 2 diabetes mellitus with diabetic polyneuropathy, without long-term current use of insulin ([facility]) E11.42    KERA (acute kidney injury) ([facility] [address] N17.9    Oropharyngeal dysphagia R13.12    NSTEMI (non-ST elevated myocardial infarction) ([facility]) I21.4    Fatigue R53.83    Severe malnutrition ([facility]) E43    Acute on chronic respiratory failure ([facility]) J96.20    Central apnea R06.81    Congestive heart failure ([facility]) I50.9    Elevated brain natriuretic peptide (BNP) level R79.89    Serum creatinine raised R79.89    Troponin level elevated O54.98    Metabolic encephalopathy M92.81    Malfunction of percutaneous endoscopic gastrostomy (PEG) tube ([facility]) K94.23    Acute respiratory failure ([facility]) E13.67       Certification of Terminal Illness: I certify that this patient is eligible for General Inpatient Hospice services for a terminal diagnosis of hypoxic and hypercapneic respiratory failure with a life expectancy predicted to be less than 6 months, if the illness follows its expected course.     Loida Pastor MD, [facility]

## 2020-01-13 NOTE — PROGRESS NOTES
[address]  Wife Saman Field has decided to make pt DNR-CC and to consult hospice. Wife does not want pt to be re-intubated at this time.

## 2020-01-13 NOTE — CONSULTS
Nutrition Assessment    Type and Reason for Visit: Reassess    Nutrition Recommendations:   · Continue NPO    Nutrition Assessment: RD consulted for EN order and manage. Pt extubated this AM, however pt status declining again requiring further intubation/trachestomy. Wife now pursuing comfort measures - has elected to use Hospcie at this time. Pt now DNR-CC. Will hold off on TF and continue to follow for any changes in goals of care. Malnutrition Assessment:  · Malnutrition Status: Meets the criteria for severe malnutrition  · Context: Chronic illness  · Findings of the 6 clinical characteristics of malnutrition (Minimum of 2 out of 6 clinical characteristics is required to make the diagnosis of moderate or severe Protein Calorie Malnutrition based on AND/ASPEN Guidelines):  1. Energy Intake-Unable to assess, Unable to assess    2. Weight Loss-10% loss or greater, in 6 months  3. Fat Loss-Severe subcutaneous fat loss, Orbital  4. Muscle Loss-Severe muscle mass loss, Clavicles (pectoralis and deltoids)  5. Fluid Accumulation-No significant fluid accumulation, Extremities  6.   Strength-Not measured    Nutrition Risk Level: High    Nutrient Needs:  · Estimated Daily Total Kcal: 7424 ([place] Jeor)  · Estimated Daily Protein (g): 73-87 (1-1.2 g/kg IBW)  · Estimated Daily Total Fluid (ml/day): 1304 (1 mL/kcal)    Nutrition Diagnosis:   · Problem: Severe malnutrition, In context of chronic illness  · Etiology: related to Insufficient energy/nutrient consumption(probable)     Signs and symptoms:  as evidenced by Severe loss of subcutaneous fat, Severe muscle loss    Objective Information:  · Wound Type: None  · Current Nutrition Therapies:  · Oral Diet Orders: NPO   · Oral Diet intake: NPO  · Oral Nutrition Supplement (ONS) Orders: None  · ONS intake: NPO  · Anthropometric Measures:  · Ht: (S) 5' 9\" (175.3 cm)   · Current Body Wt: 139 lb (63 kg)  · Admission Body Wt: 130 lb (59 kg)  · Usual Body Wt: 160 lb

## 2020-01-13 NOTE — PROGRESS NOTES
Hospitalist Progress Note      Name:  Shavon Hidalgo /Age/Sex: 7/15/1933  (80 y.o. male)   MRN & CSN:  [id number] & [id number] Admission Date/Time: 2020  5:19 PM   Location:  -A PCP: Tony Kemp MD         Hospital Day: 5    Assessment and Plan:   Shavon Hidalgo is a 80 y.o.  male  who presents from skilled nursing facility with respiratory distress.     - Acute on chronic hypoxic and hypecarbic respiratory failure, failed BiPAP so was intubated in the ER 20, extubated earlier today. Now has worsening hypercapnia. Wife does not want reintubation.    - Worsening metabolic encephalopathy due to hypercapnia. Not to be reintubated per wife. - Sepsis due to bilateral pneumonia (HAP suspected): MRSA nasal screen neg. Viral screen neg. Urine neg for legionella and pneumococcal ag. Sputum culture positive for Corynebacterium species opportunistic pathogen. - Mild KERA due to diuresis: resolved. Cr back to baseline. Stable  - Mild hyperkalemia - resolved. - Chronic elevated troponin - at baseline. EF normal. No further work up per cardiology. - Hyperglycemia - better. - Hypotension: likely due to propofol. On low dose norepinephrine gtt. .  Plan  - Continue cefepime  - Hospice consult. - CODE STATUS changed to DNR CC after discussions with wife. Chronic issues  - Elevated RVSP 43 mm hg.  - t2DM  - HLD: statin. - s/p tracheostomy in 2019, now reversed. - s/p PEG.  Dietitian consulted to resume tube feeding.  - Severe PCMalnutrition: dietitian does not want to start tube feed now because of patients hemodynamic instability.  - Known Bicuspic AV  - Glaucoma: Latanoprost.       Diet Diet NPO Effective Now   DVT Prophylaxis [] Lovenox, [x]  Heparin, [] SCDs, [] Ambulation   GI Prophylaxis [x] PPI,  [] H2 Blocker,  [] Carafate,  [] Diet/Tube Feeds   Code Status DNR-CC   Disposition  awaiting hospice transition   MDM [] Low, [] Moderate,[x]  High     History of Present Illness: (01/13/20 1102)     PRN Meds: acetaminophen, 650 mg, Q4H PRN  glucose, 15 g, PRN  dextrose, 12.5 g, PRN  glucagon (rDNA), 1 mg, PRN  dextrose, 100 mL/hr, PRN  sodium chloride flush, 10 mL, PRN  ondansetron, 4 mg, Q6H PRN  potassium chloride, 20 mEq, PRN  magnesium sulfate, 1 g, PRN  polyethylene glycol, 17 g, Daily PRN        CBC   Recent Labs     01/11/20  0535 01/12/20  0525 01/13/20  0548   WBC 10.7* 8.8 12.9*   HGB 8.0* 8.0* 8.1*   HCT 27.3* 27.7* 28.5*    177 189      BMP   Recent Labs     01/11/20  0535 01/12/20  0525 01/13/20  0548    141 141   K 4.2 4.3 4.1   CL 99 101 103   CO2 34* 30 28   PHOS 2.4* 2.4* 2.2*   BUN 41* 31* 18   CREATININE 1.0 1.0 0.8*       Radiology report reviewed:   CXR  Support lines and tubes are stable.       Hazy right mid lung opacities may represent interstitial pneumonitis or edema.           Electronically signed by Faith Franks MD on 1/13/2020 at 12:41 PM

## 2020-01-13 NOTE — CONSULTS
fluconazole (DIFLUCAN) 150 MG tablet Take 150 mg by mouth See Admin Instructions 01/09/2020 Per facility patient receives this every 14 days      chlorhexidine (PERIDEX) 0.12 % solution Take 15 mLs by mouth 2 times daily      aspirin 81 MG chewable tablet Take 1 tablet by mouth daily (Patient taking differently: 81 mg by Per G Tube route daily ) 30 tablet 3    ipratropium-albuterol (DUONEB) 0.5-2.5 (3) MG/3ML SOLN nebulizer solution Inhale 3 mLs into the lungs every 4 hours (while awake) 360 mL 0    gabapentin (NEURONTIN) 400 MG capsule 400 mg by Per G Tube route 2 times daily.        latanoprost (XALATAN) 0.005 % ophthalmic solution Place 1 drop into both eyes nightly           Objective:      BP (!) 143/89   Pulse 110   Temp 98.2 °F (36.8 °C) (Rectal)   Resp 14   Ht (S) 5' 9\" (1.753 m)   Wt 139 lb 12.4 oz (63.4 kg)   SpO2 99%   BMI 20.64 kg/m²   Jeremy Risk Score: Jeremy Scale Score: 12    LABS    CBC:   Lab Results   Component Value Date    WBC 12.9 01/13/2020    RBC 3.05 01/13/2020    HGB 8.1 01/13/2020    HCT 28.5 01/13/2020    MCV 93.4 01/13/2020    MCH 26.6 01/13/2020    MCHC 28.4 01/13/2020    RDW 15.7 01/13/2020     01/13/2020    MPV 11.5 01/13/2020     CMP:    Lab Results   Component Value Date     01/13/2020    K 4.1 01/13/2020     01/13/2020    CO2 28 01/13/2020    BUN 18 01/13/2020    CREATININE 0.8 01/13/2020    GFRAA >60 01/13/2020    LABGLOM >60 01/13/2020    GLUCOSE 132 01/13/2020    PROT 6.3 01/13/2020    LABALBU 2.9 01/13/2020    LABALBU 2.9 01/13/2020    CALCIUM 8.3 01/13/2020    BILITOT 2.3 01/13/2020    ALKPHOS 109 01/13/2020    AST 41 01/13/2020    ALT 24 01/13/2020     Albumin:    Lab Results   Component Value Date    LABALBU 2.9 01/13/2020    LABALBU 2.9 01/13/2020     PT/INR:    Lab Results   Component Value Date    PROTIME 12.6 04/12/2019    INR 1.09 04/12/2019     HgBA1c:    Lab Results   Component Value Date    LABA1C 6.4 01/10/2020         Assessment: Assessment Pink;Red 1/13/2020  9:30 AM   Drainage Amount None 1/13/2020  9:30 AM   Odor None 1/13/2020  9:30 AM   Number of days: 3       Wound 01/09/20 Right; Outer redness and pressure areas to 4th and 5th toe knuckles  (Active)   Dressing Changed Changed/New 1/13/2020  3:05 AM   Dressing/Treatment Open to air 1/13/2020  9:30 AM   Wound Length (cm) 0 cm 1/13/2020  9:00 AM   Wound Width (cm) 0 cm 1/13/2020  9:00 AM   Wound Depth (cm) 0 cm 1/13/2020  9:00 AM   Wound Surface Area (cm^2) 0 cm^2 1/13/2020  9:00 AM   Wound Volume (cm^3) 0 cm^3 1/13/2020  9:00 AM   Wound Assessment Pink;Red 1/13/2020  9:30 AM   Drainage Amount None 1/13/2020  9:30 AM   Odor None 1/13/2020  9:30 AM   Number of days: 3       Response to treatment:  Well tolerated by patient. Pain Assessment:  Severity:  0  Quality of pain: none  Wound Pain Timing/Severity:   Premedicated: no    Plan:     Plan of Care        Patient has scarring noted to bilateral 4th and 5th toes. Buttocks and sacrum intact sacral border in place and calazime. Heels intact. Pt is at high risk for skin breakdown AEB yamila score. Observe yamila orders. Heels floated pt turned to lt side. Specialty Bed Required : yes  [] Low Air Loss   [x] Pressure Redistribution  [] Fluid Immersion  [] Bariatric  [] Total Pressure Relief  [] Other:     Discharge Plan:  Placement for patient upon discharge: tbd  Hospice Care: no  Patient appropriate for Outpatient [address] [place] [address] Street:  no    Patient/Caregiver Teaching:  Level of patient/caregiver understanding able to: cares explained as given       Electronically signed by Cali Sharpe RN, on 1/13/2020 at 11:56 AM

## 2020-01-13 NOTE — PLAN OF CARE
Care plans to be ongoing
I discussed goals of care with patient's wife Sandy Cummings) at bedside. I explained that patient was extubated earlier today but is now declining again and ABG showed worsening CO2 retention. I explained that he needs to be reintubated, but at the risk of prolonged intubation and potentially needing tracheostomy again. I offered an alternative goal of care which will be to focus on comfort measures. She was okay with just pursuing comfort measures, and not to reintubate patient. She understands that decision not to re intubate will likely result in patient dying. She was okay to change code status to [facility]. I told her I will be consulting hospice team for further management.      Salvador Newby, 1/13/2020, 12:12 PM
Nutrition Problem: Severe malnutrition, In context of chronic illness  Intervention: Food and/or Nutrient Delivery: Continue NPO  Nutritional Goals: pt will have a source of nutrition started in the next 24-48 hours
Problem: Falls - Risk of:  Goal: Will remain free from falls  Description  Will remain free from falls  Outcome: Ongoing  Goal: Absence of physical injury  Description  Absence of physical injury  Outcome: Ongoing     Problem: Pain:  Description  Pain management should include both nonpharmacologic and pharmacologic interventions. Goal: Pain level will decrease  Description  Pain level will decrease  Outcome: Ongoing  Goal: Control of acute pain  Description  Control of acute pain  Outcome: Ongoing  Goal: Control of chronic pain  Description  Control of chronic pain  Outcome: Ongoing     Problem: Restraint Use - Nonviolent/Non-Self-Destructive Behavior:  Goal: Absence of restraint indications  Description  Absence of restraint indications  Outcome: Ongoing  Goal: Absence of restraint-related injury  Description  Absence of restraint-related injury  Outcome: Ongoing     Problem: Risk for Impaired Skin Integrity  Goal: Tissue integrity - skin and mucous membranes  Description  Structural intactness and normal physiological function of skin and  mucous membranes.   Outcome: Ongoing     Problem: Gas Exchange - Impaired:  Goal: Levels of oxygenation will improve  Description  Levels of oxygenation will improve  Outcome: Ongoing     Problem: Skin Integrity:  Goal: Will show no infection signs and symptoms  Description  Will show no infection signs and symptoms  Outcome: Ongoing  Goal: Absence of new skin breakdown  Description  Absence of new skin breakdown  Outcome: Ongoing     Problem: Cardiac Output - Decreased:  Goal: Hemodynamic stability will improve  Description  Hemodynamic stability will improve  Outcome: Ongoing     Problem: Discharge Planning:  Goal: Discharged to appropriate level of care  Description  Discharged to appropriate level of care  Outcome: Ongoing     Problem: Infection, Septic Shock:  Goal: Will show no infection signs and symptoms  Description  Will show no infection signs and
improve  Description  Levels of oxygenation will improve  1/11/2020 0925 by Emeli Thomason RN  Outcome: Ongoing  1/11/2020 0337 by Delio Meléndez RN  Outcome: Ongoing     Problem: Skin Integrity:  Goal: Will show no infection signs and symptoms  Description  Will show no infection signs and symptoms  1/11/2020 0925 by Emeli Thomason RN  Outcome: Ongoing  1/11/2020 0337 by Delio Meléndez RN  Outcome: Ongoing  Goal: Absence of new skin breakdown  Description  Absence of new skin breakdown  1/11/2020 0925 by Emeli Thomason RN  Outcome: Ongoing  1/11/2020 0337 by Delio Meléndez RN  Outcome: Ongoing     Problem: Cardiac Output - Decreased:  Goal: Hemodynamic stability will improve  Description  Hemodynamic stability will improve  1/11/2020 0925 by Emeli Thomason RN  Outcome: Ongoing  1/11/2020 0337 by Delio Meléndez RN  Outcome: Ongoing     Problem: Discharge Planning:  Goal: Discharged to appropriate level of care  Description  Discharged to appropriate level of care  1/11/2020 0925 by Emeli Thomason RN  Outcome: Ongoing  1/11/2020 0337 by Delio Meléndez RN  Outcome: Ongoing     Problem: Infection, Septic Shock:  Goal: Will show no infection signs and symptoms  Description  Will show no infection signs and symptoms  1/11/2020 0925 by Emeli Thomason RN  Outcome: Ongoing  1/11/2020 0337 by Delio Meléndez RN  Outcome: Ongoing     Problem: Infection - Ventilator-Associated Pneumonia:  Goal: Absence of pulmonary infection  Description  Absence of pulmonary infection  1/11/2020 0925 by Emeli Thomason RN  Outcome: Ongoing  1/11/2020 0337 by Delio Meléndez RN  Outcome: Ongoing     Problem: Serum Glucose Level - Abnormal:  Goal: Ability to maintain appropriate glucose levels will improve  Description  Ability to maintain appropriate glucose levels will improve  1/11/2020 0925 by Emeli Thomason RN  Outcome: Ongoing  1/11/2020 0337 by Delio Meléndez RN  Outcome: Ongoing     Problem: Tissue

## 2020-01-13 NOTE — PROGRESS NOTES
pulmonary      SUBJECTIVE:  Intubated on vent     OBJECTIVE    VITALS:  BP (!) 133/55   Pulse 87   Temp 99.9 °F (37.7 °C) (Rectal)   Resp 22   Ht (S) 5' 9\" (1.753 m)   Wt 139 lb 12.4 oz (63.4 kg)   SpO2 98%   BMI 20.64 kg/m²   HEAD AND FACE EXAM:  No throat injection, no active exudate,no thrush  NECK EXAM;No JVD, no masses, symmetrical  CHEST EXAM; Expansion equal and symmetrical, no masses  LUNG EXAM; Good breath sounds bilaterally. There are expiratory wheezes both lungs, there are crackles at both lung bases  CARDIOVASCULAR EXAM: Positive S1 and S2, no S3 or S4, no clicks ,no murmurs  RIGHT AND LEFT LOWER EXTRIMITY EXAM: No edema, no swelling, no inflamation            LABS   Lab Results   Component Value Date    WBC 12.9 (H) 01/13/2020    HGB 8.1 (L) 01/13/2020    HCT 28.5 (L) 01/13/2020    MCV 93.4 01/13/2020     01/13/2020     Lab Results   Component Value Date    CREATININE 0.8 (L) 01/13/2020    BUN 18 01/13/2020     01/13/2020    K 4.1 01/13/2020     01/13/2020    CO2 28 01/13/2020     Lab Results   Component Value Date    INR 1.09 04/12/2019    PROTIME 12.6 (H) 04/12/2019          Lab Results   Component Value Date    PHOS 2.2 01/13/2020    PHOS 2.4 01/12/2020    PHOS 2.4 01/11/2020        Recent Labs     01/11/20  0600 01/12/20  0600 01/13/20  0600   PH 7.46* 7.44 7.39   PO2ART 82 93 69*   JNS3CWV 52.0* 50.0* 53.0*   O2SAT 96.5 96.1 92.8*         Wt Readings from Last 3 Encounters:   01/13/20 139 lb 12.4 oz (63.4 kg)   07/21/19 150 lb (68 kg)   04/15/19 160 lb 0.9 oz (72.6 kg)               ASSESMENT  Ac resp failure  Pneumonia  Ac bronchospasm  Mild pulm htn        PLAN  1. cpm  2. Will extubate and see response  3.  Will start duoneb every 4 hours    1/13/2020  Maikol Gaffney M.D.

## 2020-01-14 NOTE — PLAN OF CARE
Problem: Falls - Risk of:  Goal: Will remain free from falls  Description  Will remain free from falls  Outcome: Ongoing  Goal: Absence of physical injury  Description  Absence of physical injury  Outcome: Ongoing     Problem: Risk for Impaired Skin Integrity  Goal: Tissue integrity - skin and mucous membranes  Description  Structural intactness and normal physiological function of skin and  mucous membranes.   Outcome: Ongoing     Problem: Coping:  Goal: Ability to participate in care decisions during the dying process will improve  Description  Ability to participate in care decisions during the dying process will improve  Outcome: Ongoing  Goal: Family's ability to cope with current situation will improve  Description  Family's ability to cope with current situation will improve  Outcome: Ongoing     Problem: Health Behavior:  Goal: Identification of resources available to assist in meeting health care needs will improve  Description  Identification of resources available to assist in meeting health care needs will improve  Outcome: Ongoing     Problem: Physical Regulation:  Goal: Complications related to the disease process, condition or treatment will be avoided or minimized  Description  Complications related to the disease process, condition or treatment will be avoided or minimized  Outcome: Ongoing     Problem: Respiratory:  Goal: Verbalizations of increased ease of respirations will increase  Description  Verbalizations of increased ease of respirations will increase  Outcome: Ongoing     Problem: Role Relationship:  Goal: The number of positive interactions the caregiver has with the patient will increase  Description  The number of positive interactions the caregiver has with the patient will increase  Outcome: Ongoing     Problem: Sensory:  Goal: Expressions of feelings of enhanced comfort will increase  Description  Expressions of feelings of enhanced comfort will increase  Outcome: Ongoing

## 2020-01-14 NOTE — H&P
[address] [place] [address]    Date: 1/13/2020  Name: Demetri Reynaga  MRN: [id number]  YOB: 1933   Patient's PCP: Yessi Colby MD at [facility][de-identified] Dr. Zee Springer care admission: 1/9 to 1/13/2020  Intubation and mechanical ventilation: 1/9 to 1/13/2020    Informant: Chart reviewed, discussed with the patient's nurse, the hospice nurse liaison, and I met with the patient's son, Randall Silva, and daughter in law at the bedside. The patient is unable to provide any information due to his clinical status. CC:  Respiratory failure    [place]: This is a 80year old patient, with a history of pulmonary hypertension, Type 2 diabetes mellitus, severe protein calorie malnutrition with involuntary weight loss, past respiratory failure in March - April 2019 post Influenza A and pneumonitis, with subsequent tracheostomy and PEG and transfer to [place] [address] and then to [facility] where he has remained. He improved some per the patient's son, and visited home at [place]. The patient developed respiratory distress at the Nursing Home and was sent to the ED  on 1/9/2020 where he was intubated for acute hypoxic and hypercapneic respiratory failure with [address] Acquired pneumonia (vulture negative and viral respiratory panel negative). The patient has had broad spectrum antibiotics that have been de- escalated to Cefepime, nebulized bronchodilators and supportive care. He has been followed by Pulmonary and Cardiology. The patient was extubated on 1/13/20 and initially was talking, but quickly declined with progressive hypoxia. ABG showed respiratory acidosis with 7.10/107/138/33/96% on 3.5 lpm oxygen. Dr. Xavier Cardoso discussed the situation with the patient's spouse, who has decided that the patient would not be re intubated. Comfort care was discussed, and hospice was asked to meet with the patient's family.  The the patient's spouse had to leave, and I met with the patient's son, Randall Silva and his wife. They are familiar with hospice from care provided to other family members. The patient is obtunded, sat is 72%, his breathing is very shallow. Hospice philosophy was discussed regarding care and comfort at the end of life. Questions were answered, and emotional support was provided. They are aware that [address] [place] [address] is for the acute management of symptoms, and if the patient stabilizes, alternative arrangements for home Hospice or extended care facility will be necessary. The patient is DNR-comfort care status.     Past Medical History:   Diagnosis Date    [facility] mellitus [facility]     Hypertension     Respiratory failure ([facility] [address] 03/2019    Influenza A with pneumonitis        Past Surgical History:   Procedure Laterality Date    THORACENTESIS Bilateral 04/11/2019    IR    TRACHEOTOMY N/A 4/12/2019    PERCUTANEOUS TRACHEOTOMY WITH PLACEMENT CONFIRMED BY BRONCHOSCOPE, PEG TUBE PLACEMENT performed by Anam Ramirez MD at [place] [address] N/A 4/4/2019    EGD DIAGNOSTIC ONLY performed by iFlipe Webb MD at [address] [address] History     Socioeconomic History    Marital status:      Spouse name: Not on file    Number of children: 3    Years of education: Not on file    Highest education level: Not on file   Occupational History    Occupation: RETIRED     Comment: senior living in [address] [place] [address] resource strain: Not on file    Food insecurity:     Worry: Not on file     Inability: Not on file   Muut needs:     Medical: Not on file     Non-medical: Not on file   Tobacco Use    Smoking status: Former Smoker    Smokeless tobacco: Never Used   Substance and Sexual Activity    Alcohol use: Never     Frequency: Never    Drug use: Never    Sexual activity: Not Currently   Lifestyle    Physical activity:     Days per week: Not on file     Minutes per session: Not on file    Stress: Not on file   Relationships    Social connections:     Talks on phone: Not on file     Gets together: Not on file     Attends Church service: Not on file     Active member of club or organization: Not on file     Attends meetings of clubs or organizations: Not on file     Relationship status: Not on file    Intimate partner violence:     Fear of current or ex partner: Not on file     Emotionally abused: Not on file     Physically abused: Not on file     Forced sexual activity: Not on file   Other Topics Concern    Not on file   Social History Narrative    Not on file       No family history on file. (the patient's son does not know family health history except + for diabetes)    No Known Allergies    Medications reviewed  Prior to Admission medications    Medication Sig Start Date End Date Taking?  Authorizing Provider   acetaminophen (TYLENOL) 325 MG tablet 650 mg by Per G Tube route every 4 hours as needed for Pain    Historical Provider, MD   atorvastatin (LIPITOR) 10 MG tablet 10 mg by Per G Tube route nightly    Historical Provider, MD   levofloxacin (LEVAQUIN) 500 MG tablet 500 mg by Per G Tube route daily    Historical Provider, MD   hyoscyamine (LEVSIN) 125 MCG tablet 125 mcg by Per G Tube route 2 times daily    Historical Provider, MD   polyethylene glycol (GLYCOLAX) powder 17 g by Per G Tube route daily    Historical Provider, MD   ranitidine (ZANTAC) 150 MG tablet 150 mg by Per G Tube route 2 times daily    Historical Provider, MD   fluconazole (DIFLUCAN) 150 MG tablet Take 150 mg by mouth See Admin Instructions 01/09/2020 Per facility patient receives this every 14 days    Historical Provider, MD   chlorhexidine (PERIDEX) 0.12 % solution Take 15 mLs by mouth 2 times daily    Historical Provider, MD   aspirin 81 MG chewable tablet Take 1 tablet by mouth daily  Patient taking differently: 81 mg by Per G Tube route daily  4/17/19   Terese oGoden MD ipratropium-albuterol (DUONEB) 0.5-2.5 (3) MG/3ML SOLN nebulizer solution Inhale 3 mLs into the lungs every 4 hours (while awake) 4/16/19   Denny Aburto MD   gabapentin (NEURONTIN) 400 MG capsule 400 mg by Per G Tube route 2 times daily. Historical Provider, MD   latanoprost (XALATAN) 0.005 % ophthalmic solution Place 1 drop into both eyes nightly    Historical Provider, MD       ROS: As noted in [address], all other systems are unobtainable due to the patient's clinical condition    Weight:    Wt Readings from Last 3 Encounters:   01/13/20 139 lb 12.4 oz (63.4 kg)   07/21/19 150 lb (68 kg)   04/15/19 160 lb 0.9 oz (72.6 kg)       Data reviewed 1/13/2020:  Chest X-ray  1/13/20  Monitor wires project over the thorax. ETT tip 5.3 cm above the cyndi. Enteric catheter courses below the diaphragm. Left lung is clear hazy right  mid lung opacities. No significant pleural effusion or pneumothorax. Heart  size and mediastinal contours are stable.      ABG:  Recent Labs     01/12/20  0600 01/13/20  0600 01/13/20  1145   PH 7.44 7.39 7.10*   PO2ART 93 69* 138*   ZEA3VDO 50.0* 53.0* 107.0*   O2SAT 96.1 92.8* 96.4         Recent Labs     01/11/20  0535 01/12/20  0525 01/13/20  0548   AST 15 31 41*   ALT 10 20 24   BILIDIR 0.3 0.8* 1.7*   BILITOT 0.8 1.4* 2.3*   ALKPHOS 81 90 109     Lab Results   Component Value Date    ALKPHOS 109 01/13/2020    ALT 24 01/13/2020    AST 41 01/13/2020    PROT 6.3 01/13/2020    BILITOT 2.3 01/13/2020    BILIDIR 1.7 01/13/2020    IBILI 0.6 01/13/2020    LABALBU 2.9 01/13/2020    LABALBU 2.9 01/13/2020     CBC:   Recent Labs     01/11/20  0535 01/12/20  0525 01/13/20  0548   WBC 10.7* 8.8 12.9*   HGB 8.0* 8.0* 8.1*   HCT 27.3* 27.7* 28.5*   MCV 91.6 93.0 93.4    177 189     BMP:   Recent Labs     01/11/20  0535 01/12/20  0525 01/13/20  0548    141 141   K 4.2 4.3 4.1   CL 99 101 103   CO2 34* 30 28   BUN 41* 31* 18   CREATININE 1.0 1.0 0.8*   GLUCOSE 190* 144* 132*

## 2020-01-14 NOTE — PLAN OF CARE
Problem: Falls - Risk of:  Goal: Will remain free from falls  Description  Will remain free from falls  1/14/2020 1557 by Deena Schlatter, RN  Outcome: Ongoing     Problem: Falls - Risk of:  Goal: Absence of physical injury  Description  Absence of physical injury  1/14/2020 1557 by Deena Schlatter, RN  Outcome: Ongoing     Problem: Risk for Impaired Skin Integrity  Goal: Tissue integrity - skin and mucous membranes  Description  Structural intactness and normal physiological function of skin and  mucous membranes.   1/14/2020 1557 by Deena Schlatter, RN  Outcome: Ongoing     Problem: Coping:  Goal: Ability to participate in care decisions during the dying process will improve  Description  Ability to participate in care decisions during the dying process will improve  1/14/2020 1557 by Deena Schlatter, RN  Outcome: Ongoing     Problem: Coping:  Goal: Family's ability to cope with current situation will improve  Description  Family's ability to cope with current situation will improve  1/14/2020 1557 by Deena Schlatter, RN  Outcome: Ongoing     Problem: Health Behavior:  Goal: Identification of resources available to assist in meeting health care needs will improve  Description  Identification of resources available to assist in meeting health care needs will improve  1/14/2020 1557 by Deena Schlatter, RN  Outcome: Ongoing     Problem: Physical Regulation:  Goal: Complications related to the disease process, condition or treatment will be avoided or minimized  Description  Complications related to the disease process, condition or treatment will be avoided or minimized  1/14/2020 1557 by Deena Schlatter, RN  Outcome: Ongoing     Problem: Respiratory:  Goal: Verbalizations of increased ease of respirations will increase  Description  Verbalizations of increased ease of respirations will increase  1/14/2020 1557 by Deena Schlatter, RN  Outcome: Ongoing     Problem: Role Relationship:  Goal: The number of positive interactions the caregiver has with the patient will increase  Description  The number of positive interactions the caregiver has with the patient will increase  1/14/2020 1557 by Sidra Pratt RN  Outcome: Ongoing     Problem: Sensory:  Goal: Expressions of feelings of enhanced comfort will increase  Description  Expressions of feelings of enhanced comfort will increase  1/14/2020 1557 by Sidra Pratt RN  Outcome: Ongoing     Problem: Skin Integrity:  Goal: Risk for impaired skin integrity will decrease  Description  Risk for impaired skin integrity will decrease  1/14/2020 1557 by Sidra Pratt RN  Outcome: Ongoing

## 2020-01-15 NOTE — PLAN OF CARE
Problem: Falls - Risk of:  Goal: Will remain free from falls  Description  Will remain free from falls  1/15/2020 0301 by Jj Lopez RN  Outcome: Ongoing  1/14/2020 1557 by Russel Causey RN  Outcome: Ongoing  Goal: Absence of physical injury  Description  Absence of physical injury  1/15/2020 0301 by Jj Lopez RN  Outcome: Ongoing  1/14/2020 1557 by Russel Causey RN  Outcome: Ongoing     Problem: Risk for Impaired Skin Integrity  Goal: Tissue integrity - skin and mucous membranes  Description  Structural intactness and normal physiological function of skin and  mucous membranes.   1/15/2020 0301 by Jj Lopez RN  Outcome: Ongoing  1/14/2020 1557 by Russel Causey RN  Outcome: Ongoing     Problem: Coping:  Goal: Ability to participate in care decisions during the dying process will improve  Description  Ability to participate in care decisions during the dying process will improve  1/15/2020 0301 by Jj Lopez RN  Outcome: Ongoing  1/14/2020 1557 by Russel Causey RN  Outcome: Ongoing  Goal: Family's ability to cope with current situation will improve  Description  Family's ability to cope with current situation will improve  1/14/2020 1557 by Russel Causey RN  Outcome: Ongoing     Problem: Health Behavior:  Goal: Identification of resources available to assist in meeting health care needs will improve  Description  Identification of resources available to assist in meeting health care needs will improve  1/14/2020 1557 by Russel Causey RN  Outcome: Ongoing     Problem: Physical Regulation:  Goal: Complications related to the disease process, condition or treatment will be avoided or minimized  Description  Complications related to the disease process, condition or treatment will be avoided or minimized  1/14/2020 1557 by Russel Causey RN  Outcome: Ongoing     Problem: Respiratory:  Goal: Verbalizations of increased ease of respirations will increase  Description  Verbalizations of increased ease of respirations will increase  1/14/2020 1557 by López Saucedo RN  Outcome: Ongoing     Problem: Role Relationship:  Goal: The number of positive interactions the caregiver has with the patient will increase  Description  The number of positive interactions the caregiver has with the patient will increase  1/14/2020 1557 by López Saucedo RN  Outcome: Ongoing     Problem: Sensory:  Goal: Expressions of feelings of enhanced comfort will increase  Description  Expressions of feelings of enhanced comfort will increase  1/14/2020 1557 by López Saucedo RN  Outcome: Ongoing     Problem: Skin Integrity:  Goal: Risk for impaired skin integrity will decrease  Description  Risk for impaired skin integrity will decrease  1/15/2020 0301 by James Stoddard RN  Outcome: Ongoing  1/14/2020 1557 by López Saucedo RN  Outcome: Ongoing

## 2020-01-15 NOTE — DISCHARGE SUMMARY
[address] [place] [address]    Death Summary    Date: 1/15/2020  Name: Omar George  MRN: [id number]  YOB: 1933     Patient's PCP: Ly Gaspar MD  Admit Date: 1/13/2020 to [address] [place] [address]  Date of Death: 1/15/2020  Time: 6883  Acute care admission: 1/9 to 1/13/2020  Intubation and mechanical ventilation: 1/9 to 1/13/2020  Admitting Physician: Jia Ac MD to [address] [place] [address]  Discharge Physician: Neil Dias MD  Consultation: none during General Inpatient Hospice stay    Invasive procedures: none during General Inpatient Hospice stay    Discharge Diagnoses:  1. Recurrent hypoxic and hypercapneic respiratory failure with respiratory acidosis with encephalopathy  2. Sepsis and [address] Acquired pneumonia  3. Metabolic encephalopathy secondary to the above  4. Pulmonary hypertension  5. Severe protein calorie malnutrition with involuntary weight loss of 24 pounds in 4 months  6. Type 2 diabetes mellitus      Patient Active Problem List   Diagnosis Code    Pneumonia J18.9    Essential hypertension I10    Type 2 diabetes mellitus with diabetic polyneuropathy, without long-term current use of insulin ([facility]) E11.42    KERA (acute kidney injury) ([facility] N17.9    Oropharyngeal dysphagia R13.12    Fatigue R53.83    Severe malnutrition (HCC) E43    Acute on chronic respiratory failure (HCC) J96.20    Central apnea R06.81    Serum creatinine raised R79.89    Troponin level elevated X44.73    Metabolic encephalopathy H70.53    Acute respiratory failure (HCC) J96.00    Respiratory failure with hypoxia ([facility]) J96.91       Brief History, reason for admission: Please see the acute care H+P, discharge summary, consultants notes, and my notes. The patient was admitted to [address] [place] [address].        This is a 80year old patient, with a history of pulmonary hypertension, Type 2 diabetes mellitus, severe protein calorie malnutrition with involuntary weight loss, computerized record in [address] MD, [facility]   1/15/2020

## 2020-01-15 NOTE — PROGRESS NOTES
[address] [place] [address]  General Inpatient Hospice Progress Note    Date: 1/15/2020  Name: Noah Dinero  MRN: [id number]  YOB: 1933   Patient's PCP: Josseline Cueva MD at [address] [place] [address]   Acute care admission: 1/9 to 1/13/2020  Intubation and mechanical ventilation: 1/9 to 1/13/2020  Admit Date: 1/13/2020 to General Inpatient Hospice    Subjective: The patient remains unresponsive, and appears comfortable without furrowed brow, respirations are shallow, with mild upper airway noise. No family are here. Objective:   Pain is managed with prn IV Morphine with 0 doses in the past 24 hours, Glycopyrrolate IV is available for secretions, and Lorazepam is available for anxiety. Data reviewed 1/15/2020:  Chest X-ray  1/13/20  Monitor wires project over the thorax.  ETT tip 5.3 cm above the cyndi.   Enteric catheter courses below the diaphragm.  Left lung is clear hazy right  mid lung opacities.  No significant pleural effusion or pneumothorax.  Heart  size and mediastinal contours are stable.      ABG:        Recent Labs     01/12/20  0600 01/13/20  0600 01/13/20  1145   PH 7.44 7.39 7.10*   PO2ART 93 69* 138*   JJL2HTE 50.0* 53.0* 107.0*   O2SAT 96.1 92.8* 96.4                  Recent Labs     01/11/20  0535 01/12/20  0525 01/13/20  0548   AST 15 31 41*   ALT 10 20 24   BILIDIR 0.3 0.8* 1.7*   BILITOT 0.8 1.4* 2.3*   ALKPHOS 81 90 109            Lab Results   Component Value Date     ALKPHOS 109 01/13/2020     ALT 24 01/13/2020     AST 41 01/13/2020     PROT 6.3 01/13/2020     BILITOT 2.3 01/13/2020     BILIDIR 1.7 01/13/2020     IBILI 0.6 01/13/2020     LABALBU 2.9 01/13/2020     LABALBU 2.9 01/13/2020      CBC:         Recent Labs     01/11/20  0535 01/12/20  0525 01/13/20  0548   WBC 10.7* 8.8 12.9*   HGB 8.0* 8.0* 8.1*   HCT 27.3* 27.7* 28.5*   MCV 91.6 93.0 93.4    177 189      BMP:         Recent Labs     01/11/20  0535 01/12/20  0525 01/13/20  0548    141 141   K 4.2
1850  Pt transferred to room 4128 via bed with portable oxygen. Pt remains unresponsive. Belongings of pair of socks taken. No distress noted.   Report updated to RN upon arrival.
R79.89    Troponin level elevated F50.32    Metabolic encephalopathy V57.97    Acute respiratory failure (HCC) J96.00    Respiratory failure with hypoxia (HCC) J96.91       JOSELITO Tam MD, [facility]  1/14/2020

## 2024-10-09 NOTE — PROGRESS NOTES
History and Physical examination  Will proceed with colonoscopy under SONYA ALCAZAR MD    Blood gas, arterial    Collection Time: 04/11/19  6:00 AM   Result Value Ref Range    pH, Bld 7.47 (H) 7.34 - 7.45    pCO2, Arterial 45.0 32 - 45 MMHG    pO2, Arterial 48 (L) 75 - 100 MMHG    Base Exc, Mixed 8  PLUS   (H) 0 - 1.2    HCO3, Arterial 32.8 (H) 18 - 23 MMOL/L    CO2 Content 34.2 (H) 19 - 24 MMOL/L    O2 Sat 84.6 (L) 96 - 97 %    Carbon Monoxide, Blood 1.9 0 - 5 %    Methemoglobin, Arterial 1.1 <1.5 %    Comment  AC14 500 .30 P5    POCT Glucose    Collection Time: 04/11/19  9:53 AM   Result Value Ref Range    POC Glucose 229 (H) 70 - 99 MG/DL   POCT Glucose    Collection Time: 04/11/19 11:52 AM   Result Value Ref Range    POC Glucose 165 (H) 70 - 99 MG/DL   POCT Glucose    Collection Time: 04/11/19  4:48 PM   Result Value Ref Range    POC Glucose 125 (H) 70 - 99 MG/DL   POCT Glucose    Collection Time: 04/11/19  9:11 PM   Result Value Ref Range    POC Glucose 226 (H) 70 - 99 MG/DL   Basic metabolic panel    Collection Time: 04/12/19  4:50 AM   Result Value Ref Range    Sodium 146 (H) 135 - 145 MMOL/L    Potassium 3.8 3.5 - 5.1 MMOL/L    Chloride 103 99 - 110 mMol/L    CO2 35 (H) 21 - 32 MMOL/L    Anion Gap 8 4 - 16    BUN 19 6 - 23 MG/DL    CREATININE 0.8 (L) 0.9 - 1.3 MG/DL    Glucose 116 (H) 70 - 99 MG/DL    Calcium 8.1 (L) 8.3 - 10.6 MG/DL    GFR Non-African American >60 >60 mL/min/1.73m2    GFR African American >60 >60 mL/min/1.73m2   CBC    Collection Time: 04/12/19  4:50 AM   Result Value Ref Range    WBC 7.0 4.0 - 10.5 K/CU MM    RBC 2.46 (L) 4.6 - 6.2 M/CU MM    Hemoglobin 7.0 (L) 13.5 - 18.0 GM/DL    Hematocrit 23.9 (L) 42 - 52 %    MCV 97.2 78 - 100 FL    MCH 28.5 27 - 31 PG    MCHC 29.3 (L) 32.0 - 36.0 %    RDW 16.8 (H) 11.7 - 14.9 %    Platelets 773 865 - 422 K/CU MM    MPV 10.1 7.5 - 11.1 FL   Protime/INR & PTT    Collection Time: 04/12/19  4:50 AM   Result Value Ref Range    Protime 12.6 (H) 9.12 - 12.5 SECONDS    INR 1.09 INDEX    aPTT 34.2 (H) 21.2 - 33.0 SECONDS   Blood gas, arterial Collection Time: 04/12/19  6:00 AM   Result Value Ref Range    pH, Bld 7.49 (H) 7.34 - 7.45    pCO2, Arterial 48.0 (H) 32 - 45 MMHG    pO2, Arterial 79 75 - 100 MMHG    Base Exc, Mixed 11.5  PLUS   (H) 0 - 1.2    HCO3, Arterial 36.6 (H) 18 - 23 MMOL/L    CO2 Content 38.1 (H) 19 - 24 MMOL/L    O2 Sat 94.8 (L) 96 - 97 %    Carbon Monoxide, Blood 2.4 0 - 5 %    Methemoglobin, Arterial 1.6 (H) <1.5 %    Comment AC/14/500/30%FIO2/5PEEP        Scheduled Meds:   chlorhexidine  15 mL Mouth/Throat BID    pantoprazole  40 mg Intravenous Daily    And    sodium chloride (PF)  10 mL Intravenous Daily    midodrine  10 mg Oral TID WC    sodium chloride flush  10 mL Intravenous 2 times per day    [Held by provider] clopidogrel  75 mg Oral Daily    enoxaparin  40 mg Subcutaneous Daily    [Held by provider] gabapentin  400 mg Oral BID    latanoprost  1 drop Both Eyes Nightly    simvastatin  40 mg Oral Nightly    sodium chloride flush  10 mL Intravenous 2 times per day    aspirin  81 mg Oral Daily    ipratropium-albuterol  1 ampule Inhalation Q4H WA    insulin lispro  0-12 Units Subcutaneous TID WC    insulin lispro  0-6 Units Subcutaneous Nightly    insulin glargine  5 Units Subcutaneous Nightly     Continuous Infusions:   propofol 20 mcg/kg/min (04/11/19 1830)    sodium chloride 75 mL/hr at 04/11/19 1830    dextrose         Physical Exam:  Constitutional:    Vitals:    04/12/19 0447 04/12/19 0502 04/12/19 0517 04/12/19 0532   BP: 113/68 (!) 113/57 116/60 117/62   Pulse: 84 82 84 85   Resp: 14 14 14 16   Temp:       TempSrc:       SpO2: 100%      Weight:       Height:       , patient awake, alert. HEENT: Anicteric sclerae, pupils reactive. Oropharyngeal mucosae moist, pink and intact. Heart:  Normal S1 and S2, RRR. Lungs: Clear to auscultation bilaterally, No audible Wheezes or Rales. No dyspnea. Extremities: No edema.   Neuro: Alert and Oriented x 3, Non focal.   Skin: No jaundice, turgor normal.  Abdomen: Soft, Benign, Non tender, Non distended, Positive bowel sounds. Principal Problem:    NSTEMI (non-ST elevated myocardial infarction) ([address]  Active Problems:    Fatigue    Severe malnutrition (HCC)    Acute on chronic respiratory failure (HCC)    Central apnea  Resolved Problems:    * No resolved hospital problems. *      Assessment and Plan:  Jael Lee is a 80 y.o. male with:  Patient presenting with respiratroy failure, in schedule today for Trach and PEG. Increase Ambulation to at least 4x/day walk in the hallways with assistance. Respiratory rachel-operative care: Incentive Spirometry / deep breathing and coughing 10x/hr while awake. Continue DVT prophylaxis with Teds and SCDs and SC Lovenox. Continue GI prophylaxis with Protonix IV till able to tolerate PO.    ___________________________________________    Ildefonso Petty MD, FACS, FICS  4/12/2019  6:31 AM    Patient was seen with total face to face time of 25 minutes. More than 50% of this visit was counseling and education as above in my assessment and plan section of my note.

## (undated) DEVICE — DRAINBAG,ANTI-REFLUX TOWER,L/F,2000ML,LL: Brand: MEDLINE

## (undated) DEVICE — PENCIL ES CRD L10FT HND SWCHING ROCK SWCH W/ EDGE COAT BLDE

## (undated) DEVICE — GLOVE ORANGE PI 7 1/2   MSG9075

## (undated) DEVICE — DRAPE THYROID

## (undated) DEVICE — Device

## (undated) DEVICE — SPONGE DRN W4XL4IN RAYON/POLYESTER 6 PLY NONWOVEN PRECUT

## (undated) DEVICE — BLADE CLIPPER GEN PURP NS

## (undated) DEVICE — ANESTHESIA CIRCUIT ADULT-LF: Brand: MEDLINE INDUSTRIES, INC.

## (undated) DEVICE — CORD ES L15FT PT RET REUSE VALLEYLAB REM

## (undated) DEVICE — TOWEL,OR,DSP,ST,BLUE,STD,6/PK,12PK/CS: Brand: MEDLINE

## (undated) DEVICE — TAPE,CLOTH/SILK,CURAD,3"X10YD,LF,40/CS: Brand: CURAD

## (undated) DEVICE — GLOVE ORANGE PI 7   MSG9070

## (undated) DEVICE — GOWN,SIRUS,FABRNF,RAGLAN,XL,ST,28/CS: Brand: MEDLINE

## (undated) DEVICE — INTEGRA® JARIT® DEBAKEY VASCULAR FORCEPS, 5-7/8", CARDIO-GRIP, TISSUE, STRAIGHT, 2MM WIDE: Brand: INTEGRA® JARIT®

## (undated) DEVICE — YANKAUER,FLEXIBLE HANDLE,REGLR CAPACITY: Brand: MEDLINE INDUSTRIES, INC.

## (undated) DEVICE — MITT SURG PREP L ADH DISPOSABLE

## (undated) DEVICE — SYRINGE, LUER LOCK, 10ML: Brand: MEDLINE

## (undated) DEVICE — GOWN,SIRUS,POLYRNF,BRTHSLV,XLN/XL,20/CS: Brand: MEDLINE

## (undated) DEVICE — SPONGE LAP W18XL18IN WHT COT 4 PLY FLD STRUNG RADPQ DISP ST

## (undated) DEVICE — SOLUTION IV IRRIG POUR BRL 0.9% SODIUM CHL 2F7124

## (undated) DEVICE — GLOVE SURG SZ 75 L12IN FNGR THK87MIL WHT LTX FREE

## (undated) DEVICE — SUTURE PROL SZ 2-0 L30IN NONABSORBABLE BLU L26MM SH 1/2 CIR 8833H

## (undated) DEVICE — INTENDED FOR TISSUE SEPARATION, AND OTHER PROCEDURES THAT REQUIRE A SHARP SURGICAL BLADE TO PUNCTURE OR CUT.: Brand: BARD-PARKER ® STAINLESS STEEL BLADES

## (undated) DEVICE — CIAGLIA BLUE RHINO G2 ADVANCED PERCUTANEOUS TRACHEOSTOMY INTRODUCER TRAY: Brand: CIAGLIA BLUE RHINO G2

## (undated) DEVICE — TUBING, SUCTION, 3/16" X 10', STRAIGHT: Brand: MEDLINE

## (undated) DEVICE — CHLORAPREP 26ML ORANGE

## (undated) DEVICE — LINER SUCT CANSTR 1500CC SEMI RIG W/ POR HYDROPHOBIC SHUT

## (undated) DEVICE — GLOVE SURG SZ 7 L12IN FNGR THK87MIL WHT LTX FREE

## (undated) DEVICE — Z DISCONTINUED (USE MFG CAT MVABO)  TUBING GAS SAMPLING STD 6.5 FT FEMALE CONN SMRT CAPNOLINE

## (undated) DEVICE — SOLUTION IV IRRIG WATER 1000ML POUR BRL 2F7114

## (undated) DEVICE — COUNTER NDL 60 COUNT FOAM STRP SGL MAG

## (undated) DEVICE — TUBING, SUCTION, 9/32" X 10', STRAIGHT: Brand: MEDLINE

## (undated) DEVICE — SUTURE PROL SZ 0 L18IN NONABSORBABLE BLU L36MM CT-1 1/2 CIR C821G

## (undated) DEVICE — CATHETER SUCT 14FR BVL TIP SGL W/ CTRL PRT STR PK AIRLFE TR

## (undated) DEVICE — LINER,SEMI-RIGID,3000CC,50EA/CS: Brand: MEDLINE

## (undated) DEVICE — GAUZE,SPONGE,4"X4",16PLY,XRAY,STRL,LF: Brand: MEDLINE

## (undated) DEVICE — NEEDLE HYPO 23GA L1.5IN TURQ POLYPR HUB S STL THN WALL IM

## (undated) DEVICE — DRAPE SHEET ULTRAGARD: Brand: MEDLINE

## (undated) DEVICE — GLOVE SURG SZ 65 L12IN FNGR THK87MIL WHT LTX FREE

## (undated) DEVICE — Z DISCONTINUED BY MEDLINE USE 2280062 TUBE TRACH SZ 8 L79MM OD12.2MM ID7.6MM CUF DISP INNR CANN

## (undated) DEVICE — ELECTRODE ES AD CRDLSS PT RET REM POLYHESIVE

## (undated) DEVICE — PACK,BASIC,IX: Brand: MEDLINE